# Patient Record
Sex: FEMALE | Race: WHITE | Employment: OTHER | URBAN - NONMETROPOLITAN AREA
[De-identification: names, ages, dates, MRNs, and addresses within clinical notes are randomized per-mention and may not be internally consistent; named-entity substitution may affect disease eponyms.]

---

## 2017-08-25 ENCOUNTER — APPOINTMENT (OUTPATIENT)
Dept: GENERAL RADIOLOGY | Age: 77
DRG: 180 | End: 2017-08-25
Attending: HOSPITALIST
Payer: MEDICARE

## 2017-08-25 ENCOUNTER — HOSPITAL ENCOUNTER (INPATIENT)
Age: 77
LOS: 15 days | Discharge: HOME HEALTH | DRG: 180 | End: 2017-09-09
Attending: HOSPITALIST | Admitting: HOSPITALIST
Payer: MEDICARE

## 2017-08-25 DIAGNOSIS — I48.0 PAROXYSMAL ATRIAL FIBRILLATION (HCC): Primary | ICD-10-CM

## 2017-08-25 LAB
ALBUMIN SERPL-MCNC: 3 G/DL (ref 3.5–5.2)
ALP BLD-CCNC: 101 U/L (ref 35–104)
ALT SERPL-CCNC: 8 U/L (ref 5–33)
ANION GAP SERPL CALCULATED.3IONS-SCNC: 9 MMOL/L (ref 7–19)
AST SERPL-CCNC: 12 U/L (ref 5–32)
BASE EXCESS ARTERIAL: 1.7 MMOL/L (ref -2–2)
BASOPHILS ABSOLUTE: 0.1 K/UL (ref 0–0.2)
BASOPHILS RELATIVE PERCENT: 0.7 % (ref 0–1)
BILIRUB SERPL-MCNC: 0.3 MG/DL (ref 0.2–1.2)
BUN BLDV-MCNC: 12 MG/DL (ref 8–23)
CALCIUM SERPL-MCNC: 9 MG/DL (ref 8.8–10.2)
CARBOXYHEMOGLOBIN ARTERIAL: 8.5 % (ref 0–5)
CHLORIDE BLD-SCNC: 102 MMOL/L (ref 98–111)
CO2: 28 MMOL/L (ref 22–29)
CREAT SERPL-MCNC: 0.8 MG/DL (ref 0.5–0.9)
EOSINOPHILS ABSOLUTE: 2.6 K/UL (ref 0–0.6)
EOSINOPHILS RELATIVE PERCENT: 14.3 % (ref 0–5)
GFR NON-AFRICAN AMERICAN: >60
GLUCOSE BLD-MCNC: 144 MG/DL (ref 70–99)
GLUCOSE BLD-MCNC: 93 MG/DL (ref 74–109)
HCO3 ARTERIAL: 29.4 MMOL/L (ref 22–26)
HCT VFR BLD CALC: 48.1 % (ref 37–47)
HEMOGLOBIN, ART, EXTENDED: 15.9 G/DL (ref 12–16)
HEMOGLOBIN: 15.6 G/DL (ref 12–16)
LYMPHOCYTES ABSOLUTE: 2 K/UL (ref 1.1–4.5)
LYMPHOCYTES RELATIVE PERCENT: 11.5 % (ref 20–40)
MAGNESIUM: 2.2 MG/DL (ref 1.6–2.4)
MCH RBC QN AUTO: 33.1 PG (ref 27–31)
MCHC RBC AUTO-ENTMCNC: 32.4 G/DL (ref 33–37)
MCV RBC AUTO: 101.9 FL (ref 81–99)
METHEMOGLOBIN ARTERIAL: 0.9 %
MONOCYTES ABSOLUTE: 1.2 K/UL (ref 0–0.9)
MONOCYTES RELATIVE PERCENT: 6.7 % (ref 0–10)
NEUTROPHILS ABSOLUTE: 11.8 K/UL (ref 1.5–7.5)
NEUTROPHILS RELATIVE PERCENT: 66.2 % (ref 50–65)
O2 CONTENT ARTERIAL: 16.7 ML/DL
O2 SAT, ARTERIAL: 75.1 %
O2 THERAPY: ABNORMAL
PCO2 ARTERIAL: 57 MMHG (ref 35–45)
PDW BLD-RTO: 16.9 % (ref 11.5–14.5)
PERFORMED ON: ABNORMAL
PH ARTERIAL: 7.32 (ref 7.35–7.45)
PLATELET # BLD: 277 K/UL (ref 130–400)
PMV BLD AUTO: 8.9 FL (ref 9.4–12.3)
PO2 ARTERIAL: 40 MMHG (ref 80–100)
POTASSIUM SERPL-SCNC: 4.2 MMOL/L (ref 3.5–5)
POTASSIUM, WHOLE BLOOD: 4.1
PRO-BNP: 1933 PG/ML (ref 0–1800)
RBC # BLD: 4.72 M/UL (ref 4.2–5.4)
SODIUM BLD-SCNC: 139 MMOL/L (ref 136–145)
TOTAL PROTEIN: 7.2 G/DL (ref 6.6–8.7)
WBC # BLD: 17.8 K/UL (ref 4.8–10.8)

## 2017-08-25 PROCEDURE — 82803 BLOOD GASES ANY COMBINATION: CPT

## 2017-08-25 PROCEDURE — G8998 SWALLOW D/C STATUS: HCPCS

## 2017-08-25 PROCEDURE — 2580000003 HC RX 258: Performed by: HOSPITALIST

## 2017-08-25 PROCEDURE — G8997 SWALLOW GOAL STATUS: HCPCS

## 2017-08-25 PROCEDURE — 84132 ASSAY OF SERUM POTASSIUM: CPT

## 2017-08-25 PROCEDURE — 80053 COMPREHEN METABOLIC PANEL: CPT

## 2017-08-25 PROCEDURE — 1210000000 HC MED SURG R&B

## 2017-08-25 PROCEDURE — 83880 ASSAY OF NATRIURETIC PEPTIDE: CPT

## 2017-08-25 PROCEDURE — 87040 BLOOD CULTURE FOR BACTERIA: CPT

## 2017-08-25 PROCEDURE — 2700000000 HC OXYGEN THERAPY PER DAY

## 2017-08-25 PROCEDURE — 83735 ASSAY OF MAGNESIUM: CPT

## 2017-08-25 PROCEDURE — 82948 REAGENT STRIP/BLOOD GLUCOSE: CPT

## 2017-08-25 PROCEDURE — 85025 COMPLETE CBC W/AUTO DIFF WBC: CPT

## 2017-08-25 PROCEDURE — 6370000000 HC RX 637 (ALT 250 FOR IP): Performed by: HOSPITALIST

## 2017-08-25 PROCEDURE — 6360000002 HC RX W HCPCS: Performed by: INTERNAL MEDICINE

## 2017-08-25 PROCEDURE — 71020 XR CHEST STANDARD TWO VW: CPT

## 2017-08-25 PROCEDURE — 94640 AIRWAY INHALATION TREATMENT: CPT

## 2017-08-25 PROCEDURE — 99222 1ST HOSP IP/OBS MODERATE 55: CPT | Performed by: HOSPITALIST

## 2017-08-25 PROCEDURE — 6360000002 HC RX W HCPCS: Performed by: HOSPITALIST

## 2017-08-25 PROCEDURE — 36415 COLL VENOUS BLD VENIPUNCTURE: CPT

## 2017-08-25 PROCEDURE — 92610 EVALUATE SWALLOWING FUNCTION: CPT

## 2017-08-25 PROCEDURE — G8996 SWALLOW CURRENT STATUS: HCPCS

## 2017-08-25 PROCEDURE — 36600 WITHDRAWAL OF ARTERIAL BLOOD: CPT

## 2017-08-25 RX ORDER — PRAVASTATIN SODIUM 20 MG
40 TABLET ORAL DAILY
Status: DISCONTINUED | OUTPATIENT
Start: 2017-08-25 | End: 2017-08-25

## 2017-08-25 RX ORDER — IPRATROPIUM BROMIDE AND ALBUTEROL SULFATE 2.5; .5 MG/3ML; MG/3ML
1 SOLUTION RESPIRATORY (INHALATION)
Status: DISCONTINUED | OUTPATIENT
Start: 2017-08-25 | End: 2017-08-27

## 2017-08-25 RX ORDER — TRAMADOL HYDROCHLORIDE 50 MG/1
50 TABLET ORAL EVERY 4 HOURS PRN
Status: DISCONTINUED | OUTPATIENT
Start: 2017-08-25 | End: 2017-09-09 | Stop reason: HOSPADM

## 2017-08-25 RX ORDER — FUROSEMIDE 20 MG/1
20 TABLET ORAL DAILY
Status: DISCONTINUED | OUTPATIENT
Start: 2017-08-25 | End: 2017-08-26

## 2017-08-25 RX ORDER — ACETAMINOPHEN 325 MG/1
650 TABLET ORAL EVERY 4 HOURS PRN
Status: DISCONTINUED | OUTPATIENT
Start: 2017-08-25 | End: 2017-08-25

## 2017-08-25 RX ORDER — LEVOFLOXACIN 5 MG/ML
500 INJECTION, SOLUTION INTRAVENOUS EVERY 24 HOURS
Status: DISCONTINUED | OUTPATIENT
Start: 2017-08-25 | End: 2017-08-27

## 2017-08-25 RX ORDER — VANCOMYCIN HYDROCHLORIDE 1 G/200ML
1000 INJECTION, SOLUTION INTRAVENOUS EVERY 12 HOURS
Status: DISCONTINUED | OUTPATIENT
Start: 2017-08-25 | End: 2017-08-29 | Stop reason: DRUGHIGH

## 2017-08-25 RX ORDER — TRAMADOL HYDROCHLORIDE 50 MG/1
50 TABLET ORAL 2 TIMES DAILY
Status: ON HOLD | COMMUNITY
End: 2017-11-27

## 2017-08-25 RX ORDER — GUAIFENESIN 600 MG/1
600 TABLET, EXTENDED RELEASE ORAL 2 TIMES DAILY
Status: DISCONTINUED | OUTPATIENT
Start: 2017-08-25 | End: 2017-09-09 | Stop reason: HOSPADM

## 2017-08-25 RX ORDER — ACETAMINOPHEN 325 MG/1
650 TABLET ORAL EVERY 4 HOURS PRN
Status: DISCONTINUED | OUTPATIENT
Start: 2017-08-25 | End: 2017-09-09 | Stop reason: HOSPADM

## 2017-08-25 RX ORDER — NIFEDIPINE 90 MG/1
90 TABLET, EXTENDED RELEASE ORAL DAILY
Status: DISCONTINUED | OUTPATIENT
Start: 2017-08-25 | End: 2017-09-09 | Stop reason: HOSPADM

## 2017-08-25 RX ORDER — METHYLPREDNISOLONE SODIUM SUCCINATE 40 MG/ML
40 INJECTION, POWDER, LYOPHILIZED, FOR SOLUTION INTRAMUSCULAR; INTRAVENOUS EVERY 6 HOURS
Status: DISCONTINUED | OUTPATIENT
Start: 2017-08-25 | End: 2017-08-26

## 2017-08-25 RX ORDER — SODIUM CHLORIDE 9 MG/ML
INJECTION, SOLUTION INTRAVENOUS CONTINUOUS
Status: DISCONTINUED | OUTPATIENT
Start: 2017-08-25 | End: 2017-08-26

## 2017-08-25 RX ORDER — NICOTINE 21 MG/24HR
1 PATCH, TRANSDERMAL 24 HOURS TRANSDERMAL DAILY
Status: DISCONTINUED | OUTPATIENT
Start: 2017-08-25 | End: 2017-09-09 | Stop reason: HOSPADM

## 2017-08-25 RX ORDER — DIAZEPAM 2 MG/1
2 TABLET ORAL EVERY 6 HOURS PRN
Status: DISCONTINUED | OUTPATIENT
Start: 2017-08-25 | End: 2017-08-25

## 2017-08-25 RX ADMIN — TRAMADOL HYDROCHLORIDE 50 MG: 50 TABLET, FILM COATED ORAL at 17:04

## 2017-08-25 RX ADMIN — ENOXAPARIN SODIUM 40 MG: 40 INJECTION SUBCUTANEOUS at 20:33

## 2017-08-25 RX ADMIN — FUROSEMIDE 20 MG: 20 TABLET ORAL at 20:33

## 2017-08-25 RX ADMIN — METHYLPREDNISOLONE SODIUM SUCCINATE 40 MG: 40 INJECTION, POWDER, FOR SOLUTION INTRAMUSCULAR; INTRAVENOUS at 20:32

## 2017-08-25 RX ADMIN — GUAIFENESIN 600 MG: 600 TABLET, EXTENDED RELEASE ORAL at 20:32

## 2017-08-25 RX ADMIN — VANCOMYCIN HYDROCHLORIDE 1000 MG: 1 INJECTION, SOLUTION INTRAVENOUS at 17:27

## 2017-08-25 RX ADMIN — IPRATROPIUM BROMIDE AND ALBUTEROL SULFATE 1 AMPULE: .5; 3 SOLUTION RESPIRATORY (INHALATION) at 14:49

## 2017-08-25 RX ADMIN — ACETAMINOPHEN 650 MG: 325 TABLET, FILM COATED ORAL at 16:12

## 2017-08-25 RX ADMIN — IPRATROPIUM BROMIDE AND ALBUTEROL SULFATE 1 AMPULE: .5; 3 SOLUTION RESPIRATORY (INHALATION) at 20:00

## 2017-08-25 RX ADMIN — NIFEDIPINE 90 MG: 90 TABLET, FILM COATED, EXTENDED RELEASE ORAL at 17:27

## 2017-08-25 RX ADMIN — SODIUM CHLORIDE: 9 INJECTION, SOLUTION INTRAVENOUS at 17:05

## 2017-08-25 RX ADMIN — LEVOFLOXACIN 500 MG: 5 INJECTION, SOLUTION INTRAVENOUS at 18:39

## 2017-08-25 ASSESSMENT — PAIN SCALES - GENERAL
PAINLEVEL_OUTOF10: 10
PAINLEVEL_OUTOF10: 10

## 2017-08-26 LAB
APTT: 28 SEC (ref 26–36.2)
HCT VFR BLD CALC: 48.8 % (ref 37–47)
HEMOGLOBIN: 15.9 G/DL (ref 12–16)
INR BLD: 0.98 (ref 0.88–1.18)
LACTIC ACID: 1.4 MG/DL (ref 0.5–1.9)
LV EF: 58 %
LVEF MODALITY: NORMAL
MCH RBC QN AUTO: 33.2 PG (ref 27–31)
MCHC RBC AUTO-ENTMCNC: 32.6 G/DL (ref 33–37)
MCV RBC AUTO: 101.9 FL (ref 81–99)
PDW BLD-RTO: 16.8 % (ref 11.5–14.5)
PLATELET # BLD: 298 K/UL (ref 130–400)
PMV BLD AUTO: 9.1 FL (ref 9.4–12.3)
PROTHROMBIN TIME: 12.9 SEC (ref 12–14.6)
RBC # BLD: 4.79 M/UL (ref 4.2–5.4)
TSH SERPL DL<=0.05 MIU/L-ACNC: 1.24 UIU/ML (ref 0.27–4.2)
WBC # BLD: 23 K/UL (ref 4.8–10.8)

## 2017-08-26 PROCEDURE — 93306 TTE W/DOPPLER COMPLETE: CPT

## 2017-08-26 PROCEDURE — 6370000000 HC RX 637 (ALT 250 FOR IP): Performed by: HOSPITALIST

## 2017-08-26 PROCEDURE — 99232 SBSQ HOSP IP/OBS MODERATE 35: CPT | Performed by: HOSPITALIST

## 2017-08-26 PROCEDURE — 94640 AIRWAY INHALATION TREATMENT: CPT

## 2017-08-26 PROCEDURE — 1210000000 HC MED SURG R&B

## 2017-08-26 PROCEDURE — 84443 ASSAY THYROID STIM HORMONE: CPT

## 2017-08-26 PROCEDURE — 2700000000 HC OXYGEN THERAPY PER DAY

## 2017-08-26 PROCEDURE — 85027 COMPLETE CBC AUTOMATED: CPT

## 2017-08-26 PROCEDURE — 6360000002 HC RX W HCPCS: Performed by: HOSPITALIST

## 2017-08-26 PROCEDURE — 2500000003 HC RX 250 WO HCPCS: Performed by: HOSPITALIST

## 2017-08-26 PROCEDURE — 85730 THROMBOPLASTIN TIME PARTIAL: CPT

## 2017-08-26 PROCEDURE — 6360000002 HC RX W HCPCS: Performed by: INTERNAL MEDICINE

## 2017-08-26 PROCEDURE — 83605 ASSAY OF LACTIC ACID: CPT

## 2017-08-26 PROCEDURE — 85610 PROTHROMBIN TIME: CPT

## 2017-08-26 PROCEDURE — 36415 COLL VENOUS BLD VENIPUNCTURE: CPT

## 2017-08-26 RX ORDER — METHYLPREDNISOLONE SODIUM SUCCINATE 40 MG/ML
40 INJECTION, POWDER, LYOPHILIZED, FOR SOLUTION INTRAMUSCULAR; INTRAVENOUS EVERY 8 HOURS
Status: DISCONTINUED | OUTPATIENT
Start: 2017-08-26 | End: 2017-08-26

## 2017-08-26 RX ORDER — METHYLPREDNISOLONE SODIUM SUCCINATE 40 MG/ML
40 INJECTION, POWDER, LYOPHILIZED, FOR SOLUTION INTRAMUSCULAR; INTRAVENOUS EVERY 12 HOURS
Status: DISCONTINUED | OUTPATIENT
Start: 2017-08-26 | End: 2017-08-31

## 2017-08-26 RX ORDER — DOCUSATE SODIUM 100 MG/1
100 CAPSULE, LIQUID FILLED ORAL DAILY
Status: DISCONTINUED | OUTPATIENT
Start: 2017-08-26 | End: 2017-09-09 | Stop reason: HOSPADM

## 2017-08-26 RX ORDER — BUMETANIDE 0.25 MG/ML
1 INJECTION, SOLUTION INTRAMUSCULAR; INTRAVENOUS 2 TIMES DAILY
Status: DISCONTINUED | OUTPATIENT
Start: 2017-08-26 | End: 2017-08-27

## 2017-08-26 RX ORDER — BUMETANIDE 0.25 MG/ML
1 INJECTION, SOLUTION INTRAMUSCULAR; INTRAVENOUS ONCE
Status: COMPLETED | OUTPATIENT
Start: 2017-08-26 | End: 2017-08-26

## 2017-08-26 RX ADMIN — METHYLPREDNISOLONE SODIUM SUCCINATE 40 MG: 40 INJECTION, POWDER, FOR SOLUTION INTRAMUSCULAR; INTRAVENOUS at 14:32

## 2017-08-26 RX ADMIN — TRAMADOL HYDROCHLORIDE 50 MG: 50 TABLET, FILM COATED ORAL at 00:09

## 2017-08-26 RX ADMIN — NIFEDIPINE 90 MG: 90 TABLET, FILM COATED, EXTENDED RELEASE ORAL at 09:48

## 2017-08-26 RX ADMIN — TRAMADOL HYDROCHLORIDE 50 MG: 50 TABLET, FILM COATED ORAL at 05:10

## 2017-08-26 RX ADMIN — IPRATROPIUM BROMIDE AND ALBUTEROL SULFATE 1 AMPULE: .5; 3 SOLUTION RESPIRATORY (INHALATION) at 07:23

## 2017-08-26 RX ADMIN — VANCOMYCIN HYDROCHLORIDE 1000 MG: 1 INJECTION, SOLUTION INTRAVENOUS at 05:10

## 2017-08-26 RX ADMIN — ENOXAPARIN SODIUM 40 MG: 40 INJECTION SUBCUTANEOUS at 20:41

## 2017-08-26 RX ADMIN — IPRATROPIUM BROMIDE AND ALBUTEROL SULFATE 1 AMPULE: .5; 3 SOLUTION RESPIRATORY (INHALATION) at 21:13

## 2017-08-26 RX ADMIN — IPRATROPIUM BROMIDE AND ALBUTEROL SULFATE 1 AMPULE: .5; 3 SOLUTION RESPIRATORY (INHALATION) at 15:12

## 2017-08-26 RX ADMIN — LEVOFLOXACIN 500 MG: 5 INJECTION, SOLUTION INTRAVENOUS at 16:03

## 2017-08-26 RX ADMIN — IPRATROPIUM BROMIDE AND ALBUTEROL SULFATE 1 AMPULE: .5; 3 SOLUTION RESPIRATORY (INHALATION) at 11:21

## 2017-08-26 RX ADMIN — TRAMADOL HYDROCHLORIDE 50 MG: 50 TABLET, FILM COATED ORAL at 20:41

## 2017-08-26 RX ADMIN — VANCOMYCIN HYDROCHLORIDE 1000 MG: 1 INJECTION, SOLUTION INTRAVENOUS at 17:13

## 2017-08-26 RX ADMIN — BUMETANIDE 1 MG: 0.25 INJECTION INTRAMUSCULAR; INTRAVENOUS at 17:47

## 2017-08-26 RX ADMIN — METHYLPREDNISOLONE SODIUM SUCCINATE 40 MG: 40 INJECTION, POWDER, FOR SOLUTION INTRAMUSCULAR; INTRAVENOUS at 02:59

## 2017-08-26 RX ADMIN — GUAIFENESIN 600 MG: 600 TABLET, EXTENDED RELEASE ORAL at 20:41

## 2017-08-26 RX ADMIN — GUAIFENESIN 600 MG: 600 TABLET, EXTENDED RELEASE ORAL at 09:48

## 2017-08-26 RX ADMIN — DOCUSATE SODIUM 100 MG: 100 CAPSULE, LIQUID FILLED ORAL at 14:32

## 2017-08-26 RX ADMIN — BUMETANIDE 1 MG: 0.25 INJECTION INTRAMUSCULAR; INTRAVENOUS at 09:50

## 2017-08-26 ASSESSMENT — PAIN SCALES - GENERAL
PAINLEVEL_OUTOF10: 9
PAINLEVEL_OUTOF10: 5
PAINLEVEL_OUTOF10: 10
PAINLEVEL_OUTOF10: 5

## 2017-08-27 LAB
ALBUMIN SERPL-MCNC: 3.1 G/DL (ref 3.5–5.2)
ALP BLD-CCNC: 83 U/L (ref 35–104)
ALT SERPL-CCNC: 9 U/L (ref 5–33)
ANION GAP SERPL CALCULATED.3IONS-SCNC: 13 MMOL/L (ref 7–19)
AST SERPL-CCNC: 10 U/L (ref 5–32)
BILIRUB SERPL-MCNC: <0.2 MG/DL (ref 0.2–1.2)
BUN BLDV-MCNC: 24 MG/DL (ref 8–23)
CALCIUM SERPL-MCNC: 9.2 MG/DL (ref 8.8–10.2)
CHLORIDE BLD-SCNC: 96 MMOL/L (ref 98–111)
CO2: 28 MMOL/L (ref 22–29)
CREAT SERPL-MCNC: 1.2 MG/DL (ref 0.5–0.9)
GFR NON-AFRICAN AMERICAN: 44
GLUCOSE BLD-MCNC: 199 MG/DL (ref 74–109)
HCT VFR BLD CALC: 45.8 % (ref 37–47)
HEMOGLOBIN: 15 G/DL (ref 12–16)
MCH RBC QN AUTO: 33 PG (ref 27–31)
MCHC RBC AUTO-ENTMCNC: 32.8 G/DL (ref 33–37)
MCV RBC AUTO: 100.7 FL (ref 81–99)
PDW BLD-RTO: 16.8 % (ref 11.5–14.5)
PLATELET # BLD: 282 K/UL (ref 130–400)
PMV BLD AUTO: 9.3 FL (ref 9.4–12.3)
POTASSIUM SERPL-SCNC: 4.3 MMOL/L (ref 3.5–5)
PRO-BNP: 3032 PG/ML (ref 0–1800)
RBC # BLD: 4.55 M/UL (ref 4.2–5.4)
SODIUM BLD-SCNC: 137 MMOL/L (ref 136–145)
TOTAL PROTEIN: 6.9 G/DL (ref 6.6–8.7)
VANCOMYCIN TROUGH: 15.5 UG/ML (ref 10–20)
WBC # BLD: 16 K/UL (ref 4.8–10.8)

## 2017-08-27 PROCEDURE — 2140000000 HC CCU INTERMEDIATE R&B

## 2017-08-27 PROCEDURE — 80202 ASSAY OF VANCOMYCIN: CPT

## 2017-08-27 PROCEDURE — 2500000003 HC RX 250 WO HCPCS: Performed by: HOSPITALIST

## 2017-08-27 PROCEDURE — 6360000002 HC RX W HCPCS: Performed by: INTERNAL MEDICINE

## 2017-08-27 PROCEDURE — 6360000002 HC RX W HCPCS: Performed by: HOSPITALIST

## 2017-08-27 PROCEDURE — 87205 SMEAR GRAM STAIN: CPT

## 2017-08-27 PROCEDURE — 93005 ELECTROCARDIOGRAM TRACING: CPT

## 2017-08-27 PROCEDURE — 36415 COLL VENOUS BLD VENIPUNCTURE: CPT

## 2017-08-27 PROCEDURE — 6370000000 HC RX 637 (ALT 250 FOR IP): Performed by: HOSPITALIST

## 2017-08-27 PROCEDURE — 2580000003 HC RX 258: Performed by: INTERNAL MEDICINE

## 2017-08-27 PROCEDURE — 99223 1ST HOSP IP/OBS HIGH 75: CPT | Performed by: INTERNAL MEDICINE

## 2017-08-27 PROCEDURE — 87070 CULTURE OTHR SPECIMN AEROBIC: CPT

## 2017-08-27 PROCEDURE — 99232 SBSQ HOSP IP/OBS MODERATE 35: CPT | Performed by: HOSPITALIST

## 2017-08-27 PROCEDURE — 94640 AIRWAY INHALATION TREATMENT: CPT

## 2017-08-27 PROCEDURE — 85027 COMPLETE CBC AUTOMATED: CPT

## 2017-08-27 PROCEDURE — 2700000000 HC OXYGEN THERAPY PER DAY

## 2017-08-27 PROCEDURE — 2580000003 HC RX 258: Performed by: HOSPITALIST

## 2017-08-27 PROCEDURE — 2500000003 HC RX 250 WO HCPCS: Performed by: INTERNAL MEDICINE

## 2017-08-27 PROCEDURE — 80053 COMPREHEN METABOLIC PANEL: CPT

## 2017-08-27 PROCEDURE — 83880 ASSAY OF NATRIURETIC PEPTIDE: CPT

## 2017-08-27 RX ORDER — PANTOPRAZOLE SODIUM 40 MG/1
40 TABLET, DELAYED RELEASE ORAL
Status: DISCONTINUED | OUTPATIENT
Start: 2017-08-27 | End: 2017-09-09 | Stop reason: HOSPADM

## 2017-08-27 RX ORDER — BUMETANIDE 1 MG/1
2 TABLET ORAL DAILY
Status: DISCONTINUED | OUTPATIENT
Start: 2017-08-27 | End: 2017-08-27

## 2017-08-27 RX ORDER — DILTIAZEM HYDROCHLORIDE 5 MG/ML
10 INJECTION INTRAVENOUS ONCE
Status: COMPLETED | OUTPATIENT
Start: 2017-08-27 | End: 2017-08-27

## 2017-08-27 RX ADMIN — DOCUSATE SODIUM 100 MG: 100 CAPSULE, LIQUID FILLED ORAL at 09:01

## 2017-08-27 RX ADMIN — DILTIAZEM HYDROCHLORIDE 10 MG: 5 INJECTION INTRAVENOUS at 05:00

## 2017-08-27 RX ADMIN — TRAMADOL HYDROCHLORIDE 50 MG: 50 TABLET, FILM COATED ORAL at 11:33

## 2017-08-27 RX ADMIN — AMIODARONE HYDROCHLORIDE 1 MG/MIN: 50 INJECTION, SOLUTION INTRAVENOUS at 13:40

## 2017-08-27 RX ADMIN — DILTIAZEM HYDROCHLORIDE 15 MG/HR: 5 INJECTION INTRAVENOUS at 05:16

## 2017-08-27 RX ADMIN — ENOXAPARIN SODIUM 80 MG: 80 INJECTION SUBCUTANEOUS at 09:01

## 2017-08-27 RX ADMIN — TRAMADOL HYDROCHLORIDE 50 MG: 50 TABLET, FILM COATED ORAL at 20:48

## 2017-08-27 RX ADMIN — AMIODARONE HYDROCHLORIDE 0.5 MG/MIN: 50 INJECTION, SOLUTION INTRAVENOUS at 20:38

## 2017-08-27 RX ADMIN — AMIODARONE HYDROCHLORIDE 150 MG: 50 INJECTION, SOLUTION INTRAVENOUS at 12:06

## 2017-08-27 RX ADMIN — ACETAMINOPHEN 650 MG: 325 TABLET, FILM COATED ORAL at 20:48

## 2017-08-27 RX ADMIN — TRAMADOL HYDROCHLORIDE 50 MG: 50 TABLET, FILM COATED ORAL at 05:21

## 2017-08-27 RX ADMIN — METHYLPREDNISOLONE SODIUM SUCCINATE 40 MG: 40 INJECTION, POWDER, FOR SOLUTION INTRAMUSCULAR; INTRAVENOUS at 02:59

## 2017-08-27 RX ADMIN — IPRATROPIUM BROMIDE AND ALBUTEROL SULFATE 1 AMPULE: .5; 3 SOLUTION RESPIRATORY (INHALATION) at 07:51

## 2017-08-27 RX ADMIN — METHYLPREDNISOLONE SODIUM SUCCINATE 40 MG: 40 INJECTION, POWDER, FOR SOLUTION INTRAMUSCULAR; INTRAVENOUS at 13:40

## 2017-08-27 RX ADMIN — VANCOMYCIN HYDROCHLORIDE 1000 MG: 1 INJECTION, SOLUTION INTRAVENOUS at 20:37

## 2017-08-27 RX ADMIN — GUAIFENESIN 600 MG: 600 TABLET, EXTENDED RELEASE ORAL at 20:47

## 2017-08-27 RX ADMIN — PANTOPRAZOLE SODIUM 40 MG: 40 TABLET, DELAYED RELEASE ORAL at 09:01

## 2017-08-27 RX ADMIN — GUAIFENESIN 600 MG: 600 TABLET, EXTENDED RELEASE ORAL at 09:01

## 2017-08-27 RX ADMIN — VANCOMYCIN HYDROCHLORIDE 1000 MG: 1 INJECTION, SOLUTION INTRAVENOUS at 06:43

## 2017-08-27 RX ADMIN — BUMETANIDE 2 MG: 1 TABLET ORAL at 09:01

## 2017-08-27 RX ADMIN — NIFEDIPINE 90 MG: 90 TABLET, FILM COATED, EXTENDED RELEASE ORAL at 09:01

## 2017-08-27 RX ADMIN — DOXYCYCLINE 100 MG: 100 INJECTION, POWDER, LYOPHILIZED, FOR SOLUTION INTRAVENOUS at 15:49

## 2017-08-27 ASSESSMENT — ENCOUNTER SYMPTOMS
SHORTNESS OF BREATH: 1
COUGH: 1
SPUTUM PRODUCTION: 1

## 2017-08-27 ASSESSMENT — PAIN SCALES - GENERAL
PAINLEVEL_OUTOF10: 6
PAINLEVEL_OUTOF10: 8
PAINLEVEL_OUTOF10: 6
PAINLEVEL_OUTOF10: 5

## 2017-08-28 LAB
BASE EXCESS ARTERIAL: 8.1 MMOL/L (ref -2–2)
CARBOXYHEMOGLOBIN ARTERIAL: 2.1 % (ref 0–5)
HCO3 ARTERIAL: 34.9 MMOL/L (ref 22–26)
HCT VFR BLD CALC: 44.3 % (ref 37–47)
HEMOGLOBIN, ART, EXTENDED: 15.8 G/DL (ref 12–16)
HEMOGLOBIN: 14.6 G/DL (ref 12–16)
MCH RBC QN AUTO: 33 PG (ref 27–31)
MCHC RBC AUTO-ENTMCNC: 33 G/DL (ref 33–37)
MCV RBC AUTO: 100 FL (ref 81–99)
METHEMOGLOBIN ARTERIAL: 1.1 %
O2 CONTENT ARTERIAL: 19.9 ML/DL
O2 SAT, ARTERIAL: 89.9 %
O2 THERAPY: ABNORMAL
PCO2 ARTERIAL: 55 MMHG (ref 35–45)
PDW BLD-RTO: 16.7 % (ref 11.5–14.5)
PH ARTERIAL: 7.41 (ref 7.35–7.45)
PLATELET # BLD: 291 K/UL (ref 130–400)
PMV BLD AUTO: 9.2 FL (ref 9.4–12.3)
PO2 ARTERIAL: 57 MMHG (ref 80–100)
POTASSIUM, WHOLE BLOOD: 5.1
PRO-BNP: 6231 PG/ML (ref 0–1800)
RBC # BLD: 4.43 M/UL (ref 4.2–5.4)
WBC # BLD: 15.9 K/UL (ref 4.8–10.8)

## 2017-08-28 PROCEDURE — 84132 ASSAY OF SERUM POTASSIUM: CPT

## 2017-08-28 PROCEDURE — G8989 SELF CARE D/C STATUS: HCPCS

## 2017-08-28 PROCEDURE — 2580000003 HC RX 258: Performed by: INTERNAL MEDICINE

## 2017-08-28 PROCEDURE — G8987 SELF CARE CURRENT STATUS: HCPCS

## 2017-08-28 PROCEDURE — 6360000002 HC RX W HCPCS: Performed by: INTERNAL MEDICINE

## 2017-08-28 PROCEDURE — 99232 SBSQ HOSP IP/OBS MODERATE 35: CPT | Performed by: HOSPITALIST

## 2017-08-28 PROCEDURE — 99231 SBSQ HOSP IP/OBS SF/LOW 25: CPT | Performed by: INTERNAL MEDICINE

## 2017-08-28 PROCEDURE — 36600 WITHDRAWAL OF ARTERIAL BLOOD: CPT

## 2017-08-28 PROCEDURE — 83880 ASSAY OF NATRIURETIC PEPTIDE: CPT

## 2017-08-28 PROCEDURE — 97162 PT EVAL MOD COMPLEX 30 MIN: CPT

## 2017-08-28 PROCEDURE — 36415 COLL VENOUS BLD VENIPUNCTURE: CPT

## 2017-08-28 PROCEDURE — 2500000003 HC RX 250 WO HCPCS: Performed by: HOSPITALIST

## 2017-08-28 PROCEDURE — 2140000000 HC CCU INTERMEDIATE R&B

## 2017-08-28 PROCEDURE — G8978 MOBILITY CURRENT STATUS: HCPCS

## 2017-08-28 PROCEDURE — 2700000000 HC OXYGEN THERAPY PER DAY

## 2017-08-28 PROCEDURE — G8979 MOBILITY GOAL STATUS: HCPCS

## 2017-08-28 PROCEDURE — 85027 COMPLETE CBC AUTOMATED: CPT

## 2017-08-28 PROCEDURE — 6360000002 HC RX W HCPCS: Performed by: HOSPITALIST

## 2017-08-28 PROCEDURE — 2500000003 HC RX 250 WO HCPCS: Performed by: INTERNAL MEDICINE

## 2017-08-28 PROCEDURE — G8988 SELF CARE GOAL STATUS: HCPCS

## 2017-08-28 PROCEDURE — 97165 OT EVAL LOW COMPLEX 30 MIN: CPT

## 2017-08-28 PROCEDURE — 93005 ELECTROCARDIOGRAM TRACING: CPT

## 2017-08-28 PROCEDURE — 2580000003 HC RX 258: Performed by: HOSPITALIST

## 2017-08-28 PROCEDURE — 6370000000 HC RX 637 (ALT 250 FOR IP): Performed by: HOSPITALIST

## 2017-08-28 PROCEDURE — 82803 BLOOD GASES ANY COMBINATION: CPT

## 2017-08-28 RX ORDER — DILTIAZEM HYDROCHLORIDE 5 MG/ML
10 INJECTION INTRAVENOUS ONCE
Status: COMPLETED | OUTPATIENT
Start: 2017-08-28 | End: 2017-08-28

## 2017-08-28 RX ADMIN — DOXYCYCLINE 100 MG: 100 INJECTION, POWDER, LYOPHILIZED, FOR SOLUTION INTRAVENOUS at 15:48

## 2017-08-28 RX ADMIN — TRAMADOL HYDROCHLORIDE 50 MG: 50 TABLET, FILM COATED ORAL at 02:02

## 2017-08-28 RX ADMIN — GUAIFENESIN 600 MG: 600 TABLET, EXTENDED RELEASE ORAL at 19:56

## 2017-08-28 RX ADMIN — METHYLNALTREXONE BROMIDE 6 MG: 12 INJECTION, SOLUTION SUBCUTANEOUS at 16:53

## 2017-08-28 RX ADMIN — DILTIAZEM HYDROCHLORIDE 10 MG/HR: 5 INJECTION INTRAVENOUS at 19:39

## 2017-08-28 RX ADMIN — NIFEDIPINE 90 MG: 90 TABLET, FILM COATED, EXTENDED RELEASE ORAL at 09:31

## 2017-08-28 RX ADMIN — PANTOPRAZOLE SODIUM 40 MG: 40 TABLET, DELAYED RELEASE ORAL at 06:36

## 2017-08-28 RX ADMIN — DOCUSATE SODIUM 100 MG: 100 CAPSULE, LIQUID FILLED ORAL at 09:31

## 2017-08-28 RX ADMIN — METHYLPREDNISOLONE SODIUM SUCCINATE 40 MG: 40 INJECTION, POWDER, FOR SOLUTION INTRAMUSCULAR; INTRAVENOUS at 02:02

## 2017-08-28 RX ADMIN — VANCOMYCIN HYDROCHLORIDE 1000 MG: 1 INJECTION, SOLUTION INTRAVENOUS at 06:12

## 2017-08-28 RX ADMIN — AMIODARONE HYDROCHLORIDE 0.5 MG/MIN: 50 INJECTION, SOLUTION INTRAVENOUS at 16:46

## 2017-08-28 RX ADMIN — TRAMADOL HYDROCHLORIDE 50 MG: 50 TABLET, FILM COATED ORAL at 23:58

## 2017-08-28 RX ADMIN — ENOXAPARIN SODIUM 80 MG: 80 INJECTION SUBCUTANEOUS at 15:48

## 2017-08-28 RX ADMIN — VANCOMYCIN HYDROCHLORIDE 1000 MG: 1 INJECTION, SOLUTION INTRAVENOUS at 18:08

## 2017-08-28 RX ADMIN — GUAIFENESIN 600 MG: 600 TABLET, EXTENDED RELEASE ORAL at 09:31

## 2017-08-28 RX ADMIN — TRAMADOL HYDROCHLORIDE 50 MG: 50 TABLET, FILM COATED ORAL at 19:56

## 2017-08-28 RX ADMIN — METHYLPREDNISOLONE SODIUM SUCCINATE 40 MG: 40 INJECTION, POWDER, FOR SOLUTION INTRAMUSCULAR; INTRAVENOUS at 15:48

## 2017-08-28 RX ADMIN — DOXYCYCLINE 100 MG: 100 INJECTION, POWDER, LYOPHILIZED, FOR SOLUTION INTRAVENOUS at 02:58

## 2017-08-28 RX ADMIN — DILTIAZEM HYDROCHLORIDE 10 MG: 5 INJECTION INTRAVENOUS at 19:31

## 2017-08-28 ASSESSMENT — PAIN SCALES - GENERAL
PAINLEVEL_OUTOF10: 7
PAINLEVEL_OUTOF10: 9
PAINLEVEL_OUTOF10: 7
PAINLEVEL_OUTOF10: 8
PAINLEVEL_OUTOF10: 0

## 2017-08-28 ASSESSMENT — PAIN DESCRIPTION - ORIENTATION: ORIENTATION: RIGHT

## 2017-08-28 ASSESSMENT — PAIN DESCRIPTION - LOCATION: LOCATION: RIB CAGE

## 2017-08-28 ASSESSMENT — PAIN DESCRIPTION - PAIN TYPE: TYPE: ACUTE PAIN

## 2017-08-29 LAB
ANION GAP SERPL CALCULATED.3IONS-SCNC: 10 MMOL/L (ref 7–19)
BUN BLDV-MCNC: 36 MG/DL (ref 8–23)
CALCIUM SERPL-MCNC: 9.4 MG/DL (ref 8.8–10.2)
CHLORIDE BLD-SCNC: 96 MMOL/L (ref 98–111)
CO2: 30 MMOL/L (ref 22–29)
CREAT SERPL-MCNC: 1.3 MG/DL (ref 0.5–0.9)
CULTURE, RESPIRATORY: NORMAL
EKG P AXIS: 86 DEGREES
EKG P AXIS: NORMAL DEGREES
EKG P AXIS: NORMAL DEGREES
EKG P-R INTERVAL: 120 MS
EKG P-R INTERVAL: 44 MS
EKG P-R INTERVAL: 73 MS
EKG Q-T INTERVAL: 294 MS
EKG Q-T INTERVAL: 349 MS
EKG Q-T INTERVAL: 374 MS
EKG QRS DURATION: 82 MS
EKG QRS DURATION: 88 MS
EKG QRS DURATION: 93 MS
EKG QTC CALCULATION (BAZETT): 369 MS
EKG QTC CALCULATION (BAZETT): 424 MS
EKG QTC CALCULATION (BAZETT): 465 MS
EKG T AXIS: 58 DEGREES
EKG T AXIS: 72 DEGREES
EKG T AXIS: 73 DEGREES
GFR NON-AFRICAN AMERICAN: 40
GLUCOSE BLD-MCNC: 183 MG/DL (ref 74–109)
GRAM STAIN RESULT: NORMAL
HCT VFR BLD CALC: 45.9 % (ref 37–47)
HEMOGLOBIN: 14.9 G/DL (ref 12–16)
MCH RBC QN AUTO: 32.4 PG (ref 27–31)
MCHC RBC AUTO-ENTMCNC: 32.5 G/DL (ref 33–37)
MCV RBC AUTO: 99.8 FL (ref 81–99)
PDW BLD-RTO: 16.5 % (ref 11.5–14.5)
PLATELET # BLD: 282 K/UL (ref 130–400)
PMV BLD AUTO: 9 FL (ref 9.4–12.3)
POTASSIUM SERPL-SCNC: 4.6 MMOL/L (ref 3.5–5)
POTASSIUM SERPL-SCNC: 5.5 MMOL/L (ref 3.5–5)
RBC # BLD: 4.6 M/UL (ref 4.2–5.4)
SODIUM BLD-SCNC: 136 MMOL/L (ref 136–145)
VANCOMYCIN TROUGH: 32.4 UG/ML (ref 10–20)
WBC # BLD: 14.8 K/UL (ref 4.8–10.8)

## 2017-08-29 PROCEDURE — 99231 SBSQ HOSP IP/OBS SF/LOW 25: CPT | Performed by: INTERNAL MEDICINE

## 2017-08-29 PROCEDURE — 6370000000 HC RX 637 (ALT 250 FOR IP): Performed by: INTERNAL MEDICINE

## 2017-08-29 PROCEDURE — 97116 GAIT TRAINING THERAPY: CPT

## 2017-08-29 PROCEDURE — 94640 AIRWAY INHALATION TREATMENT: CPT

## 2017-08-29 PROCEDURE — 6370000000 HC RX 637 (ALT 250 FOR IP)

## 2017-08-29 PROCEDURE — 2500000003 HC RX 250 WO HCPCS: Performed by: HOSPITALIST

## 2017-08-29 PROCEDURE — 80048 BASIC METABOLIC PNL TOTAL CA: CPT

## 2017-08-29 PROCEDURE — 80202 ASSAY OF VANCOMYCIN: CPT

## 2017-08-29 PROCEDURE — 6360000002 HC RX W HCPCS: Performed by: INTERNAL MEDICINE

## 2017-08-29 PROCEDURE — 93005 ELECTROCARDIOGRAM TRACING: CPT

## 2017-08-29 PROCEDURE — 6360000002 HC RX W HCPCS: Performed by: HOSPITALIST

## 2017-08-29 PROCEDURE — 6360000002 HC RX W HCPCS

## 2017-08-29 PROCEDURE — 2700000000 HC OXYGEN THERAPY PER DAY

## 2017-08-29 PROCEDURE — 99232 SBSQ HOSP IP/OBS MODERATE 35: CPT | Performed by: INTERNAL MEDICINE

## 2017-08-29 PROCEDURE — 2500000003 HC RX 250 WO HCPCS: Performed by: INTERNAL MEDICINE

## 2017-08-29 PROCEDURE — 2580000003 HC RX 258: Performed by: INTERNAL MEDICINE

## 2017-08-29 PROCEDURE — 84132 ASSAY OF SERUM POTASSIUM: CPT

## 2017-08-29 PROCEDURE — 6370000000 HC RX 637 (ALT 250 FOR IP): Performed by: HOSPITALIST

## 2017-08-29 PROCEDURE — 85027 COMPLETE CBC AUTOMATED: CPT

## 2017-08-29 PROCEDURE — 36415 COLL VENOUS BLD VENIPUNCTURE: CPT

## 2017-08-29 PROCEDURE — 97110 THERAPEUTIC EXERCISES: CPT

## 2017-08-29 PROCEDURE — 2140000000 HC CCU INTERMEDIATE R&B

## 2017-08-29 PROCEDURE — 2580000003 HC RX 258: Performed by: HOSPITALIST

## 2017-08-29 RX ORDER — IPRATROPIUM BROMIDE AND ALBUTEROL SULFATE 2.5; .5 MG/3ML; MG/3ML
1 SOLUTION RESPIRATORY (INHALATION)
Status: DISCONTINUED | OUTPATIENT
Start: 2017-08-29 | End: 2017-08-29

## 2017-08-29 RX ORDER — SODIUM CHLORIDE 0.9 % (FLUSH) 0.9 %
10 SYRINGE (ML) INJECTION EVERY 12 HOURS SCHEDULED
Status: DISCONTINUED | OUTPATIENT
Start: 2017-08-29 | End: 2017-09-06 | Stop reason: SDUPTHER

## 2017-08-29 RX ORDER — FUROSEMIDE 10 MG/ML
40 INJECTION INTRAMUSCULAR; INTRAVENOUS ONCE
Status: COMPLETED | OUTPATIENT
Start: 2017-08-29 | End: 2017-08-29

## 2017-08-29 RX ORDER — IPRATROPIUM BROMIDE AND ALBUTEROL SULFATE 2.5; .5 MG/3ML; MG/3ML
1 SOLUTION RESPIRATORY (INHALATION)
Status: DISCONTINUED | OUTPATIENT
Start: 2017-08-29 | End: 2017-09-09 | Stop reason: HOSPADM

## 2017-08-29 RX ORDER — SODIUM CHLORIDE 9 MG/ML
INJECTION, SOLUTION INTRAVENOUS CONTINUOUS
Status: DISCONTINUED | OUTPATIENT
Start: 2017-08-29 | End: 2017-09-07

## 2017-08-29 RX ORDER — SODIUM CHLORIDE 0.9 % (FLUSH) 0.9 %
10 SYRINGE (ML) INJECTION PRN
Status: DISCONTINUED | OUTPATIENT
Start: 2017-08-29 | End: 2017-09-06 | Stop reason: SDUPTHER

## 2017-08-29 RX ADMIN — GUAIFENESIN 600 MG: 600 TABLET, EXTENDED RELEASE ORAL at 09:23

## 2017-08-29 RX ADMIN — SODIUM CHLORIDE: 9 INJECTION, SOLUTION INTRAVENOUS at 08:13

## 2017-08-29 RX ADMIN — METHYLPREDNISOLONE SODIUM SUCCINATE 40 MG: 40 INJECTION, POWDER, FOR SOLUTION INTRAMUSCULAR; INTRAVENOUS at 09:23

## 2017-08-29 RX ADMIN — FUROSEMIDE 40 MG: 10 INJECTION, SOLUTION INTRAMUSCULAR; INTRAVENOUS at 10:06

## 2017-08-29 RX ADMIN — VANCOMYCIN HYDROCHLORIDE 1000 MG: 1 INJECTION, SOLUTION INTRAVENOUS at 06:18

## 2017-08-29 RX ADMIN — DOCUSATE SODIUM 100 MG: 100 CAPSULE, LIQUID FILLED ORAL at 09:23

## 2017-08-29 RX ADMIN — IPRATROPIUM BROMIDE AND ALBUTEROL SULFATE 1 AMPULE: .5; 3 SOLUTION RESPIRATORY (INHALATION) at 14:49

## 2017-08-29 RX ADMIN — IPRATROPIUM BROMIDE AND ALBUTEROL SULFATE 1 AMPULE: .5; 3 SOLUTION RESPIRATORY (INHALATION) at 10:00

## 2017-08-29 RX ADMIN — DOXYCYCLINE 100 MG: 100 INJECTION, POWDER, LYOPHILIZED, FOR SOLUTION INTRAVENOUS at 03:37

## 2017-08-29 RX ADMIN — TRAMADOL HYDROCHLORIDE 50 MG: 50 TABLET, FILM COATED ORAL at 10:05

## 2017-08-29 RX ADMIN — METOPROLOL TARTRATE 25 MG: 50 TABLET ORAL at 20:00

## 2017-08-29 RX ADMIN — IPRATROPIUM BROMIDE AND ALBUTEROL SULFATE 1 AMPULE: .5; 3 SOLUTION RESPIRATORY (INHALATION) at 18:47

## 2017-08-29 RX ADMIN — METHYLPREDNISOLONE SODIUM SUCCINATE 40 MG: 40 INJECTION, POWDER, FOR SOLUTION INTRAMUSCULAR; INTRAVENOUS at 20:14

## 2017-08-29 RX ADMIN — AMIODARONE HYDROCHLORIDE 0.5 MG/MIN: 50 INJECTION, SOLUTION INTRAVENOUS at 10:37

## 2017-08-29 RX ADMIN — ENOXAPARIN SODIUM 80 MG: 80 INJECTION SUBCUTANEOUS at 09:23

## 2017-08-29 RX ADMIN — TRAMADOL HYDROCHLORIDE 50 MG: 50 TABLET, FILM COATED ORAL at 20:04

## 2017-08-29 RX ADMIN — NIFEDIPINE 90 MG: 90 TABLET, FILM COATED, EXTENDED RELEASE ORAL at 09:23

## 2017-08-29 RX ADMIN — Medication 10 ML: at 09:24

## 2017-08-29 RX ADMIN — TRAMADOL HYDROCHLORIDE 50 MG: 50 TABLET, FILM COATED ORAL at 05:55

## 2017-08-29 RX ADMIN — DILTIAZEM HYDROCHLORIDE 10 MG/HR: 5 INJECTION INTRAVENOUS at 06:19

## 2017-08-29 RX ADMIN — PANTOPRAZOLE SODIUM 40 MG: 40 TABLET, DELAYED RELEASE ORAL at 05:52

## 2017-08-29 RX ADMIN — DOXYCYCLINE 100 MG: 100 INJECTION, POWDER, LYOPHILIZED, FOR SOLUTION INTRAVENOUS at 14:36

## 2017-08-29 RX ADMIN — ENOXAPARIN SODIUM 80 MG: 80 INJECTION SUBCUTANEOUS at 20:04

## 2017-08-29 RX ADMIN — GUAIFENESIN 600 MG: 600 TABLET, EXTENDED RELEASE ORAL at 20:03

## 2017-08-29 ASSESSMENT — PAIN DESCRIPTION - PAIN TYPE: TYPE: CHRONIC PAIN

## 2017-08-29 ASSESSMENT — PAIN SCALES - GENERAL
PAINLEVEL_OUTOF10: 7
PAINLEVEL_OUTOF10: 9
PAINLEVEL_OUTOF10: 6
PAINLEVEL_OUTOF10: 8
PAINLEVEL_OUTOF10: 7
PAINLEVEL_OUTOF10: 0
PAINLEVEL_OUTOF10: 9
PAINLEVEL_OUTOF10: 0
PAINLEVEL_OUTOF10: 0
PAINLEVEL_OUTOF10: 9
PAINLEVEL_OUTOF10: 8
PAINLEVEL_OUTOF10: 0
PAINLEVEL_OUTOF10: 8
PAINLEVEL_OUTOF10: 6

## 2017-08-29 ASSESSMENT — PAIN DESCRIPTION - LOCATION: LOCATION: BACK

## 2017-08-30 PROBLEM — I48.91 NEW ONSET A-FIB (HCC): Status: ACTIVE | Noted: 2017-08-30

## 2017-08-30 LAB
ANION GAP SERPL CALCULATED.3IONS-SCNC: 9 MMOL/L (ref 7–19)
BLOOD CULTURE, ROUTINE: NORMAL
BUN BLDV-MCNC: 36 MG/DL (ref 8–23)
CALCIUM SERPL-MCNC: 9.6 MG/DL (ref 8.8–10.2)
CHLORIDE BLD-SCNC: 96 MMOL/L (ref 98–111)
CO2: 33 MMOL/L (ref 22–29)
CREAT SERPL-MCNC: 1.3 MG/DL (ref 0.5–0.9)
CULTURE, BLOOD 2: NORMAL
EKG P AXIS: 39 DEGREES
EKG P AXIS: 45 DEGREES
EKG P-R INTERVAL: 148 MS
EKG P-R INTERVAL: 155 MS
EKG Q-T INTERVAL: 356 MS
EKG Q-T INTERVAL: 402 MS
EKG QRS DURATION: 97 MS
EKG QRS DURATION: 99 MS
EKG QTC CALCULATION (BAZETT): 419 MS
EKG QTC CALCULATION (BAZETT): 425 MS
EKG T AXIS: 49 DEGREES
EKG T AXIS: 59 DEGREES
GFR NON-AFRICAN AMERICAN: 40
GLUCOSE BLD-MCNC: 180 MG/DL (ref 74–109)
HCT VFR BLD CALC: 45.6 % (ref 37–47)
HEMOGLOBIN: 15.2 G/DL (ref 12–16)
MCH RBC QN AUTO: 32.5 PG (ref 27–31)
MCHC RBC AUTO-ENTMCNC: 33.3 G/DL (ref 33–37)
MCV RBC AUTO: 97.4 FL (ref 81–99)
PDW BLD-RTO: 15.9 % (ref 11.5–14.5)
PLATELET # BLD: 266 K/UL (ref 130–400)
PMV BLD AUTO: 8.9 FL (ref 9.4–12.3)
POTASSIUM SERPL-SCNC: 4.8 MMOL/L (ref 3.5–5)
RBC # BLD: 4.68 M/UL (ref 4.2–5.4)
SODIUM BLD-SCNC: 138 MMOL/L (ref 136–145)
VANCOMYCIN RANDOM: 24.5 UG/ML
VANCOMYCIN TROUGH: 19.9 UG/ML (ref 10–20)
WBC # BLD: 11.3 K/UL (ref 4.8–10.8)

## 2017-08-30 PROCEDURE — 80048 BASIC METABOLIC PNL TOTAL CA: CPT

## 2017-08-30 PROCEDURE — 6370000000 HC RX 637 (ALT 250 FOR IP): Performed by: HOSPITALIST

## 2017-08-30 PROCEDURE — 6360000002 HC RX W HCPCS: Performed by: INTERNAL MEDICINE

## 2017-08-30 PROCEDURE — 99224 PR SBSQ OBSERVATION CARE/DAY 15 MINUTES: CPT | Performed by: INTERNAL MEDICINE

## 2017-08-30 PROCEDURE — 2580000003 HC RX 258: Performed by: INTERNAL MEDICINE

## 2017-08-30 PROCEDURE — 2580000003 HC RX 258: Performed by: HOSPITALIST

## 2017-08-30 PROCEDURE — 6360000002 HC RX W HCPCS: Performed by: HOSPITALIST

## 2017-08-30 PROCEDURE — 2140000000 HC CCU INTERMEDIATE R&B

## 2017-08-30 PROCEDURE — 2700000000 HC OXYGEN THERAPY PER DAY

## 2017-08-30 PROCEDURE — 94640 AIRWAY INHALATION TREATMENT: CPT

## 2017-08-30 PROCEDURE — 97110 THERAPEUTIC EXERCISES: CPT

## 2017-08-30 PROCEDURE — 6370000000 HC RX 637 (ALT 250 FOR IP): Performed by: INTERNAL MEDICINE

## 2017-08-30 PROCEDURE — 2500000003 HC RX 250 WO HCPCS: Performed by: HOSPITALIST

## 2017-08-30 PROCEDURE — 93005 ELECTROCARDIOGRAM TRACING: CPT

## 2017-08-30 PROCEDURE — 6370000000 HC RX 637 (ALT 250 FOR IP): Performed by: NURSE PRACTITIONER

## 2017-08-30 PROCEDURE — 80202 ASSAY OF VANCOMYCIN: CPT

## 2017-08-30 PROCEDURE — 99231 SBSQ HOSP IP/OBS SF/LOW 25: CPT | Performed by: INTERNAL MEDICINE

## 2017-08-30 PROCEDURE — 85027 COMPLETE CBC AUTOMATED: CPT

## 2017-08-30 PROCEDURE — 36415 COLL VENOUS BLD VENIPUNCTURE: CPT

## 2017-08-30 PROCEDURE — 97116 GAIT TRAINING THERAPY: CPT

## 2017-08-30 RX ORDER — AMIODARONE HYDROCHLORIDE 200 MG/1
400 TABLET ORAL DAILY
Status: DISCONTINUED | OUTPATIENT
Start: 2017-08-30 | End: 2017-09-09 | Stop reason: HOSPADM

## 2017-08-30 RX ADMIN — DOXYCYCLINE 100 MG: 100 INJECTION, POWDER, LYOPHILIZED, FOR SOLUTION INTRAVENOUS at 15:38

## 2017-08-30 RX ADMIN — DOCUSATE SODIUM 100 MG: 100 CAPSULE, LIQUID FILLED ORAL at 09:03

## 2017-08-30 RX ADMIN — IPRATROPIUM BROMIDE AND ALBUTEROL SULFATE 1 AMPULE: .5; 3 SOLUTION RESPIRATORY (INHALATION) at 19:35

## 2017-08-30 RX ADMIN — METOPROLOL TARTRATE 25 MG: 50 TABLET ORAL at 20:40

## 2017-08-30 RX ADMIN — TRAMADOL HYDROCHLORIDE 50 MG: 50 TABLET, FILM COATED ORAL at 05:36

## 2017-08-30 RX ADMIN — IPRATROPIUM BROMIDE AND ALBUTEROL SULFATE 1 AMPULE: .5; 3 SOLUTION RESPIRATORY (INHALATION) at 10:17

## 2017-08-30 RX ADMIN — Medication 10 ML: at 20:41

## 2017-08-30 RX ADMIN — ENOXAPARIN SODIUM 80 MG: 80 INJECTION SUBCUTANEOUS at 17:10

## 2017-08-30 RX ADMIN — METHYLPREDNISOLONE SODIUM SUCCINATE 40 MG: 40 INJECTION, POWDER, FOR SOLUTION INTRAMUSCULAR; INTRAVENOUS at 02:55

## 2017-08-30 RX ADMIN — DOXYCYCLINE 100 MG: 100 INJECTION, POWDER, LYOPHILIZED, FOR SOLUTION INTRAVENOUS at 02:55

## 2017-08-30 RX ADMIN — TRAMADOL HYDROCHLORIDE 50 MG: 50 TABLET, FILM COATED ORAL at 22:07

## 2017-08-30 RX ADMIN — METHYLPREDNISOLONE SODIUM SUCCINATE 40 MG: 40 INJECTION, POWDER, FOR SOLUTION INTRAMUSCULAR; INTRAVENOUS at 15:38

## 2017-08-30 RX ADMIN — AMIODARONE HYDROCHLORIDE 0.5 MG/MIN: 50 INJECTION, SOLUTION INTRAVENOUS at 02:55

## 2017-08-30 RX ADMIN — AMIODARONE HYDROCHLORIDE 400 MG: 200 TABLET ORAL at 09:03

## 2017-08-30 RX ADMIN — METOPROLOL TARTRATE 25 MG: 50 TABLET ORAL at 09:03

## 2017-08-30 RX ADMIN — Medication 10 ML: at 09:02

## 2017-08-30 RX ADMIN — GUAIFENESIN 600 MG: 600 TABLET, EXTENDED RELEASE ORAL at 09:03

## 2017-08-30 RX ADMIN — ENOXAPARIN SODIUM 80 MG: 80 INJECTION SUBCUTANEOUS at 09:02

## 2017-08-30 RX ADMIN — PANTOPRAZOLE SODIUM 40 MG: 40 TABLET, DELAYED RELEASE ORAL at 05:36

## 2017-08-30 RX ADMIN — IPRATROPIUM BROMIDE AND ALBUTEROL SULFATE 1 AMPULE: .5; 3 SOLUTION RESPIRATORY (INHALATION) at 06:50

## 2017-08-30 RX ADMIN — IPRATROPIUM BROMIDE AND ALBUTEROL SULFATE 1 AMPULE: .5; 3 SOLUTION RESPIRATORY (INHALATION) at 14:24

## 2017-08-30 RX ADMIN — GUAIFENESIN 600 MG: 600 TABLET, EXTENDED RELEASE ORAL at 20:40

## 2017-08-30 RX ADMIN — NIFEDIPINE 90 MG: 90 TABLET, FILM COATED, EXTENDED RELEASE ORAL at 09:03

## 2017-08-30 ASSESSMENT — PAIN SCALES - GENERAL
PAINLEVEL_OUTOF10: 8
PAINLEVEL_OUTOF10: 0
PAINLEVEL_OUTOF10: 7
PAINLEVEL_OUTOF10: 2

## 2017-08-31 LAB
ANION GAP SERPL CALCULATED.3IONS-SCNC: 7 MMOL/L (ref 7–19)
BUN BLDV-MCNC: 39 MG/DL (ref 8–23)
CALCIUM SERPL-MCNC: 9.3 MG/DL (ref 8.8–10.2)
CHLORIDE BLD-SCNC: 99 MMOL/L (ref 98–111)
CO2: 35 MMOL/L (ref 22–29)
CREAT SERPL-MCNC: 1.1 MG/DL (ref 0.5–0.9)
GFR NON-AFRICAN AMERICAN: 48
GLUCOSE BLD-MCNC: 144 MG/DL (ref 74–109)
HCT VFR BLD CALC: 44.6 % (ref 37–47)
HEMOGLOBIN: 15 G/DL (ref 12–16)
MCH RBC QN AUTO: 33.6 PG (ref 27–31)
MCHC RBC AUTO-ENTMCNC: 33.6 G/DL (ref 33–37)
MCV RBC AUTO: 99.8 FL (ref 81–99)
PDW BLD-RTO: 16.2 % (ref 11.5–14.5)
PLATELET # BLD: 269 K/UL (ref 130–400)
PMV BLD AUTO: 9.3 FL (ref 9.4–12.3)
POTASSIUM SERPL-SCNC: 4.8 MMOL/L (ref 3.5–5)
RBC # BLD: 4.47 M/UL (ref 4.2–5.4)
SODIUM BLD-SCNC: 141 MMOL/L (ref 136–145)
VANCOMYCIN RANDOM: 14.9 UG/ML
WBC # BLD: 12.6 K/UL (ref 4.8–10.8)

## 2017-08-31 PROCEDURE — 2580000003 HC RX 258: Performed by: HOSPITALIST

## 2017-08-31 PROCEDURE — 2580000003 HC RX 258: Performed by: INTERNAL MEDICINE

## 2017-08-31 PROCEDURE — 6370000000 HC RX 637 (ALT 250 FOR IP): Performed by: NURSE PRACTITIONER

## 2017-08-31 PROCEDURE — 2140000000 HC CCU INTERMEDIATE R&B

## 2017-08-31 PROCEDURE — 85027 COMPLETE CBC AUTOMATED: CPT

## 2017-08-31 PROCEDURE — 6370000000 HC RX 637 (ALT 250 FOR IP): Performed by: INTERNAL MEDICINE

## 2017-08-31 PROCEDURE — 2700000000 HC OXYGEN THERAPY PER DAY

## 2017-08-31 PROCEDURE — 80202 ASSAY OF VANCOMYCIN: CPT

## 2017-08-31 PROCEDURE — 94640 AIRWAY INHALATION TREATMENT: CPT

## 2017-08-31 PROCEDURE — 99232 SBSQ HOSP IP/OBS MODERATE 35: CPT | Performed by: INTERNAL MEDICINE

## 2017-08-31 PROCEDURE — 36415 COLL VENOUS BLD VENIPUNCTURE: CPT

## 2017-08-31 PROCEDURE — 6360000002 HC RX W HCPCS: Performed by: HOSPITALIST

## 2017-08-31 PROCEDURE — 2500000003 HC RX 250 WO HCPCS: Performed by: HOSPITALIST

## 2017-08-31 PROCEDURE — 6370000000 HC RX 637 (ALT 250 FOR IP): Performed by: HOSPITALIST

## 2017-08-31 PROCEDURE — 80048 BASIC METABOLIC PNL TOTAL CA: CPT

## 2017-08-31 PROCEDURE — 6360000002 HC RX W HCPCS: Performed by: INTERNAL MEDICINE

## 2017-08-31 PROCEDURE — 99231 SBSQ HOSP IP/OBS SF/LOW 25: CPT | Performed by: INTERNAL MEDICINE

## 2017-08-31 RX ORDER — METHYLPREDNISOLONE SODIUM SUCCINATE 40 MG/ML
40 INJECTION, POWDER, LYOPHILIZED, FOR SOLUTION INTRAMUSCULAR; INTRAVENOUS EVERY 24 HOURS
Status: DISCONTINUED | OUTPATIENT
Start: 2017-09-01 | End: 2017-09-02

## 2017-08-31 RX ADMIN — Medication 10 ML: at 09:08

## 2017-08-31 RX ADMIN — TRAMADOL HYDROCHLORIDE 50 MG: 50 TABLET, FILM COATED ORAL at 20:58

## 2017-08-31 RX ADMIN — NIFEDIPINE 90 MG: 90 TABLET, FILM COATED, EXTENDED RELEASE ORAL at 09:07

## 2017-08-31 RX ADMIN — Medication 10 ML: at 13:46

## 2017-08-31 RX ADMIN — GUAIFENESIN 600 MG: 600 TABLET, EXTENDED RELEASE ORAL at 20:58

## 2017-08-31 RX ADMIN — VANCOMYCIN HYDROCHLORIDE 1250 MG: 1 INJECTION, POWDER, LYOPHILIZED, FOR SOLUTION INTRAVENOUS at 09:06

## 2017-08-31 RX ADMIN — GUAIFENESIN 600 MG: 600 TABLET, EXTENDED RELEASE ORAL at 09:07

## 2017-08-31 RX ADMIN — DOXYCYCLINE 100 MG: 100 INJECTION, POWDER, LYOPHILIZED, FOR SOLUTION INTRAVENOUS at 04:01

## 2017-08-31 RX ADMIN — IPRATROPIUM BROMIDE AND ALBUTEROL SULFATE 1 AMPULE: .5; 3 SOLUTION RESPIRATORY (INHALATION) at 19:40

## 2017-08-31 RX ADMIN — Medication 10 ML: at 20:59

## 2017-08-31 RX ADMIN — IPRATROPIUM BROMIDE AND ALBUTEROL SULFATE 1 AMPULE: .5; 3 SOLUTION RESPIRATORY (INHALATION) at 10:46

## 2017-08-31 RX ADMIN — IPRATROPIUM BROMIDE AND ALBUTEROL SULFATE 1 AMPULE: .5; 3 SOLUTION RESPIRATORY (INHALATION) at 06:52

## 2017-08-31 RX ADMIN — METOPROLOL TARTRATE 25 MG: 50 TABLET ORAL at 09:07

## 2017-08-31 RX ADMIN — MEROPENEM 500 MG: 1 INJECTION, POWDER, FOR SOLUTION INTRAVENOUS at 20:59

## 2017-08-31 RX ADMIN — AMIODARONE HYDROCHLORIDE 400 MG: 200 TABLET ORAL at 09:07

## 2017-08-31 RX ADMIN — METHYLPREDNISOLONE SODIUM SUCCINATE 40 MG: 40 INJECTION, POWDER, FOR SOLUTION INTRAMUSCULAR; INTRAVENOUS at 03:32

## 2017-08-31 RX ADMIN — METOPROLOL TARTRATE 25 MG: 50 TABLET ORAL at 20:59

## 2017-08-31 RX ADMIN — IPRATROPIUM BROMIDE AND ALBUTEROL SULFATE 1 AMPULE: .5; 3 SOLUTION RESPIRATORY (INHALATION) at 14:39

## 2017-08-31 RX ADMIN — PANTOPRAZOLE SODIUM 40 MG: 40 TABLET, DELAYED RELEASE ORAL at 05:59

## 2017-08-31 RX ADMIN — DOCUSATE SODIUM 100 MG: 100 CAPSULE, LIQUID FILLED ORAL at 09:08

## 2017-08-31 RX ADMIN — MEROPENEM 500 MG: 1 INJECTION, POWDER, FOR SOLUTION INTRAVENOUS at 13:45

## 2017-08-31 ASSESSMENT — PAIN SCALES - GENERAL
PAINLEVEL_OUTOF10: 9
PAINLEVEL_OUTOF10: 0
PAINLEVEL_OUTOF10: 6
PAINLEVEL_OUTOF10: 9

## 2017-09-01 ENCOUNTER — APPOINTMENT (OUTPATIENT)
Dept: CT IMAGING | Age: 77
DRG: 180 | End: 2017-09-01
Attending: HOSPITALIST
Payer: MEDICARE

## 2017-09-01 ENCOUNTER — APPOINTMENT (OUTPATIENT)
Dept: GENERAL RADIOLOGY | Age: 77
DRG: 180 | End: 2017-09-01
Attending: HOSPITALIST
Payer: MEDICARE

## 2017-09-01 LAB
ALBUMIN SERPL-MCNC: 2.7 G/DL (ref 3.5–5.2)
ALP BLD-CCNC: 64 U/L (ref 35–104)
ALT SERPL-CCNC: 21 U/L (ref 5–33)
ANION GAP SERPL CALCULATED.3IONS-SCNC: 7 MMOL/L (ref 7–19)
APTT: 24.5 SEC (ref 26–36.2)
AST SERPL-CCNC: 11 U/L (ref 5–32)
BILIRUB SERPL-MCNC: 0.4 MG/DL (ref 0.2–1.2)
BILIRUBIN DIRECT: 0.2 MG/DL (ref 0–0.3)
BILIRUBIN, INDIRECT: 0.2 MG/DL (ref 0.1–1)
BUN BLDV-MCNC: 45 MG/DL (ref 8–23)
CALCIUM SERPL-MCNC: 9.2 MG/DL (ref 8.8–10.2)
CHLORIDE BLD-SCNC: 99 MMOL/L (ref 98–111)
CO2: 35 MMOL/L (ref 22–29)
CREAT SERPL-MCNC: 1.5 MG/DL (ref 0.5–0.9)
FLUID TYPE: NORMAL
GFR NON-AFRICAN AMERICAN: 34
GLUCOSE BLD-MCNC: 92 MG/DL (ref 74–109)
GLUCOSE, FLUID: 125
HCT VFR BLD CALC: 44.1 % (ref 37–47)
HEMOGLOBIN: 14.8 G/DL (ref 12–16)
INR BLD: 0.96 (ref 0.88–1.18)
LD, FLUID: 75 U/L
MCH RBC QN AUTO: 33.2 PG (ref 27–31)
MCHC RBC AUTO-ENTMCNC: 33.6 G/DL (ref 33–37)
MCV RBC AUTO: 98.9 FL (ref 81–99)
PDW BLD-RTO: 16.2 % (ref 11.5–14.5)
PH, BODY FLUID: 7.72
PLATELET # BLD: 279 K/UL (ref 130–400)
PMV BLD AUTO: 9.1 FL (ref 9.4–12.3)
POTASSIUM SERPL-SCNC: 4.8 MMOL/L (ref 3.5–5)
PROTHROMBIN TIME: 12.7 SEC (ref 12–14.6)
RBC # BLD: 4.46 M/UL (ref 4.2–5.4)
SODIUM BLD-SCNC: 141 MMOL/L (ref 136–145)
TOTAL PROTEIN: 5.6 G/DL (ref 6.6–8.7)
WBC # BLD: 18.3 K/UL (ref 4.8–10.8)

## 2017-09-01 PROCEDURE — 85730 THROMBOPLASTIN TIME PARTIAL: CPT

## 2017-09-01 PROCEDURE — 99232 SBSQ HOSP IP/OBS MODERATE 35: CPT | Performed by: INTERNAL MEDICINE

## 2017-09-01 PROCEDURE — 88112 CYTOPATH CELL ENHANCE TECH: CPT

## 2017-09-01 PROCEDURE — 6370000000 HC RX 637 (ALT 250 FOR IP): Performed by: NURSE PRACTITIONER

## 2017-09-01 PROCEDURE — 2700000000 HC OXYGEN THERAPY PER DAY

## 2017-09-01 PROCEDURE — 76937 US GUIDE VASCULAR ACCESS: CPT

## 2017-09-01 PROCEDURE — 0W9B3ZX DRAINAGE OF LEFT PLEURAL CAVITY, PERCUTANEOUS APPROACH, DIAGNOSTIC: ICD-10-PCS | Performed by: RADIOLOGY

## 2017-09-01 PROCEDURE — 6370000000 HC RX 637 (ALT 250 FOR IP): Performed by: HOSPITALIST

## 2017-09-01 PROCEDURE — 87206 SMEAR FLUORESCENT/ACID STAI: CPT

## 2017-09-01 PROCEDURE — 85027 COMPLETE CBC AUTOMATED: CPT

## 2017-09-01 PROCEDURE — 2580000003 HC RX 258: Performed by: INTERNAL MEDICINE

## 2017-09-01 PROCEDURE — 87070 CULTURE OTHR SPECIMN AEROBIC: CPT

## 2017-09-01 PROCEDURE — 85610 PROTHROMBIN TIME: CPT

## 2017-09-01 PROCEDURE — 82945 GLUCOSE OTHER FLUID: CPT

## 2017-09-01 PROCEDURE — 6370000000 HC RX 637 (ALT 250 FOR IP): Performed by: INTERNAL MEDICINE

## 2017-09-01 PROCEDURE — 2140000000 HC CCU INTERMEDIATE R&B

## 2017-09-01 PROCEDURE — 71010 XR CHEST PORTABLE: CPT

## 2017-09-01 PROCEDURE — 99231 SBSQ HOSP IP/OBS SF/LOW 25: CPT | Performed by: INTERNAL MEDICINE

## 2017-09-01 PROCEDURE — B548ZZA ULTRASONOGRAPHY OF SUPERIOR VENA CAVA, GUIDANCE: ICD-10-PCS | Performed by: INTERNAL MEDICINE

## 2017-09-01 PROCEDURE — 87015 SPECIMEN INFECT AGNT CONCNTJ: CPT

## 2017-09-01 PROCEDURE — 80076 HEPATIC FUNCTION PANEL: CPT

## 2017-09-01 PROCEDURE — 10160 PNXR ASPIR ABSC HMTMA BULLA: CPT

## 2017-09-01 PROCEDURE — 36415 COLL VENOUS BLD VENIPUNCTURE: CPT

## 2017-09-01 PROCEDURE — 80048 BASIC METABOLIC PNL TOTAL CA: CPT

## 2017-09-01 PROCEDURE — 87116 MYCOBACTERIA CULTURE: CPT

## 2017-09-01 PROCEDURE — 83615 LACTATE (LD) (LDH) ENZYME: CPT

## 2017-09-01 PROCEDURE — 02HV33Z INSERTION OF INFUSION DEVICE INTO SUPERIOR VENA CAVA, PERCUTANEOUS APPROACH: ICD-10-PCS | Performed by: INTERNAL MEDICINE

## 2017-09-01 PROCEDURE — 2500000003 HC RX 250 WO HCPCS: Performed by: INTERNAL MEDICINE

## 2017-09-01 PROCEDURE — 83986 ASSAY PH BODY FLUID NOS: CPT

## 2017-09-01 PROCEDURE — 2580000003 HC RX 258: Performed by: NURSE PRACTITIONER

## 2017-09-01 PROCEDURE — 94640 AIRWAY INHALATION TREATMENT: CPT

## 2017-09-01 PROCEDURE — 36569 INSJ PICC 5 YR+ W/O IMAGING: CPT

## 2017-09-01 PROCEDURE — 6360000002 HC RX W HCPCS: Performed by: INTERNAL MEDICINE

## 2017-09-01 PROCEDURE — 88305 TISSUE EXAM BY PATHOLOGIST: CPT

## 2017-09-01 PROCEDURE — C1751 CATH, INF, PER/CENT/MIDLINE: HCPCS

## 2017-09-01 PROCEDURE — 87075 CULTR BACTERIA EXCEPT BLOOD: CPT

## 2017-09-01 PROCEDURE — 87205 SMEAR GRAM STAIN: CPT

## 2017-09-01 RX ORDER — SODIUM CHLORIDE 0.9 % (FLUSH) 0.9 %
10 SYRINGE (ML) INJECTION PRN
Status: DISCONTINUED | OUTPATIENT
Start: 2017-09-01 | End: 2017-09-06 | Stop reason: SDUPTHER

## 2017-09-01 RX ORDER — SODIUM CHLORIDE 0.9 % (FLUSH) 0.9 %
10 SYRINGE (ML) INJECTION PRN
Status: DISCONTINUED | OUTPATIENT
Start: 2017-09-01 | End: 2017-09-09 | Stop reason: HOSPADM

## 2017-09-01 RX ORDER — LIDOCAINE HYDROCHLORIDE 10 MG/ML
5 INJECTION, SOLUTION EPIDURAL; INFILTRATION; INTRACAUDAL; PERINEURAL ONCE
Status: COMPLETED | OUTPATIENT
Start: 2017-09-01 | End: 2017-09-01

## 2017-09-01 RX ORDER — SODIUM CHLORIDE 0.9 % (FLUSH) 0.9 %
10 SYRINGE (ML) INJECTION EVERY 12 HOURS SCHEDULED
Status: DISCONTINUED | OUTPATIENT
Start: 2017-09-01 | End: 2017-09-06 | Stop reason: SDUPTHER

## 2017-09-01 RX ORDER — HYDROCODONE BITARTRATE AND ACETAMINOPHEN 5; 325 MG/1; MG/1
1 TABLET ORAL EVERY 4 HOURS PRN
Status: DISCONTINUED | OUTPATIENT
Start: 2017-09-01 | End: 2017-09-09 | Stop reason: HOSPADM

## 2017-09-01 RX ORDER — SODIUM CHLORIDE 0.9 % (FLUSH) 0.9 %
10 SYRINGE (ML) INJECTION EVERY 12 HOURS SCHEDULED
Status: DISCONTINUED | OUTPATIENT
Start: 2017-09-01 | End: 2017-09-09 | Stop reason: HOSPADM

## 2017-09-01 RX ADMIN — IPRATROPIUM BROMIDE AND ALBUTEROL SULFATE 1 AMPULE: .5; 3 SOLUTION RESPIRATORY (INHALATION) at 14:48

## 2017-09-01 RX ADMIN — PANTOPRAZOLE SODIUM 40 MG: 40 TABLET, DELAYED RELEASE ORAL at 05:53

## 2017-09-01 RX ADMIN — METOPROLOL TARTRATE 25 MG: 50 TABLET ORAL at 09:03

## 2017-09-01 RX ADMIN — TRAMADOL HYDROCHLORIDE 50 MG: 50 TABLET, FILM COATED ORAL at 21:05

## 2017-09-01 RX ADMIN — METHYLPREDNISOLONE SODIUM SUCCINATE 40 MG: 40 INJECTION, POWDER, FOR SOLUTION INTRAMUSCULAR; INTRAVENOUS at 02:52

## 2017-09-01 RX ADMIN — LIDOCAINE HYDROCHLORIDE 5 ML: 10 INJECTION, SOLUTION EPIDURAL; INFILTRATION; INTRACAUDAL; PERINEURAL at 11:54

## 2017-09-01 RX ADMIN — METOPROLOL TARTRATE 25 MG: 50 TABLET ORAL at 21:04

## 2017-09-01 RX ADMIN — IPRATROPIUM BROMIDE AND ALBUTEROL SULFATE 1 AMPULE: .5; 3 SOLUTION RESPIRATORY (INHALATION) at 18:48

## 2017-09-01 RX ADMIN — TRAMADOL HYDROCHLORIDE 50 MG: 50 TABLET, FILM COATED ORAL at 04:11

## 2017-09-01 RX ADMIN — MEROPENEM 500 MG: 1 INJECTION, POWDER, FOR SOLUTION INTRAVENOUS at 21:04

## 2017-09-01 RX ADMIN — GUAIFENESIN 600 MG: 600 TABLET, EXTENDED RELEASE ORAL at 21:04

## 2017-09-01 RX ADMIN — Medication 10 ML: at 21:05

## 2017-09-01 RX ADMIN — MEROPENEM 500 MG: 1 INJECTION, POWDER, FOR SOLUTION INTRAVENOUS at 13:29

## 2017-09-01 RX ADMIN — GUAIFENESIN 600 MG: 600 TABLET, EXTENDED RELEASE ORAL at 09:02

## 2017-09-01 RX ADMIN — AMIODARONE HYDROCHLORIDE 400 MG: 200 TABLET ORAL at 09:03

## 2017-09-01 RX ADMIN — NIFEDIPINE 90 MG: 90 TABLET, FILM COATED, EXTENDED RELEASE ORAL at 09:02

## 2017-09-01 RX ADMIN — IPRATROPIUM BROMIDE AND ALBUTEROL SULFATE 1 AMPULE: .5; 3 SOLUTION RESPIRATORY (INHALATION) at 10:54

## 2017-09-01 RX ADMIN — IPRATROPIUM BROMIDE AND ALBUTEROL SULFATE 1 AMPULE: .5; 3 SOLUTION RESPIRATORY (INHALATION) at 06:57

## 2017-09-01 RX ADMIN — TRAMADOL HYDROCHLORIDE 50 MG: 50 TABLET, FILM COATED ORAL at 09:01

## 2017-09-01 ASSESSMENT — PAIN DESCRIPTION - LOCATION: LOCATION: BACK

## 2017-09-01 ASSESSMENT — PAIN SCALES - GENERAL
PAINLEVEL_OUTOF10: 7
PAINLEVEL_OUTOF10: 0
PAINLEVEL_OUTOF10: 8
PAINLEVEL_OUTOF10: 10
PAINLEVEL_OUTOF10: 8
PAINLEVEL_OUTOF10: 0
PAINLEVEL_OUTOF10: 7
PAINLEVEL_OUTOF10: 4
PAINLEVEL_OUTOF10: 7
PAINLEVEL_OUTOF10: 0

## 2017-09-01 ASSESSMENT — PAIN DESCRIPTION - PAIN TYPE: TYPE: CHRONIC PAIN

## 2017-09-01 ASSESSMENT — PAIN DESCRIPTION - DESCRIPTORS: DESCRIPTORS: ACHING

## 2017-09-02 LAB
ANION GAP SERPL CALCULATED.3IONS-SCNC: 7 MMOL/L (ref 7–19)
BUN BLDV-MCNC: 34 MG/DL (ref 8–23)
CALCIUM SERPL-MCNC: 9.2 MG/DL (ref 8.8–10.2)
CHLORIDE BLD-SCNC: 99 MMOL/L (ref 98–111)
CO2: 36 MMOL/L (ref 22–29)
CREAT SERPL-MCNC: 1.1 MG/DL (ref 0.5–0.9)
GFR NON-AFRICAN AMERICAN: 48
GLUCOSE BLD-MCNC: 96 MG/DL (ref 74–109)
HCT VFR BLD CALC: 44.9 % (ref 37–47)
HEMOGLOBIN: 14.7 G/DL (ref 12–16)
MCH RBC QN AUTO: 32.7 PG (ref 27–31)
MCHC RBC AUTO-ENTMCNC: 32.7 G/DL (ref 33–37)
MCV RBC AUTO: 100 FL (ref 81–99)
PDW BLD-RTO: 16.5 % (ref 11.5–14.5)
PLATELET # BLD: 281 K/UL (ref 130–400)
PMV BLD AUTO: 9.3 FL (ref 9.4–12.3)
POTASSIUM SERPL-SCNC: 4.6 MMOL/L (ref 3.5–5)
RBC # BLD: 4.49 M/UL (ref 4.2–5.4)
SODIUM BLD-SCNC: 142 MMOL/L (ref 136–145)
WBC # BLD: 26.4 K/UL (ref 4.8–10.8)

## 2017-09-02 PROCEDURE — 6360000002 HC RX W HCPCS: Performed by: INTERNAL MEDICINE

## 2017-09-02 PROCEDURE — 2580000003 HC RX 258: Performed by: HOSPITALIST

## 2017-09-02 PROCEDURE — 6370000000 HC RX 637 (ALT 250 FOR IP): Performed by: INTERNAL MEDICINE

## 2017-09-02 PROCEDURE — 2580000003 HC RX 258: Performed by: INTERNAL MEDICINE

## 2017-09-02 PROCEDURE — 97116 GAIT TRAINING THERAPY: CPT

## 2017-09-02 PROCEDURE — 2140000000 HC CCU INTERMEDIATE R&B

## 2017-09-02 PROCEDURE — 6370000000 HC RX 637 (ALT 250 FOR IP): Performed by: HOSPITALIST

## 2017-09-02 PROCEDURE — 2580000003 HC RX 258: Performed by: NURSE PRACTITIONER

## 2017-09-02 PROCEDURE — 2700000000 HC OXYGEN THERAPY PER DAY

## 2017-09-02 PROCEDURE — 80048 BASIC METABOLIC PNL TOTAL CA: CPT

## 2017-09-02 PROCEDURE — 99232 SBSQ HOSP IP/OBS MODERATE 35: CPT | Performed by: INTERNAL MEDICINE

## 2017-09-02 PROCEDURE — 94640 AIRWAY INHALATION TREATMENT: CPT

## 2017-09-02 PROCEDURE — 6370000000 HC RX 637 (ALT 250 FOR IP): Performed by: NURSE PRACTITIONER

## 2017-09-02 PROCEDURE — 97110 THERAPEUTIC EXERCISES: CPT

## 2017-09-02 PROCEDURE — 85027 COMPLETE CBC AUTOMATED: CPT

## 2017-09-02 PROCEDURE — 6360000002 HC RX W HCPCS: Performed by: HOSPITALIST

## 2017-09-02 PROCEDURE — 6360000002 HC RX W HCPCS: Performed by: NURSE PRACTITIONER

## 2017-09-02 RX ORDER — WARFARIN SODIUM 5 MG/1
10 TABLET ORAL
Status: COMPLETED | OUTPATIENT
Start: 2017-09-02 | End: 2017-09-02

## 2017-09-02 RX ORDER — PREDNISONE 10 MG/1
10 TABLET ORAL 2 TIMES DAILY WITH MEALS
Status: DISCONTINUED | OUTPATIENT
Start: 2017-09-02 | End: 2017-09-09 | Stop reason: HOSPADM

## 2017-09-02 RX ADMIN — IPRATROPIUM BROMIDE AND ALBUTEROL SULFATE 1 AMPULE: .5; 3 SOLUTION RESPIRATORY (INHALATION) at 07:19

## 2017-09-02 RX ADMIN — AMIODARONE HYDROCHLORIDE 400 MG: 200 TABLET ORAL at 09:02

## 2017-09-02 RX ADMIN — MEROPENEM 500 MG: 1 INJECTION, POWDER, FOR SOLUTION INTRAVENOUS at 05:04

## 2017-09-02 RX ADMIN — WARFARIN SODIUM 10 MG: 5 TABLET ORAL at 18:04

## 2017-09-02 RX ADMIN — VANCOMYCIN HYDROCHLORIDE 1250 MG: 1 INJECTION, POWDER, LYOPHILIZED, FOR SOLUTION INTRAVENOUS at 09:02

## 2017-09-02 RX ADMIN — METHYLPREDNISOLONE SODIUM SUCCINATE 40 MG: 40 INJECTION, POWDER, FOR SOLUTION INTRAMUSCULAR; INTRAVENOUS at 02:47

## 2017-09-02 RX ADMIN — IPRATROPIUM BROMIDE AND ALBUTEROL SULFATE 1 AMPULE: .5; 3 SOLUTION RESPIRATORY (INHALATION) at 19:46

## 2017-09-02 RX ADMIN — Medication 10 ML: at 22:08

## 2017-09-02 RX ADMIN — PANTOPRAZOLE SODIUM 40 MG: 40 TABLET, DELAYED RELEASE ORAL at 05:46

## 2017-09-02 RX ADMIN — DOCUSATE SODIUM 100 MG: 100 CAPSULE, LIQUID FILLED ORAL at 09:02

## 2017-09-02 RX ADMIN — ENOXAPARIN SODIUM 80 MG: 80 INJECTION SUBCUTANEOUS at 22:04

## 2017-09-02 RX ADMIN — GUAIFENESIN 600 MG: 600 TABLET, EXTENDED RELEASE ORAL at 22:04

## 2017-09-02 RX ADMIN — MEROPENEM 500 MG: 1 INJECTION, POWDER, FOR SOLUTION INTRAVENOUS at 12:14

## 2017-09-02 RX ADMIN — HYDROCODONE BITARTRATE AND ACETAMINOPHEN 1 TABLET: 5; 325 TABLET ORAL at 05:46

## 2017-09-02 RX ADMIN — Medication 10 ML: at 09:03

## 2017-09-02 RX ADMIN — NIFEDIPINE 90 MG: 90 TABLET, FILM COATED, EXTENDED RELEASE ORAL at 09:02

## 2017-09-02 RX ADMIN — METOPROLOL TARTRATE 25 MG: 50 TABLET ORAL at 09:02

## 2017-09-02 RX ADMIN — METOPROLOL TARTRATE 25 MG: 50 TABLET ORAL at 22:04

## 2017-09-02 RX ADMIN — GUAIFENESIN 600 MG: 600 TABLET, EXTENDED RELEASE ORAL at 09:02

## 2017-09-02 RX ADMIN — MEROPENEM 500 MG: 1 INJECTION, POWDER, FOR SOLUTION INTRAVENOUS at 22:04

## 2017-09-02 RX ADMIN — IPRATROPIUM BROMIDE AND ALBUTEROL SULFATE 1 AMPULE: .5; 3 SOLUTION RESPIRATORY (INHALATION) at 14:58

## 2017-09-02 RX ADMIN — ENOXAPARIN SODIUM 80 MG: 80 INJECTION SUBCUTANEOUS at 09:02

## 2017-09-02 RX ADMIN — IPRATROPIUM BROMIDE AND ALBUTEROL SULFATE 1 AMPULE: .5; 3 SOLUTION RESPIRATORY (INHALATION) at 11:35

## 2017-09-02 RX ADMIN — PREDNISONE 10 MG: 10 TABLET ORAL at 18:05

## 2017-09-02 RX ADMIN — PREDNISONE 10 MG: 10 TABLET ORAL at 12:11

## 2017-09-02 ASSESSMENT — PAIN SCALES - GENERAL
PAINLEVEL_OUTOF10: 0
PAINLEVEL_OUTOF10: 4
PAINLEVEL_OUTOF10: 8
PAINLEVEL_OUTOF10: 0

## 2017-09-03 LAB
ANION GAP SERPL CALCULATED.3IONS-SCNC: 7 MMOL/L (ref 7–19)
BUN BLDV-MCNC: 28 MG/DL (ref 8–23)
CALCIUM SERPL-MCNC: 9.2 MG/DL (ref 8.8–10.2)
CHLORIDE BLD-SCNC: 95 MMOL/L (ref 98–111)
CHOLESTEROL, TOTAL: 154 MG/DL (ref 160–199)
CO2: 36 MMOL/L (ref 22–29)
CREAT SERPL-MCNC: 1 MG/DL (ref 0.5–0.9)
GFR NON-AFRICAN AMERICAN: 54
GLUCOSE BLD-MCNC: 143 MG/DL (ref 74–109)
HCT VFR BLD CALC: 42.3 % (ref 37–47)
HDLC SERPL-MCNC: 43 MG/DL (ref 65–121)
HEMOGLOBIN: 14.1 G/DL (ref 12–16)
INR BLD: 1.09 (ref 0.88–1.18)
LDL CHOLESTEROL CALCULATED: 86 MG/DL
MCH RBC QN AUTO: 33.3 PG (ref 27–31)
MCHC RBC AUTO-ENTMCNC: 33.3 G/DL (ref 33–37)
MCV RBC AUTO: 99.8 FL (ref 81–99)
PDW BLD-RTO: 16.1 % (ref 11.5–14.5)
PLATELET # BLD: 254 K/UL (ref 130–400)
PMV BLD AUTO: 9.5 FL (ref 9.4–12.3)
POTASSIUM SERPL-SCNC: 5 MMOL/L (ref 3.5–5)
PROTHROMBIN TIME: 14 SEC (ref 12–14.6)
RBC # BLD: 4.24 M/UL (ref 4.2–5.4)
SODIUM BLD-SCNC: 138 MMOL/L (ref 136–145)
TRIGL SERPL-MCNC: 123 MG/DL (ref 150–199)
WBC # BLD: 21.3 K/UL (ref 4.8–10.8)

## 2017-09-03 PROCEDURE — 2140000000 HC CCU INTERMEDIATE R&B

## 2017-09-03 PROCEDURE — 6370000000 HC RX 637 (ALT 250 FOR IP): Performed by: NURSE PRACTITIONER

## 2017-09-03 PROCEDURE — 99232 SBSQ HOSP IP/OBS MODERATE 35: CPT | Performed by: INTERNAL MEDICINE

## 2017-09-03 PROCEDURE — 80061 LIPID PANEL: CPT

## 2017-09-03 PROCEDURE — 2580000003 HC RX 258: Performed by: NURSE PRACTITIONER

## 2017-09-03 PROCEDURE — 6360000002 HC RX W HCPCS: Performed by: NURSE PRACTITIONER

## 2017-09-03 PROCEDURE — 97116 GAIT TRAINING THERAPY: CPT

## 2017-09-03 PROCEDURE — 6370000000 HC RX 637 (ALT 250 FOR IP): Performed by: INTERNAL MEDICINE

## 2017-09-03 PROCEDURE — 6360000002 HC RX W HCPCS: Performed by: INTERNAL MEDICINE

## 2017-09-03 PROCEDURE — 99231 SBSQ HOSP IP/OBS SF/LOW 25: CPT | Performed by: INTERNAL MEDICINE

## 2017-09-03 PROCEDURE — 2700000000 HC OXYGEN THERAPY PER DAY

## 2017-09-03 PROCEDURE — 2580000003 HC RX 258: Performed by: INTERNAL MEDICINE

## 2017-09-03 PROCEDURE — 80048 BASIC METABOLIC PNL TOTAL CA: CPT

## 2017-09-03 PROCEDURE — 94640 AIRWAY INHALATION TREATMENT: CPT

## 2017-09-03 PROCEDURE — 6370000000 HC RX 637 (ALT 250 FOR IP): Performed by: HOSPITALIST

## 2017-09-03 PROCEDURE — 97110 THERAPEUTIC EXERCISES: CPT

## 2017-09-03 PROCEDURE — 85610 PROTHROMBIN TIME: CPT

## 2017-09-03 PROCEDURE — 85027 COMPLETE CBC AUTOMATED: CPT

## 2017-09-03 RX ORDER — L. ACIDOPHILUS/L.BULGARICUS 1MM CELL
4 TABLET ORAL 3 TIMES DAILY
Status: DISCONTINUED | OUTPATIENT
Start: 2017-09-03 | End: 2017-09-09 | Stop reason: HOSPADM

## 2017-09-03 RX ORDER — WARFARIN SODIUM 5 MG/1
10 TABLET ORAL
Status: DISCONTINUED | OUTPATIENT
Start: 2017-09-03 | End: 2017-09-03

## 2017-09-03 RX ORDER — WARFARIN SODIUM 7.5 MG/1
7.5 TABLET ORAL
Status: COMPLETED | OUTPATIENT
Start: 2017-09-03 | End: 2017-09-03

## 2017-09-03 RX ORDER — FLUCONAZOLE, SODIUM CHLORIDE 2 MG/ML
100 INJECTION INTRAVENOUS EVERY 24 HOURS
Status: DISCONTINUED | OUTPATIENT
Start: 2017-09-03 | End: 2017-09-09

## 2017-09-03 RX ADMIN — IPRATROPIUM BROMIDE AND ALBUTEROL SULFATE 1 AMPULE: .5; 3 SOLUTION RESPIRATORY (INHALATION) at 10:43

## 2017-09-03 RX ADMIN — PANTOPRAZOLE SODIUM 40 MG: 40 TABLET, DELAYED RELEASE ORAL at 06:32

## 2017-09-03 RX ADMIN — NYSTATIN 500000 UNITS: 100000 SUSPENSION ORAL at 14:00

## 2017-09-03 RX ADMIN — MEROPENEM 500 MG: 1 INJECTION, POWDER, FOR SOLUTION INTRAVENOUS at 21:20

## 2017-09-03 RX ADMIN — AMIODARONE HYDROCHLORIDE 400 MG: 200 TABLET ORAL at 09:22

## 2017-09-03 RX ADMIN — Medication 10 ML: at 21:15

## 2017-09-03 RX ADMIN — IPRATROPIUM BROMIDE AND ALBUTEROL SULFATE 1 AMPULE: .5; 3 SOLUTION RESPIRATORY (INHALATION) at 15:04

## 2017-09-03 RX ADMIN — Medication 10 ML: at 09:23

## 2017-09-03 RX ADMIN — HYDROCODONE BITARTRATE AND ACETAMINOPHEN 1 TABLET: 5; 325 TABLET ORAL at 01:34

## 2017-09-03 RX ADMIN — MEROPENEM 500 MG: 1 INJECTION, POWDER, FOR SOLUTION INTRAVENOUS at 13:21

## 2017-09-03 RX ADMIN — PREDNISONE 10 MG: 10 TABLET ORAL at 09:22

## 2017-09-03 RX ADMIN — METOPROLOL TARTRATE 25 MG: 50 TABLET ORAL at 21:13

## 2017-09-03 RX ADMIN — HYDROCODONE BITARTRATE AND ACETAMINOPHEN 1 TABLET: 5; 325 TABLET ORAL at 05:06

## 2017-09-03 RX ADMIN — HYDROCODONE BITARTRATE AND ACETAMINOPHEN 1 TABLET: 5; 325 TABLET ORAL at 18:17

## 2017-09-03 RX ADMIN — PREDNISONE 10 MG: 10 TABLET ORAL at 18:12

## 2017-09-03 RX ADMIN — NYSTATIN 500000 UNITS: 100000 SUSPENSION ORAL at 09:31

## 2017-09-03 RX ADMIN — MEROPENEM 500 MG: 1 INJECTION, POWDER, FOR SOLUTION INTRAVENOUS at 05:37

## 2017-09-03 RX ADMIN — IPRATROPIUM BROMIDE AND ALBUTEROL SULFATE 1 AMPULE: .5; 3 SOLUTION RESPIRATORY (INHALATION) at 07:04

## 2017-09-03 RX ADMIN — ENOXAPARIN SODIUM 80 MG: 80 INJECTION SUBCUTANEOUS at 21:13

## 2017-09-03 RX ADMIN — NYSTATIN 500000 UNITS: 100000 SUSPENSION ORAL at 18:13

## 2017-09-03 RX ADMIN — ENOXAPARIN SODIUM 80 MG: 80 INJECTION SUBCUTANEOUS at 09:21

## 2017-09-03 RX ADMIN — NIFEDIPINE 90 MG: 90 TABLET, FILM COATED, EXTENDED RELEASE ORAL at 09:22

## 2017-09-03 RX ADMIN — GUAIFENESIN 600 MG: 600 TABLET, EXTENDED RELEASE ORAL at 09:22

## 2017-09-03 RX ADMIN — IPRATROPIUM BROMIDE AND ALBUTEROL SULFATE 1 AMPULE: .5; 3 SOLUTION RESPIRATORY (INHALATION) at 20:13

## 2017-09-03 RX ADMIN — LACTOBACILLUS TAB 4 TABLET: TAB at 14:03

## 2017-09-03 RX ADMIN — METOPROLOL TARTRATE 25 MG: 50 TABLET ORAL at 09:22

## 2017-09-03 RX ADMIN — LACTOBACILLUS TAB 4 TABLET: TAB at 21:12

## 2017-09-03 RX ADMIN — WARFARIN SODIUM 7.5 MG: 7.5 TABLET ORAL at 18:12

## 2017-09-03 RX ADMIN — NYSTATIN 500000 UNITS: 100000 SUSPENSION ORAL at 21:20

## 2017-09-03 RX ADMIN — GUAIFENESIN 600 MG: 600 TABLET, EXTENDED RELEASE ORAL at 21:12

## 2017-09-03 RX ADMIN — DOCUSATE SODIUM 100 MG: 100 CAPSULE, LIQUID FILLED ORAL at 09:22

## 2017-09-03 RX ADMIN — FLUCONAZOLE 100 MG: 2 INJECTION INTRAVENOUS at 13:59

## 2017-09-03 RX ADMIN — Medication 5 ML: at 21:20

## 2017-09-03 ASSESSMENT — PAIN SCALES - GENERAL
PAINLEVEL_OUTOF10: 8
PAINLEVEL_OUTOF10: 9
PAINLEVEL_OUTOF10: 0
PAINLEVEL_OUTOF10: 10

## 2017-09-04 ENCOUNTER — APPOINTMENT (OUTPATIENT)
Dept: CT IMAGING | Age: 77
DRG: 180 | End: 2017-09-04
Attending: HOSPITALIST
Payer: MEDICARE

## 2017-09-04 LAB
AMYLASE: 24 U/L (ref 28–100)
ANION GAP SERPL CALCULATED.3IONS-SCNC: 13 MMOL/L (ref 7–19)
BUN BLDV-MCNC: 27 MG/DL (ref 8–23)
CALCIUM SERPL-MCNC: 9.1 MG/DL (ref 8.8–10.2)
CHLORIDE BLD-SCNC: 97 MMOL/L (ref 98–111)
CO2: 32 MMOL/L (ref 22–29)
CREAT SERPL-MCNC: 1 MG/DL (ref 0.5–0.9)
GFR NON-AFRICAN AMERICAN: 54
GLUCOSE BLD-MCNC: 119 MG/DL (ref 74–109)
GLUCOSE BLD-MCNC: 134 MG/DL (ref 70–99)
HCT VFR BLD CALC: 43.3 % (ref 37–47)
HEMOGLOBIN: 14.5 G/DL (ref 12–16)
INR BLD: 1.47 (ref 0.88–1.18)
LIPASE: 11 U/L (ref 13–60)
MCH RBC QN AUTO: 33.5 PG (ref 27–31)
MCHC RBC AUTO-ENTMCNC: 33.5 G/DL (ref 33–37)
MCV RBC AUTO: 100 FL (ref 81–99)
PDW BLD-RTO: 16.3 % (ref 11.5–14.5)
PERFORMED ON: ABNORMAL
PLATELET # BLD: 269 K/UL (ref 130–400)
PMV BLD AUTO: 10 FL (ref 9.4–12.3)
POTASSIUM SERPL-SCNC: 5 MMOL/L (ref 3.5–5)
PROTHROMBIN TIME: 17.8 SEC (ref 12–14.6)
RBC # BLD: 4.33 M/UL (ref 4.2–5.4)
SODIUM BLD-SCNC: 142 MMOL/L (ref 136–145)
VANCOMYCIN TROUGH: 10 UG/ML (ref 10–20)
WBC # BLD: 20.6 K/UL (ref 4.8–10.8)

## 2017-09-04 PROCEDURE — 6370000000 HC RX 637 (ALT 250 FOR IP): Performed by: INTERNAL MEDICINE

## 2017-09-04 PROCEDURE — 85027 COMPLETE CBC AUTOMATED: CPT

## 2017-09-04 PROCEDURE — 6360000002 HC RX W HCPCS: Performed by: HOSPITALIST

## 2017-09-04 PROCEDURE — 82150 ASSAY OF AMYLASE: CPT

## 2017-09-04 PROCEDURE — 6360000002 HC RX W HCPCS: Performed by: NURSE PRACTITIONER

## 2017-09-04 PROCEDURE — 6360000002 HC RX W HCPCS: Performed by: INTERNAL MEDICINE

## 2017-09-04 PROCEDURE — 2580000003 HC RX 258: Performed by: HOSPITALIST

## 2017-09-04 PROCEDURE — 6370000000 HC RX 637 (ALT 250 FOR IP): Performed by: HOSPITALIST

## 2017-09-04 PROCEDURE — 97116 GAIT TRAINING THERAPY: CPT

## 2017-09-04 PROCEDURE — 2580000003 HC RX 258: Performed by: NURSE PRACTITIONER

## 2017-09-04 PROCEDURE — 2580000003 HC RX 258: Performed by: INTERNAL MEDICINE

## 2017-09-04 PROCEDURE — 94640 AIRWAY INHALATION TREATMENT: CPT

## 2017-09-04 PROCEDURE — 74150 CT ABDOMEN W/O CONTRAST: CPT

## 2017-09-04 PROCEDURE — 6370000000 HC RX 637 (ALT 250 FOR IP): Performed by: NURSE PRACTITIONER

## 2017-09-04 PROCEDURE — 80202 ASSAY OF VANCOMYCIN: CPT

## 2017-09-04 PROCEDURE — 2700000000 HC OXYGEN THERAPY PER DAY

## 2017-09-04 PROCEDURE — 80048 BASIC METABOLIC PNL TOTAL CA: CPT

## 2017-09-04 PROCEDURE — 2140000000 HC CCU INTERMEDIATE R&B

## 2017-09-04 PROCEDURE — 85610 PROTHROMBIN TIME: CPT

## 2017-09-04 PROCEDURE — 99231 SBSQ HOSP IP/OBS SF/LOW 25: CPT | Performed by: INTERNAL MEDICINE

## 2017-09-04 PROCEDURE — 99232 SBSQ HOSP IP/OBS MODERATE 35: CPT | Performed by: INTERNAL MEDICINE

## 2017-09-04 PROCEDURE — 83690 ASSAY OF LIPASE: CPT

## 2017-09-04 PROCEDURE — 82948 REAGENT STRIP/BLOOD GLUCOSE: CPT

## 2017-09-04 RX ORDER — WARFARIN SODIUM 5 MG/1
5 TABLET ORAL
Status: COMPLETED | OUTPATIENT
Start: 2017-09-04 | End: 2017-09-04

## 2017-09-04 RX ORDER — ACYCLOVIR 50 MG/G
OINTMENT TOPICAL
Status: DISCONTINUED | OUTPATIENT
Start: 2017-09-04 | End: 2017-09-09 | Stop reason: HOSPADM

## 2017-09-04 RX ADMIN — IPRATROPIUM BROMIDE AND ALBUTEROL SULFATE 1 AMPULE: .5; 3 SOLUTION RESPIRATORY (INHALATION) at 15:27

## 2017-09-04 RX ADMIN — NYSTATIN 500000 UNITS: 100000 SUSPENSION ORAL at 17:37

## 2017-09-04 RX ADMIN — ACYCLOVIR: 50 OINTMENT TOPICAL at 13:28

## 2017-09-04 RX ADMIN — ENOXAPARIN SODIUM 80 MG: 80 INJECTION SUBCUTANEOUS at 10:20

## 2017-09-04 RX ADMIN — ACYCLOVIR: 50 OINTMENT TOPICAL at 21:42

## 2017-09-04 RX ADMIN — AMIODARONE HYDROCHLORIDE 400 MG: 200 TABLET ORAL at 10:19

## 2017-09-04 RX ADMIN — MICONAZOLE NITRATE 1 APPLICATOR: 20 CREAM VAGINAL at 21:47

## 2017-09-04 RX ADMIN — PREDNISONE 10 MG: 10 TABLET ORAL at 10:20

## 2017-09-04 RX ADMIN — PREDNISONE 10 MG: 10 TABLET ORAL at 17:36

## 2017-09-04 RX ADMIN — DOCUSATE SODIUM 100 MG: 100 CAPSULE, LIQUID FILLED ORAL at 10:19

## 2017-09-04 RX ADMIN — FLUCONAZOLE 100 MG: 2 INJECTION INTRAVENOUS at 13:25

## 2017-09-04 RX ADMIN — HYDROCODONE BITARTRATE AND ACETAMINOPHEN 1 TABLET: 5; 325 TABLET ORAL at 10:42

## 2017-09-04 RX ADMIN — GUAIFENESIN 600 MG: 600 TABLET, EXTENDED RELEASE ORAL at 21:42

## 2017-09-04 RX ADMIN — LACTOBACILLUS TAB 4 TABLET: TAB at 10:18

## 2017-09-04 RX ADMIN — Medication 10 ML: at 21:43

## 2017-09-04 RX ADMIN — HYDROCODONE BITARTRATE AND ACETAMINOPHEN 1 TABLET: 5; 325 TABLET ORAL at 04:38

## 2017-09-04 RX ADMIN — PANTOPRAZOLE SODIUM 40 MG: 40 TABLET, DELAYED RELEASE ORAL at 10:20

## 2017-09-04 RX ADMIN — MICONAZOLE NITRATE 1 APPLICATOR: 20 CREAM VAGINAL at 00:03

## 2017-09-04 RX ADMIN — LACTOBACILLUS TAB 4 TABLET: TAB at 13:31

## 2017-09-04 RX ADMIN — GUAIFENESIN 600 MG: 600 TABLET, EXTENDED RELEASE ORAL at 10:20

## 2017-09-04 RX ADMIN — LACTOBACILLUS TAB 4 TABLET: TAB at 21:42

## 2017-09-04 RX ADMIN — ENOXAPARIN SODIUM 80 MG: 80 INJECTION SUBCUTANEOUS at 21:41

## 2017-09-04 RX ADMIN — NYSTATIN 500000 UNITS: 100000 SUSPENSION ORAL at 21:42

## 2017-09-04 RX ADMIN — IPRATROPIUM BROMIDE AND ALBUTEROL SULFATE 1 AMPULE: .5; 3 SOLUTION RESPIRATORY (INHALATION) at 19:24

## 2017-09-04 RX ADMIN — METOPROLOL TARTRATE 25 MG: 50 TABLET ORAL at 21:41

## 2017-09-04 RX ADMIN — MEROPENEM 500 MG: 1 INJECTION, POWDER, FOR SOLUTION INTRAVENOUS at 21:00

## 2017-09-04 RX ADMIN — WARFARIN SODIUM 5 MG: 5 TABLET ORAL at 17:36

## 2017-09-04 RX ADMIN — MEROPENEM 500 MG: 1 INJECTION, POWDER, FOR SOLUTION INTRAVENOUS at 13:25

## 2017-09-04 RX ADMIN — METOPROLOL TARTRATE 25 MG: 50 TABLET ORAL at 10:18

## 2017-09-04 RX ADMIN — VANCOMYCIN HYDROCHLORIDE 1250 MG: 1 INJECTION, POWDER, LYOPHILIZED, FOR SOLUTION INTRAVENOUS at 10:31

## 2017-09-04 RX ADMIN — Medication 10 ML: at 10:25

## 2017-09-04 RX ADMIN — ACYCLOVIR: 50 OINTMENT TOPICAL at 17:38

## 2017-09-04 RX ADMIN — NYSTATIN 500000 UNITS: 100000 SUSPENSION ORAL at 13:32

## 2017-09-04 RX ADMIN — IPRATROPIUM BROMIDE AND ALBUTEROL SULFATE 1 AMPULE: .5; 3 SOLUTION RESPIRATORY (INHALATION) at 11:02

## 2017-09-04 RX ADMIN — TRAMADOL HYDROCHLORIDE 50 MG: 50 TABLET, FILM COATED ORAL at 13:30

## 2017-09-04 RX ADMIN — NIFEDIPINE 90 MG: 90 TABLET, FILM COATED, EXTENDED RELEASE ORAL at 10:20

## 2017-09-04 RX ADMIN — MEROPENEM 500 MG: 1 INJECTION, POWDER, FOR SOLUTION INTRAVENOUS at 04:38

## 2017-09-04 RX ADMIN — IPRATROPIUM BROMIDE AND ALBUTEROL SULFATE 1 AMPULE: .5; 3 SOLUTION RESPIRATORY (INHALATION) at 07:06

## 2017-09-04 RX ADMIN — NYSTATIN 500000 UNITS: 100000 SUSPENSION ORAL at 10:21

## 2017-09-04 ASSESSMENT — PAIN DESCRIPTION - LOCATION
LOCATION: ABDOMEN
LOCATION: BACK
LOCATION: ABDOMEN
LOCATION: ABDOMEN;BACK

## 2017-09-04 ASSESSMENT — PAIN SCALES - WONG BAKER: WONGBAKER_NUMERICALRESPONSE: 0

## 2017-09-04 ASSESSMENT — PAIN SCALES - GENERAL
PAINLEVEL_OUTOF10: 9
PAINLEVEL_OUTOF10: 8
PAINLEVEL_OUTOF10: 4
PAINLEVEL_OUTOF10: 8
PAINLEVEL_OUTOF10: 0
PAINLEVEL_OUTOF10: 6
PAINLEVEL_OUTOF10: 6
PAINLEVEL_OUTOF10: 7
PAINLEVEL_OUTOF10: 10

## 2017-09-04 ASSESSMENT — PAIN DESCRIPTION - PAIN TYPE: TYPE: ACUTE PAIN

## 2017-09-05 PROBLEM — J18.9 PNEUMONIA OF LEFT LUNG DUE TO INFECTIOUS ORGANISM: Status: ACTIVE | Noted: 2017-09-05

## 2017-09-05 PROBLEM — E43 SEVERE PROTEIN-CALORIE MALNUTRITION (HCC): Chronic | Status: ACTIVE | Noted: 2017-09-05

## 2017-09-05 LAB
ANAEROBIC CULTURE: NORMAL
ANION GAP SERPL CALCULATED.3IONS-SCNC: 9 MMOL/L (ref 7–19)
BODY FLUID CULTURE, STERILE: NORMAL
BUN BLDV-MCNC: 20 MG/DL (ref 8–23)
CALCIUM SERPL-MCNC: 8.9 MG/DL (ref 8.8–10.2)
CHLORIDE BLD-SCNC: 99 MMOL/L (ref 98–111)
CO2: 33 MMOL/L (ref 22–29)
CREAT SERPL-MCNC: 0.9 MG/DL (ref 0.5–0.9)
GFR NON-AFRICAN AMERICAN: >60
GLUCOSE BLD-MCNC: 85 MG/DL (ref 74–109)
GRAM STAIN RESULT: NORMAL
HCT VFR BLD CALC: 41.3 % (ref 37–47)
HEMOGLOBIN: 13.9 G/DL (ref 12–16)
INR BLD: 2.38 (ref 0.88–1.18)
MCH RBC QN AUTO: 33.7 PG (ref 27–31)
MCHC RBC AUTO-ENTMCNC: 33.7 G/DL (ref 33–37)
MCV RBC AUTO: 100 FL (ref 81–99)
PDW BLD-RTO: 16.1 % (ref 11.5–14.5)
PLATELET # BLD: 242 K/UL (ref 130–400)
PMV BLD AUTO: 10.4 FL (ref 9.4–12.3)
POTASSIUM SERPL-SCNC: 4.3 MMOL/L (ref 3.5–5)
PROTHROMBIN TIME: 26.2 SEC (ref 12–14.6)
RBC # BLD: 4.13 M/UL (ref 4.2–5.4)
SODIUM BLD-SCNC: 141 MMOL/L (ref 136–145)
WBC # BLD: 26.8 K/UL (ref 4.8–10.8)

## 2017-09-05 PROCEDURE — 99231 SBSQ HOSP IP/OBS SF/LOW 25: CPT | Performed by: INTERNAL MEDICINE

## 2017-09-05 PROCEDURE — 99233 SBSQ HOSP IP/OBS HIGH 50: CPT | Performed by: HOSPITALIST

## 2017-09-05 PROCEDURE — 6370000000 HC RX 637 (ALT 250 FOR IP): Performed by: HOSPITALIST

## 2017-09-05 PROCEDURE — 6360000002 HC RX W HCPCS: Performed by: INTERNAL MEDICINE

## 2017-09-05 PROCEDURE — 6370000000 HC RX 637 (ALT 250 FOR IP): Performed by: NURSE PRACTITIONER

## 2017-09-05 PROCEDURE — 2140000000 HC CCU INTERMEDIATE R&B

## 2017-09-05 PROCEDURE — 6360000002 HC RX W HCPCS: Performed by: NURSE PRACTITIONER

## 2017-09-05 PROCEDURE — 6370000000 HC RX 637 (ALT 250 FOR IP): Performed by: INTERNAL MEDICINE

## 2017-09-05 PROCEDURE — 80048 BASIC METABOLIC PNL TOTAL CA: CPT

## 2017-09-05 PROCEDURE — 94640 AIRWAY INHALATION TREATMENT: CPT

## 2017-09-05 PROCEDURE — 2580000003 HC RX 258: Performed by: INTERNAL MEDICINE

## 2017-09-05 PROCEDURE — 2700000000 HC OXYGEN THERAPY PER DAY

## 2017-09-05 PROCEDURE — 2580000003 HC RX 258: Performed by: NURSE PRACTITIONER

## 2017-09-05 PROCEDURE — 85027 COMPLETE CBC AUTOMATED: CPT

## 2017-09-05 PROCEDURE — 85610 PROTHROMBIN TIME: CPT

## 2017-09-05 RX ADMIN — LACTOBACILLUS TAB 4 TABLET: TAB at 08:11

## 2017-09-05 RX ADMIN — Medication 10 ML: at 08:14

## 2017-09-05 RX ADMIN — PREDNISONE 10 MG: 10 TABLET ORAL at 08:12

## 2017-09-05 RX ADMIN — IPRATROPIUM BROMIDE AND ALBUTEROL SULFATE 1 AMPULE: .5; 3 SOLUTION RESPIRATORY (INHALATION) at 10:04

## 2017-09-05 RX ADMIN — NYSTATIN 500000 UNITS: 100000 SUSPENSION ORAL at 08:13

## 2017-09-05 RX ADMIN — ACYCLOVIR: 50 OINTMENT TOPICAL at 17:04

## 2017-09-05 RX ADMIN — NYSTATIN 500000 UNITS: 100000 SUSPENSION ORAL at 13:15

## 2017-09-05 RX ADMIN — ACYCLOVIR: 50 OINTMENT TOPICAL at 20:02

## 2017-09-05 RX ADMIN — ACYCLOVIR: 50 OINTMENT TOPICAL at 01:30

## 2017-09-05 RX ADMIN — PREDNISONE 10 MG: 10 TABLET ORAL at 17:04

## 2017-09-05 RX ADMIN — NYSTATIN 500000 UNITS: 100000 SUSPENSION ORAL at 20:33

## 2017-09-05 RX ADMIN — IPRATROPIUM BROMIDE AND ALBUTEROL SULFATE 1 AMPULE: .5; 3 SOLUTION RESPIRATORY (INHALATION) at 06:16

## 2017-09-05 RX ADMIN — MEROPENEM 500 MG: 1 INJECTION, POWDER, FOR SOLUTION INTRAVENOUS at 04:58

## 2017-09-05 RX ADMIN — ACYCLOVIR: 50 OINTMENT TOPICAL at 05:55

## 2017-09-05 RX ADMIN — NYSTATIN 500000 UNITS: 100000 SUSPENSION ORAL at 17:03

## 2017-09-05 RX ADMIN — Medication 10 ML: at 08:12

## 2017-09-05 RX ADMIN — IPRATROPIUM BROMIDE AND ALBUTEROL SULFATE 1 AMPULE: .5; 3 SOLUTION RESPIRATORY (INHALATION) at 14:09

## 2017-09-05 RX ADMIN — HYDROCODONE BITARTRATE AND ACETAMINOPHEN 1 TABLET: 5; 325 TABLET ORAL at 17:04

## 2017-09-05 RX ADMIN — MEROPENEM 500 MG: 1 INJECTION, POWDER, FOR SOLUTION INTRAVENOUS at 14:42

## 2017-09-05 RX ADMIN — ACYCLOVIR: 50 OINTMENT TOPICAL at 13:15

## 2017-09-05 RX ADMIN — METOPROLOL TARTRATE 25 MG: 50 TABLET ORAL at 20:35

## 2017-09-05 RX ADMIN — GUAIFENESIN 600 MG: 600 TABLET, EXTENDED RELEASE ORAL at 20:30

## 2017-09-05 RX ADMIN — GUAIFENESIN 600 MG: 600 TABLET, EXTENDED RELEASE ORAL at 08:14

## 2017-09-05 RX ADMIN — HYDROCODONE BITARTRATE AND ACETAMINOPHEN 1 TABLET: 5; 325 TABLET ORAL at 00:01

## 2017-09-05 RX ADMIN — LACTOBACILLUS TAB 4 TABLET: TAB at 20:29

## 2017-09-05 RX ADMIN — METHYLNALTREXONE BROMIDE 12 MG: 12 INJECTION, SOLUTION SUBCUTANEOUS at 13:14

## 2017-09-05 RX ADMIN — ACYCLOVIR: 50 OINTMENT TOPICAL at 22:54

## 2017-09-05 RX ADMIN — Medication 10 ML: at 20:30

## 2017-09-05 RX ADMIN — MEROPENEM 500 MG: 1 INJECTION, POWDER, FOR SOLUTION INTRAVENOUS at 20:29

## 2017-09-05 RX ADMIN — ACYCLOVIR: 50 OINTMENT TOPICAL at 03:45

## 2017-09-05 RX ADMIN — AMIODARONE HYDROCHLORIDE 400 MG: 200 TABLET ORAL at 08:11

## 2017-09-05 RX ADMIN — HYDROCODONE BITARTRATE AND ACETAMINOPHEN 1 TABLET: 5; 325 TABLET ORAL at 23:01

## 2017-09-05 RX ADMIN — MICONAZOLE NITRATE 1 APPLICATOR: 20 CREAM VAGINAL at 20:35

## 2017-09-05 RX ADMIN — FLUCONAZOLE 100 MG: 2 INJECTION INTRAVENOUS at 13:14

## 2017-09-05 RX ADMIN — PANTOPRAZOLE SODIUM 40 MG: 40 TABLET, DELAYED RELEASE ORAL at 05:55

## 2017-09-05 RX ADMIN — LACTOBACILLUS TAB 4 TABLET: TAB at 13:19

## 2017-09-05 RX ADMIN — HYDROCODONE BITARTRATE AND ACETAMINOPHEN 1 TABLET: 5; 325 TABLET ORAL at 08:21

## 2017-09-05 RX ADMIN — ACYCLOVIR: 50 OINTMENT TOPICAL at 10:30

## 2017-09-05 RX ADMIN — Medication 10 ML: at 08:15

## 2017-09-05 RX ADMIN — NIFEDIPINE 90 MG: 90 TABLET, FILM COATED, EXTENDED RELEASE ORAL at 08:11

## 2017-09-05 RX ADMIN — IPRATROPIUM BROMIDE AND ALBUTEROL SULFATE 1 AMPULE: .5; 3 SOLUTION RESPIRATORY (INHALATION) at 19:47

## 2017-09-05 RX ADMIN — DOCUSATE SODIUM 100 MG: 100 CAPSULE, LIQUID FILLED ORAL at 08:11

## 2017-09-05 RX ADMIN — METOPROLOL TARTRATE 25 MG: 50 TABLET ORAL at 08:11

## 2017-09-05 ASSESSMENT — PAIN SCALES - GENERAL
PAINLEVEL_OUTOF10: 0
PAINLEVEL_OUTOF10: 9
PAINLEVEL_OUTOF10: 4
PAINLEVEL_OUTOF10: 0
PAINLEVEL_OUTOF10: 8
PAINLEVEL_OUTOF10: 2
PAINLEVEL_OUTOF10: 5
PAINLEVEL_OUTOF10: 6

## 2017-09-05 ASSESSMENT — PAIN DESCRIPTION - ONSET
ONSET: PROGRESSIVE
ONSET: ON-GOING

## 2017-09-05 ASSESSMENT — PAIN DESCRIPTION - PAIN TYPE
TYPE: ACUTE PAIN
TYPE: CHRONIC PAIN;OTHER (COMMENT)

## 2017-09-05 ASSESSMENT — PAIN DESCRIPTION - FREQUENCY
FREQUENCY: CONTINUOUS
FREQUENCY: CONTINUOUS

## 2017-09-05 ASSESSMENT — PAIN DESCRIPTION - DESCRIPTORS
DESCRIPTORS: ACHING;CRAMPING
DESCRIPTORS: ACHING;DISCOMFORT;CONSTANT

## 2017-09-05 ASSESSMENT — PAIN DESCRIPTION - LOCATION
LOCATION: ABDOMEN
LOCATION: GENERALIZED;ABDOMEN

## 2017-09-05 ASSESSMENT — PAIN DESCRIPTION - PROGRESSION
CLINICAL_PROGRESSION: GRADUALLY IMPROVING
CLINICAL_PROGRESSION: GRADUALLY IMPROVING

## 2017-09-06 LAB
ANION GAP SERPL CALCULATED.3IONS-SCNC: 10 MMOL/L (ref 7–19)
BUN BLDV-MCNC: 19 MG/DL (ref 8–23)
CALCIUM SERPL-MCNC: 8.8 MG/DL (ref 8.8–10.2)
CHLORIDE BLD-SCNC: 100 MMOL/L (ref 98–111)
CO2: 31 MMOL/L (ref 22–29)
CREAT SERPL-MCNC: 1 MG/DL (ref 0.5–0.9)
GFR NON-AFRICAN AMERICAN: 54
GLUCOSE BLD-MCNC: 144 MG/DL (ref 74–109)
HCT VFR BLD CALC: 41.5 % (ref 37–47)
HEMOGLOBIN: 13.8 G/DL (ref 12–16)
INR BLD: 2.47 (ref 0.88–1.18)
MCH RBC QN AUTO: 33.2 PG (ref 27–31)
MCHC RBC AUTO-ENTMCNC: 33.3 G/DL (ref 33–37)
MCV RBC AUTO: 99.8 FL (ref 81–99)
PDW BLD-RTO: 15.9 % (ref 11.5–14.5)
PLATELET # BLD: 235 K/UL (ref 130–400)
PMV BLD AUTO: 10.3 FL (ref 9.4–12.3)
POTASSIUM SERPL-SCNC: 4.7 MMOL/L (ref 3.5–5)
PROTHROMBIN TIME: 27 SEC (ref 12–14.6)
RBC # BLD: 4.16 M/UL (ref 4.2–5.4)
SODIUM BLD-SCNC: 141 MMOL/L (ref 136–145)
WBC # BLD: 25.8 K/UL (ref 4.8–10.8)

## 2017-09-06 PROCEDURE — 6370000000 HC RX 637 (ALT 250 FOR IP): Performed by: NURSE PRACTITIONER

## 2017-09-06 PROCEDURE — 2140000000 HC CCU INTERMEDIATE R&B

## 2017-09-06 PROCEDURE — 6360000002 HC RX W HCPCS: Performed by: INTERNAL MEDICINE

## 2017-09-06 PROCEDURE — 2580000003 HC RX 258: Performed by: INTERNAL MEDICINE

## 2017-09-06 PROCEDURE — 99233 SBSQ HOSP IP/OBS HIGH 50: CPT | Performed by: HOSPITALIST

## 2017-09-06 PROCEDURE — 6370000000 HC RX 637 (ALT 250 FOR IP): Performed by: INTERNAL MEDICINE

## 2017-09-06 PROCEDURE — 94640 AIRWAY INHALATION TREATMENT: CPT

## 2017-09-06 PROCEDURE — 85027 COMPLETE CBC AUTOMATED: CPT

## 2017-09-06 PROCEDURE — 2700000000 HC OXYGEN THERAPY PER DAY

## 2017-09-06 PROCEDURE — 6370000000 HC RX 637 (ALT 250 FOR IP): Performed by: HOSPITALIST

## 2017-09-06 PROCEDURE — 2580000003 HC RX 258: Performed by: NURSE PRACTITIONER

## 2017-09-06 PROCEDURE — 80048 BASIC METABOLIC PNL TOTAL CA: CPT

## 2017-09-06 PROCEDURE — 85610 PROTHROMBIN TIME: CPT

## 2017-09-06 PROCEDURE — 6360000002 HC RX W HCPCS: Performed by: NURSE PRACTITIONER

## 2017-09-06 RX ORDER — BISACODYL 10 MG
10 SUPPOSITORY, RECTAL RECTAL ONCE
Status: COMPLETED | OUTPATIENT
Start: 2017-09-06 | End: 2017-09-07

## 2017-09-06 RX ADMIN — Medication 10 ML: at 20:27

## 2017-09-06 RX ADMIN — DOCUSATE SODIUM 100 MG: 100 CAPSULE, LIQUID FILLED ORAL at 09:03

## 2017-09-06 RX ADMIN — NYSTATIN 500000 UNITS: 100000 SUSPENSION ORAL at 20:28

## 2017-09-06 RX ADMIN — MAGNESIUM HYDROXIDE 30 ML: 400 SUSPENSION ORAL at 15:35

## 2017-09-06 RX ADMIN — LACTOBACILLUS TAB 4 TABLET: TAB at 09:03

## 2017-09-06 RX ADMIN — NYSTATIN 500000 UNITS: 100000 SUSPENSION ORAL at 09:09

## 2017-09-06 RX ADMIN — LACTOBACILLUS TAB 4 TABLET: TAB at 13:53

## 2017-09-06 RX ADMIN — MEROPENEM 500 MG: 1 INJECTION, POWDER, FOR SOLUTION INTRAVENOUS at 04:35

## 2017-09-06 RX ADMIN — PANTOPRAZOLE SODIUM 40 MG: 40 TABLET, DELAYED RELEASE ORAL at 06:04

## 2017-09-06 RX ADMIN — ACYCLOVIR: 50 OINTMENT TOPICAL at 04:35

## 2017-09-06 RX ADMIN — MICONAZOLE NITRATE 1 APPLICATOR: 20 CREAM VAGINAL at 20:28

## 2017-09-06 RX ADMIN — NYSTATIN 500000 UNITS: 100000 SUSPENSION ORAL at 15:38

## 2017-09-06 RX ADMIN — LACTOBACILLUS TAB 4 TABLET: TAB at 21:14

## 2017-09-06 RX ADMIN — HYDROCODONE BITARTRATE AND ACETAMINOPHEN 1 TABLET: 5; 325 TABLET ORAL at 13:57

## 2017-09-06 RX ADMIN — PREDNISONE 10 MG: 10 TABLET ORAL at 09:02

## 2017-09-06 RX ADMIN — HYDROCODONE BITARTRATE AND ACETAMINOPHEN 1 TABLET: 5; 325 TABLET ORAL at 09:02

## 2017-09-06 RX ADMIN — ACYCLOVIR: 50 OINTMENT TOPICAL at 01:44

## 2017-09-06 RX ADMIN — AMIODARONE HYDROCHLORIDE 400 MG: 200 TABLET ORAL at 09:03

## 2017-09-06 RX ADMIN — MEROPENEM 500 MG: 1 INJECTION, POWDER, FOR SOLUTION INTRAVENOUS at 13:53

## 2017-09-06 RX ADMIN — ACYCLOVIR: 50 OINTMENT TOPICAL at 20:27

## 2017-09-06 RX ADMIN — NIFEDIPINE 90 MG: 90 TABLET, FILM COATED, EXTENDED RELEASE ORAL at 09:03

## 2017-09-06 RX ADMIN — Medication 10 ML: at 09:11

## 2017-09-06 RX ADMIN — IPRATROPIUM BROMIDE AND ALBUTEROL SULFATE 1 AMPULE: .5; 3 SOLUTION RESPIRATORY (INHALATION) at 14:08

## 2017-09-06 RX ADMIN — IPRATROPIUM BROMIDE AND ALBUTEROL SULFATE 1 AMPULE: .5; 3 SOLUTION RESPIRATORY (INHALATION) at 11:55

## 2017-09-06 RX ADMIN — GUAIFENESIN 600 MG: 600 TABLET, EXTENDED RELEASE ORAL at 09:03

## 2017-09-06 RX ADMIN — IPRATROPIUM BROMIDE AND ALBUTEROL SULFATE 1 AMPULE: .5; 3 SOLUTION RESPIRATORY (INHALATION) at 20:04

## 2017-09-06 RX ADMIN — METOPROLOL TARTRATE 25 MG: 50 TABLET ORAL at 09:03

## 2017-09-06 RX ADMIN — HYDROCODONE BITARTRATE AND ACETAMINOPHEN 1 TABLET: 5; 325 TABLET ORAL at 18:13

## 2017-09-06 RX ADMIN — FLUCONAZOLE 100 MG: 2 INJECTION INTRAVENOUS at 13:03

## 2017-09-06 RX ADMIN — GUAIFENESIN 600 MG: 600 TABLET, EXTENDED RELEASE ORAL at 20:27

## 2017-09-06 RX ADMIN — ACYCLOVIR: 50 OINTMENT TOPICAL at 15:38

## 2017-09-06 RX ADMIN — MEROPENEM 500 MG: 1 INJECTION, POWDER, FOR SOLUTION INTRAVENOUS at 20:27

## 2017-09-06 RX ADMIN — HYDROCODONE BITARTRATE AND ACETAMINOPHEN 1 TABLET: 5; 325 TABLET ORAL at 22:58

## 2017-09-06 RX ADMIN — METOPROLOL TARTRATE 25 MG: 50 TABLET ORAL at 20:27

## 2017-09-06 RX ADMIN — ACYCLOVIR: 50 OINTMENT TOPICAL at 09:11

## 2017-09-06 RX ADMIN — IPRATROPIUM BROMIDE AND ALBUTEROL SULFATE 1 AMPULE: .5; 3 SOLUTION RESPIRATORY (INHALATION) at 07:26

## 2017-09-06 RX ADMIN — PREDNISONE 10 MG: 10 TABLET ORAL at 15:35

## 2017-09-06 ASSESSMENT — PAIN DESCRIPTION - ORIENTATION: ORIENTATION: RIGHT

## 2017-09-06 ASSESSMENT — PAIN SCALES - GENERAL
PAINLEVEL_OUTOF10: 6
PAINLEVEL_OUTOF10: 5
PAINLEVEL_OUTOF10: 8
PAINLEVEL_OUTOF10: 3
PAINLEVEL_OUTOF10: 3

## 2017-09-06 ASSESSMENT — PAIN DESCRIPTION - LOCATION: LOCATION: RIB CAGE

## 2017-09-06 ASSESSMENT — PAIN DESCRIPTION - PAIN TYPE: TYPE: ACUTE PAIN

## 2017-09-07 ENCOUNTER — APPOINTMENT (OUTPATIENT)
Dept: GENERAL RADIOLOGY | Age: 77
DRG: 180 | End: 2017-09-07
Attending: HOSPITALIST
Payer: MEDICARE

## 2017-09-07 LAB
ABO/RH: NORMAL
ANION GAP SERPL CALCULATED.3IONS-SCNC: 9 MMOL/L (ref 7–19)
ANTIBODY SCREEN: NORMAL
BLOOD BANK DISPENSE STATUS: NORMAL
BLOOD BANK DISPENSE STATUS: NORMAL
BLOOD BANK PRODUCT CODE: NORMAL
BLOOD BANK PRODUCT CODE: NORMAL
BPU ID: NORMAL
BPU ID: NORMAL
BUN BLDV-MCNC: 20 MG/DL (ref 8–23)
CALCIUM SERPL-MCNC: 8.8 MG/DL (ref 8.8–10.2)
CHLORIDE BLD-SCNC: 99 MMOL/L (ref 98–111)
CO2: 31 MMOL/L (ref 22–29)
CREAT SERPL-MCNC: 1 MG/DL (ref 0.5–0.9)
DESCRIPTION BLOOD BANK: NORMAL
DESCRIPTION BLOOD BANK: NORMAL
GFR NON-AFRICAN AMERICAN: 54
GLUCOSE BLD-MCNC: 97 MG/DL (ref 74–109)
HCT VFR BLD CALC: 40.1 % (ref 37–47)
HEMOGLOBIN: 13.4 G/DL (ref 12–16)
INR BLD: 1.32 (ref 0.88–1.18)
INR BLD: 1.65 (ref 0.88–1.18)
INR BLD: 2.33 (ref 0.88–1.18)
MCH RBC QN AUTO: 33.3 PG (ref 27–31)
MCHC RBC AUTO-ENTMCNC: 33.4 G/DL (ref 33–37)
MCV RBC AUTO: 99.8 FL (ref 81–99)
PDW BLD-RTO: 15.9 % (ref 11.5–14.5)
PLATELET # BLD: 235 K/UL (ref 130–400)
PMV BLD AUTO: 10.1 FL (ref 9.4–12.3)
POTASSIUM SERPL-SCNC: 4.7 MMOL/L (ref 3.5–5)
PROTHROMBIN TIME: 16.4 SEC (ref 12–14.6)
PROTHROMBIN TIME: 19.6 SEC (ref 12–14.6)
PROTHROMBIN TIME: 25.8 SEC (ref 12–14.6)
RBC # BLD: 4.02 M/UL (ref 4.2–5.4)
SODIUM BLD-SCNC: 139 MMOL/L (ref 136–145)
WBC # BLD: 28.7 K/UL (ref 4.8–10.8)

## 2017-09-07 PROCEDURE — 6370000000 HC RX 637 (ALT 250 FOR IP): Performed by: INTERNAL MEDICINE

## 2017-09-07 PROCEDURE — 97110 THERAPEUTIC EXERCISES: CPT

## 2017-09-07 PROCEDURE — 85610 PROTHROMBIN TIME: CPT

## 2017-09-07 PROCEDURE — 80048 BASIC METABOLIC PNL TOTAL CA: CPT

## 2017-09-07 PROCEDURE — 36415 COLL VENOUS BLD VENIPUNCTURE: CPT

## 2017-09-07 PROCEDURE — 71010 XR CHEST PORTABLE: CPT

## 2017-09-07 PROCEDURE — 94761 N-INVAS EAR/PLS OXIMETRY MLT: CPT

## 2017-09-07 PROCEDURE — 97116 GAIT TRAINING THERAPY: CPT

## 2017-09-07 PROCEDURE — 86901 BLOOD TYPING SEROLOGIC RH(D): CPT

## 2017-09-07 PROCEDURE — 6360000002 HC RX W HCPCS: Performed by: NURSE PRACTITIONER

## 2017-09-07 PROCEDURE — 2140000000 HC CCU INTERMEDIATE R&B

## 2017-09-07 PROCEDURE — 2580000003 HC RX 258: Performed by: INTERNAL MEDICINE

## 2017-09-07 PROCEDURE — 99233 SBSQ HOSP IP/OBS HIGH 50: CPT | Performed by: HOSPITALIST

## 2017-09-07 PROCEDURE — 86850 RBC ANTIBODY SCREEN: CPT

## 2017-09-07 PROCEDURE — 6370000000 HC RX 637 (ALT 250 FOR IP): Performed by: NURSE PRACTITIONER

## 2017-09-07 PROCEDURE — 86900 BLOOD TYPING SEROLOGIC ABO: CPT

## 2017-09-07 PROCEDURE — 2580000003 HC RX 258: Performed by: NURSE PRACTITIONER

## 2017-09-07 PROCEDURE — 6370000000 HC RX 637 (ALT 250 FOR IP): Performed by: HOSPITALIST

## 2017-09-07 PROCEDURE — P9059 PLASMA, FRZ BETWEEN 8-24HOUR: HCPCS

## 2017-09-07 PROCEDURE — 85027 COMPLETE CBC AUTOMATED: CPT

## 2017-09-07 PROCEDURE — 2700000000 HC OXYGEN THERAPY PER DAY

## 2017-09-07 PROCEDURE — 36430 TRANSFUSION BLD/BLD COMPNT: CPT

## 2017-09-07 PROCEDURE — 94640 AIRWAY INHALATION TREATMENT: CPT

## 2017-09-07 PROCEDURE — 6360000002 HC RX W HCPCS: Performed by: INTERNAL MEDICINE

## 2017-09-07 RX ORDER — 0.9 % SODIUM CHLORIDE 0.9 %
250 INTRAVENOUS SOLUTION INTRAVENOUS ONCE
Status: COMPLETED | OUTPATIENT
Start: 2017-09-07 | End: 2017-09-08

## 2017-09-07 RX ORDER — 0.9 % SODIUM CHLORIDE 0.9 %
250 INTRAVENOUS SOLUTION INTRAVENOUS ONCE
Status: COMPLETED | OUTPATIENT
Start: 2017-09-07 | End: 2017-09-07

## 2017-09-07 RX ORDER — PHYTONADIONE 5 MG/1
5 TABLET ORAL ONCE
Status: COMPLETED | OUTPATIENT
Start: 2017-09-07 | End: 2017-09-07

## 2017-09-07 RX ADMIN — FLUCONAZOLE 100 MG: 2 INJECTION INTRAVENOUS at 13:22

## 2017-09-07 RX ADMIN — ACYCLOVIR: 50 OINTMENT TOPICAL at 07:39

## 2017-09-07 RX ADMIN — MEROPENEM 500 MG: 1 INJECTION, POWDER, FOR SOLUTION INTRAVENOUS at 04:12

## 2017-09-07 RX ADMIN — MEROPENEM 500 MG: 1 INJECTION, POWDER, FOR SOLUTION INTRAVENOUS at 11:56

## 2017-09-07 RX ADMIN — MICONAZOLE NITRATE 1 APPLICATOR: 20 CREAM VAGINAL at 21:56

## 2017-09-07 RX ADMIN — NYSTATIN 500000 UNITS: 100000 SUSPENSION ORAL at 17:29

## 2017-09-07 RX ADMIN — NYSTATIN 500000 UNITS: 100000 SUSPENSION ORAL at 08:58

## 2017-09-07 RX ADMIN — IPRATROPIUM BROMIDE AND ALBUTEROL SULFATE 1 AMPULE: .5; 3 SOLUTION RESPIRATORY (INHALATION) at 18:55

## 2017-09-07 RX ADMIN — AMIODARONE HYDROCHLORIDE 400 MG: 200 TABLET ORAL at 08:58

## 2017-09-07 RX ADMIN — METOPROLOL TARTRATE 12.5 MG: 25 TABLET ORAL at 09:25

## 2017-09-07 RX ADMIN — PREDNISONE 10 MG: 10 TABLET ORAL at 17:29

## 2017-09-07 RX ADMIN — HYDROCODONE BITARTRATE AND ACETAMINOPHEN 1 TABLET: 5; 325 TABLET ORAL at 22:01

## 2017-09-07 RX ADMIN — LACTOBACILLUS TAB 4 TABLET: TAB at 21:54

## 2017-09-07 RX ADMIN — DOCUSATE SODIUM 100 MG: 100 CAPSULE, LIQUID FILLED ORAL at 07:39

## 2017-09-07 RX ADMIN — IPRATROPIUM BROMIDE AND ALBUTEROL SULFATE 1 AMPULE: .5; 3 SOLUTION RESPIRATORY (INHALATION) at 07:08

## 2017-09-07 RX ADMIN — HYDROCODONE BITARTRATE AND ACETAMINOPHEN 1 TABLET: 5; 325 TABLET ORAL at 16:42

## 2017-09-07 RX ADMIN — ACYCLOVIR: 50 OINTMENT TOPICAL at 04:12

## 2017-09-07 RX ADMIN — NYSTATIN 500000 UNITS: 100000 SUSPENSION ORAL at 21:56

## 2017-09-07 RX ADMIN — IPRATROPIUM BROMIDE AND ALBUTEROL SULFATE 1 AMPULE: .5; 3 SOLUTION RESPIRATORY (INHALATION) at 15:09

## 2017-09-07 RX ADMIN — PHYTONADIONE 5 MG: 5 TABLET ORAL at 12:30

## 2017-09-07 RX ADMIN — PREDNISONE 10 MG: 10 TABLET ORAL at 07:39

## 2017-09-07 RX ADMIN — ACYCLOVIR: 50 OINTMENT TOPICAL at 16:43

## 2017-09-07 RX ADMIN — GUAIFENESIN 600 MG: 600 TABLET, EXTENDED RELEASE ORAL at 08:57

## 2017-09-07 RX ADMIN — MEROPENEM 500 MG: 1 INJECTION, POWDER, FOR SOLUTION INTRAVENOUS at 21:53

## 2017-09-07 RX ADMIN — SODIUM CHLORIDE 250 ML: 9 INJECTION, SOLUTION INTRAVENOUS at 19:00

## 2017-09-07 RX ADMIN — ACYCLOVIR: 50 OINTMENT TOPICAL at 12:05

## 2017-09-07 RX ADMIN — IPRATROPIUM BROMIDE AND ALBUTEROL SULFATE 1 AMPULE: .5; 3 SOLUTION RESPIRATORY (INHALATION) at 11:11

## 2017-09-07 RX ADMIN — ACYCLOVIR: 50 OINTMENT TOPICAL at 21:56

## 2017-09-07 RX ADMIN — NIFEDIPINE 90 MG: 90 TABLET, FILM COATED, EXTENDED RELEASE ORAL at 08:57

## 2017-09-07 RX ADMIN — SODIUM CHLORIDE 20 ML: 9 INJECTION, SOLUTION INTRAVENOUS at 13:23

## 2017-09-07 RX ADMIN — ACYCLOVIR: 50 OINTMENT TOPICAL at 01:38

## 2017-09-07 RX ADMIN — HYDROCODONE BITARTRATE AND ACETAMINOPHEN 1 TABLET: 5; 325 TABLET ORAL at 07:39

## 2017-09-07 RX ADMIN — Medication 10 ML: at 08:57

## 2017-09-07 RX ADMIN — HYDROCODONE BITARTRATE AND ACETAMINOPHEN 1 TABLET: 5; 325 TABLET ORAL at 03:09

## 2017-09-07 RX ADMIN — GUAIFENESIN 600 MG: 600 TABLET, EXTENDED RELEASE ORAL at 21:54

## 2017-09-07 RX ADMIN — NYSTATIN 500000 UNITS: 100000 SUSPENSION ORAL at 12:07

## 2017-09-07 RX ADMIN — Medication 10 ML: at 22:00

## 2017-09-07 RX ADMIN — METOPROLOL TARTRATE 12.5 MG: 25 TABLET ORAL at 21:54

## 2017-09-07 RX ADMIN — HYDROCODONE BITARTRATE AND ACETAMINOPHEN 1 TABLET: 5; 325 TABLET ORAL at 12:38

## 2017-09-07 RX ADMIN — PANTOPRAZOLE SODIUM 40 MG: 40 TABLET, DELAYED RELEASE ORAL at 06:08

## 2017-09-07 RX ADMIN — BISACODYL 10 MG: 10 SUPPOSITORY RECTAL at 05:07

## 2017-09-07 ASSESSMENT — PAIN SCALES - GENERAL
PAINLEVEL_OUTOF10: 9
PAINLEVEL_OUTOF10: 7
PAINLEVEL_OUTOF10: 9
PAINLEVEL_OUTOF10: 8
PAINLEVEL_OUTOF10: 10
PAINLEVEL_OUTOF10: 10
PAINLEVEL_OUTOF10: 8
PAINLEVEL_OUTOF10: 5
PAINLEVEL_OUTOF10: 6
PAINLEVEL_OUTOF10: 2
PAINLEVEL_OUTOF10: 7

## 2017-09-08 ENCOUNTER — APPOINTMENT (OUTPATIENT)
Dept: CT IMAGING | Age: 77
DRG: 180 | End: 2017-09-08
Attending: HOSPITALIST
Payer: MEDICARE

## 2017-09-08 ENCOUNTER — APPOINTMENT (OUTPATIENT)
Dept: GENERAL RADIOLOGY | Age: 77
DRG: 180 | End: 2017-09-08
Attending: HOSPITALIST
Payer: MEDICARE

## 2017-09-08 LAB
ANION GAP SERPL CALCULATED.3IONS-SCNC: 11 MMOL/L (ref 7–19)
BLOOD BANK DISPENSE STATUS: NORMAL
BLOOD BANK PRODUCT CODE: NORMAL
BPU ID: NORMAL
BUN BLDV-MCNC: 21 MG/DL (ref 8–23)
CALCIUM SERPL-MCNC: 8.8 MG/DL (ref 8.8–10.2)
CHLORIDE BLD-SCNC: 100 MMOL/L (ref 98–111)
CO2: 30 MMOL/L (ref 22–29)
CREAT SERPL-MCNC: 1.1 MG/DL (ref 0.5–0.9)
DESCRIPTION BLOOD BANK: NORMAL
GFR NON-AFRICAN AMERICAN: 48
GLUCOSE BLD-MCNC: 121 MG/DL (ref 74–109)
HCT VFR BLD CALC: 38.5 % (ref 37–47)
HEMOGLOBIN: 12.6 G/DL (ref 12–16)
INR BLD: 1.16 (ref 0.88–1.18)
INR BLD: 1.3 (ref 0.88–1.18)
MCH RBC QN AUTO: 33 PG (ref 27–31)
MCHC RBC AUTO-ENTMCNC: 32.7 G/DL (ref 33–37)
MCV RBC AUTO: 100.8 FL (ref 81–99)
PDW BLD-RTO: 16.1 % (ref 11.5–14.5)
PLATELET # BLD: 244 K/UL (ref 130–400)
PMV BLD AUTO: 10.3 FL (ref 9.4–12.3)
POTASSIUM SERPL-SCNC: 5.1 MMOL/L (ref 3.5–5)
PROTHROMBIN TIME: 14.8 SEC (ref 12–14.6)
PROTHROMBIN TIME: 16.2 SEC (ref 12–14.6)
RBC # BLD: 3.82 M/UL (ref 4.2–5.4)
SODIUM BLD-SCNC: 141 MMOL/L (ref 136–145)
WBC # BLD: 28.4 K/UL (ref 4.8–10.8)

## 2017-09-08 PROCEDURE — 99233 SBSQ HOSP IP/OBS HIGH 50: CPT | Performed by: HOSPITALIST

## 2017-09-08 PROCEDURE — 6360000002 HC RX W HCPCS: Performed by: RADIOLOGY

## 2017-09-08 PROCEDURE — 36430 TRANSFUSION BLD/BLD COMPNT: CPT

## 2017-09-08 PROCEDURE — 6370000000 HC RX 637 (ALT 250 FOR IP): Performed by: NURSE PRACTITIONER

## 2017-09-08 PROCEDURE — 6360000002 HC RX W HCPCS: Performed by: INTERNAL MEDICINE

## 2017-09-08 PROCEDURE — 88333 PATH CONSLTJ SURG CYTO XM 1: CPT

## 2017-09-08 PROCEDURE — 2580000003 HC RX 258: Performed by: INTERNAL MEDICINE

## 2017-09-08 PROCEDURE — 6370000000 HC RX 637 (ALT 250 FOR IP): Performed by: INTERNAL MEDICINE

## 2017-09-08 PROCEDURE — 2140000000 HC CCU INTERMEDIATE R&B

## 2017-09-08 PROCEDURE — 85027 COMPLETE CBC AUTOMATED: CPT

## 2017-09-08 PROCEDURE — 2580000003 HC RX 258: Performed by: NURSE PRACTITIONER

## 2017-09-08 PROCEDURE — 88305 TISSUE EXAM BY PATHOLOGIST: CPT

## 2017-09-08 PROCEDURE — 2700000000 HC OXYGEN THERAPY PER DAY

## 2017-09-08 PROCEDURE — P9059 PLASMA, FRZ BETWEEN 8-24HOUR: HCPCS

## 2017-09-08 PROCEDURE — 6370000000 HC RX 637 (ALT 250 FOR IP): Performed by: HOSPITALIST

## 2017-09-08 PROCEDURE — 0BBG3ZX EXCISION OF LEFT UPPER LUNG LOBE, PERCUTANEOUS APPROACH, DIAGNOSTIC: ICD-10-PCS | Performed by: RADIOLOGY

## 2017-09-08 PROCEDURE — 88329 PATH CONSLTJ DRG SURG: CPT

## 2017-09-08 PROCEDURE — 80048 BASIC METABOLIC PNL TOTAL CA: CPT

## 2017-09-08 PROCEDURE — 88334 PATH CONSLTJ SURG CYTO XM EA: CPT

## 2017-09-08 PROCEDURE — 71010 XR CHEST PORTABLE: CPT

## 2017-09-08 PROCEDURE — 2720000000

## 2017-09-08 PROCEDURE — 6360000002 HC RX W HCPCS

## 2017-09-08 PROCEDURE — 6360000002 HC RX W HCPCS: Performed by: NURSE PRACTITIONER

## 2017-09-08 PROCEDURE — 85610 PROTHROMBIN TIME: CPT

## 2017-09-08 PROCEDURE — 94640 AIRWAY INHALATION TREATMENT: CPT

## 2017-09-08 RX ORDER — MIDAZOLAM HYDROCHLORIDE 1 MG/ML
INJECTION INTRAMUSCULAR; INTRAVENOUS
Status: COMPLETED | OUTPATIENT
Start: 2017-09-08 | End: 2017-09-08

## 2017-09-08 RX ORDER — 0.9 % SODIUM CHLORIDE 0.9 %
250 INTRAVENOUS SOLUTION INTRAVENOUS ONCE
Status: COMPLETED | OUTPATIENT
Start: 2017-09-08 | End: 2017-09-08

## 2017-09-08 RX ORDER — FENTANYL CITRATE 50 UG/ML
INJECTION, SOLUTION INTRAMUSCULAR; INTRAVENOUS
Status: DISCONTINUED
Start: 2017-09-08 | End: 2017-09-08 | Stop reason: WASHOUT

## 2017-09-08 RX ORDER — MIDAZOLAM HYDROCHLORIDE 1 MG/ML
INJECTION INTRAMUSCULAR; INTRAVENOUS
Status: COMPLETED
Start: 2017-09-08 | End: 2017-09-08

## 2017-09-08 RX ADMIN — AMIODARONE HYDROCHLORIDE 400 MG: 200 TABLET ORAL at 09:30

## 2017-09-08 RX ADMIN — NIFEDIPINE 90 MG: 90 TABLET, FILM COATED, EXTENDED RELEASE ORAL at 09:30

## 2017-09-08 RX ADMIN — MEROPENEM 500 MG: 1 INJECTION, POWDER, FOR SOLUTION INTRAVENOUS at 03:18

## 2017-09-08 RX ADMIN — GUAIFENESIN 600 MG: 600 TABLET, EXTENDED RELEASE ORAL at 20:35

## 2017-09-08 RX ADMIN — METOPROLOL TARTRATE 12.5 MG: 25 TABLET ORAL at 09:31

## 2017-09-08 RX ADMIN — METOPROLOL TARTRATE 12.5 MG: 25 TABLET ORAL at 20:37

## 2017-09-08 RX ADMIN — MEROPENEM 500 MG: 1 INJECTION, POWDER, FOR SOLUTION INTRAVENOUS at 13:38

## 2017-09-08 RX ADMIN — HYDROCODONE BITARTRATE AND ACETAMINOPHEN 1 TABLET: 5; 325 TABLET ORAL at 03:17

## 2017-09-08 RX ADMIN — Medication 10 ML: at 20:36

## 2017-09-08 RX ADMIN — IPRATROPIUM BROMIDE AND ALBUTEROL SULFATE 1 AMPULE: .5; 3 SOLUTION RESPIRATORY (INHALATION) at 19:11

## 2017-09-08 RX ADMIN — LACTOBACILLUS TAB 4 TABLET: TAB at 14:50

## 2017-09-08 RX ADMIN — IPRATROPIUM BROMIDE AND ALBUTEROL SULFATE 1 AMPULE: .5; 3 SOLUTION RESPIRATORY (INHALATION) at 07:05

## 2017-09-08 RX ADMIN — MIDAZOLAM HYDROCHLORIDE 1 MG: 1 INJECTION, SOLUTION INTRAMUSCULAR; INTRAVENOUS at 11:58

## 2017-09-08 RX ADMIN — SODIUM CHLORIDE 250 ML: 9 INJECTION, SOLUTION INTRAVENOUS at 06:12

## 2017-09-08 RX ADMIN — ACYCLOVIR: 50 OINTMENT TOPICAL at 00:22

## 2017-09-08 RX ADMIN — NYSTATIN 500000 UNITS: 100000 SUSPENSION ORAL at 16:30

## 2017-09-08 RX ADMIN — FLUCONAZOLE 100 MG: 2 INJECTION INTRAVENOUS at 14:50

## 2017-09-08 RX ADMIN — ACYCLOVIR: 50 OINTMENT TOPICAL at 06:14

## 2017-09-08 RX ADMIN — IPRATROPIUM BROMIDE AND ALBUTEROL SULFATE 1 AMPULE: .5; 3 SOLUTION RESPIRATORY (INHALATION) at 14:44

## 2017-09-08 RX ADMIN — GUAIFENESIN 600 MG: 600 TABLET, EXTENDED RELEASE ORAL at 09:30

## 2017-09-08 RX ADMIN — ACYCLOVIR: 50 OINTMENT TOPICAL at 16:30

## 2017-09-08 RX ADMIN — PREDNISONE 10 MG: 10 TABLET ORAL at 09:30

## 2017-09-08 RX ADMIN — NYSTATIN 500000 UNITS: 100000 SUSPENSION ORAL at 13:13

## 2017-09-08 RX ADMIN — NYSTATIN 500000 UNITS: 100000 SUSPENSION ORAL at 09:38

## 2017-09-08 RX ADMIN — HYDROCODONE BITARTRATE AND ACETAMINOPHEN 1 TABLET: 5; 325 TABLET ORAL at 13:15

## 2017-09-08 RX ADMIN — NYSTATIN 500000 UNITS: 100000 SUSPENSION ORAL at 20:35

## 2017-09-08 RX ADMIN — PREDNISONE 10 MG: 10 TABLET ORAL at 16:30

## 2017-09-08 RX ADMIN — LACTOBACILLUS TAB 4 TABLET: TAB at 09:29

## 2017-09-08 RX ADMIN — ACYCLOVIR: 50 OINTMENT TOPICAL at 13:12

## 2017-09-08 RX ADMIN — LACTOBACILLUS TAB 1 TABLET: TAB at 20:35

## 2017-09-08 RX ADMIN — ACYCLOVIR: 50 OINTMENT TOPICAL at 03:20

## 2017-09-08 RX ADMIN — PANTOPRAZOLE SODIUM 40 MG: 40 TABLET, DELAYED RELEASE ORAL at 06:14

## 2017-09-08 RX ADMIN — ACYCLOVIR: 50 OINTMENT TOPICAL at 09:39

## 2017-09-08 RX ADMIN — MICONAZOLE NITRATE 1 APPLICATOR: 20 CREAM VAGINAL at 20:36

## 2017-09-08 RX ADMIN — MIDAZOLAM HYDROCHLORIDE: 1 INJECTION, SOLUTION INTRAMUSCULAR; INTRAVENOUS at 11:58

## 2017-09-08 RX ADMIN — HYDROCODONE BITARTRATE AND ACETAMINOPHEN 1 TABLET: 5; 325 TABLET ORAL at 20:48

## 2017-09-08 RX ADMIN — MEROPENEM 500 MG: 1 INJECTION, POWDER, FOR SOLUTION INTRAVENOUS at 20:35

## 2017-09-08 RX ADMIN — ACYCLOVIR: 50 OINTMENT TOPICAL at 19:00

## 2017-09-08 RX ADMIN — IPRATROPIUM BROMIDE AND ALBUTEROL SULFATE 1 AMPULE: .5; 3 SOLUTION RESPIRATORY (INHALATION) at 11:01

## 2017-09-08 ASSESSMENT — PAIN SCALES - GENERAL
PAINLEVEL_OUTOF10: 9
PAINLEVEL_OUTOF10: 10
PAINLEVEL_OUTOF10: 0
PAINLEVEL_OUTOF10: 9
PAINLEVEL_OUTOF10: 6
PAINLEVEL_OUTOF10: 7
PAINLEVEL_OUTOF10: 0

## 2017-09-09 VITALS
OXYGEN SATURATION: 92 % | HEART RATE: 59 BPM | HEIGHT: 69 IN | WEIGHT: 172.6 LBS | DIASTOLIC BLOOD PRESSURE: 72 MMHG | TEMPERATURE: 97 F | BODY MASS INDEX: 25.56 KG/M2 | SYSTOLIC BLOOD PRESSURE: 128 MMHG | RESPIRATION RATE: 16 BRPM

## 2017-09-09 LAB
ANION GAP SERPL CALCULATED.3IONS-SCNC: 8 MMOL/L (ref 7–19)
BUN BLDV-MCNC: 20 MG/DL (ref 8–23)
CALCIUM SERPL-MCNC: 9 MG/DL (ref 8.8–10.2)
CHLORIDE BLD-SCNC: 101 MMOL/L (ref 98–111)
CO2: 31 MMOL/L (ref 22–29)
CREAT SERPL-MCNC: 1 MG/DL (ref 0.5–0.9)
GFR NON-AFRICAN AMERICAN: 54
GLUCOSE BLD-MCNC: 97 MG/DL (ref 74–109)
HCT VFR BLD CALC: 38.5 % (ref 37–47)
HEMOGLOBIN: 12.5 G/DL (ref 12–16)
INR BLD: 1.11 (ref 0.88–1.18)
MCH RBC QN AUTO: 33 PG (ref 27–31)
MCHC RBC AUTO-ENTMCNC: 32.5 G/DL (ref 33–37)
MCV RBC AUTO: 101.6 FL (ref 81–99)
PDW BLD-RTO: 16 % (ref 11.5–14.5)
PLATELET # BLD: 248 K/UL (ref 130–400)
PMV BLD AUTO: 10.3 FL (ref 9.4–12.3)
POTASSIUM SERPL-SCNC: 4.6 MMOL/L (ref 3.5–5)
PROTHROMBIN TIME: 14.2 SEC (ref 12–14.6)
RBC # BLD: 3.79 M/UL (ref 4.2–5.4)
SODIUM BLD-SCNC: 140 MMOL/L (ref 136–145)
WBC # BLD: 17.8 K/UL (ref 4.8–10.8)

## 2017-09-09 PROCEDURE — 2700000000 HC OXYGEN THERAPY PER DAY

## 2017-09-09 PROCEDURE — 94640 AIRWAY INHALATION TREATMENT: CPT

## 2017-09-09 PROCEDURE — 6360000002 HC RX W HCPCS: Performed by: INTERNAL MEDICINE

## 2017-09-09 PROCEDURE — 6360000002 HC RX W HCPCS: Performed by: PHYSICIAN ASSISTANT

## 2017-09-09 PROCEDURE — 6370000000 HC RX 637 (ALT 250 FOR IP): Performed by: NURSE PRACTITIONER

## 2017-09-09 PROCEDURE — 6370000000 HC RX 637 (ALT 250 FOR IP): Performed by: INTERNAL MEDICINE

## 2017-09-09 PROCEDURE — 2580000003 HC RX 258: Performed by: INTERNAL MEDICINE

## 2017-09-09 PROCEDURE — 99239 HOSP IP/OBS DSCHRG MGMT >30: CPT | Performed by: HOSPITALIST

## 2017-09-09 PROCEDURE — 80048 BASIC METABOLIC PNL TOTAL CA: CPT

## 2017-09-09 PROCEDURE — 85610 PROTHROMBIN TIME: CPT

## 2017-09-09 PROCEDURE — 6370000000 HC RX 637 (ALT 250 FOR IP): Performed by: HOSPITALIST

## 2017-09-09 PROCEDURE — 85027 COMPLETE CBC AUTOMATED: CPT

## 2017-09-09 RX ORDER — PANTOPRAZOLE SODIUM 40 MG/1
40 TABLET, DELAYED RELEASE ORAL
Qty: 30 TABLET | Refills: 0 | Status: SHIPPED | OUTPATIENT
Start: 2017-09-09

## 2017-09-09 RX ORDER — NICOTINE 21 MG/24HR
1 PATCH, TRANSDERMAL 24 HOURS TRANSDERMAL DAILY
Qty: 30 PATCH | Refills: 0 | Status: ON HOLD | OUTPATIENT
Start: 2017-09-09 | End: 2017-10-11 | Stop reason: ALTCHOICE

## 2017-09-09 RX ORDER — WARFARIN SODIUM 5 MG/1
TABLET ORAL
Qty: 30 TABLET | Refills: 0 | Status: ON HOLD | OUTPATIENT
Start: 2017-09-09 | End: 2017-10-13

## 2017-09-09 RX ORDER — HYDROCODONE BITARTRATE AND ACETAMINOPHEN 5; 325 MG/1; MG/1
1 TABLET ORAL EVERY 6 HOURS PRN
Qty: 20 TABLET | Refills: 0 | Status: ON HOLD | OUTPATIENT
Start: 2017-09-09 | End: 2017-10-13

## 2017-09-09 RX ORDER — WARFARIN SODIUM 5 MG/1
5 TABLET ORAL DAILY
Status: DISCONTINUED | OUTPATIENT
Start: 2017-09-09 | End: 2017-09-09 | Stop reason: HOSPADM

## 2017-09-09 RX ORDER — AMIODARONE HYDROCHLORIDE 200 MG/1
200 TABLET ORAL DAILY
Qty: 30 TABLET | Refills: 0 | Status: ON HOLD | OUTPATIENT
Start: 2017-09-09 | End: 2017-10-13

## 2017-09-09 RX ADMIN — MEROPENEM 500 MG: 1 INJECTION, POWDER, FOR SOLUTION INTRAVENOUS at 06:29

## 2017-09-09 RX ADMIN — TRAMADOL HYDROCHLORIDE 50 MG: 50 TABLET, FILM COATED ORAL at 00:12

## 2017-09-09 RX ADMIN — ACYCLOVIR: 50 OINTMENT TOPICAL at 10:13

## 2017-09-09 RX ADMIN — GUAIFENESIN 600 MG: 600 TABLET, EXTENDED RELEASE ORAL at 08:51

## 2017-09-09 RX ADMIN — PREDNISONE 10 MG: 10 TABLET ORAL at 07:30

## 2017-09-09 RX ADMIN — METOPROLOL TARTRATE 12.5 MG: 25 TABLET ORAL at 08:51

## 2017-09-09 RX ADMIN — HYDROCODONE BITARTRATE AND ACETAMINOPHEN 1 TABLET: 5; 325 TABLET ORAL at 15:11

## 2017-09-09 RX ADMIN — NYSTATIN 500000 UNITS: 100000 SUSPENSION ORAL at 08:50

## 2017-09-09 RX ADMIN — IPRATROPIUM BROMIDE AND ALBUTEROL SULFATE 1 AMPULE: .5; 3 SOLUTION RESPIRATORY (INHALATION) at 06:40

## 2017-09-09 RX ADMIN — LACTOBACILLUS TAB 4 TABLET: TAB at 08:51

## 2017-09-09 RX ADMIN — ENOXAPARIN SODIUM 80 MG: 80 INJECTION SUBCUTANEOUS at 14:48

## 2017-09-09 RX ADMIN — NIFEDIPINE 90 MG: 90 TABLET, FILM COATED, EXTENDED RELEASE ORAL at 08:51

## 2017-09-09 RX ADMIN — HYDROCODONE BITARTRATE AND ACETAMINOPHEN 1 TABLET: 5; 325 TABLET ORAL at 08:57

## 2017-09-09 RX ADMIN — AMIODARONE HYDROCHLORIDE 400 MG: 200 TABLET ORAL at 08:51

## 2017-09-09 RX ADMIN — Medication 10 ML: at 08:51

## 2017-09-09 RX ADMIN — PANTOPRAZOLE SODIUM 40 MG: 40 TABLET, DELAYED RELEASE ORAL at 07:30

## 2017-09-09 RX ADMIN — DOCUSATE SODIUM 100 MG: 100 CAPSULE, LIQUID FILLED ORAL at 08:51

## 2017-09-09 RX ADMIN — IPRATROPIUM BROMIDE AND ALBUTEROL SULFATE 1 AMPULE: .5; 3 SOLUTION RESPIRATORY (INHALATION) at 10:29

## 2017-09-09 ASSESSMENT — PAIN SCALES - GENERAL
PAINLEVEL_OUTOF10: 10
PAINLEVEL_OUTOF10: 9
PAINLEVEL_OUTOF10: 9

## 2017-09-28 ENCOUNTER — HOSPITAL ENCOUNTER (INPATIENT)
Age: 77
LOS: 15 days | Discharge: NURSING, REHAB OR LONG-TERM CARE | DRG: 813 | End: 2017-10-13
Attending: EMERGENCY MEDICINE | Admitting: HOSPITALIST
Payer: MEDICARE

## 2017-09-28 DIAGNOSIS — Z98.890 S/P THORACENTESIS: ICD-10-CM

## 2017-09-28 DIAGNOSIS — T45.511A WARFARIN TOXICITY, ACCIDENTAL OR UNINTENTIONAL, INITIAL ENCOUNTER: ICD-10-CM

## 2017-09-28 DIAGNOSIS — K92.1 GASTROINTESTINAL HEMORRHAGE WITH MELENA: Primary | ICD-10-CM

## 2017-09-28 DIAGNOSIS — D62 ANEMIA DUE TO BLOOD LOSS, ACUTE: ICD-10-CM

## 2017-09-28 PROBLEM — D68.59 HYPERCOAGULABLE STATE (HCC): Status: ACTIVE | Noted: 2017-09-28

## 2017-09-28 PROBLEM — I48.0 PAROXYSMAL ATRIAL FIBRILLATION (HCC): Status: ACTIVE | Noted: 2017-09-28

## 2017-09-28 LAB
ABO/RH: NORMAL
ALBUMIN SERPL-MCNC: 2.6 G/DL (ref 3.5–5.2)
ALP BLD-CCNC: 55 U/L (ref 35–104)
ALT SERPL-CCNC: 15 U/L (ref 5–33)
ANION GAP SERPL CALCULATED.3IONS-SCNC: 12 MMOL/L (ref 7–19)
ANTIBODY SCREEN: NORMAL
AST SERPL-CCNC: 15 U/L (ref 5–32)
BASOPHILS ABSOLUTE: 0.1 K/UL (ref 0–0.2)
BASOPHILS ABSOLUTE: 0.1 K/UL (ref 0–0.2)
BASOPHILS RELATIVE PERCENT: 0.2 % (ref 0–1)
BASOPHILS RELATIVE PERCENT: 0.3 % (ref 0–1)
BILIRUB SERPL-MCNC: <0.2 MG/DL (ref 0.2–1.2)
BLOOD BANK DISPENSE STATUS: NORMAL
BLOOD BANK DISPENSE STATUS: NORMAL
BLOOD BANK PRODUCT CODE: NORMAL
BLOOD BANK PRODUCT CODE: NORMAL
BPU ID: NORMAL
BPU ID: NORMAL
BUN BLDV-MCNC: 72 MG/DL (ref 8–23)
CALCIUM SERPL-MCNC: 8.3 MG/DL (ref 8.8–10.2)
CHLORIDE BLD-SCNC: 107 MMOL/L (ref 98–111)
CO2: 23 MMOL/L (ref 22–29)
CREAT SERPL-MCNC: 1.5 MG/DL (ref 0.5–0.9)
DESCRIPTION BLOOD BANK: NORMAL
DESCRIPTION BLOOD BANK: NORMAL
EOSINOPHILS ABSOLUTE: 0.2 K/UL (ref 0–0.6)
EOSINOPHILS ABSOLUTE: 0.2 K/UL (ref 0–0.6)
EOSINOPHILS RELATIVE PERCENT: 0.7 % (ref 0–5)
EOSINOPHILS RELATIVE PERCENT: 0.8 % (ref 0–5)
FERRITIN: 196.2 NG/ML (ref 13–150)
GFR NON-AFRICAN AMERICAN: 34
GLUCOSE BLD-MCNC: 136 MG/DL (ref 74–109)
HCT VFR BLD CALC: 21.3 % (ref 37–47)
HCT VFR BLD CALC: 23.7 % (ref 37–47)
HEMOGLOBIN: 6.6 G/DL (ref 12–16)
HEMOGLOBIN: 7.4 G/DL (ref 12–16)
INR BLD: >18.8 (ref 0.88–1.18)
INR BLD: >18.8 (ref 0.88–1.18)
IRON SATURATION: 28 % (ref 14–50)
IRON: 56 UG/DL (ref 37–145)
LACTIC ACID: 1.8 MMOL/L (ref 0.5–1.9)
LYMPHOCYTES ABSOLUTE: 1.9 K/UL (ref 1.1–4.5)
LYMPHOCYTES ABSOLUTE: 2 K/UL (ref 1.1–4.5)
LYMPHOCYTES RELATIVE PERCENT: 7.8 % (ref 20–40)
LYMPHOCYTES RELATIVE PERCENT: 8.7 % (ref 20–40)
MCH RBC QN AUTO: 31.9 PG (ref 27–31)
MCH RBC QN AUTO: 32.5 PG (ref 27–31)
MCHC RBC AUTO-ENTMCNC: 31 G/DL (ref 33–37)
MCHC RBC AUTO-ENTMCNC: 31.2 G/DL (ref 33–37)
MCV RBC AUTO: 102.2 FL (ref 81–99)
MCV RBC AUTO: 104.9 FL (ref 81–99)
MONOCYTES ABSOLUTE: 1.5 K/UL (ref 0–0.9)
MONOCYTES ABSOLUTE: 1.6 K/UL (ref 0–0.9)
MONOCYTES RELATIVE PERCENT: 6.4 % (ref 0–10)
MONOCYTES RELATIVE PERCENT: 6.4 % (ref 0–10)
NEUTROPHILS ABSOLUTE: 18.9 K/UL (ref 1.5–7.5)
NEUTROPHILS ABSOLUTE: 20 K/UL (ref 1.5–7.5)
NEUTROPHILS RELATIVE PERCENT: 80.8 % (ref 50–65)
NEUTROPHILS RELATIVE PERCENT: 81.6 % (ref 50–65)
PDW BLD-RTO: 16.7 % (ref 11.5–14.5)
PDW BLD-RTO: 16.7 % (ref 11.5–14.5)
PLATELET # BLD: 311 K/UL (ref 130–400)
PLATELET # BLD: 334 K/UL (ref 130–400)
PMV BLD AUTO: 9.1 FL (ref 9.4–12.3)
PMV BLD AUTO: 9.2 FL (ref 9.4–12.3)
POTASSIUM SERPL-SCNC: 5.1 MMOL/L (ref 3.5–5)
PROTHROMBIN TIME: >120 SEC (ref 12–14.6)
PROTHROMBIN TIME: >120 SEC (ref 12–14.6)
RBC # BLD: 2.03 M/UL (ref 4.2–5.4)
RBC # BLD: 2.32 M/UL (ref 4.2–5.4)
SODIUM BLD-SCNC: 142 MMOL/L (ref 136–145)
TOTAL IRON BINDING CAPACITY: 203 UG/DL (ref 250–400)
TOTAL PROTEIN: 5.5 G/DL (ref 6.6–8.7)
WBC # BLD: 23.4 K/UL (ref 4.8–10.8)
WBC # BLD: 24.5 K/UL (ref 4.8–10.8)

## 2017-09-28 PROCEDURE — 99284 EMERGENCY DEPT VISIT MOD MDM: CPT | Performed by: EMERGENCY MEDICINE

## 2017-09-28 PROCEDURE — 85025 COMPLETE CBC W/AUTO DIFF WBC: CPT

## 2017-09-28 PROCEDURE — 86901 BLOOD TYPING SEROLOGIC RH(D): CPT

## 2017-09-28 PROCEDURE — 86900 BLOOD TYPING SEROLOGIC ABO: CPT

## 2017-09-28 PROCEDURE — 85610 PROTHROMBIN TIME: CPT

## 2017-09-28 PROCEDURE — 6370000000 HC RX 637 (ALT 250 FOR IP): Performed by: INTERNAL MEDICINE

## 2017-09-28 PROCEDURE — 83540 ASSAY OF IRON: CPT

## 2017-09-28 PROCEDURE — C9113 INJ PANTOPRAZOLE SODIUM, VIA: HCPCS | Performed by: EMERGENCY MEDICINE

## 2017-09-28 PROCEDURE — 36415 COLL VENOUS BLD VENIPUNCTURE: CPT

## 2017-09-28 PROCEDURE — 83550 IRON BINDING TEST: CPT

## 2017-09-28 PROCEDURE — 86850 RBC ANTIBODY SCREEN: CPT

## 2017-09-28 PROCEDURE — 99223 1ST HOSP IP/OBS HIGH 75: CPT | Performed by: INTERNAL MEDICINE

## 2017-09-28 PROCEDURE — 2580000003 HC RX 258: Performed by: EMERGENCY MEDICINE

## 2017-09-28 PROCEDURE — 6360000002 HC RX W HCPCS: Performed by: INTERNAL MEDICINE

## 2017-09-28 PROCEDURE — 6360000002 HC RX W HCPCS: Performed by: EMERGENCY MEDICINE

## 2017-09-28 PROCEDURE — 93005 ELECTROCARDIOGRAM TRACING: CPT

## 2017-09-28 PROCEDURE — 2700000000 HC OXYGEN THERAPY PER DAY

## 2017-09-28 PROCEDURE — 99285 EMERGENCY DEPT VISIT HI MDM: CPT

## 2017-09-28 PROCEDURE — 94664 DEMO&/EVAL PT USE INHALER: CPT

## 2017-09-28 PROCEDURE — 83605 ASSAY OF LACTIC ACID: CPT

## 2017-09-28 PROCEDURE — 80053 COMPREHEN METABOLIC PANEL: CPT

## 2017-09-28 PROCEDURE — 82728 ASSAY OF FERRITIN: CPT

## 2017-09-28 PROCEDURE — P9059 PLASMA, FRZ BETWEEN 8-24HOUR: HCPCS

## 2017-09-28 PROCEDURE — 2000000000 HC ICU R&B

## 2017-09-28 PROCEDURE — 36430 TRANSFUSION BLD/BLD COMPNT: CPT

## 2017-09-28 PROCEDURE — 2580000003 HC RX 258: Performed by: INTERNAL MEDICINE

## 2017-09-28 PROCEDURE — 99222 1ST HOSP IP/OBS MODERATE 55: CPT | Performed by: INTERNAL MEDICINE

## 2017-09-28 RX ORDER — SODIUM CHLORIDE 0.9 % (FLUSH) 0.9 %
10 SYRINGE (ML) INJECTION PRN
Status: DISCONTINUED | OUTPATIENT
Start: 2017-09-28 | End: 2017-09-29

## 2017-09-28 RX ORDER — SODIUM CHLORIDE 0.9 % (FLUSH) 0.9 %
10 SYRINGE (ML) INJECTION EVERY 12 HOURS SCHEDULED
Status: DISCONTINUED | OUTPATIENT
Start: 2017-09-28 | End: 2017-09-29

## 2017-09-28 RX ORDER — 0.9 % SODIUM CHLORIDE 0.9 %
1000 INTRAVENOUS SOLUTION INTRAVENOUS ONCE
Status: COMPLETED | OUTPATIENT
Start: 2017-09-28 | End: 2017-09-28

## 2017-09-28 RX ORDER — PANTOPRAZOLE SODIUM 40 MG/10ML
80 INJECTION, POWDER, LYOPHILIZED, FOR SOLUTION INTRAVENOUS ONCE
Status: COMPLETED | OUTPATIENT
Start: 2017-09-28 | End: 2017-09-28

## 2017-09-28 RX ORDER — SODIUM CHLORIDE 9 MG/ML
INJECTION, SOLUTION INTRAVENOUS CONTINUOUS
Status: DISCONTINUED | OUTPATIENT
Start: 2017-09-28 | End: 2017-09-30

## 2017-09-28 RX ORDER — TRAMADOL HYDROCHLORIDE 50 MG/1
50 TABLET ORAL 2 TIMES DAILY
Status: DISCONTINUED | OUTPATIENT
Start: 2017-09-28 | End: 2017-10-13 | Stop reason: HOSPADM

## 2017-09-28 RX ORDER — ONDANSETRON 2 MG/ML
4 INJECTION INTRAMUSCULAR; INTRAVENOUS EVERY 6 HOURS PRN
Status: DISCONTINUED | OUTPATIENT
Start: 2017-09-28 | End: 2017-10-13 | Stop reason: HOSPADM

## 2017-09-28 RX ORDER — PRAVASTATIN SODIUM 20 MG
40 TABLET ORAL DAILY
Status: DISCONTINUED | OUTPATIENT
Start: 2017-09-28 | End: 2017-10-13 | Stop reason: HOSPADM

## 2017-09-28 RX ORDER — ACETAMINOPHEN 325 MG/1
650 TABLET ORAL EVERY 4 HOURS PRN
Status: DISCONTINUED | OUTPATIENT
Start: 2017-09-28 | End: 2017-10-13 | Stop reason: HOSPADM

## 2017-09-28 RX ORDER — PHYTONADIONE 10 MG/ML
10 INJECTION, EMULSION INTRAMUSCULAR; INTRAVENOUS; SUBCUTANEOUS ONCE
Status: COMPLETED | OUTPATIENT
Start: 2017-09-28 | End: 2017-09-28

## 2017-09-28 RX ADMIN — PHYTONADIONE 10 MG: 10 INJECTION, EMULSION INTRAMUSCULAR; INTRAVENOUS; SUBCUTANEOUS at 17:12

## 2017-09-28 RX ADMIN — SODIUM CHLORIDE 1000 ML: 9 INJECTION, SOLUTION INTRAVENOUS at 15:28

## 2017-09-28 RX ADMIN — TRAMADOL HYDROCHLORIDE 50 MG: 50 TABLET, FILM COATED ORAL at 21:22

## 2017-09-28 RX ADMIN — PRAVASTATIN SODIUM 40 MG: 20 TABLET ORAL at 21:22

## 2017-09-28 RX ADMIN — OCTREOTIDE ACETATE 25 MCG/HR: 500 INJECTION, SOLUTION INTRAVENOUS; SUBCUTANEOUS at 20:24

## 2017-09-28 RX ADMIN — PANTOPRAZOLE SODIUM 80 MG: 40 INJECTION, POWDER, FOR SOLUTION INTRAVENOUS at 17:11

## 2017-09-28 RX ADMIN — Medication 10 ML: at 21:23

## 2017-09-28 RX ADMIN — SODIUM CHLORIDE 8 MG/HR: 9 INJECTION, SOLUTION INTRAVENOUS at 21:38

## 2017-09-28 ASSESSMENT — ENCOUNTER SYMPTOMS
NAUSEA: 0
ABDOMINAL PAIN: 0
SHORTNESS OF BREATH: 1
BACK PAIN: 0
DIARRHEA: 0
CONSTIPATION: 0
VOMITING: 0

## 2017-09-28 ASSESSMENT — PAIN SCALES - GENERAL: PAINLEVEL_OUTOF10: 2

## 2017-09-28 NOTE — Clinical Note
Patient Class: Inpatient [101]   REQUIRED: Diagnosis: GI bleed [[id number]]   Estimated Length of Stay: Estimated stay of more than 2 midnights   Future Attending Provider: Melissa Moore [[id number]]   Admitting Provider: Melissa Moore [[id number]]

## 2017-09-28 NOTE — ED PROVIDER NOTES
23.7 (*)     .2 (*)     MCH 31.9 (*)     MCHC 31.2 (*)     RDW 16.7 (*)     MPV 9.2 (*)     Neutrophils % 81.6 (*)     Lymphocytes % 7.8 (*)     Neutrophils # 20.0 (*)     Monocytes # 1.60 (*)     All other components within normal limits   COMPREHENSIVE METABOLIC PANEL - Abnormal; Notable for the following:     Potassium 5.1 (*)     Glucose 136 (*)     BUN 72 (*)     CREATININE 1.5 (*)     GFR Non-African American 34 (*)     Calcium 8.3 (*)     Total Protein 5.5 (*)     Alb 2.6 (*)     All other components within normal limits   PROTIME-INR - Abnormal; Notable for the following:     Protime >120.0 (*)     INR >18.80 (*)     All other components within normal limits    Narrative:     CALL  Martini  [facility] tel. ,  Hematology results called to and read back by maia case rn,  09/28/2017 15:24, by Bertin Jacinto - Abnormal; Notable for the following:     Protime >120.0 (*)     INR >18.80 (*)     All other components within normal limits    Narrative:     CALL  [facility]  [facility] tel. ,  Hematology results called to and read back by maia corrigan rn,  09/28/2017 16:50, by [facility]   LACTIC ACID, PLASMA   URINALYSIS   TYPE AND SCREEN   PREPARE FRESH FROZEN PLASMA       All other labs were within normal range or not returned as of this dictation. EMERGENCY DEPARTMENT COURSE and DIFFERENTIAL DIAGNOSIS/MDM:   Vitals:    Vitals:    09/28/17 1448 09/28/17 1500 09/28/17 1600 09/28/17 1700   BP:  (!) 98/45 (!) 110/52 (!) 108/56   Pulse:   64 64   Resp: 20 20 20 20   Temp:    98.5 °F (36.9 °C)   SpO2: 93% 94% 93% 92%   Weight:       Height:               MDM  Number of Diagnoses or Management Options  Diagnosis management comments: Discussed with Dr. Kevin Weaver - shaan. CRITICAL CARE TIME   Total Critical Care time was 0 minutes, excluding separately reportable procedures.   There was a high probability of clinically significant/life threatening deterioration in the patient's condition which required my urgent intervention. CONSULTS:  None    PROCEDURES:  Unless otherwise noted below, none     Procedures    FINAL IMPRESSION      1. Gastrointestinal hemorrhage with melena    2. Warfarin toxicity, accidental or unintentional, initial encounter    3.  Anemia due to blood loss, acute          DISPOSITION/PLAN   DISPOSITION     PATIENT REFERRED TO:  Claudie [address] [id number]  [phone]            DISCHARGE MEDICATIONS:  New Prescriptions    No medications on file          (Please note that portions of this note were completed with a voice recognition program.  Efforts were made to edit the dictations but occasionally words are mis-transcribed.)    Dinorah Trujillo MD (electronically signed)  Attending Emergency Physician       Dinorah Trujillo MD  09/28/17 [id number]

## 2017-09-28 NOTE — H&P
Hospital Medicine History & Physical      PCP: Alie Blue    Date of Admission: 9/28/2017    Date of Service: Pt seen/examined on 9/28/17 and Admitted to Inpatient with expected LOS greater than two midnights due to medical therapy. Chief Complaint:  GI bleed with melena turning to bright red blood, dizziness      History Of Present Illness:    69 y.o. female who presented to ER with melena and dizziness. She has been on coumadin for paroxysmal a fib. She also recently underwent lung mass biopsy. She states that her stools turned black 2 days ago and has continued. Today when she cleared her bowels she had bright red blood and was dizzy so she came to ER. She states she is only taking coumadin and cannot remember her dose right now. Otherwise no other complaints today. No chest pain or palpitations. SOB at her baseline. No N/V. No fevers or chills. Appetite ok. She is very thirsty. Very pleasant and joking with me and staff. Past Medical History:          Diagnosis Date    Anxiety     HTN (hypertension)     Hyperlipidemia     Malignant neoplasm of breast (female), unspecified site     Occlusion and stenosis of carotid artery     Osteoporosis     Palliative care encounter     Tobacco use disorder        Past Surgical History:          Procedure Laterality Date    BREAST SURGERY      CATARACT REMOVAL      CHOLECYSTECTOMY      COLONOSCOPY      LYMPH NODE BIOPSY      MASTECTOMY, PARTIAL         Medications Prior to Admission:      Prior to Admission medications    Medication Sig Start Date End Date Taking?  Authorizing Provider   amiodarone (CORDARONE) 200 MG tablet Take 1 tablet by mouth daily 9/9/17  Yes Mellisa Ramsey PA-C   enoxaparin (LOVENOX) 80 MG/0.8ML injection Inject 0.8 mLs into the skin 2 times daily For 5 days or until INR 2.0-3.0 9/9/17  Yes Mellisa Ramsey PA-C   warfarin (COUMADIN) 5 MG tablet Keep INR 2.0-3.0 9/9/17  Yes Mellisa Ramsey PA-C   metoprolol tartrate (LOPRESSOR) 25 MG tablet Take 0.5 tablets by mouth 2 times daily 9/9/17  Yes Tootie Henderson PA-C   nicotine (NICODERM CQ) 14 MG/24HR Place 1 patch onto the skin daily 9/9/17  Yes Tootie Henderson PA-C   pantoprazole (PROTONIX) 40 MG tablet Take 1 tablet by mouth every morning (before breakfast) 9/9/17  Yes Tootie Henderson PA-C   HYDROcodone-acetaminophen (NORCO) 5-325 MG per tablet Take 1 tablet by mouth every 6 hours as needed for Pain . 9/9/17  Yes Dar Green MD   traMADol (ULTRAM) 50 MG tablet Take 50 mg by mouth 2 times daily   Yes Historical Provider, MD   diazepam (VALIUM) 5 MG tablet Take 5 mg by mouth every 6 hours as needed. Yes Historical Provider, MD   NIFEdipine (ADALAT CC) 90 MG CR tablet Take 90 mg by mouth daily. Yes Historical Provider, MD   pravastatin (PRAVACHOL) 40 MG tablet Take 40 mg by mouth daily. Yes Historical Provider, MD       Allergies:  Aspirin; Morphine; and Penicillins    Social History:      The patient currently lives at home. TOBACCO:   reports that she has quit smoking. She does not have any smokeless tobacco history on file. ETOH:   reports that she does not drink alcohol. Family History:      Reviewed in detail and negative for DM, CAD. Positive as follows:        Problem Relation Age of Onset    Lung Cancer Other     Stomach Cancer Other     Brain Cancer Other     Other Father      cardiac event       REVIEW OF SYSTEMS:   Pertinent items are noted in HPI. 14 point reveiw of systems otherwise negative. PHYSICAL EXAM:    /67  Pulse 70  Temp 97.8 °F (36.6 °C) (Temporal)   Resp 24  Ht 5' 9\" (1.753 m)  Wt 160 lb (72.6 kg)  SpO2 93%  BMI 23.63 kg/m2    General appearance: No apparent distress, appears stated age and cooperative. HEENT: Normal cephalic, atraumatic without obvious deformity. Pupils equal, round, and reactive to light. Extra ocular muscles intact. Conjunctivae/corneas clear.   Neck: Supple, with full range of motion. No jugular venous distention. Trachea midline. No thyroid tenderness. No masses palpated. Respiratory:  Normal respiratory effort. Clear to auscultation, bilaterally without Rales/Wheezes/Rhonchi. Cardiovascular: Regular rate and rhythm with normal X2/Q2.  8-9/1 systolic ejection murmur. No rubs or gallops. Abdomen: Soft, non-tender, non-distended with normal bowel sounds. No hepatosplenomegaly. Musculoskeletal: No clubbing, cyanosis or edema bilaterally. Full range of motion without deformity. Skin: Skin color pale, texture, turgor normal.  No rashes or lesions. Neurologic:  Neurovascularly intact without any focal sensory/motor deficits. Cranial nerves: II-XII intact, grossly non-focal.  Generalized weakness. Psychiatric: Alert and oriented, thought content appropriate, normal insight    CXR:  None  EKG:  I have reviewed the EKG with the following interpretation: Sinus with no ST segment elevations or depressions. No T wave abnormalities. Labs:     Recent Labs      09/28/17   1440   WBC  24.5*   HGB  7.4*   HCT  23.7*   PLT  334     Recent Labs      09/28/17   1440   NA  142   K  5.1*   CL  107   CO2  23   BUN  72*   CREATININE  1.5*   CALCIUM  8.3*     Recent Labs      09/28/17   1440   AST  15   ALT  15   BILITOT  <0.2   ALKPHOS  55     Recent Labs      09/28/17   1440  09/28/17   1552   INR  >18.80*  >18.80*     No results for input(s): CKTOTAL, TROPONINI in the last 72 hours.     Urinalysis:    No results found for: Edson Carlota, BACTERIA, RBCUA, BLOODU, SPECGRAV, GLUCOSEU      ASSESSMENT:    Active Hospital Problems    Diagnosis Date Noted    Diastolic heart failure, stage C ([address] [I50.30]      Priority: High    Paroxysmal atrial fibrillation ([address] [I48.0] 09/28/2017    Hypercoagulable state ([address] [D68.59] 09/28/2017    Gastrointestinal hemorrhage with melena [K92.1] 09/28/2017    Acute blood loss anemia [D62] 09/28/2017    Lung mass [R91.8]     Essential hypertension [I10] PLAN:  Protonix drip. Octreotide drip. Consult GI. Serial CBCs. Transfuse as needed to keep Hgb above 7. Vitamin K and FFP has been administered in ER. Strict I/Os. Normal saline at 50 cc/hr. Iron panel. Will restart home medications as clinically indicated. Hold BP meds for now. I believe hydration should treat her mildly elevated potassium sufficiently. BMP in am.  See all admit orders. Further recommendations per clinical course.         DVT Prophylaxis: None, GI bleed, place SCDs  Diet: Clear liquid diet  Code Status: Full     PT/OT Eval Status: Pending    Dispo - TBD       Bubba Baptiste DO

## 2017-09-28 NOTE — ED NOTES
Bed: 17  Expected date: 9/28/17  Expected time: 1:37 PM  Means of arrival: [facility]  Comments:  EMS     Fred Quinones RN  09/28/17 [id number]

## 2017-09-29 ENCOUNTER — ANESTHESIA EVENT (OUTPATIENT)
Dept: ENDOSCOPY | Age: 77
DRG: 813 | End: 2017-09-29
Payer: MEDICARE

## 2017-09-29 ENCOUNTER — ANESTHESIA (OUTPATIENT)
Dept: ENDOSCOPY | Age: 77
DRG: 813 | End: 2017-09-29
Payer: MEDICARE

## 2017-09-29 ENCOUNTER — APPOINTMENT (OUTPATIENT)
Dept: GENERAL RADIOLOGY | Age: 77
DRG: 813 | End: 2017-09-29
Payer: MEDICARE

## 2017-09-29 VITALS
OXYGEN SATURATION: 93 % | RESPIRATION RATE: 16 BRPM | SYSTOLIC BLOOD PRESSURE: 103 MMHG | DIASTOLIC BLOOD PRESSURE: 48 MMHG

## 2017-09-29 LAB
ANION GAP SERPL CALCULATED.3IONS-SCNC: 4 MMOL/L (ref 7–19)
BASOPHILS ABSOLUTE: 0.1 K/UL (ref 0–0.2)
BASOPHILS RELATIVE PERCENT: 0.5 % (ref 0–1)
BLOOD BANK DISPENSE STATUS: NORMAL
BLOOD BANK PRODUCT CODE: NORMAL
BPU ID: NORMAL
BUN BLDV-MCNC: 61 MG/DL (ref 8–23)
CALCIUM SERPL-MCNC: 7.9 MG/DL (ref 8.8–10.2)
CHLORIDE BLD-SCNC: 109 MMOL/L (ref 98–111)
CO2: 28 MMOL/L (ref 22–29)
CREAT SERPL-MCNC: 1.3 MG/DL (ref 0.5–0.9)
DESCRIPTION BLOOD BANK: NORMAL
EOSINOPHILS ABSOLUTE: 2.1 K/UL (ref 0–0.6)
EOSINOPHILS RELATIVE PERCENT: 9.2 % (ref 0–5)
GFR NON-AFRICAN AMERICAN: 40
GLUCOSE BLD-MCNC: 165 MG/DL (ref 74–109)
HCT VFR BLD CALC: 17.4 % (ref 37–47)
HCT VFR BLD CALC: 23.6 % (ref 37–47)
HEMOGLOBIN: 5.5 G/DL (ref 12–16)
HEMOGLOBIN: 7.6 G/DL (ref 12–16)
INR BLD: 1.94 (ref 0.88–1.18)
INR BLD: 3.55 (ref 0.88–1.18)
LV EF: 55 %
LVEF MODALITY: NORMAL
LYMPHOCYTES ABSOLUTE: 1.7 K/UL (ref 1.1–4.5)
LYMPHOCYTES RELATIVE PERCENT: 7.5 % (ref 20–40)
MCH RBC QN AUTO: 30.8 PG (ref 27–31)
MCH RBC QN AUTO: 33.1 PG (ref 27–31)
MCHC RBC AUTO-ENTMCNC: 31.6 G/DL (ref 33–37)
MCHC RBC AUTO-ENTMCNC: 32.2 G/DL (ref 33–37)
MCV RBC AUTO: 104.8 FL (ref 81–99)
MCV RBC AUTO: 95.5 FL (ref 81–99)
MONOCYTES ABSOLUTE: 1.8 K/UL (ref 0–0.9)
MONOCYTES RELATIVE PERCENT: 7.8 % (ref 0–10)
NEUTROPHILS ABSOLUTE: 16.6 K/UL (ref 1.5–7.5)
NEUTROPHILS RELATIVE PERCENT: 71.6 % (ref 50–65)
PDW BLD-RTO: 17.2 % (ref 11.5–14.5)
PDW BLD-RTO: 19.6 % (ref 11.5–14.5)
PLATELET # BLD: 201 K/UL (ref 130–400)
PLATELET # BLD: 249 K/UL (ref 130–400)
PMV BLD AUTO: 8.9 FL (ref 9.4–12.3)
PMV BLD AUTO: 9.1 FL (ref 9.4–12.3)
POTASSIUM SERPL-SCNC: 4.6 MMOL/L (ref 3.5–5)
PROTHROMBIN TIME: 22.3 SEC (ref 12–14.6)
PROTHROMBIN TIME: 36.1 SEC (ref 12–14.6)
RBC # BLD: 1.66 M/UL (ref 4.2–5.4)
RBC # BLD: 2.47 M/UL (ref 4.2–5.4)
SODIUM BLD-SCNC: 141 MMOL/L (ref 136–145)
WBC # BLD: 23.2 K/UL (ref 4.8–10.8)
WBC # BLD: 24 K/UL (ref 4.8–10.8)

## 2017-09-29 PROCEDURE — 3700000001 HC ADD 15 MINUTES (ANESTHESIA): Performed by: INTERNAL MEDICINE

## 2017-09-29 PROCEDURE — 99233 SBSQ HOSP IP/OBS HIGH 50: CPT | Performed by: INTERNAL MEDICINE

## 2017-09-29 PROCEDURE — 93306 TTE W/DOPPLER COMPLETE: CPT

## 2017-09-29 PROCEDURE — 43235 EGD DIAGNOSTIC BRUSH WASH: CPT | Performed by: INTERNAL MEDICINE

## 2017-09-29 PROCEDURE — 36415 COLL VENOUS BLD VENIPUNCTURE: CPT

## 2017-09-29 PROCEDURE — P9059 PLASMA, FRZ BETWEEN 8-24HOUR: HCPCS

## 2017-09-29 PROCEDURE — 71010 XR CHEST PORTABLE: CPT

## 2017-09-29 PROCEDURE — C1751 CATH, INF, PER/CENT/MIDLINE: HCPCS

## 2017-09-29 PROCEDURE — 90670 PCV13 VACCINE IM: CPT | Performed by: HOSPITALIST

## 2017-09-29 PROCEDURE — C9113 INJ PANTOPRAZOLE SODIUM, VIA: HCPCS | Performed by: EMERGENCY MEDICINE

## 2017-09-29 PROCEDURE — 6360000002 HC RX W HCPCS: Performed by: EMERGENCY MEDICINE

## 2017-09-29 PROCEDURE — 80048 BASIC METABOLIC PNL TOTAL CA: CPT

## 2017-09-29 PROCEDURE — 6360000002 HC RX W HCPCS: Performed by: INTERNAL MEDICINE

## 2017-09-29 PROCEDURE — P9040 RBC LEUKOREDUCED IRRADIATED: HCPCS

## 2017-09-29 PROCEDURE — 85027 COMPLETE CBC AUTOMATED: CPT

## 2017-09-29 PROCEDURE — 36430 TRANSFUSION BLD/BLD COMPNT: CPT

## 2017-09-29 PROCEDURE — 6360000002 HC RX W HCPCS: Performed by: HOSPITALIST

## 2017-09-29 PROCEDURE — 36569 INSJ PICC 5 YR+ W/O IMAGING: CPT

## 2017-09-29 PROCEDURE — 87086 URINE CULTURE/COLONY COUNT: CPT

## 2017-09-29 PROCEDURE — 85610 PROTHROMBIN TIME: CPT

## 2017-09-29 PROCEDURE — 3700000000 HC ANESTHESIA ATTENDED CARE: Performed by: INTERNAL MEDICINE

## 2017-09-29 PROCEDURE — 2580000003 HC RX 258: Performed by: EMERGENCY MEDICINE

## 2017-09-29 PROCEDURE — 3E0234Z INTRODUCTION OF SERUM, TOXOID AND VACCINE INTO MUSCLE, PERCUTANEOUS APPROACH: ICD-10-PCS | Performed by: INTERNAL MEDICINE

## 2017-09-29 PROCEDURE — 76937 US GUIDE VASCULAR ACCESS: CPT

## 2017-09-29 PROCEDURE — 2580000003 HC RX 258: Performed by: INTERNAL MEDICINE

## 2017-09-29 PROCEDURE — 85025 COMPLETE CBC W/AUTO DIFF WBC: CPT

## 2017-09-29 PROCEDURE — 6370000000 HC RX 637 (ALT 250 FOR IP): Performed by: INTERNAL MEDICINE

## 2017-09-29 PROCEDURE — 6360000002 HC RX W HCPCS: Performed by: NURSE ANESTHETIST, CERTIFIED REGISTERED

## 2017-09-29 PROCEDURE — 0DJ08ZZ INSPECTION OF UPPER INTESTINAL TRACT, VIA NATURAL OR ARTIFICIAL OPENING ENDOSCOPIC: ICD-10-PCS | Performed by: INTERNAL MEDICINE

## 2017-09-29 PROCEDURE — 2500000003 HC RX 250 WO HCPCS: Performed by: INTERNAL MEDICINE

## 2017-09-29 PROCEDURE — 2000000000 HC ICU R&B

## 2017-09-29 PROCEDURE — 2500000003 HC RX 250 WO HCPCS: Performed by: NURSE ANESTHETIST, CERTIFIED REGISTERED

## 2017-09-29 PROCEDURE — 2700000000 HC OXYGEN THERAPY PER DAY

## 2017-09-29 PROCEDURE — 3609012800 HC EGD DIAGNOSTIC ONLY: Performed by: INTERNAL MEDICINE

## 2017-09-29 PROCEDURE — P9016 RBC LEUKOCYTES REDUCED: HCPCS

## 2017-09-29 PROCEDURE — G0009 ADMIN PNEUMOCOCCAL VACCINE: HCPCS | Performed by: HOSPITALIST

## 2017-09-29 RX ORDER — LIDOCAINE HYDROCHLORIDE 10 MG/ML
INJECTION, SOLUTION EPIDURAL; INFILTRATION; INTRACAUDAL; PERINEURAL PRN
Status: DISCONTINUED | OUTPATIENT
Start: 2017-09-29 | End: 2017-09-29 | Stop reason: SDUPTHER

## 2017-09-29 RX ORDER — 0.9 % SODIUM CHLORIDE 0.9 %
10 VIAL (ML) INJECTION EVERY 12 HOURS SCHEDULED
Status: DISCONTINUED | OUTPATIENT
Start: 2017-09-29 | End: 2017-10-06 | Stop reason: SDUPTHER

## 2017-09-29 RX ORDER — VANCOMYCIN HYDROCHLORIDE 1 G/200ML
1000 INJECTION, SOLUTION INTRAVENOUS EVERY 24 HOURS
Status: DISCONTINUED | OUTPATIENT
Start: 2017-09-29 | End: 2017-10-05

## 2017-09-29 RX ORDER — LIDOCAINE HYDROCHLORIDE 10 MG/ML
5 INJECTION, SOLUTION EPIDURAL; INFILTRATION; INTRACAUDAL; PERINEURAL ONCE
Status: COMPLETED | OUTPATIENT
Start: 2017-09-29 | End: 2017-09-29

## 2017-09-29 RX ORDER — 0.9 % SODIUM CHLORIDE 0.9 %
10 VIAL (ML) INJECTION PRN
Status: DISCONTINUED | OUTPATIENT
Start: 2017-09-29 | End: 2017-10-06 | Stop reason: SDUPTHER

## 2017-09-29 RX ORDER — SODIUM CHLORIDE 0.9 % (FLUSH) 0.9 %
10 SYRINGE (ML) INJECTION EVERY 12 HOURS SCHEDULED
Status: DISCONTINUED | OUTPATIENT
Start: 2017-09-29 | End: 2017-10-13 | Stop reason: HOSPADM

## 2017-09-29 RX ORDER — PROPOFOL 10 MG/ML
INJECTION, EMULSION INTRAVENOUS PRN
Status: DISCONTINUED | OUTPATIENT
Start: 2017-09-29 | End: 2017-09-29 | Stop reason: SDUPTHER

## 2017-09-29 RX ORDER — POLYETHYLENE GLYCOL 3350 17 G/17G
238 POWDER, FOR SOLUTION ORAL ONCE
Status: COMPLETED | OUTPATIENT
Start: 2017-09-29 | End: 2017-09-29

## 2017-09-29 RX ORDER — FUROSEMIDE 10 MG/ML
20 INJECTION INTRAMUSCULAR; INTRAVENOUS SEE ADMIN INSTRUCTIONS
Status: COMPLETED | OUTPATIENT
Start: 2017-09-29 | End: 2017-09-29

## 2017-09-29 RX ORDER — 0.9 % SODIUM CHLORIDE 0.9 %
250 INTRAVENOUS SOLUTION INTRAVENOUS ONCE
Status: DISCONTINUED | OUTPATIENT
Start: 2017-09-29 | End: 2017-10-13 | Stop reason: HOSPADM

## 2017-09-29 RX ORDER — SODIUM CHLORIDE 0.9 % (FLUSH) 0.9 %
10 SYRINGE (ML) INJECTION PRN
Status: DISCONTINUED | OUTPATIENT
Start: 2017-09-29 | End: 2017-10-13 | Stop reason: HOSPADM

## 2017-09-29 RX ADMIN — LIDOCAINE HYDROCHLORIDE 5 ML: 10 INJECTION, SOLUTION EPIDURAL; INFILTRATION; INTRACAUDAL; PERINEURAL at 10:42

## 2017-09-29 RX ADMIN — MAGESIUM CITRATE 296 ML: 1.75 LIQUID ORAL at 17:48

## 2017-09-29 RX ADMIN — VANCOMYCIN HYDROCHLORIDE 1000 MG: 1 INJECTION, SOLUTION INTRAVENOUS at 20:59

## 2017-09-29 RX ADMIN — Medication 10 ML: at 21:00

## 2017-09-29 RX ADMIN — PROPOFOL 80 MG: 10 INJECTION, EMULSION INTRAVENOUS at 12:29

## 2017-09-29 RX ADMIN — ONDANSETRON 4 MG: 2 INJECTION INTRAMUSCULAR; INTRAVENOUS at 20:59

## 2017-09-29 RX ADMIN — LIDOCAINE HYDROCHLORIDE 5 ML: 10 INJECTION, SOLUTION EPIDURAL; INFILTRATION; INTRACAUDAL; PERINEURAL at 12:29

## 2017-09-29 RX ADMIN — FUROSEMIDE 20 MG: 10 INJECTION, SOLUTION INTRAMUSCULAR; INTRAVENOUS at 12:56

## 2017-09-29 RX ADMIN — POLYETHYLENE GLYCOL 3350 238 G: 17 POWDER, FOR SOLUTION ORAL at 19:43

## 2017-09-29 RX ADMIN — SODIUM CHLORIDE 8 MG/HR: 9 INJECTION, SOLUTION INTRAVENOUS at 05:15

## 2017-09-29 RX ADMIN — BISACODYL 10 MG: 5 TABLET, COATED ORAL at 17:48

## 2017-09-29 RX ADMIN — MEROPENEM 1 G: 1 INJECTION, POWDER, FOR SOLUTION INTRAVENOUS at 20:35

## 2017-09-29 RX ADMIN — OCTREOTIDE ACETATE 25 MCG/HR: 500 INJECTION, SOLUTION INTRAVENOUS; SUBCUTANEOUS at 10:34

## 2017-09-29 RX ADMIN — PNEUMOCOCCAL 13-VALENT CONJUGATE VACCINE 0.5 ML: 2.2; 2.2; 2.2; 2.2; 2.2; 4.4; 2.2; 2.2; 2.2; 2.2; 2.2; 2.2; 2.2 INJECTION, SUSPENSION INTRAMUSCULAR at 11:44

## 2017-09-29 ASSESSMENT — PAIN SCALES - GENERAL
PAINLEVEL_OUTOF10: 0

## 2017-09-29 NOTE — CONSULTS
Denies swollen lymph glands or hemophilia. ALLERGIC/IMMUNOLOGIC:  Denies anaphylaxis or loss of immune function. PHYSICAL EXAMINATION:  HEENT:  PERRL. Extraocular muscles intact. Sclerae not icteric. NECK:  Without masses. Trachea midline and moveable. No crepitus. HEART:  RRR. No heaves or thrills. LUNGS:  Normal excursion. No auditory wheezes or accessory muscle  use. ABDOMEN:  No hepatomegaly. No significant hernias. Soft. Nontender  to palpation. No masses, rebound or rigidity. LYMPHATICS:  No enlarged nodes in the neck, groin or umbilicus. SKIN:  No systemic rashes or large areas of ecchymosis. No spider  angioma. MUSCULOSKELETAL:  Nails show no clubbing or cyanosis. No cogwheel  rigidity. NEURO:  No new focal deficits. No new loss of sensation. PSYCHIATRIC:  No excessive depression. Judgment and insight appear  appropriate. ASSESSMENT:  1. Acute blood loss anemia. 2.  Melena. 3.  Coagulopathy secondary to Coumadin. 4.  Atrial fibrillation. PLAN:  1. She is currently on Protonix as well as octreotide intravenously,  I will continue these. She is receiving FFP and vitamin K to reverse  her coagulopathy. 2.  I will keep her n.p.o. and if her coagulopathy is essentially  reversed, we will plan on EGD tomorrow. 3.  Keep hemoglobin over 7 by transfusion. Thank you for allowing me to participate in her care.     Sincerely,        Shanae Collier DO    D: 09/28/2017 23:41:49       T: 09/28/2017 23:43:17     [email or web address]  Job#: [id number]     Doc#: [id number]

## 2017-09-29 NOTE — CARE COORDINATION
Patient is current with [facility].   Please contact them at time of discharge at [phone] Electronically signed by Jordan Jaramillo RN on 9/29/2017 at 8:56 AM

## 2017-09-29 NOTE — PROGRESS NOTES
Nutrition Assessment    Type and Reason for Visit: Initial, Positive Nutrition Screen    Nutrition Recommendations: continue current POC    Malnutrition Assessment:  · Malnutrition Status: At risk for malnutrition  · Context: Acute illness or injury    Nutrition Diagnosis:   · Problem: Inadequate oral intake  · Etiology: related to Alteration in GI function     Signs and symptoms:  as evidenced by Intake 0-25%, NPO status due to medical condition    Nutrition Assessment:  · Subjective Assessment: Positive nutrition screen for decreased po intake and decreased weight. At present time pt to have EGD. · Nutrition-Focused Physical Findings:    · Wound Type:  (bruising)  · Current Nutrition Therapies:  · Oral Diet Orders: Clear Liquid, 2gm Sodium, NPO   · Oral Diet intake: 1-25%  · Oral Nutrition Supplement (ONS) Orders: None    · Anthropometric Measures:  · Ht: 5' 9\" (175.3 cm)   · Current Body Wt: 159 lb 4 oz (72.2 kg)  · Admission Body Wt: 160 lb (72.6 kg) (stated)  · Ideal Body Wt: 145 lb (65.8 kg),  · BMI Classification: BMI 18.5 - 24.9 Normal Weight    Estimated Intake vs Estimated Needs: Intake Less Than Needs    Nutrition Risk Level: High    Nutrition Interventions:   Continue NPO  Continued Inpatient Monitoring    Nutrition Evaluation:   · Evaluation: Goals set   · Goals: po intake 50% or greater    · Monitoring: NPO Status, Diet Progression, Meal Intake, Diet Tolerance, Skin Integrity, Wound Healing, Weight, Pertinent Labs    See Adult Nutrition Doc Flowsheet for more detail.      Electronically signed by Alban Borges MS, RD, LD on 9/29/17 at 12:44 PM    Contact Number: [phone]

## 2017-09-29 NOTE — PROGRESS NOTES
Patient sitting up in bed with pain. She was able to eat some clear liquids with supper. Last Hgb above 7 and blood pressure stable. Colonoscopy consent signed and in paper chart.

## 2017-09-29 NOTE — PROGRESS NOTES
09/28/2017    Acute blood loss anemia [D62] 09/28/2017    Warfarin-induced coagulopathy (HCC) [T45.511A, D68.9]     Melena [K92.1]     Lung mass [R91.8]     Essential hypertension [I10]            Plan:  Place PICC. Transfuse 3 units PRBCs with a hemoglobin of 5.5. Hgb was 6.6 last night and that lab was never called to the physician.  @ units FFP as well with INR still above 3 and active bleeding. GI following. Continue protonix and octreotide drips. CBC every 6 hours. BMP in am.  Given leukocytosis, check UA ansd CXR now please. Continue gentle hydration given AMOS. EGD when ok with GI. Further recommendations per clinical course. Maintain in ICU for now. See all orders.           Bubba Baptiste ,DO

## 2017-09-30 ENCOUNTER — APPOINTMENT (OUTPATIENT)
Dept: CT IMAGING | Age: 77
DRG: 813 | End: 2017-09-30
Payer: MEDICARE

## 2017-09-30 PROBLEM — J18.9 HCAP (HEALTHCARE-ASSOCIATED PNEUMONIA): Status: ACTIVE | Noted: 2017-09-30

## 2017-09-30 LAB
ANION GAP SERPL CALCULATED.3IONS-SCNC: 7 MMOL/L (ref 7–19)
BASOPHILS ABSOLUTE: 0.1 K/UL (ref 0–0.2)
BASOPHILS RELATIVE PERCENT: 0.3 % (ref 0–1)
BASOPHILS RELATIVE PERCENT: 0.4 % (ref 0–1)
BUN BLDV-MCNC: 32 MG/DL (ref 8–23)
CALCIUM SERPL-MCNC: 8 MG/DL (ref 8.8–10.2)
CHLORIDE BLD-SCNC: 107 MMOL/L (ref 98–111)
CO2: 32 MMOL/L (ref 22–29)
CREAT SERPL-MCNC: 1.1 MG/DL (ref 0.5–0.9)
EOSINOPHILS ABSOLUTE: 1.9 K/UL (ref 0–0.6)
EOSINOPHILS ABSOLUTE: 2.1 K/UL (ref 0–0.6)
EOSINOPHILS ABSOLUTE: 2.3 K/UL (ref 0–0.6)
EOSINOPHILS ABSOLUTE: 2.5 K/UL (ref 0–0.6)
EOSINOPHILS RELATIVE PERCENT: 7 % (ref 0–5)
EOSINOPHILS RELATIVE PERCENT: 7.4 % (ref 0–5)
EOSINOPHILS RELATIVE PERCENT: 7.4 % (ref 0–5)
EOSINOPHILS RELATIVE PERCENT: 8.2 % (ref 0–5)
GFR NON-AFRICAN AMERICAN: 48
GLUCOSE BLD-MCNC: 138 MG/DL (ref 74–109)
HCT VFR BLD CALC: 26 % (ref 37–47)
HCT VFR BLD CALC: 26.1 % (ref 37–47)
HCT VFR BLD CALC: 26.6 % (ref 37–47)
HCT VFR BLD CALC: 26.6 % (ref 37–47)
HEMOGLOBIN: 8.5 G/DL (ref 12–16)
HEMOGLOBIN: 8.6 G/DL (ref 12–16)
HEMOGLOBIN: 8.6 G/DL (ref 12–16)
HEMOGLOBIN: 8.7 G/DL (ref 12–16)
LYMPHOCYTES ABSOLUTE: 1.1 K/UL (ref 1.1–4.5)
LYMPHOCYTES ABSOLUTE: 1.3 K/UL (ref 1.1–4.5)
LYMPHOCYTES ABSOLUTE: 1.4 K/UL (ref 1.1–4.5)
LYMPHOCYTES ABSOLUTE: 1.6 K/UL (ref 1.1–4.5)
LYMPHOCYTES RELATIVE PERCENT: 3.9 % (ref 20–40)
LYMPHOCYTES RELATIVE PERCENT: 3.9 % (ref 20–40)
LYMPHOCYTES RELATIVE PERCENT: 4.9 % (ref 20–40)
LYMPHOCYTES RELATIVE PERCENT: 6.1 % (ref 20–40)
MCH RBC QN AUTO: 31.3 PG (ref 27–31)
MCH RBC QN AUTO: 31.3 PG (ref 27–31)
MCH RBC QN AUTO: 31.5 PG (ref 27–31)
MCH RBC QN AUTO: 31.6 PG (ref 27–31)
MCHC RBC AUTO-ENTMCNC: 32.3 G/DL (ref 33–37)
MCHC RBC AUTO-ENTMCNC: 32.3 G/DL (ref 33–37)
MCHC RBC AUTO-ENTMCNC: 32.6 G/DL (ref 33–37)
MCHC RBC AUTO-ENTMCNC: 33.5 G/DL (ref 33–37)
MCV RBC AUTO: 94.5 FL (ref 81–99)
MCV RBC AUTO: 96 FL (ref 81–99)
MCV RBC AUTO: 96.7 FL (ref 81–99)
MCV RBC AUTO: 97.4 FL (ref 81–99)
MONOCYTES ABSOLUTE: 0.8 K/UL (ref 0–0.9)
MONOCYTES ABSOLUTE: 1.4 K/UL (ref 0–0.9)
MONOCYTES ABSOLUTE: 1.6 K/UL (ref 0–0.9)
MONOCYTES ABSOLUTE: 2 K/UL (ref 0–0.9)
MONOCYTES RELATIVE PERCENT: 2.9 % (ref 0–10)
MONOCYTES RELATIVE PERCENT: 5.2 % (ref 0–10)
MONOCYTES RELATIVE PERCENT: 5.5 % (ref 0–10)
MONOCYTES RELATIVE PERCENT: 5.9 % (ref 0–10)
NEUTROPHILS ABSOLUTE: 21.2 K/UL (ref 1.5–7.5)
NEUTROPHILS ABSOLUTE: 22.3 K/UL (ref 1.5–7.5)
NEUTROPHILS ABSOLUTE: 22.8 K/UL (ref 1.5–7.5)
NEUTROPHILS ABSOLUTE: 26.7 K/UL (ref 1.5–7.5)
NEUTROPHILS RELATIVE PERCENT: 78.4 % (ref 50–65)
NEUTROPHILS RELATIVE PERCENT: 78.8 % (ref 50–65)
NEUTROPHILS RELATIVE PERCENT: 79.1 % (ref 50–65)
NEUTROPHILS RELATIVE PERCENT: 81.1 % (ref 50–65)
PDW BLD-RTO: 20.2 % (ref 11.5–14.5)
PDW BLD-RTO: 20.7 % (ref 11.5–14.5)
PDW BLD-RTO: 21 % (ref 11.5–14.5)
PDW BLD-RTO: 21.1 % (ref 11.5–14.5)
PLATELET # BLD: 235 K/UL (ref 130–400)
PLATELET # BLD: 236 K/UL (ref 130–400)
PLATELET # BLD: 238 K/UL (ref 130–400)
PLATELET # BLD: 246 K/UL (ref 130–400)
PMV BLD AUTO: 8.9 FL (ref 9.4–12.3)
PMV BLD AUTO: 9 FL (ref 9.4–12.3)
POTASSIUM SERPL-SCNC: 3.9 MMOL/L (ref 3.5–5)
RBC # BLD: 2.72 M/UL (ref 4.2–5.4)
RBC # BLD: 2.73 M/UL (ref 4.2–5.4)
RBC # BLD: 2.75 M/UL (ref 4.2–5.4)
RBC # BLD: 2.75 M/UL (ref 4.2–5.4)
SODIUM BLD-SCNC: 146 MMOL/L (ref 136–145)
WBC # BLD: 27 K/UL (ref 4.8–10.8)
WBC # BLD: 27.5 K/UL (ref 4.8–10.8)
WBC # BLD: 29 K/UL (ref 4.8–10.8)
WBC # BLD: 33.7 K/UL (ref 4.8–10.8)

## 2017-09-30 PROCEDURE — 99233 SBSQ HOSP IP/OBS HIGH 50: CPT | Performed by: INTERNAL MEDICINE

## 2017-09-30 PROCEDURE — 6370000000 HC RX 637 (ALT 250 FOR IP): Performed by: INTERNAL MEDICINE

## 2017-09-30 PROCEDURE — 71250 CT THORAX DX C-: CPT

## 2017-09-30 PROCEDURE — 99232 SBSQ HOSP IP/OBS MODERATE 35: CPT | Performed by: INTERNAL MEDICINE

## 2017-09-30 PROCEDURE — 85025 COMPLETE CBC W/AUTO DIFF WBC: CPT

## 2017-09-30 PROCEDURE — 87040 BLOOD CULTURE FOR BACTERIA: CPT

## 2017-09-30 PROCEDURE — 2580000003 HC RX 258: Performed by: INTERNAL MEDICINE

## 2017-09-30 PROCEDURE — 6360000002 HC RX W HCPCS: Performed by: INTERNAL MEDICINE

## 2017-09-30 PROCEDURE — 2700000000 HC OXYGEN THERAPY PER DAY

## 2017-09-30 PROCEDURE — 2000000000 HC ICU R&B

## 2017-09-30 PROCEDURE — 80048 BASIC METABOLIC PNL TOTAL CA: CPT

## 2017-09-30 RX ORDER — DEXTROSE MONOHYDRATE 50 MG/ML
INJECTION, SOLUTION INTRAVENOUS CONTINUOUS
Status: DISCONTINUED | OUTPATIENT
Start: 2017-09-30 | End: 2017-10-01

## 2017-09-30 RX ADMIN — VANCOMYCIN HYDROCHLORIDE 1000 MG: 1 INJECTION, SOLUTION INTRAVENOUS at 20:47

## 2017-09-30 RX ADMIN — MEROPENEM 1 G: 1 INJECTION, POWDER, FOR SOLUTION INTRAVENOUS at 08:11

## 2017-09-30 RX ADMIN — TRAMADOL HYDROCHLORIDE 50 MG: 50 TABLET, FILM COATED ORAL at 20:46

## 2017-09-30 RX ADMIN — MEROPENEM 1 G: 1 INJECTION, POWDER, FOR SOLUTION INTRAVENOUS at 20:00

## 2017-09-30 RX ADMIN — Medication 10 ML: at 19:57

## 2017-09-30 RX ADMIN — Medication 10 ML: at 09:00

## 2017-09-30 RX ADMIN — Medication 10 ML: at 20:47

## 2017-09-30 RX ADMIN — TRAMADOL HYDROCHLORIDE 50 MG: 50 TABLET, FILM COATED ORAL at 11:52

## 2017-09-30 RX ADMIN — OCTREOTIDE ACETATE 25 MCG/HR: 500 INJECTION, SOLUTION INTRAVENOUS; SUBCUTANEOUS at 21:37

## 2017-09-30 RX ADMIN — PRAVASTATIN SODIUM 40 MG: 20 TABLET ORAL at 11:52

## 2017-09-30 RX ADMIN — DEXTROSE MONOHYDRATE 25 ML/HR: 50 INJECTION, SOLUTION INTRAVENOUS at 12:04

## 2017-09-30 ASSESSMENT — PAIN SCALES - GENERAL
PAINLEVEL_OUTOF10: 4
PAINLEVEL_OUTOF10: 0
PAINLEVEL_OUTOF10: 4
PAINLEVEL_OUTOF10: 0
PAINLEVEL_OUTOF10: 7

## 2017-09-30 NOTE — PROGRESS NOTES
Pharmacy Note  Vancomycin Consult    Mary Belle is a 69 y.o. female started on Vancomycin for pneumonia; consult received from Dr. Timothy Busby to manage therapy. Patient Active Problem List   Diagnosis    Mixed hyperlipidemia    Essential hypertension    Lung mass    Atrial fibrillation with RVR (HCC)    Diastolic heart failure, stage C (HCC)    Severe protein-calorie malnutrition (HCC)    Pneumonia of left lung due to infectious organism    Paroxysmal atrial fibrillation (HCC)    Hypercoagulable state ([address]    Gastrointestinal hemorrhage with melena    Acute blood loss anemia    Warfarin-induced coagulopathy (HCC)    Melena    Gastroesophageal reflux disease with esophagitis       Allergies:  Aspirin; Morphine; and Penicillins     Temp max: 99.1    Recent Labs      09/28/17   1440  09/29/17   0129   BUN  72*  61*       Recent Labs      09/28/17   1440  09/29/17   0129   CREATININE  1.5*  1.3*       Recent Labs      09/29/17   0801  09/29/17   1620   WBC  24.0*  23.2*         Intake/Output Summary (Last 24 hours) at 09/29/17 1932  Last data filed at 09/29/17 1800   Gross per 24 hour   Intake 4332.83 ml   Output 4000 ml   Net 332.83 ml       Culture Date Source Results   09/29/17 Urine Sent                 Ht Readings from Last 1 Encounters:   09/28/17 5' 9\" (1.753 m)        Wt Readings from Last 1 Encounters:   09/29/17 159 lb 4.8 oz (72.3 kg)         Body mass index is 23.52 kg/(m^2). Estimated Creatinine Clearance: 38 mL/min (based on Cr of 1.3). Assessment/Plan:  Will initiate vancomycin 1000 mg IV every 24 hours. Timing of trough level will be determined based on culture results, renal function, and clinical response. Thank you for the consult. Will continue to follow.     Saurabh Woods, PHARM D, 9/29/2017, 7:33 PM

## 2017-09-30 NOTE — PROGRESS NOTES
Critical Care Progress Note      Events of Last 24 hours: No acute events noted overnight. Chief Complaint:  GI bleed, anemia follow up    Subjective:   Feeling more weak and tired this morning from have to go bathroom from bowel prep. No chest pain or palpitations. No N/V.  SOB about the same today. Appetite is poor. Wants the NG out. ROS:  Pertinent items are noted in HPI. 14 point reveiw of systems otherwise negative. Invasive Lines: peripheral IVs only     MV:  D0    No results for input(s): PHART, QAG9VLF, PO2ART in the last 72 hours. MV Settings:     / / /FiO2 : 50 %  ABGs:   Lab Results   Component Value Date    PHART 7.410 08/28/2017    PO2ART 57.0 08/28/2017    XNV9TNO 55.0 08/28/2017       IV:   octreotide (SANDOSTATIN) infusion 25 mcg/hr (09/29/17 1034)    sodium chloride 50 mL/hr at 09/28/17 1814       Vitals:  BP (!) 142/44  Pulse 67  Temp 98.7 °F (37.1 °C) (Temporal)   Resp 17  Ht 5' 9\" (1.753 m)  Wt 159 lb 4.8 oz (72.3 kg)  SpO2 93%  BMI 23.52 kg/m2     Intake/Output Summary (Last 24 hours) at 09/30/17 0829  Last data filed at 09/30/17 0600   Gross per 24 hour   Intake          4993.33 ml   Output             3610 ml   Net          1383.33 ml       EXAM:  General appearance: No apparent distress, appears stated age and cooperative. HEENT: Normal cephalic, atraumatic without obvious deformity. Pupils equal, round, and reactive to light. Extra ocular muscles intact. Conjunctivae/corneas clear. Neck: Supple, with full range of motion. No jugular venous distention. Trachea midline. No thyroid tenderness. No masses palpated. Respiratory:  Normal respiratory effort. Clear to auscultation, bilaterally without wheezes or rhonchi. Surprisingly no crackles despite CXR appearance. Cardiovascular: Regular rate and rhythm with normal H3/V9.  7-2/2 systolic ejection murmur. No rubs or gallops. Abdomen: Soft, non-tender, non-distended with normal bowel sounds.   No

## 2017-09-30 NOTE — PROGRESS NOTES
GI  - PROGRESS NOTE    Admit Date: 9/28/2017             Chief Complaint:    Patient being seen for:  melena     DIET CLEAR LIQUID; Low Sodium (2 GM)                         Bowel movement: no black or bloody stools    Pain:None  Nausea:None    Intake/Output Summary (Last 24 hours) at 09/30/17 1741  Last data filed at 09/30/17 1600   Gross per 24 hour   Intake          2718.33 ml   Output             1890 ml   Net           828.33 ml               Lab /Radiology:   Scheduled Meds: Reviewed          -----------------------------------------------------------------          Recent Labs      09/30/17   0005  09/30/17   0555  09/30/17   1350   WBC  27.5*  33.7*  29.0*   RBC  2.75*  2.72*  2.75*   HGB  8.7*  8.5*  8.6*   HCT  26.0*  26.1*  26.6*   PLT  246  238  235     Recent Labs      09/28/17   1440  09/29/17   0129  09/30/17   0555   NA  142  141  146*   K  5.1*  4.6  3.9   ANIONGAP  12  4*  7   CL  107  109  107   CO2  23  28  32*   BUN  72*  61*  32*   CREATININE  1.5*  1.3*  1.1*   GLUCOSE  136*  165*  138*   CALCIUM  8.3*  7.9*  8.0*     Recent Labs      09/28/17   1440   AST  15   ALT  15   BILITOT  <0.2   ALKPHOS  55     No results for input(s): AMYLASE, LIPASE in the last 72 hours. Troponin T: No results for input(s): TROPONINI in the last 72 hours. Pro-BNP: No results for input(s): BNP in the last 72 hours. INR:   Recent Labs      09/28/17   1552  09/29/17   0129  09/29/17   1620   INR  >18.80*  3.55*  1.94*             Physical Exam:   Vitals: BP (!) 141/44  Pulse 62  Temp 97.6 °F (36.4 °C) (Temporal)   Resp 24  Ht 5' 9\" (1.753 m)  Wt 159 lb 4.8 oz (72.3 kg)  SpO2 90%  BMI 23.52 kg/m2    HEENT: Pupils equal, round, reactive to light       Neck supple. Trachea midline. No crepitus  Lungs: breath sounds bilaterally.   Normal excursion  Heart[de-identified]    IRRR without heave or thrill  Abdomen: soft, non-tender; bowel sounds normal; no masses,  no organomegaly. No encarcerated hernia detected. No organomegaly detected  Extremities: no cyanosis or clubbing  Lymphatic: No significant lymph node enlargement papable in the neck , groin or umbilicus  Neurologic: alert. oriented        Impression:   1. Acute blood loss anemia. 2.  Melena. 3.  Coagulopathy secondary to Coumadin. 4.  Atrial fibrillation. Plan:   Clear liquids  Colon on Monday  Hold coumadin        Jackelyn Pickard D.O.   Gastroenterology

## 2017-10-01 LAB
ANION GAP SERPL CALCULATED.3IONS-SCNC: 2 MMOL/L (ref 7–19)
BASOPHILS ABSOLUTE: 0.1 K/UL (ref 0–0.2)
BASOPHILS RELATIVE PERCENT: 0.3 % (ref 0–1)
BASOPHILS RELATIVE PERCENT: 0.4 % (ref 0–1)
BASOPHILS RELATIVE PERCENT: 0.4 % (ref 0–1)
BUN BLDV-MCNC: 20 MG/DL (ref 8–23)
CALCIUM SERPL-MCNC: 8.3 MG/DL (ref 8.8–10.2)
CHLORIDE BLD-SCNC: 104 MMOL/L (ref 98–111)
CO2: 35 MMOL/L (ref 22–29)
CREAT SERPL-MCNC: 1 MG/DL (ref 0.5–0.9)
EOSINOPHILS ABSOLUTE: 1.7 K/UL (ref 0–0.6)
EOSINOPHILS ABSOLUTE: 1.7 K/UL (ref 0–0.6)
EOSINOPHILS ABSOLUTE: 1.8 K/UL (ref 0–0.6)
EOSINOPHILS RELATIVE PERCENT: 7.4 % (ref 0–5)
EOSINOPHILS RELATIVE PERCENT: 7.8 % (ref 0–5)
EOSINOPHILS RELATIVE PERCENT: 7.8 % (ref 0–5)
GFR NON-AFRICAN AMERICAN: 54
GLUCOSE BLD-MCNC: 124 MG/DL (ref 74–109)
HCT VFR BLD CALC: 25.3 % (ref 37–47)
HCT VFR BLD CALC: 26.1 % (ref 37–47)
HCT VFR BLD CALC: 27.3 % (ref 37–47)
HEMOGLOBIN: 8.1 G/DL (ref 12–16)
HEMOGLOBIN: 8.2 G/DL (ref 12–16)
HEMOGLOBIN: 8.4 G/DL (ref 12–16)
LYMPHOCYTES ABSOLUTE: 1.3 K/UL (ref 1.1–4.5)
LYMPHOCYTES ABSOLUTE: 1.5 K/UL (ref 1.1–4.5)
LYMPHOCYTES ABSOLUTE: 1.7 K/UL (ref 1.1–4.5)
LYMPHOCYTES RELATIVE PERCENT: 5.4 % (ref 20–40)
LYMPHOCYTES RELATIVE PERCENT: 6.3 % (ref 20–40)
LYMPHOCYTES RELATIVE PERCENT: 7.9 % (ref 20–40)
MCH RBC QN AUTO: 30.9 PG (ref 27–31)
MCH RBC QN AUTO: 31.4 PG (ref 27–31)
MCH RBC QN AUTO: 31.9 PG (ref 27–31)
MCHC RBC AUTO-ENTMCNC: 30.8 G/DL (ref 33–37)
MCHC RBC AUTO-ENTMCNC: 31.4 G/DL (ref 33–37)
MCHC RBC AUTO-ENTMCNC: 32 G/DL (ref 33–37)
MCV RBC AUTO: 100 FL (ref 81–99)
MCV RBC AUTO: 100.4 FL (ref 81–99)
MCV RBC AUTO: 99.6 FL (ref 81–99)
MONOCYTES ABSOLUTE: 1.1 K/UL (ref 0–0.9)
MONOCYTES ABSOLUTE: 1.3 K/UL (ref 0–0.9)
MONOCYTES ABSOLUTE: 1.3 K/UL (ref 0–0.9)
MONOCYTES RELATIVE PERCENT: 5.3 % (ref 0–10)
MONOCYTES RELATIVE PERCENT: 5.5 % (ref 0–10)
MONOCYTES RELATIVE PERCENT: 5.5 % (ref 0–10)
NEUTROPHILS ABSOLUTE: 16.4 K/UL (ref 1.5–7.5)
NEUTROPHILS ABSOLUTE: 18.3 K/UL (ref 1.5–7.5)
NEUTROPHILS ABSOLUTE: 18.3 K/UL (ref 1.5–7.5)
NEUTROPHILS RELATIVE PERCENT: 77.2 % (ref 50–65)
NEUTROPHILS RELATIVE PERCENT: 78.5 % (ref 50–65)
NEUTROPHILS RELATIVE PERCENT: 79.4 % (ref 50–65)
PDW BLD-RTO: 20.5 % (ref 11.5–14.5)
PDW BLD-RTO: 20.8 % (ref 11.5–14.5)
PDW BLD-RTO: 21.2 % (ref 11.5–14.5)
PLATELET # BLD: 228 K/UL (ref 130–400)
PLATELET # BLD: 232 K/UL (ref 130–400)
PLATELET # BLD: 237 K/UL (ref 130–400)
PMV BLD AUTO: 8.9 FL (ref 9.4–12.3)
PMV BLD AUTO: 8.9 FL (ref 9.4–12.3)
PMV BLD AUTO: 9.1 FL (ref 9.4–12.3)
POTASSIUM SERPL-SCNC: 3.8 MMOL/L (ref 3.5–5)
RBC # BLD: 2.54 M/UL (ref 4.2–5.4)
RBC # BLD: 2.61 M/UL (ref 4.2–5.4)
RBC # BLD: 2.72 M/UL (ref 4.2–5.4)
SODIUM BLD-SCNC: 141 MMOL/L (ref 136–145)
URINE CULTURE, ROUTINE: NORMAL
WBC # BLD: 21.2 K/UL (ref 4.8–10.8)
WBC # BLD: 23.1 K/UL (ref 4.8–10.8)
WBC # BLD: 23.4 K/UL (ref 4.8–10.8)

## 2017-10-01 PROCEDURE — C9113 INJ PANTOPRAZOLE SODIUM, VIA: HCPCS | Performed by: INTERNAL MEDICINE

## 2017-10-01 PROCEDURE — 2000000000 HC ICU R&B

## 2017-10-01 PROCEDURE — 6370000000 HC RX 637 (ALT 250 FOR IP): Performed by: INTERNAL MEDICINE

## 2017-10-01 PROCEDURE — 85025 COMPLETE CBC W/AUTO DIFF WBC: CPT

## 2017-10-01 PROCEDURE — 99232 SBSQ HOSP IP/OBS MODERATE 35: CPT | Performed by: INTERNAL MEDICINE

## 2017-10-01 PROCEDURE — 80048 BASIC METABOLIC PNL TOTAL CA: CPT

## 2017-10-01 PROCEDURE — 2580000003 HC RX 258: Performed by: INTERNAL MEDICINE

## 2017-10-01 PROCEDURE — 99233 SBSQ HOSP IP/OBS HIGH 50: CPT | Performed by: INTERNAL MEDICINE

## 2017-10-01 PROCEDURE — 6360000002 HC RX W HCPCS: Performed by: INTERNAL MEDICINE

## 2017-10-01 PROCEDURE — 2700000000 HC OXYGEN THERAPY PER DAY

## 2017-10-01 RX ORDER — PANTOPRAZOLE SODIUM 40 MG/10ML
40 INJECTION, POWDER, LYOPHILIZED, FOR SOLUTION INTRAVENOUS 2 TIMES DAILY
Status: DISCONTINUED | OUTPATIENT
Start: 2017-10-01 | End: 2017-10-02

## 2017-10-01 RX ADMIN — MEROPENEM 1 G: 1 INJECTION, POWDER, FOR SOLUTION INTRAVENOUS at 20:10

## 2017-10-01 RX ADMIN — PANTOPRAZOLE SODIUM 40 MG: 40 INJECTION, POWDER, FOR SOLUTION INTRAVENOUS at 10:53

## 2017-10-01 RX ADMIN — Medication 10 ML: at 09:11

## 2017-10-01 RX ADMIN — Medication 10 ML: at 20:17

## 2017-10-01 RX ADMIN — Medication 10 ML: at 09:10

## 2017-10-01 RX ADMIN — Medication 10 ML: at 21:12

## 2017-10-01 RX ADMIN — PRAVASTATIN SODIUM 40 MG: 20 TABLET ORAL at 09:10

## 2017-10-01 RX ADMIN — TRAMADOL HYDROCHLORIDE 50 MG: 50 TABLET, FILM COATED ORAL at 09:10

## 2017-10-01 RX ADMIN — TRAMADOL HYDROCHLORIDE 50 MG: 50 TABLET, FILM COATED ORAL at 21:12

## 2017-10-01 RX ADMIN — MEROPENEM 1 G: 1 INJECTION, POWDER, FOR SOLUTION INTRAVENOUS at 07:48

## 2017-10-01 RX ADMIN — PANTOPRAZOLE SODIUM 40 MG: 40 INJECTION, POWDER, FOR SOLUTION INTRAVENOUS at 21:12

## 2017-10-01 RX ADMIN — VANCOMYCIN HYDROCHLORIDE 1000 MG: 1 INJECTION, SOLUTION INTRAVENOUS at 21:11

## 2017-10-01 ASSESSMENT — PAIN DESCRIPTION - ONSET: ONSET: GRADUAL

## 2017-10-01 ASSESSMENT — PAIN DESCRIPTION - FREQUENCY: FREQUENCY: INTERMITTENT

## 2017-10-01 ASSESSMENT — PAIN SCALES - GENERAL
PAINLEVEL_OUTOF10: 5
PAINLEVEL_OUTOF10: 1
PAINLEVEL_OUTOF10: 0
PAINLEVEL_OUTOF10: 2
PAINLEVEL_OUTOF10: 0

## 2017-10-01 ASSESSMENT — PAIN DESCRIPTION - LOCATION: LOCATION: RIB CAGE

## 2017-10-01 ASSESSMENT — PAIN DESCRIPTION - DESCRIPTORS: DESCRIPTORS: ACHING

## 2017-10-01 ASSESSMENT — PAIN DESCRIPTION - PROGRESSION: CLINICAL_PROGRESSION: NOT CHANGED

## 2017-10-01 ASSESSMENT — PAIN DESCRIPTION - PAIN TYPE: TYPE: ACUTE PAIN

## 2017-10-01 ASSESSMENT — PAIN DESCRIPTION - ORIENTATION: ORIENTATION: RIGHT

## 2017-10-01 NOTE — PROGRESS NOTES
[facility]    Cultures:  None    Films:  XR Chest Portable [[id number]] Resulted: 09/29/17 1704      Order Status: Completed Updated: 09/29/17 1606     Narrative:       XR CHEST PORTABLE   9/29/2017 4:03 PM  History: Leukocytosis. Portable chest x-ray compared with 09/08/2017. Increased dense consolidation of left lower lobe compatible with  pneumonia. Left PICC line now in good position. Underlying chronic lung changes. No pneumothorax.     Impression:       1. Extensive left lower lobe pneumonia is increased as compared with 3  weeks ago. CT Chest:  Impression:        1. Superimposed pneumonia surrounding a biopsy-proven lung carcinoma  in the left lingula. 2. Consolidation in the right lower lobe may also represent pneumonia  or atelectasis. 3. Moderate-sized left pleural effusion. 4. Metastatic lymphadenopathy in the mediastinum. 5. Lucent lesion in the L3 vertebral body is partially visualized and  is concerning for metastatic disease. Assessment:  Active Hospital Problems    Diagnosis Date Noted    Diastolic heart failure, stage C (HCC) [I50.30]      Priority: High    HCAP (healthcare-associated pneumonia) [J18.9] 09/30/2017    Gastroesophageal reflux disease with esophagitis [K21.0]     Paroxysmal atrial fibrillation [address] [address] [I48.0] 09/28/2017    Hypercoagulable state ([facility] [address] [D68.59] 09/28/2017    Gastrointestinal hemorrhage with melena [K92.1] 09/28/2017    Acute blood loss anemia [D62] 09/28/2017    Warfarin-induced coagulopathy (HCC) [T45.511A, D68.9]     Melena [K92.1]     Lung mass [R91.8]     Essential hypertension [I10]            Plan:  EGD report reviewed, no signs of bleeding. Colonoscopy tomorrow. Continue protonix, DC octreotide. CBC every 12 hours. Transfuse as needed to keep Hgb above 7. BMP in am.  Leukocytosis improved, CT reviewed. Continue merrem and vancomycin for now, unable to add levaquin or cipro given her a fib. DC D5W.   Further recommendations per

## 2017-10-02 ENCOUNTER — ANESTHESIA (OUTPATIENT)
Dept: ENDOSCOPY | Age: 77
DRG: 813 | End: 2017-10-02
Payer: MEDICARE

## 2017-10-02 ENCOUNTER — ANESTHESIA EVENT (OUTPATIENT)
Dept: ENDOSCOPY | Age: 77
DRG: 813 | End: 2017-10-02
Payer: MEDICARE

## 2017-10-02 VITALS
TEMPERATURE: 98.6 F | RESPIRATION RATE: 17 BRPM | DIASTOLIC BLOOD PRESSURE: 33 MMHG | SYSTOLIC BLOOD PRESSURE: 106 MMHG | OXYGEN SATURATION: 95 %

## 2017-10-02 LAB
ANION GAP SERPL CALCULATED.3IONS-SCNC: 1 MMOL/L (ref 7–19)
BASOPHILS ABSOLUTE: 0.1 K/UL (ref 0–0.2)
BASOPHILS RELATIVE PERCENT: 0.4 % (ref 0–1)
BUN BLDV-MCNC: 13 MG/DL (ref 8–23)
CALCIUM SERPL-MCNC: 8.1 MG/DL (ref 8.8–10.2)
CHLORIDE BLD-SCNC: 104 MMOL/L (ref 98–111)
CO2: 36 MMOL/L (ref 22–29)
CREAT SERPL-MCNC: 0.9 MG/DL (ref 0.5–0.9)
EKG P AXIS: 77 DEGREES
EKG P-R INTERVAL: 154 MS
EKG Q-T INTERVAL: 444 MS
EKG QRS DURATION: 94 MS
EKG QTC CALCULATION (BAZETT): 429 MS
EKG T AXIS: 69 DEGREES
EOSINOPHILS ABSOLUTE: 1.7 K/UL (ref 0–0.6)
EOSINOPHILS RELATIVE PERCENT: 7.3 % (ref 0–5)
GFR NON-AFRICAN AMERICAN: >60
GLUCOSE BLD-MCNC: 113 MG/DL (ref 74–109)
HCT VFR BLD CALC: 27.4 % (ref 37–47)
HEMOGLOBIN: 8.5 G/DL (ref 12–16)
INR BLD: 1.2 (ref 0.88–1.18)
LYMPHOCYTES ABSOLUTE: 1.5 K/UL (ref 1.1–4.5)
LYMPHOCYTES RELATIVE PERCENT: 6.1 % (ref 20–40)
MCH RBC QN AUTO: 31.1 PG (ref 27–31)
MCHC RBC AUTO-ENTMCNC: 31 G/DL (ref 33–37)
MCV RBC AUTO: 100.4 FL (ref 81–99)
MONOCYTES ABSOLUTE: 1.2 K/UL (ref 0–0.9)
MONOCYTES RELATIVE PERCENT: 5.1 % (ref 0–10)
NEUTROPHILS ABSOLUTE: 19 K/UL (ref 1.5–7.5)
NEUTROPHILS RELATIVE PERCENT: 80.1 % (ref 50–65)
PDW BLD-RTO: 19.8 % (ref 11.5–14.5)
PLATELET # BLD: 222 K/UL (ref 130–400)
PMV BLD AUTO: 8.7 FL (ref 9.4–12.3)
POTASSIUM SERPL-SCNC: 3.9 MMOL/L (ref 3.5–5)
PROTHROMBIN TIME: 15.2 SEC (ref 12–14.6)
RBC # BLD: 2.73 M/UL (ref 4.2–5.4)
SODIUM BLD-SCNC: 141 MMOL/L (ref 136–145)
WBC # BLD: 23.7 K/UL (ref 4.8–10.8)

## 2017-10-02 PROCEDURE — 88305 TISSUE EXAM BY PATHOLOGIST: CPT

## 2017-10-02 PROCEDURE — 3609010400 HC COLONOSCOPY POLYPECTOMY HOT BIOPSY: Performed by: INTERNAL MEDICINE

## 2017-10-02 PROCEDURE — 6370000000 HC RX 637 (ALT 250 FOR IP): Performed by: INTERNAL MEDICINE

## 2017-10-02 PROCEDURE — 2580000003 HC RX 258: Performed by: NURSE ANESTHETIST, CERTIFIED REGISTERED

## 2017-10-02 PROCEDURE — 85025 COMPLETE CBC W/AUTO DIFF WBC: CPT

## 2017-10-02 PROCEDURE — 1210000000 HC MED SURG R&B

## 2017-10-02 PROCEDURE — 99233 SBSQ HOSP IP/OBS HIGH 50: CPT | Performed by: INTERNAL MEDICINE

## 2017-10-02 PROCEDURE — 2580000003 HC RX 258: Performed by: INTERNAL MEDICINE

## 2017-10-02 PROCEDURE — 6360000002 HC RX W HCPCS: Performed by: NURSE ANESTHETIST, CERTIFIED REGISTERED

## 2017-10-02 PROCEDURE — 3700000001 HC ADD 15 MINUTES (ANESTHESIA): Performed by: INTERNAL MEDICINE

## 2017-10-02 PROCEDURE — 85610 PROTHROMBIN TIME: CPT

## 2017-10-02 PROCEDURE — 0DBM8ZX EXCISION OF DESCENDING COLON, VIA NATURAL OR ARTIFICIAL OPENING ENDOSCOPIC, DIAGNOSTIC: ICD-10-PCS | Performed by: INTERNAL MEDICINE

## 2017-10-02 PROCEDURE — 6360000002 HC RX W HCPCS: Performed by: INTERNAL MEDICINE

## 2017-10-02 PROCEDURE — 3700000000 HC ANESTHESIA ATTENDED CARE: Performed by: INTERNAL MEDICINE

## 2017-10-02 PROCEDURE — 2700000000 HC OXYGEN THERAPY PER DAY

## 2017-10-02 PROCEDURE — C9113 INJ PANTOPRAZOLE SODIUM, VIA: HCPCS | Performed by: INTERNAL MEDICINE

## 2017-10-02 PROCEDURE — 36592 COLLECT BLOOD FROM PICC: CPT

## 2017-10-02 PROCEDURE — 45384 COLONOSCOPY W/LESION REMOVAL: CPT | Performed by: INTERNAL MEDICINE

## 2017-10-02 PROCEDURE — 80048 BASIC METABOLIC PNL TOTAL CA: CPT

## 2017-10-02 PROCEDURE — 2500000003 HC RX 250 WO HCPCS: Performed by: INTERNAL MEDICINE

## 2017-10-02 RX ORDER — AMIODARONE HYDROCHLORIDE 200 MG/1
200 TABLET ORAL DAILY
Status: DISCONTINUED | OUTPATIENT
Start: 2017-10-02 | End: 2017-10-02

## 2017-10-02 RX ORDER — PROPOFOL 10 MG/ML
INJECTION, EMULSION INTRAVENOUS PRN
Status: DISCONTINUED | OUTPATIENT
Start: 2017-10-02 | End: 2017-10-02 | Stop reason: SDUPTHER

## 2017-10-02 RX ORDER — SODIUM CHLORIDE, SODIUM LACTATE, POTASSIUM CHLORIDE, CALCIUM CHLORIDE 600; 310; 30; 20 MG/100ML; MG/100ML; MG/100ML; MG/100ML
INJECTION, SOLUTION INTRAVENOUS CONTINUOUS PRN
Status: DISCONTINUED | OUTPATIENT
Start: 2017-10-02 | End: 2017-10-02 | Stop reason: SDUPTHER

## 2017-10-02 RX ORDER — LISINOPRIL 10 MG/1
10 TABLET ORAL DAILY
Status: DISCONTINUED | OUTPATIENT
Start: 2017-10-02 | End: 2017-10-02

## 2017-10-02 RX ORDER — NIFEDIPINE 30 MG/1
90 TABLET, EXTENDED RELEASE ORAL DAILY
Status: DISCONTINUED | OUTPATIENT
Start: 2017-10-02 | End: 2017-10-03 | Stop reason: SDUPTHER

## 2017-10-02 RX ORDER — BUMETANIDE 0.25 MG/ML
0.5 INJECTION, SOLUTION INTRAMUSCULAR; INTRAVENOUS ONCE
Status: COMPLETED | OUTPATIENT
Start: 2017-10-02 | End: 2017-10-02

## 2017-10-02 RX ADMIN — MEROPENEM 1 G: 1 INJECTION, POWDER, FOR SOLUTION INTRAVENOUS at 09:34

## 2017-10-02 RX ADMIN — PRAVASTATIN SODIUM 40 MG: 20 TABLET ORAL at 09:51

## 2017-10-02 RX ADMIN — SODIUM CHLORIDE, SODIUM LACTATE, POTASSIUM CHLORIDE, AND CALCIUM CHLORIDE: 600; 310; 30; 20 INJECTION, SOLUTION INTRAVENOUS at 10:43

## 2017-10-02 RX ADMIN — BUMETANIDE 0.5 MG: 0.25 INJECTION INTRAMUSCULAR; INTRAVENOUS at 09:34

## 2017-10-02 RX ADMIN — LISINOPRIL 10 MG: 10 TABLET ORAL at 04:47

## 2017-10-02 RX ADMIN — ONDANSETRON 4 MG: 2 INJECTION INTRAMUSCULAR; INTRAVENOUS at 13:31

## 2017-10-02 RX ADMIN — Medication 10 ML: at 09:36

## 2017-10-02 RX ADMIN — Medication 10 ML: at 20:08

## 2017-10-02 RX ADMIN — TRAMADOL HYDROCHLORIDE 50 MG: 50 TABLET, FILM COATED ORAL at 09:45

## 2017-10-02 RX ADMIN — VANCOMYCIN HYDROCHLORIDE 1000 MG: 1 INJECTION, SOLUTION INTRAVENOUS at 22:10

## 2017-10-02 RX ADMIN — NIFEDIPINE 90 MG: 30 TABLET, FILM COATED, EXTENDED RELEASE ORAL at 09:45

## 2017-10-02 RX ADMIN — Medication 10 ML: at 04:51

## 2017-10-02 RX ADMIN — TRAMADOL HYDROCHLORIDE 50 MG: 50 TABLET, FILM COATED ORAL at 20:08

## 2017-10-02 RX ADMIN — Medication 10 ML: at 20:09

## 2017-10-02 RX ADMIN — PROPOFOL 250 MG: 10 INJECTION, EMULSION INTRAVENOUS at 10:48

## 2017-10-02 RX ADMIN — MEROPENEM 1 G: 1 INJECTION, POWDER, FOR SOLUTION INTRAVENOUS at 20:08

## 2017-10-02 RX ADMIN — PANTOPRAZOLE SODIUM 40 MG: 40 INJECTION, POWDER, FOR SOLUTION INTRAVENOUS at 09:34

## 2017-10-02 RX ADMIN — Medication 10 ML: at 04:54

## 2017-10-02 ASSESSMENT — PAIN SCALES - GENERAL
PAINLEVEL_OUTOF10: 0
PAINLEVEL_OUTOF10: 0
PAINLEVEL_OUTOF10: 10
PAINLEVEL_OUTOF10: 7

## 2017-10-02 NOTE — PROGRESS NOTES
bowel sounds normal; no masses,  no organomegaly. No encarcerated hernia detected. No organomegaly detected  Extremities: no cyanosis or clubbing  Lymphatic: No significant lymph node enlargement papable in the neck , groin or umbilicus  Neurologic: alert. oriented        Impression:   1. Acute blood loss anemia. 2.  Melena- currently stable  3. Coagulopathy secondary to Coumadin. 4.  Atrial fibrillation. Plan:   Colon tomorrow  Hold coumadin        Flakito Pickard D.O.   Gastroenterology

## 2017-10-02 NOTE — PLAN OF CARE
Problem: Nutrition  Goal: Optimal nutrition therapy  Outcome: Ongoing  Nutrition Problem: Inadequate oral intake  Intervention: Food and/or Nutrient Delivery: Start oral diet  Nutritional Goals: po intake 50% or greater

## 2017-10-02 NOTE — ANESTHESIA PRE PROCEDURE
Department of Anesthesiology  Preprocedure Note       Name:  Everardo Found   Age:  69 y.o.  :  1940                                          MRN:  [id number]         Date:  10/2/2017      Surgeon: Alley Rutherford):  Reggie Amador DO    Procedure: Procedure(s):  COLONOSCOPY DIAGNOSTIC OR SCREENING    Medications prior to admission:   Prior to Admission medications    Medication Sig Start Date End Date Taking? Authorizing Provider   amiodarone (CORDARONE) 200 MG tablet Take 1 tablet by mouth daily 17  Yes Todd Valdez PA-C   enoxaparin (LOVENOX) 80 MG/0.8ML injection Inject 0.8 mLs into the skin 2 times daily For 5 days or until INR 2.0-3.0 17  Yes Todd Valdez PA-C   warfarin (COUMADIN) 5 MG tablet Keep INR 2.0-3.0 17  Yes Todd Valdez PA-C   metoprolol tartrate (LOPRESSOR) 25 MG tablet Take 0.5 tablets by mouth 2 times daily 17  Yes Todd Valdez PA-C   nicotine (NICODERM CQ) 14 MG/24HR Place 1 patch onto the skin daily 17  Yes Todd Valdez PA-C   pantoprazole (PROTONIX) 40 MG tablet Take 1 tablet by mouth every morning (before breakfast) 17  Yes Todd Valdez PA-C   HYDROcodone-acetaminophen (NORCO) 5-325 MG per tablet Take 1 tablet by mouth every 6 hours as needed for Pain . 17  Yes Tesha Coley MD   traMADol (ULTRAM) 50 MG tablet Take 50 mg by mouth 2 times daily   Yes Historical Provider, MD   diazepam (VALIUM) 5 MG tablet Take 5 mg by mouth every 6 hours as needed. Yes Historical Provider, MD   NIFEdipine (ADALAT CC) 90 MG CR tablet Take 90 mg by mouth daily. Yes Historical Provider, MD   pravastatin (PRAVACHOL) 40 MG tablet Take 40 mg by mouth daily.    Yes Historical Provider, MD       Current medications:    Current Facility-Administered Medications   Medication Dose Route Frequency Provider Last Rate Last Dose    NIFEdipine (PROCARDIA XL) extended release tablet 90 mg  90 mg Oral Daily Bubba Baptiste DO   90 mg at 10/02/17 0945    pantoprazole of left lung due to infectious organism J18.9    Paroxysmal atrial fibrillation (HCC) I48.0    Hypercoagulable state ([facility] [address] D68.59    Gastrointestinal hemorrhage with melena K92.1    Acute blood loss anemia D62    Warfarin-induced coagulopathy (HCC) T45.511A, D68.9    Melena K92.1    Gastroesophageal reflux disease with esophagitis K21.0    HCAP (healthcare-associated pneumonia) J18.9       Past Medical History:        Diagnosis Date    A-fib ([facility]     Anticoagulated on Coumadin     Anxiety     HTN (hypertension)     Hyperlipidemia     Malignant neoplasm of breast (female), unspecified site     Occlusion and stenosis of carotid artery     Osteoporosis     Palliative care encounter     Respiratory distress     Tobacco use disorder        Past Surgical History:        Procedure Laterality Date    BREAST SURGERY      CATARACT REMOVAL      CHOLECYSTECTOMY      COLONOSCOPY      LYMPH NODE BIOPSY      MASTECTOMY, PARTIAL      UPPER GASTROINTESTINAL ENDOSCOPY N/A 9/29/2017    EGD DIAGNOSTIC ONLY performed by Reggie Amador DO at [address] [facility]       Social History:    Social History   Substance Use Topics    Smoking status: Former Smoker    Smokeless tobacco: Not on file    Alcohol use No                                Counseling given: Not Answered      Vital Signs (Current):   Vitals:    10/02/17 0801 10/02/17 0902 10/02/17 0945 10/02/17 1000   BP: (!) 158/62 (!) 168/73 (!) 169/53 (!) 174/54   Pulse: 53 56  52   Resp: 16 18  14   Temp: 97.5 °F (36.4 °C)      TempSrc: Temporal      SpO2: 96% 94%  94%   Weight:       Height:                                                  BP Readings from Last 3 Encounters:   10/02/17 (!) 174/54   09/29/17 (!) 103/48   09/09/17 128/72       NPO Status:                                                                                 BMI:   Wt Readings from Last 3 Encounters:   09/29/17 159 lb 4.8 oz (72.3 kg)   09/09/17 172 lb 9.6 oz (78.3 kg)   03/26/13 225 lb

## 2017-10-02 NOTE — PROGRESS NOTES
Appointment made c Dr. Severo Marine for October 12 at [facility].    Electronically signed by Rian Ramirez RN on 10/2/2017 at 8:02 AM

## 2017-10-02 NOTE — PROGRESS NOTES
Critical Care Progress Note      Events of Last 24 hours: No acute events noted overnight. Chief Complaint:  GI bleed, anemia follow up    Subjective:   Feeling a little better this morning. Does still have some mild abdominal pain. No chest pain or palpitations. No N/V.  SOB worse today. Appetite is poor. No fevers or chills. ROS:  Pertinent items are noted in HPI. 14 point reveiw of systems otherwise negative. Invasive Lines: peripheral IVs only     MV:  D0    No results for input(s): PHART, COW7TGV, PO2ART in the last 72 hours. MV Settings:     / / /FiO2 : 50 %  ABGs:   Lab Results   Component Value Date    PHART 7.410 08/28/2017    PO2ART 57.0 08/28/2017    WVZ5TZN 55.0 08/28/2017       IV:       Vitals:  BP (!) 173/51  Pulse 53  Temp 97.7 °F (36.5 °C) (Temporal)   Resp 16  Ht 5' 9\" (1.753 m)  Wt 159 lb 4.8 oz (72.3 kg)  SpO2 93%  BMI 23.52 kg/m2     Intake/Output Summary (Last 24 hours) at 10/02/17 0745  Last data filed at 10/02/17 0600   Gross per 24 hour   Intake           690.83 ml   Output              585 ml   Net           105.83 ml       EXAM:  General appearance: No apparent distress, appears stated age and cooperative. HEENT: Normal cephalic, atraumatic without obvious deformity. Pupils equal, round, and reactive to light. Extra ocular muscles intact. Conjunctivae/corneas clear. Neck: Supple, with full range of motion. No jugular venous distention. Trachea midline. No thyroid tenderness. No masses palpated. Respiratory:  Normal respiratory effort. Clear to auscultation, bilaterally without wheezes or rhonchi. Few crackles noted bilateral bases. Cardiovascular: Regular rate and rhythm with normal H3/O7.  0-2/5 systolic ejection murmur. No rubs or gallops. Abdomen: Soft, non-tender, non-distended with normal bowel sounds. No hepatosplenomegaly. Musculoskeletal: No clubbing, cyanosis or edema bilaterally. Full range of motion without deformity.   Skin: Skin [[id number]] Resulted: 09/29/17 1704      Order Status: Completed Updated: 09/29/17 1606     Narrative:       XR CHEST PORTABLE   9/29/2017 4:03 PM  History: Leukocytosis. Portable chest x-ray compared with 09/08/2017. Increased dense consolidation of left lower lobe compatible with  pneumonia. Left PICC line now in good position. Underlying chronic lung changes. No pneumothorax.     Impression:       1. Extensive left lower lobe pneumonia is increased as compared with 3  weeks ago. CT Chest:  Impression:        1. Superimposed pneumonia surrounding a biopsy-proven lung carcinoma  in the left lingula. 2. Consolidation in the right lower lobe may also represent pneumonia  or atelectasis. 3. Moderate-sized left pleural effusion. 4. Metastatic lymphadenopathy in the mediastinum. 5. Lucent lesion in the L3 vertebral body is partially visualized and  is concerning for metastatic disease. Assessment:  Active Hospital Problems    Diagnosis Date Noted    Diastolic heart failure, stage C (HCC) [I50.30]      Priority: High    HCAP (healthcare-associated pneumonia) [J18.9] 09/30/2017    Gastroesophageal reflux disease with esophagitis [K21.0]     Paroxysmal atrial fibrillation ([facility] [address] [I48.0] 09/28/2017    Hypercoagulable state ([facility] [address] [D68.59] 09/28/2017    Gastrointestinal hemorrhage with melena [K92.1] 09/28/2017    Acute blood loss anemia [D62] 09/28/2017    Warfarin-induced coagulopathy (HCC) [T45.511A, D68.9]     Melena [K92.1]     Lung mass [R91.8]     Essential hypertension [I10]            Plan:  Given her elevated BP and SOB, bumex 0.5 mg IV times one. Restart nifedipine. No signs of bleeding. Colonoscopy today. Continue protonix. CBC every 12 hours. Transfuse as needed to keep Hgb above 7. BMP in am.  Leukocytosis improved, CT reviewed. Continue merrem and vancomycin for now, unable to add levaquin or cipro given her a fib. Further recommendations per clinical course.   Maintain in

## 2017-10-03 ENCOUNTER — APPOINTMENT (OUTPATIENT)
Dept: GENERAL RADIOLOGY | Age: 77
DRG: 813 | End: 2017-10-03
Payer: MEDICARE

## 2017-10-03 LAB
ANION GAP SERPL CALCULATED.3IONS-SCNC: 1 MMOL/L (ref 7–19)
BASOPHILS ABSOLUTE: 0.1 K/UL (ref 0–0.2)
BASOPHILS ABSOLUTE: 0.1 K/UL (ref 0–0.2)
BASOPHILS RELATIVE PERCENT: 0.3 % (ref 0–1)
BASOPHILS RELATIVE PERCENT: 0.4 % (ref 0–1)
BUN BLDV-MCNC: 13 MG/DL (ref 8–23)
CALCIUM SERPL-MCNC: 8.1 MG/DL (ref 8.8–10.2)
CHLORIDE BLD-SCNC: 104 MMOL/L (ref 98–111)
CO2: 37 MMOL/L (ref 22–29)
CREAT SERPL-MCNC: 0.8 MG/DL (ref 0.5–0.9)
EKG P AXIS: NORMAL DEGREES
EKG P-R INTERVAL: NORMAL MS
EKG Q-T INTERVAL: 320 MS
EKG QRS DURATION: 110 MS
EKG QTC CALCULATION (BAZETT): 437 MS
EKG T AXIS: -16 DEGREES
EOSINOPHILS ABSOLUTE: 1.3 K/UL (ref 0–0.6)
EOSINOPHILS ABSOLUTE: 1.4 K/UL (ref 0–0.6)
EOSINOPHILS RELATIVE PERCENT: 4.7 % (ref 0–5)
EOSINOPHILS RELATIVE PERCENT: 5.8 % (ref 0–5)
GFR NON-AFRICAN AMERICAN: >60
GLUCOSE BLD-MCNC: 116 MG/DL (ref 74–109)
HCT VFR BLD CALC: 28.1 % (ref 37–47)
HCT VFR BLD CALC: 28.6 % (ref 37–47)
HEMOGLOBIN: 8.8 G/DL (ref 12–16)
HEMOGLOBIN: 9.4 G/DL (ref 12–16)
LYMPHOCYTES ABSOLUTE: 1.8 K/UL (ref 1.1–4.5)
LYMPHOCYTES ABSOLUTE: 2 K/UL (ref 1.1–4.5)
LYMPHOCYTES RELATIVE PERCENT: 7.4 % (ref 20–40)
LYMPHOCYTES RELATIVE PERCENT: 7.5 % (ref 20–40)
MCH RBC QN AUTO: 31.4 PG (ref 27–31)
MCH RBC QN AUTO: 33.2 PG (ref 27–31)
MCHC RBC AUTO-ENTMCNC: 31.3 G/DL (ref 33–37)
MCHC RBC AUTO-ENTMCNC: 32.9 G/DL (ref 33–37)
MCV RBC AUTO: 100.4 FL (ref 81–99)
MCV RBC AUTO: 101.1 FL (ref 81–99)
MONOCYTES ABSOLUTE: 1.2 K/UL (ref 0–0.9)
MONOCYTES ABSOLUTE: 1.5 K/UL (ref 0–0.9)
MONOCYTES RELATIVE PERCENT: 4.9 % (ref 0–10)
MONOCYTES RELATIVE PERCENT: 5.5 % (ref 0–10)
NEUTROPHILS ABSOLUTE: 19.2 K/UL (ref 1.5–7.5)
NEUTROPHILS ABSOLUTE: 21.9 K/UL (ref 1.5–7.5)
NEUTROPHILS RELATIVE PERCENT: 80.7 % (ref 50–65)
NEUTROPHILS RELATIVE PERCENT: 81 % (ref 50–65)
PDW BLD-RTO: 19.4 % (ref 11.5–14.5)
PDW BLD-RTO: 19.5 % (ref 11.5–14.5)
PLATELET # BLD: 239 K/UL (ref 130–400)
PLATELET # BLD: 261 K/UL (ref 130–400)
PMV BLD AUTO: 9 FL (ref 9.4–12.3)
PMV BLD AUTO: 9.4 FL (ref 9.4–12.3)
POTASSIUM SERPL-SCNC: 3.6 MMOL/L (ref 3.5–5)
RBC # BLD: 2.8 M/UL (ref 4.2–5.4)
RBC # BLD: 2.83 M/UL (ref 4.2–5.4)
SODIUM BLD-SCNC: 142 MMOL/L (ref 136–145)
VANCOMYCIN TROUGH: 13.1 UG/ML (ref 10–20)
WBC # BLD: 23.8 K/UL (ref 4.8–10.8)
WBC # BLD: 27.1 K/UL (ref 4.8–10.8)

## 2017-10-03 PROCEDURE — 6360000002 HC RX W HCPCS: Performed by: INTERNAL MEDICINE

## 2017-10-03 PROCEDURE — 2580000003 HC RX 258: Performed by: INTERNAL MEDICINE

## 2017-10-03 PROCEDURE — 2700000000 HC OXYGEN THERAPY PER DAY

## 2017-10-03 PROCEDURE — 2500000003 HC RX 250 WO HCPCS: Performed by: INTERNAL MEDICINE

## 2017-10-03 PROCEDURE — 2500000003 HC RX 250 WO HCPCS

## 2017-10-03 PROCEDURE — 1210000000 HC MED SURG R&B

## 2017-10-03 PROCEDURE — 80048 BASIC METABOLIC PNL TOTAL CA: CPT

## 2017-10-03 PROCEDURE — 6370000000 HC RX 637 (ALT 250 FOR IP): Performed by: INTERNAL MEDICINE

## 2017-10-03 PROCEDURE — 99233 SBSQ HOSP IP/OBS HIGH 50: CPT | Performed by: INTERNAL MEDICINE

## 2017-10-03 PROCEDURE — 80202 ASSAY OF VANCOMYCIN: CPT

## 2017-10-03 PROCEDURE — 85025 COMPLETE CBC W/AUTO DIFF WBC: CPT

## 2017-10-03 PROCEDURE — 71020 XR CHEST STANDARD TWO VW: CPT

## 2017-10-03 PROCEDURE — 36592 COLLECT BLOOD FROM PICC: CPT

## 2017-10-03 RX ORDER — AMIODARONE HYDROCHLORIDE 200 MG/1
200 TABLET ORAL DAILY
Status: DISCONTINUED | OUTPATIENT
Start: 2017-10-03 | End: 2017-10-13 | Stop reason: HOSPADM

## 2017-10-03 RX ORDER — BUMETANIDE 0.25 MG/ML
1 INJECTION, SOLUTION INTRAMUSCULAR; INTRAVENOUS ONCE
Status: COMPLETED | OUTPATIENT
Start: 2017-10-03 | End: 2017-10-03

## 2017-10-03 RX ORDER — NIFEDIPINE 90 MG/1
90 TABLET, EXTENDED RELEASE ORAL DAILY
Status: DISCONTINUED | OUTPATIENT
Start: 2017-10-03 | End: 2017-10-04

## 2017-10-03 RX ORDER — METOPROLOL TARTRATE 5 MG/5ML
5 INJECTION INTRAVENOUS EVERY 6 HOURS
Status: DISCONTINUED | OUTPATIENT
Start: 2017-10-03 | End: 2017-10-04

## 2017-10-03 RX ORDER — METOPROLOL TARTRATE 5 MG/5ML
INJECTION INTRAVENOUS
Status: COMPLETED
Start: 2017-10-03 | End: 2017-10-03

## 2017-10-03 RX ADMIN — NIFEDIPINE 90 MG: 90 TABLET, FILM COATED, EXTENDED RELEASE ORAL at 12:26

## 2017-10-03 RX ADMIN — TRAMADOL HYDROCHLORIDE 50 MG: 50 TABLET, FILM COATED ORAL at 21:24

## 2017-10-03 RX ADMIN — PRAVASTATIN SODIUM 40 MG: 20 TABLET ORAL at 08:57

## 2017-10-03 RX ADMIN — MEROPENEM 1 G: 1 INJECTION, POWDER, FOR SOLUTION INTRAVENOUS at 16:49

## 2017-10-03 RX ADMIN — Medication 10 ML: at 09:00

## 2017-10-03 RX ADMIN — Medication 10 ML: at 09:01

## 2017-10-03 RX ADMIN — Medication 10 ML: at 21:25

## 2017-10-03 RX ADMIN — METOPROLOL TARTRATE 5 MG: 5 INJECTION INTRAVENOUS at 22:00

## 2017-10-03 RX ADMIN — TRAMADOL HYDROCHLORIDE 50 MG: 50 TABLET, FILM COATED ORAL at 08:56

## 2017-10-03 RX ADMIN — AMIODARONE HYDROCHLORIDE 200 MG: 200 TABLET ORAL at 15:30

## 2017-10-03 RX ADMIN — METOPROLOL TARTRATE 5 MG: 5 INJECTION INTRAVENOUS at 15:11

## 2017-10-03 RX ADMIN — MEROPENEM 1 G: 1 INJECTION, POWDER, FOR SOLUTION INTRAVENOUS at 08:58

## 2017-10-03 RX ADMIN — BUMETANIDE 1 MG: 0.25 INJECTION INTRAMUSCULAR; INTRAVENOUS at 13:50

## 2017-10-03 RX ADMIN — VANCOMYCIN HYDROCHLORIDE 1000 MG: 1 INJECTION, SOLUTION INTRAVENOUS at 23:30

## 2017-10-03 ASSESSMENT — PAIN SCALES - GENERAL
PAINLEVEL_OUTOF10: 7
PAINLEVEL_OUTOF10: 5
PAINLEVEL_OUTOF10: 2
PAINLEVEL_OUTOF10: 2

## 2017-10-03 ASSESSMENT — PAIN DESCRIPTION - LOCATION: LOCATION: BACK

## 2017-10-03 NOTE — PLAN OF CARE
Problem: Falls - Risk of  Goal: Absence of falls  Outcome: Ongoing    Problem: Bleeding:  Goal: Will show no signs and symptoms of excessive bleeding  Will show no signs and symptoms of excessive bleeding   Outcome: Ongoing    Problem: Infection - Risk of, Central Venous Catheter-Associated Bloodstream Infection  Goal: Absence of central venous catheter-associated bloodstream infection  Outcome: Ongoing    Problem: Pain:  Goal: Pain level will decrease  Pain level will decrease   Outcome: Ongoing  Goal: Control of acute pain  Control of acute pain   Outcome: Ongoing  Goal: Control of chronic pain  Control of chronic pain   Outcome: Ongoing    Problem: Risk for Impaired Skin Integrity  Goal: Tissue integrity - skin and mucous membranes  Structural intactness and normal physiological function of skin and  mucous membranes.    Outcome: Ongoing    Problem: Nutrition  Goal: Optimal nutrition therapy  Outcome: Ongoing

## 2017-10-03 NOTE — CARE COORDINATION
SS spoke with pt regarding plans following discharge. She is expecting to return to her home in [place] where she lives with her son and her daughter living close by. She has home health with [facility] and team is aware to contact upon discharge. She is using O2 while here and has it set up at home with [employer]. She has no DME yet reports a recent fall. She refused a PT evaluation for mobility needs. She states she has no difficulties attaining or taking her medications. Continue to follow as needed.  Electronically signed by Venus Ruiz on 10/3/2017 at 12:32 PM

## 2017-10-03 NOTE — PROGRESS NOTES
Telemetry room called and notified this rn that pt HR in 160s. Pt states feels slightly sob but \"otherwise I feel fine. \"  /76, O2 89% on 6L NC.  EKG completed, afib with hr 140s.   Will notify MD.

## 2017-10-03 NOTE — PROGRESS NOTES
Per Dr Shirleen Blizzard O2 turned to 4L NC. O2 sat was 85% on 4L. O2 increased to 5L and pt sat 87%. O2 now at 6L and O2 sat is 89%. Monitoring.

## 2017-10-04 LAB
ANION GAP SERPL CALCULATED.3IONS-SCNC: 9 MMOL/L (ref 7–19)
BASOPHILS ABSOLUTE: 0.1 K/UL (ref 0–0.2)
BASOPHILS ABSOLUTE: 0.1 K/UL (ref 0–0.2)
BASOPHILS RELATIVE PERCENT: 0.4 % (ref 0–1)
BASOPHILS RELATIVE PERCENT: 0.4 % (ref 0–1)
BUN BLDV-MCNC: 12 MG/DL (ref 8–23)
CALCIUM SERPL-MCNC: 8 MG/DL (ref 8.8–10.2)
CHLORIDE BLD-SCNC: 101 MMOL/L (ref 98–111)
CO2: 31 MMOL/L (ref 22–29)
CREAT SERPL-MCNC: 0.8 MG/DL (ref 0.5–0.9)
EOSINOPHILS ABSOLUTE: 1.4 K/UL (ref 0–0.6)
EOSINOPHILS ABSOLUTE: 1.6 K/UL (ref 0–0.6)
EOSINOPHILS RELATIVE PERCENT: 6 % (ref 0–5)
EOSINOPHILS RELATIVE PERCENT: 6 % (ref 0–5)
GFR NON-AFRICAN AMERICAN: >60
GLUCOSE BLD-MCNC: 95 MG/DL (ref 74–109)
HCT VFR BLD CALC: 28.8 % (ref 37–47)
HCT VFR BLD CALC: 29.5 % (ref 37–47)
HEMOGLOBIN: 9.1 G/DL (ref 12–16)
HEMOGLOBIN: 9.5 G/DL (ref 12–16)
LYMPHOCYTES ABSOLUTE: 2 K/UL (ref 1.1–4.5)
LYMPHOCYTES ABSOLUTE: 2.1 K/UL (ref 1.1–4.5)
LYMPHOCYTES RELATIVE PERCENT: 7.4 % (ref 20–40)
LYMPHOCYTES RELATIVE PERCENT: 8.8 % (ref 20–40)
MCH RBC QN AUTO: 31.6 PG (ref 27–31)
MCH RBC QN AUTO: 31.9 PG (ref 27–31)
MCHC RBC AUTO-ENTMCNC: 31.6 G/DL (ref 33–37)
MCHC RBC AUTO-ENTMCNC: 32.2 G/DL (ref 33–37)
MCV RBC AUTO: 100 FL (ref 81–99)
MCV RBC AUTO: 99 FL (ref 81–99)
MONOCYTES ABSOLUTE: 1.5 K/UL (ref 0–0.9)
MONOCYTES ABSOLUTE: 1.5 K/UL (ref 0–0.9)
MONOCYTES RELATIVE PERCENT: 5.5 % (ref 0–10)
MONOCYTES RELATIVE PERCENT: 6.1 % (ref 0–10)
NEUTROPHILS ABSOLUTE: 18.8 K/UL (ref 1.5–7.5)
NEUTROPHILS ABSOLUTE: 21.7 K/UL (ref 1.5–7.5)
NEUTROPHILS RELATIVE PERCENT: 78 % (ref 50–65)
NEUTROPHILS RELATIVE PERCENT: 79.7 % (ref 50–65)
PDW BLD-RTO: 18.9 % (ref 11.5–14.5)
PDW BLD-RTO: 19.1 % (ref 11.5–14.5)
PLATELET # BLD: 257 K/UL (ref 130–400)
PLATELET # BLD: 263 K/UL (ref 130–400)
PMV BLD AUTO: 9.2 FL (ref 9.4–12.3)
PMV BLD AUTO: 9.2 FL (ref 9.4–12.3)
POTASSIUM SERPL-SCNC: 3.4 MMOL/L (ref 3.5–5)
RBC # BLD: 2.88 M/UL (ref 4.2–5.4)
RBC # BLD: 2.98 M/UL (ref 4.2–5.4)
SODIUM BLD-SCNC: 141 MMOL/L (ref 136–145)
WBC # BLD: 24.1 K/UL (ref 4.8–10.8)
WBC # BLD: 27.2 K/UL (ref 4.8–10.8)

## 2017-10-04 PROCEDURE — 99233 SBSQ HOSP IP/OBS HIGH 50: CPT | Performed by: INTERNAL MEDICINE

## 2017-10-04 PROCEDURE — 6370000000 HC RX 637 (ALT 250 FOR IP): Performed by: INTERNAL MEDICINE

## 2017-10-04 PROCEDURE — 6360000002 HC RX W HCPCS: Performed by: INTERNAL MEDICINE

## 2017-10-04 PROCEDURE — 80048 BASIC METABOLIC PNL TOTAL CA: CPT

## 2017-10-04 PROCEDURE — 2580000003 HC RX 258: Performed by: INTERNAL MEDICINE

## 2017-10-04 PROCEDURE — 89051 BODY FLUID CELL COUNT: CPT

## 2017-10-04 PROCEDURE — 2700000000 HC OXYGEN THERAPY PER DAY

## 2017-10-04 PROCEDURE — 99223 1ST HOSP IP/OBS HIGH 75: CPT | Performed by: INTERNAL MEDICINE

## 2017-10-04 PROCEDURE — 1210000000 HC MED SURG R&B

## 2017-10-04 PROCEDURE — 85025 COMPLETE CBC W/AUTO DIFF WBC: CPT

## 2017-10-04 PROCEDURE — 36592 COLLECT BLOOD FROM PICC: CPT

## 2017-10-04 PROCEDURE — 2500000003 HC RX 250 WO HCPCS: Performed by: INTERNAL MEDICINE

## 2017-10-04 RX ORDER — METOPROLOL TARTRATE 5 MG/5ML
2.5 INJECTION INTRAVENOUS EVERY 6 HOURS
Status: DISCONTINUED | OUTPATIENT
Start: 2017-10-05 | End: 2017-10-04

## 2017-10-04 RX ORDER — 0.9 % SODIUM CHLORIDE 0.9 %
500 INTRAVENOUS SOLUTION INTRAVENOUS ONCE
Status: COMPLETED | OUTPATIENT
Start: 2017-10-04 | End: 2017-10-04

## 2017-10-04 RX ORDER — NIFEDIPINE 60 MG/1
60 TABLET, FILM COATED, EXTENDED RELEASE ORAL DAILY
Status: DISCONTINUED | OUTPATIENT
Start: 2017-10-05 | End: 2017-10-05

## 2017-10-04 RX ORDER — SODIUM CHLORIDE 9 MG/ML
INJECTION, SOLUTION INTRAVENOUS CONTINUOUS
Status: DISCONTINUED | OUTPATIENT
Start: 2017-10-04 | End: 2017-10-07

## 2017-10-04 RX ADMIN — METOPROLOL TARTRATE 12.5 MG: 25 TABLET ORAL at 20:43

## 2017-10-04 RX ADMIN — PRAVASTATIN SODIUM 40 MG: 20 TABLET ORAL at 08:33

## 2017-10-04 RX ADMIN — SODIUM CHLORIDE 500 ML: 9 INJECTION, SOLUTION INTRAVENOUS at 15:45

## 2017-10-04 RX ADMIN — MEROPENEM 1 G: 1 INJECTION, POWDER, FOR SOLUTION INTRAVENOUS at 18:01

## 2017-10-04 RX ADMIN — Medication 10 ML: at 08:34

## 2017-10-04 RX ADMIN — MEROPENEM 1 G: 1 INJECTION, POWDER, FOR SOLUTION INTRAVENOUS at 00:55

## 2017-10-04 RX ADMIN — ONDANSETRON 4 MG: 2 INJECTION INTRAMUSCULAR; INTRAVENOUS at 14:50

## 2017-10-04 RX ADMIN — MEROPENEM 1 G: 1 INJECTION, POWDER, FOR SOLUTION INTRAVENOUS at 08:33

## 2017-10-04 RX ADMIN — METOPROLOL TARTRATE 5 MG: 5 INJECTION INTRAVENOUS at 18:01

## 2017-10-04 RX ADMIN — TRAMADOL HYDROCHLORIDE 50 MG: 50 TABLET, FILM COATED ORAL at 20:43

## 2017-10-04 RX ADMIN — SODIUM CHLORIDE: 9 INJECTION, SOLUTION INTRAVENOUS at 20:43

## 2017-10-04 RX ADMIN — METOPROLOL TARTRATE 5 MG: 5 INJECTION INTRAVENOUS at 12:03

## 2017-10-04 RX ADMIN — AMIODARONE HYDROCHLORIDE 200 MG: 200 TABLET ORAL at 12:03

## 2017-10-04 RX ADMIN — VANCOMYCIN HYDROCHLORIDE 1000 MG: 1 INJECTION, SOLUTION INTRAVENOUS at 21:54

## 2017-10-04 RX ADMIN — SODIUM CHLORIDE: 9 INJECTION, SOLUTION INTRAVENOUS at 17:01

## 2017-10-04 ASSESSMENT — PAIN SCALES - GENERAL
PAINLEVEL_OUTOF10: 1
PAINLEVEL_OUTOF10: 5

## 2017-10-04 NOTE — PLAN OF CARE
Problem: Nutrition  Goal: Optimal nutrition therapy  Nutrition Problem: Inadequate oral intake  Intervention: Food and/or Nutrient Delivery: Continue current diet, Modify current ONS  Nutritional Goals: po intake 50% or greater  Outcome: Ongoing

## 2017-10-04 NOTE — PROGRESS NOTES
Nutrition Assessment    Type and Reason for Visit: Reassess    Nutrition Recommendations: Ensure TID    Malnutrition Assessment:  · Malnutrition Status: At risk for malnutrition  · Context: Acute illness or injury    Nutrition Diagnosis:   · Problem: Inadequate oral intake  · Etiology: related to Alteration in GI function     Signs and symptoms:  as evidenced by Intake 0-25%    Nutrition Assessment:  · Subjective Assessment: Appetite has been poor. Pt doesn't care for [employer], states she drinks Ensure at home. · Nutrition-Focused Physical Findings:    · Wound Type:  (bruising)  · Current Nutrition Therapies:  · Oral Diet Orders: Dental Soft, 2gm Sodium   · Oral Diet intake: 1-25%  · Oral Nutrition Supplement (ONS) Orders: Frozen Oral Supplement  · Anthropometric Measures:  · Ht: 5' 9\" (175.3 cm)   · Current Body Wt: 159 lb 4 oz (72.2 kg)  · Admission Body Wt: 160 lb (72.6 kg) (stated)  · Ideal Body Wt: 145 lb (65.8 kg), % Ideal Body    · BMI Classification: BMI 18.5 - 24.9 Normal Weight    Estimated Intake vs Estimated Needs: Intake Less Than Needs    Nutrition Risk Level: High    Nutrition Interventions:   Continue current diet, Modify current ONS  Continued Inpatient Monitoring    Nutrition Evaluation:   · Evaluation: Progressing toward goals   · Goals: po intake 50% or greater    · Monitoring: Meal Intake, Supplement Intake, Weight, Pertinent Labs    See Adult Nutrition Doc Flowsheet for more detail.      Electronically signed by Joe Butcher MS, RD, LD on 10/4/17 at 10:58 AM    Contact Number: [phone]

## 2017-10-04 NOTE — PROGRESS NOTES
Dr. Land Aas at bedside, pt showing no s/s of acute distress at this time. Orders received for IV fluids. Will continue to monitor.  Electronically signed by Katherine Philippe RN on 10/4/2017 at 12:28 PM

## 2017-10-04 NOTE — PROGRESS NOTES
Palliative Care  made a follow up visit to offer support to Patient. Patient was lying in her bed and reports that things are not going too well. Patient reports that Doctors are not able to get her Blood Pressure up consistently. Patient shares that she was brought to the hospital after having an episode where she could not breath. Patient reports that a mass was discovered on her left lung. Patient reports that more tests are being run. Palliative Care will continue to follow up.       Electronically signed by Roland Hou on 10/4/2017 at 11:01 AM

## 2017-10-04 NOTE — PROGRESS NOTES
Pt's metoprolol IV and amiodarone held this am for BP 92/52. Tele called to report pt's HR up in 150-160s. Pt's rhythm afib per tele. Discussed with Dr. Ian Travis who said to go ahead and give dose of metoprolol IV and amiodarone now. Pt showing no s/s of acute distress. Will continue to monitor.

## 2017-10-04 NOTE — PROGRESS NOTES
Critical Care Progress Note      Events of Last 24 hours: No acute events noted overnight. Chief Complaint:  afib f/u    Subjective:   Continues in afib, and has been relatively hypotensive. Amiodarone and metoprolol held this morning due to low BP, then HR increased to 130's again. No chest pain, no light headedness while lying down. No n/v. Appetite is good. Hgb stable after recent GI bleeding. Continues on 6lpm oxygen, home baseline 3lpm.  Has pleural effusion on left. Invasive Lines: peripheral IVs only     MV:  D0      MV Settings:     / / /FiO2 : 50 %  ABGs:   Lab Results   Component Value Date    PHART 7.410 08/28/2017    PO2ART 57.0 08/28/2017    LZU4YPL 55.0 08/28/2017       IV:   sodium chloride 75 mL/hr at 10/04/17 1701       Vitals:  BP (!) 90/53  Pulse 103  Temp 97.3 °F (36.3 °C) (Temporal)   Resp 18  Ht 5' 9\" (1.753 m)  Wt 159 lb 12.8 oz (72.5 kg)  SpO2 90%  BMI 23.6 kg/m2     Intake/Output Summary (Last 24 hours) at 10/04/17 1836  Last data filed at 10/04/17 1215   Gross per 24 hour   Intake             2706 ml   Output                0 ml   Net             2706 ml       EXAM:  General appearance: No apparent distress, appears stated age and cooperative.   CV: tachy, irregularly irregular, no murmur  Pulm: diminished left base, no rales or rhonchi, no wheeze  GI: soft, NT, ND  Ext: no edema'  Neuro:  No focal weakness, answers and follow commands easily      Medications:  Scheduled Meds:   [START ON 10/5/2017] metoprolol  2.5 mg Intravenous Q6H    NIFEdipine  90 mg Oral Daily    meropenem  1 g Intravenous Q8H    amiodarone  200 mg Oral Daily    0.9 % sodium chloride  250 mL Intravenous Once    sodium chloride flush  10 mL Intravenous 2 times per day    sodium chloride (PF)  10 mL Intravenous 2 times per day    vancomycin  1,000 mg Intravenous Q24H    vancomycin (VANCOCIN) intermittent dosing (placeholder)   Other RX Placeholder    traMADol  50 mg Oral BID    pravastatin  40 mg Oral Daily       PRN Meds:  sodium chloride flush, sodium chloride (PF), acetaminophen, magnesium hydroxide, ondansetron    Results:  CBC:   Recent Labs      10/03/17   1400  10/03/17   2130  10/04/17   0850   WBC  23.8*  27.1*  27.2*   HGB  8.8*  9.4*  9.5*   HCT  28.1*  28.6*  29.5*   MCV  100.4*  101.1*  99.0   PLT  239  261  263     BMP:   Recent Labs      10/02/17   0500  10/03/17   1400  10/04/17   0850   NA  141  142  141   K  3.9  3.6  3.4*   CL  104  104  101   CO2  36*  37*  31*   BUN  13  13  12   CREATININE  0.9  0.8  0.8     PT/INR:   Recent Labs      10/02/17   0500   PROTIME  15.2*   INR  1.20*       Cultures:  None    Films:  XR Chest Portable [[id number]] Resulted: 09/29/17 1704      Order Status: Completed Updated: 09/29/17 1606     Narrative:       XR CHEST PORTABLE   9/29/2017 4:03 PM  History: Leukocytosis. Portable chest x-ray compared with 09/08/2017. Increased dense consolidation of left lower lobe compatible with  pneumonia. Left PICC line now in good position. Underlying chronic lung changes. No pneumothorax.     Impression:       1. Extensive left lower lobe pneumonia is increased as compared with 3  weeks ago. CT Chest:  Impression:        1. Superimposed pneumonia surrounding a biopsy-proven lung carcinoma  in the left lingula. 2. Consolidation in the right lower lobe may also represent pneumonia  or atelectasis. 3. Moderate-sized left pleural effusion. 4. Metastatic lymphadenopathy in the mediastinum. 5. Lucent lesion in the L3 vertebral body is partially visualized and  is concerning for metastatic disease.        Assessment:  Active Hospital Problems    Diagnosis Date Noted    Diastolic heart failure, stage C (HCC) [I50.30]      Priority: High    Polyp of descending colon [D12.4]     Diverticulosis of large intestine without hemorrhage [K57.30]     HCAP (healthcare-associated pneumonia) [J18.9] 09/30/2017    Gastroesophageal reflux disease with esophagitis [K21.0]     Paroxysmal atrial fibrillation ([facility] [address] [I48.0] 09/28/2017    Hypercoagulable state ([facility] [address] [D68.59] 09/28/2017    Gastrointestinal hemorrhage with melena [K92.1] 09/28/2017    Acute blood loss anemia [D62] 09/28/2017    Warfarin-induced coagulopathy (HCC) [T45.511A, D68.9]     Melena [K92.1]     Lung mass [R91.8]     Essential hypertension [I10]            Plan:  Afib:  Continue IV metoprolol; oral amiodarone for afib. Off anticoag due to GI bleeding, consider resuming trial.  Request cardio consult due to continued hypotension on this treatment plan. Give IVF bolus 500ml and start maintenance fluid. Pleural effusion:  Will get US guided thoracentesis tomorrow, send fluid for culture. Has had PNA. May help improve afib and hypoxia. Acute on chronic hypoxic resp failure: Wean as able. Control afib and remove pleural fluid.   Cont PNA tx          Hansel Corcoran MD

## 2017-10-04 NOTE — PROGRESS NOTES
Neurovascularly intact without any focal sensory/motor deficits. Cranial nerves: II-XII intact, grossly non-focal.  Generalized weakness. Psychiatric: Alert and oriented, thought content appropriate, normal insight      Medications:  Scheduled Meds:   NIFEdipine  90 mg Oral Daily    meropenem  1 g Intravenous Q8H    metoprolol  5 mg Intravenous Q6H    amiodarone  200 mg Oral Daily    0.9 % sodium chloride  250 mL Intravenous Once    sodium chloride flush  10 mL Intravenous 2 times per day    sodium chloride (PF)  10 mL Intravenous 2 times per day    vancomycin  1,000 mg Intravenous Q24H    vancomycin (VANCOCIN) intermittent dosing (placeholder)   Other RX Placeholder    traMADol  50 mg Oral BID    pravastatin  40 mg Oral Daily       PRN Meds:  sodium chloride flush, sodium chloride (PF), acetaminophen, magnesium hydroxide, ondansetron    Results:  CBC:   Recent Labs      10/01/17   1940  10/02/17   0500  10/03/17   1400   WBC  21.2*  23.7*  23.8*   HGB  8.4*  8.5*  8.8*   HCT  27.3*  27.4*  28.1*   MCV  100.4*  100.4*  100.4*   PLT  237  222  239     BMP:   Recent Labs      10/01/17   0010  10/02/17   0500  10/03/17   1400   NA  141  141  142   K  3.8  3.9  3.6   CL  104  104  104   CO2  35*  36*  37*   BUN  20  13  13   CREATININE  1.0*  0.9  0.8     LIVER PROFILE:   No results for input(s): AST, ALT, LIPASE, BILIDIR, BILITOT, ALKPHOS in the last 72 hours. Invalid input(s): AMYLASE,  ALB  PT/INR:   Recent Labs      10/02/17   0500   PROTIME  15.2*   INR  1.20*       Cultures:  None    Films:  XR Chest Portable [[id number]] Resulted: 09/29/17 1704      Order Status: Completed Updated: 09/29/17 1606     Narrative:       XR CHEST PORTABLE   9/29/2017 4:03 PM  History: Leukocytosis. Portable chest x-ray compared with 09/08/2017. Increased dense consolidation of left lower lobe compatible with  pneumonia. Left PICC line now in good position. Underlying chronic lung changes.   No pneumothorax.

## 2017-10-05 ENCOUNTER — APPOINTMENT (OUTPATIENT)
Dept: GENERAL RADIOLOGY | Age: 77
DRG: 813 | End: 2017-10-05
Payer: MEDICARE

## 2017-10-05 ENCOUNTER — APPOINTMENT (OUTPATIENT)
Dept: ULTRASOUND IMAGING | Age: 77
DRG: 813 | End: 2017-10-05
Payer: MEDICARE

## 2017-10-05 LAB
APPEARANCE FLUID: NORMAL
BLOOD CULTURE, ROUTINE: NORMAL
CELL COUNT FLUID TYPE: NORMAL
CLOT EVALUATION: NORMAL
COLOR FLUID: YELLOW
CULTURE, BLOOD 2: NORMAL
FLUID PATH CONSULT: NO
FLUID TYPE: NORMAL
INR BLD: 1.12 (ref 0.88–1.18)
LD, FLUID: 148 U/L
LYMPHOCYTES, BODY FLUID: 44 %
MACROPHAGE FLUID: 5 %
MONOCYTE, FLUID: 9 %
NEUTROPHIL, FLUID: 42 %
NUCLEATED CELLS FLUID: 228 /CUMM
NUMBER OF CELLS COUNTED FLUID: 100
PROTEIN FLUID: 2.4 G/DL
PROTHROMBIN TIME: 14.3 SEC (ref 12–14.6)
RBC FLUID: 910 /CUMM

## 2017-10-05 PROCEDURE — 84157 ASSAY OF PROTEIN OTHER: CPT

## 2017-10-05 PROCEDURE — 2700000000 HC OXYGEN THERAPY PER DAY

## 2017-10-05 PROCEDURE — 6360000002 HC RX W HCPCS: Performed by: INTERNAL MEDICINE

## 2017-10-05 PROCEDURE — 87070 CULTURE OTHR SPECIMN AEROBIC: CPT

## 2017-10-05 PROCEDURE — 6370000000 HC RX 637 (ALT 250 FOR IP): Performed by: INTERNAL MEDICINE

## 2017-10-05 PROCEDURE — 83615 LACTATE (LD) (LDH) ENZYME: CPT

## 2017-10-05 PROCEDURE — 85610 PROTHROMBIN TIME: CPT

## 2017-10-05 PROCEDURE — 1210000000 HC MED SURG R&B

## 2017-10-05 PROCEDURE — 87205 SMEAR GRAM STAIN: CPT

## 2017-10-05 PROCEDURE — 0W9B3ZZ DRAINAGE OF LEFT PLEURAL CAVITY, PERCUTANEOUS APPROACH: ICD-10-PCS | Performed by: RADIOLOGY

## 2017-10-05 PROCEDURE — 2580000003 HC RX 258: Performed by: INTERNAL MEDICINE

## 2017-10-05 PROCEDURE — 71035 XR CHEST 1 VW: CPT

## 2017-10-05 PROCEDURE — 32555 ASPIRATE PLEURA W/ IMAGING: CPT

## 2017-10-05 PROCEDURE — 99233 SBSQ HOSP IP/OBS HIGH 50: CPT | Performed by: INTERNAL MEDICINE

## 2017-10-05 PROCEDURE — 87015 SPECIMEN INFECT AGNT CONCNTJ: CPT

## 2017-10-05 PROCEDURE — 87075 CULTR BACTERIA EXCEPT BLOOD: CPT

## 2017-10-05 RX ORDER — POTASSIUM CHLORIDE 20 MEQ/1
20 TABLET, EXTENDED RELEASE ORAL 2 TIMES DAILY
Status: COMPLETED | OUTPATIENT
Start: 2017-10-05 | End: 2017-10-06

## 2017-10-05 RX ADMIN — POTASSIUM CHLORIDE 20 MEQ: 20 TABLET, EXTENDED RELEASE ORAL at 12:07

## 2017-10-05 RX ADMIN — PRAVASTATIN SODIUM 40 MG: 20 TABLET ORAL at 08:51

## 2017-10-05 RX ADMIN — TRAMADOL HYDROCHLORIDE 50 MG: 50 TABLET, FILM COATED ORAL at 08:51

## 2017-10-05 RX ADMIN — METOPROLOL TARTRATE 12.5 MG: 25 TABLET ORAL at 20:52

## 2017-10-05 RX ADMIN — MEROPENEM 1 G: 1 INJECTION, POWDER, FOR SOLUTION INTRAVENOUS at 00:50

## 2017-10-05 RX ADMIN — MEROPENEM 1 G: 1 INJECTION, POWDER, FOR SOLUTION INTRAVENOUS at 08:51

## 2017-10-05 RX ADMIN — Medication 10 ML: at 08:52

## 2017-10-05 RX ADMIN — MEROPENEM 1 G: 1 INJECTION, POWDER, FOR SOLUTION INTRAVENOUS at 17:20

## 2017-10-05 RX ADMIN — TRAMADOL HYDROCHLORIDE 50 MG: 50 TABLET, FILM COATED ORAL at 20:52

## 2017-10-05 RX ADMIN — POTASSIUM CHLORIDE 20 MEQ: 20 TABLET, EXTENDED RELEASE ORAL at 20:53

## 2017-10-05 RX ADMIN — AMIODARONE HYDROCHLORIDE 200 MG: 200 TABLET ORAL at 08:51

## 2017-10-05 RX ADMIN — NIFEDIPINE 60 MG: 60 TABLET, FILM COATED, EXTENDED RELEASE ORAL at 12:06

## 2017-10-05 RX ADMIN — METOPROLOL TARTRATE 12.5 MG: 25 TABLET ORAL at 08:51

## 2017-10-05 ASSESSMENT — PAIN SCALES - GENERAL
PAINLEVEL_OUTOF10: 2
PAINLEVEL_OUTOF10: 5
PAINLEVEL_OUTOF10: 4

## 2017-10-05 NOTE — CONSULTS
[facility] Randy Dwyer      Cardiology Consultation      Date of Admission:  9/28/2017  1:44 PM    Date of Initially Being Seen / Consultation:  10/5/17    Cardiologist:  Dr. Antonio Jiang    Cardiology Attending: Dr. Riri Juárez Attending: Hospital services     PCP:  Alie Blue    Reason for Consultation or Admission / Chief Complaint:  Atrial fibrillation and hypotension    SUBJECTIVE AND HISTORY OF PRESENT ILLNESS:    Source of the history:  Patient, family, previous inpatient and outpatient records in [facility]. Alex Pandya is a 69 y.o. female who presents to [facility] emergency room with symptoms / signs / problem or diagnosis of melena and dizziness on 9/28/2017. Patient had been on coumadin for paroxysmal atrial fibrillation diagnosed In August 2017. Upon evaluation in the ER, patient was found to have an INR >18.80 and a hemoglobin of 7.4. Anticoagulation was discontinued. Amiodarone and metoprolol were continued. On 10/3/2017 patient went back into atrial fibrillation and she remains in that rhythm. Patient denies chest pain, pressure, and tightness. Patient denies palpitations, dizziness, near syncope, and syncope. Patient states they just took fluid off her lung this morning and her breathing has much improved.         CARDIAC RISK PROFILE:    Risk Factor Yes / No / Unknown       Gender Female   Cigarette Use Yes: Former   Family History of Heart Disease Yes: Father   Diabetes Mellitus No   Hypercholesteremia Yes: Pravastatin   Hypertension Yes: Metoprolol, Nifedipine          Cardiac Specific Problems:    Specialty Problems        Cardiology Problems    Diastolic heart failure, stage C (HCC)        Essential hypertension        Atrial fibrillation with RVR (HCC)        Paroxysmal atrial fibrillation ([address]                PRIOR CARDIAC PROBLEM LIST  (IF APPLICABLE):    7/17/7591 TTE:    Mitral valve leaflets are mildly thickened with preserved leaflet 99  Temp 97.4 °F (36.3 °C) (Temporal)   Resp 18  Ht 5' 9\" (1.753 m)  Wt 159 lb 12.8 oz (72.5 kg)  SpO2 (!) 89%  BMI 23.6 kg/m2    GENERAL - well developed and well nourished, in no amount of generalized distress  HEENT   PERRLA, Hearing appears normal, conjunctiva and lids are normal, ears and nose appear normal  NECK - no thyromegaly, no JVD, trachea is in the midline  CARDIOVASCULAR  PMI is in the left mid line clavicular position, Normal S1 and S2 with a grade 2/6 systolic murmur. No S3 or S4    PULMONARY   No respiratory distress. No wheezes and rales. ABDOMEN   soft, non tender, no rebound, no hepatomegaly or splenomegaly  MUSCULOSKELETAL   Sitting, digitals and nails are without clubbing or cyanosis  EXTREMITIES - No edema  NEUROLOGIC - cranial nerves, II-XII, are normal  SKIN - turgor is normal, no rash  PSYCHIATRIC - normal mood and affect, alert and orientated x 3, judgement and insight appear appropriate      LABORATORY EVALUATION & TESTING:    I have personally reviewed and interpreted the results of the following diagnostic testing      EKG and or Telemetry:  which was personally reviewed me:  Atrial fibrillation rhythm, 127 bpm,  with nonspecific T wave abnormality        CBC:   Recent Labs      10/03/17   2130  10/04/17   0850  10/04/17   2145   WBC  27.1*  27.2*  24.1*   HGB  9.4*  9.5*  9.1*   HCT  28.6*  29.5*  28.8*   MCV  101.1*  99.0  100.0*   PLT  261  263  257     BMP:   Recent Labs      10/03/17   1400  10/04/17   0850   NA  142  141   K  3.6  3.4*   CL  104  101   CO2  37*  31*   BUN  13  12   CREATININE  0.8  0.8     Cardiac Enzymes: No results for input(s): CKTOTAL, CKMB, CKMBINDEX, TROPONINI in the last 72 hours. PT/INR:   Recent Labs      10/05/17   0630   PROTIME  14.3   INR  1.12     APTT: No results for input(s): APTT in the last 72 hours.   Liver Profile:  Lab Results   Component Value Date    AST 15 09/28/2017    ALT 15 09/28/2017    BILIDIR 0.2 09/01/2017    BILITOT <0.2 09/28/2017    ALKPHOS 55 09/28/2017     Lab Results   Component Value Date    CHOL 154 09/03/2017    HDL 43 09/03/2017    TRIG 123 09/03/2017     TSH:  Lab Results   Component Value Date    TSH 1.240 08/26/2017     UA: No results found for: NITRITE, COLORU, PHUR, LABCAST, WBCUA, RBCUA, MUCUS, TRICHOMONAS, YEAST, BACTERIA, CLARITYU, SPECGRAV, LEUKOCYTESUR, UROBILINOGEN, BILIRUBINUR, BLOODU, GLUCOSEU, AMORPHOUS          ALL THE CARDIOLOGY PROBLEMS ARE LISTED ABOVE; HOWEVER, THE FOLLOWING SPECIFIC CARDIAC PROBLEMS WERE ADDRESSED AND TREATED DURING THE HOSPITAL VISIT TODAY:                                                                                                                                                                                                                                              MEDICAL DECISION MAKING             Cardiac Specific Problem / Diagnosis  Discussion and Data Reviewed Diagnostic Procedures Ordered Management Options Selected           1. Atrial fibrillation  Initial encounter   Review and summation of old records:    Continue metoprolol and amiodarone. Anticoagulation on hold after recent GI bleed. No Continue current medications:     Yes           2. Hypotension Initial presentation during this evaluation   Nifedipine, metoprolol  SBP min 81 max 99  DBP min 46 max 57  No Continue current medications:    No- hold Nifedipine, continue maintenance fluid           3.  Dyspnea Initial presentation during this evaluation Improved after thoracentesis performed today    9/29/2017 TTE:    Mitral valve leaflets are mildly thickened with preserved leaflet mobility.   Mild-moderate mitral regurgitation noted.   Normal left ventricular size with preserved LV function and an estimated ejection fraction of approximately 55%.   No regional wall motion abnormalities identified.   Normal left ventricular wall thickness.   Normal diastolic function.   No evidence of left ventricular mass

## 2017-10-05 NOTE — CONSULTS
(Note to coding:  Patient was seen tonight, however, due to logistics, the full history and physical examination / consult note will be written tomorrow.)      I have personally seen and preformed historical and physical examinations. These examinations were performed by:  Darren Valentine MD on 10/4/17 in / on 4th floor    The patient has the following known cardiologic specific problems:    Specialty Problems        Cardiology Problems    Diastolic heart failure, stage C ([address]        Essential hypertension        Atrial fibrillation with RVR (HCC)        Paroxysmal atrial fibrillation (HCC)                Today's Subjective complaint / reason for consultation is:  Atrial fibrillation      Objective:   General:     Pleasant and interactive. Respiratory:       CTA without rhonchi or wheezes. Effort regular and unlabored. Cardiovascular:   RRR without murmurs, rubs or gallops. Normal S1/S2. Extremities:         No clubbing or cyanosis or edema. GI:                   Abdomen soft, NT, ND.  +BS. No guarding. Neuro:                 No focal motor deficits. Psych:                 Appropriate behavior      Plan:          1. Further recommendations per clinical course  2. See orders      The history and physical examination / consult note will be preformed tomorrow.       Electronically signed by Darren Valentine MD on 10/4/17

## 2017-10-05 NOTE — PROGRESS NOTES
Appreciate cardiology input. Cont oral metoprolol; oral amiodarone for afib. Off anticoag due to GI bleeding, consider resuming trial.    Pleural effusion:  Follow up fluid for culture and analysis. May be infectious or malignant    Acute on chronic hypoxic resp failure: Wean as able. Control afib and remove pleural fluid.     Pneumonia:  Will d/c vanc, cont meropenem    Hopeful d/c soon          Judson Lundborg, MD

## 2017-10-06 ENCOUNTER — APPOINTMENT (OUTPATIENT)
Dept: GENERAL RADIOLOGY | Age: 77
DRG: 813 | End: 2017-10-06
Payer: MEDICARE

## 2017-10-06 LAB
ANION GAP SERPL CALCULATED.3IONS-SCNC: 9 MMOL/L (ref 7–19)
BASOPHILS ABSOLUTE: 0.2 K/UL (ref 0–0.2)
BASOPHILS RELATIVE PERCENT: 0.7 % (ref 0–1)
BUN BLDV-MCNC: 17 MG/DL (ref 8–23)
CALCIUM SERPL-MCNC: 7.8 MG/DL (ref 8.8–10.2)
CHLORIDE BLD-SCNC: 106 MMOL/L (ref 98–111)
CO2: 28 MMOL/L (ref 22–29)
CREAT SERPL-MCNC: 0.9 MG/DL (ref 0.5–0.9)
EOSINOPHILS ABSOLUTE: 2 K/UL (ref 0–0.6)
EOSINOPHILS RELATIVE PERCENT: 9.1 % (ref 0–5)
GFR NON-AFRICAN AMERICAN: >60
GLUCOSE BLD-MCNC: 89 MG/DL (ref 74–109)
HCT VFR BLD CALC: 28.1 % (ref 37–47)
HEMOGLOBIN: 8.8 G/DL (ref 12–16)
LYMPHOCYTES ABSOLUTE: 2.2 K/UL (ref 1.1–4.5)
LYMPHOCYTES RELATIVE PERCENT: 10.1 % (ref 20–40)
MCH RBC QN AUTO: 31.5 PG (ref 27–31)
MCHC RBC AUTO-ENTMCNC: 31.3 G/DL (ref 33–37)
MCV RBC AUTO: 100.7 FL (ref 81–99)
MONOCYTES ABSOLUTE: 1.8 K/UL (ref 0–0.9)
MONOCYTES RELATIVE PERCENT: 8.3 % (ref 0–10)
NEUTROPHILS ABSOLUTE: 15.6 K/UL (ref 1.5–7.5)
NEUTROPHILS RELATIVE PERCENT: 70.9 % (ref 50–65)
PDW BLD-RTO: 19 % (ref 11.5–14.5)
PLATELET # BLD: 284 K/UL (ref 130–400)
PMV BLD AUTO: 9.4 FL (ref 9.4–12.3)
POTASSIUM SERPL-SCNC: 4.6 MMOL/L (ref 3.5–5)
RBC # BLD: 2.79 M/UL (ref 4.2–5.4)
SODIUM BLD-SCNC: 143 MMOL/L (ref 136–145)
WBC # BLD: 21.9 K/UL (ref 4.8–10.8)

## 2017-10-06 PROCEDURE — 2580000003 HC RX 258: Performed by: INTERNAL MEDICINE

## 2017-10-06 PROCEDURE — 1210000000 HC MED SURG R&B

## 2017-10-06 PROCEDURE — 2700000000 HC OXYGEN THERAPY PER DAY

## 2017-10-06 PROCEDURE — 99231 SBSQ HOSP IP/OBS SF/LOW 25: CPT | Performed by: INTERNAL MEDICINE

## 2017-10-06 PROCEDURE — 71010 XR CHEST PORTABLE: CPT

## 2017-10-06 PROCEDURE — 6370000000 HC RX 637 (ALT 250 FOR IP): Performed by: INTERNAL MEDICINE

## 2017-10-06 PROCEDURE — 36415 COLL VENOUS BLD VENIPUNCTURE: CPT

## 2017-10-06 PROCEDURE — 85025 COMPLETE CBC W/AUTO DIFF WBC: CPT

## 2017-10-06 PROCEDURE — 6360000002 HC RX W HCPCS: Performed by: INTERNAL MEDICINE

## 2017-10-06 PROCEDURE — 99233 SBSQ HOSP IP/OBS HIGH 50: CPT | Performed by: INTERNAL MEDICINE

## 2017-10-06 PROCEDURE — 80048 BASIC METABOLIC PNL TOTAL CA: CPT

## 2017-10-06 RX ORDER — 0.9 % SODIUM CHLORIDE 0.9 %
500 INTRAVENOUS SOLUTION INTRAVENOUS ONCE
Status: COMPLETED | OUTPATIENT
Start: 2017-10-06 | End: 2017-10-06

## 2017-10-06 RX ADMIN — POTASSIUM CHLORIDE 20 MEQ: 20 TABLET, EXTENDED RELEASE ORAL at 09:17

## 2017-10-06 RX ADMIN — AMIODARONE HYDROCHLORIDE 200 MG: 200 TABLET ORAL at 09:17

## 2017-10-06 RX ADMIN — Medication 10 ML: at 10:23

## 2017-10-06 RX ADMIN — ENOXAPARIN SODIUM 70 MG: 80 INJECTION SUBCUTANEOUS at 21:33

## 2017-10-06 RX ADMIN — ENOXAPARIN SODIUM 70 MG: 80 INJECTION SUBCUTANEOUS at 14:18

## 2017-10-06 RX ADMIN — METOPROLOL TARTRATE 12.5 MG: 25 TABLET ORAL at 09:18

## 2017-10-06 RX ADMIN — PRAVASTATIN SODIUM 40 MG: 20 TABLET ORAL at 09:18

## 2017-10-06 RX ADMIN — SODIUM CHLORIDE 500 ML: 9 INJECTION, SOLUTION INTRAVENOUS at 09:50

## 2017-10-06 RX ADMIN — Medication 10 ML: at 22:23

## 2017-10-06 RX ADMIN — Medication 10 ML: at 10:24

## 2017-10-06 RX ADMIN — SODIUM CHLORIDE: 9 INJECTION, SOLUTION INTRAVENOUS at 06:36

## 2017-10-06 RX ADMIN — TRAMADOL HYDROCHLORIDE 50 MG: 50 TABLET, FILM COATED ORAL at 09:17

## 2017-10-06 RX ADMIN — MEROPENEM 1 G: 1 INJECTION, POWDER, FOR SOLUTION INTRAVENOUS at 00:17

## 2017-10-06 RX ADMIN — TRAMADOL HYDROCHLORIDE 50 MG: 50 TABLET, FILM COATED ORAL at 21:25

## 2017-10-06 RX ADMIN — MEROPENEM 1 G: 1 INJECTION, POWDER, FOR SOLUTION INTRAVENOUS at 17:38

## 2017-10-06 RX ADMIN — MEROPENEM 1 G: 1 INJECTION, POWDER, FOR SOLUTION INTRAVENOUS at 09:16

## 2017-10-06 ASSESSMENT — PAIN SCALES - GENERAL
PAINLEVEL_OUTOF10: 9
PAINLEVEL_OUTOF10: 10

## 2017-10-06 NOTE — PROGRESS NOTES
Critical Care Progress Note      Events of Last 24 hours: No acute events noted overnight. Chief Complaint:  afib f/u    Subjective:   Called to bedside this AM for chest pain. Had substernal pain, mild shortness of breath. Pain worse with inspiration. No nausea. HR improved control but continues relative hypotension. Pain resolved shortly later, and CXR was unchanged, EKG afib with no acute changes, and enzymes normal.          Invasive Lines: peripheral IVs only     MV:  D0      MV Settings:     / / /FiO2 : 50 %  ABGs:   Lab Results   Component Value Date    PHART 7.410 08/28/2017    PO2ART 57.0 08/28/2017    HBA5NSP 55.0 08/28/2017       IV:   sodium chloride 75 mL/hr at 10/06/17 0636       Vitals:  BP (!) 90/50  Pulse 106  Temp 97.8 °F (36.6 °C)  Resp 18  Ht 5' 9\" (1.753 m)  Wt 159 lb 12.8 oz (72.5 kg)  SpO2 90%  BMI 23.6 kg/m2     Intake/Output Summary (Last 24 hours) at 10/06/17 1846  Last data filed at 10/06/17 0830   Gross per 24 hour   Intake             2659 ml   Output                0 ml   Net             2659 ml       EXAM:  General appearance:mild distress, appears stated age and cooperative.   CV: tachy, irregularly irregular, no murmur  Pulm: L base crackles, no wheezing, R lung clear  GI: soft, NT, ND  Ext: no edema'  Neuro:  No focal weakness, answers and follow commands easily      Medications:  Scheduled Meds:   enoxaparin  1 mg/kg Subcutaneous BID    metoprolol tartrate  12.5 mg Oral BID    meropenem  1 g Intravenous Q8H    amiodarone  200 mg Oral Daily    0.9 % sodium chloride  250 mL Intravenous Once    sodium chloride flush  10 mL Intravenous 2 times per day    traMADol  50 mg Oral BID    pravastatin  40 mg Oral Daily       PRN Meds:  sodium chloride flush, acetaminophen, magnesium hydroxide, ondansetron    Results:  CBC:   Recent Labs      10/04/17   0850  10/04/17   2145  10/06/17   0737   WBC  27.2*  24.1*  21.9*   HGB  9.5*  9.1*  8.8*   HCT  29.5*  28.8*  28.1*

## 2017-10-06 NOTE — PROGRESS NOTES
RN called to room pt reports chest pain with deep breathe bp 84/60 02 95% on 6 lpm hr 102 resp 22. Pt awake alert and oriented. Call placed to Dr Rubens Daugherty new orders received. EKG completed. RN in with pt.

## 2017-10-06 NOTE — PLAN OF CARE
Problem: Nutrition  Goal: Optimal nutrition therapy  Nutrition Problem: Inadequate oral intake  Intervention: Food and/or Nutrient Delivery: Continue current diet, Continue current ONS  Nutritional Goals: po intake 50% or greater  Outcome: Ongoing

## 2017-10-06 NOTE — PROGRESS NOTES
Palliative Care  made a follow up visit to offer support to Patient. Patient was lying in her bed with Family present. Patient reports that she feels much better today than she has in a few days. Patient is hoping to get out of hospital soon. Palliative Care will continue to follow up.       Electronically signed by Malick Farris on 10/6/2017 at 4:49 PM

## 2017-10-06 NOTE — PROGRESS NOTES
Nutrition Assessment    Type and Reason for Visit: Reassess    Nutrition Recommendations: continue POC    Malnutrition Assessment:  · Malnutrition Status: At risk for malnutrition  · Context: Acute illness or injury    Nutrition Diagnosis:   · Problem: Inadequate oral intake  · Etiology: related to Alteration in GI function     Signs and symptoms:  as evidenced by Intake 0-25%    Nutrition Assessment:  · Subjective Assessment: states appetite is better, drinks supplement  · Nutrition-Focused Physical Findings:    · Wound Type:  (bruising)  · Current Nutrition Therapies:  · Oral Diet Orders: Dental Soft, 2gm Sodium   · Oral Diet intake: 26-50%  · Oral Nutrition Supplement (ONS) Orders: Standard High Calorie Oral Supplement  · Anthropometric Measures:  · Ht: 5' 9\" (175.3 cm)   · Current Body Wt: 159 lb 4 oz (72.2 kg)  · Admission Body Wt: 160 lb (72.6 kg) (stated)  · Usual Body Wt:    · % Weight Change:  ,     · Ideal Body Wt: 145 lb (65.8 kg), % Ideal Body    · BMI Classification: BMI 18.5 - 24.9 Normal Weight    Estimated Intake vs Estimated Needs: Intake Improving    Nutrition Risk Level: Moderate    Nutrition Interventions:   Continue current diet, Continue current ONS  Continued Inpatient Monitoring    Nutrition Evaluation:   · Evaluation: Progressing toward goals   · Goals: po intake 50% or greater    · Monitoring: Meal Intake, Supplement Intake, Weight, Pertinent Labs    See Adult Nutrition Doc Flowsheet for more detail.      Electronically signed by Blanche Saez MS, RD, LD on 10/6/17 at 2:23 PM    Contact Number: [phone]

## 2017-10-07 LAB
ANION GAP SERPL CALCULATED.3IONS-SCNC: 9 MMOL/L (ref 7–19)
ANISOCYTOSIS: ABNORMAL
ATYPICAL LYMPHOCYTE RELATIVE PERCENT: 1 % (ref 0–8)
BANDED NEUTROPHILS RELATIVE PERCENT: 18 % (ref 0–5)
BASOPHILS ABSOLUTE: 0.3 K/UL (ref 0–0.2)
BASOPHILS MANUAL: 1 %
BASOPHILS RELATIVE PERCENT: 1 % (ref 0–1)
BUN BLDV-MCNC: 15 MG/DL (ref 8–23)
CALCIUM SERPL-MCNC: 7.9 MG/DL (ref 8.8–10.2)
CHLORIDE BLD-SCNC: 108 MMOL/L (ref 98–111)
CO2: 26 MMOL/L (ref 22–29)
CREAT SERPL-MCNC: 0.9 MG/DL (ref 0.5–0.9)
EOSINOPHILS ABSOLUTE: 1.94 K/UL (ref 0–0.6)
EOSINOPHILS RELATIVE PERCENT: 6 % (ref 0–5)
GFR NON-AFRICAN AMERICAN: >60
GLUCOSE BLD-MCNC: 90 MG/DL (ref 74–109)
HCT VFR BLD CALC: 30.8 % (ref 37–47)
HEMOGLOBIN: 9.4 G/DL (ref 12–16)
LYMPHOCYTES ABSOLUTE: 4.9 K/UL (ref 1.1–4.5)
LYMPHOCYTES RELATIVE PERCENT: 14 % (ref 20–40)
MACROCYTES: ABNORMAL
MCH RBC QN AUTO: 31.5 PG (ref 27–31)
MCHC RBC AUTO-ENTMCNC: 30.5 G/DL (ref 33–37)
MCV RBC AUTO: 103.4 FL (ref 81–99)
MONOCYTES ABSOLUTE: 2.9 K/UL (ref 0–0.9)
MONOCYTES RELATIVE PERCENT: 9 % (ref 0–10)
NEUTROPHILS ABSOLUTE: 22.4 K/UL (ref 1.5–7.5)
NEUTROPHILS MANUAL: 51 %
NEUTROPHILS RELATIVE PERCENT: 51 % (ref 50–65)
PDW BLD-RTO: 19.1 % (ref 11.5–14.5)
PLATELET # BLD: 377 K/UL (ref 130–400)
PLATELET SLIDE REVIEW: ADEQUATE
PMV BLD AUTO: 9.5 FL (ref 9.4–12.3)
POLYCHROMASIA: ABNORMAL
POTASSIUM SERPL-SCNC: 4.8 MMOL/L (ref 3.5–5)
RBC # BLD: 2.98 M/UL (ref 4.2–5.4)
SODIUM BLD-SCNC: 143 MMOL/L (ref 136–145)
WBC # BLD: 32.4 K/UL (ref 4.8–10.8)

## 2017-10-07 PROCEDURE — 2580000003 HC RX 258: Performed by: INTERNAL MEDICINE

## 2017-10-07 PROCEDURE — 85025 COMPLETE CBC W/AUTO DIFF WBC: CPT

## 2017-10-07 PROCEDURE — 6370000000 HC RX 637 (ALT 250 FOR IP): Performed by: INTERNAL MEDICINE

## 2017-10-07 PROCEDURE — 2700000000 HC OXYGEN THERAPY PER DAY

## 2017-10-07 PROCEDURE — 36415 COLL VENOUS BLD VENIPUNCTURE: CPT

## 2017-10-07 PROCEDURE — 1210000000 HC MED SURG R&B

## 2017-10-07 PROCEDURE — 80048 BASIC METABOLIC PNL TOTAL CA: CPT

## 2017-10-07 PROCEDURE — 99231 SBSQ HOSP IP/OBS SF/LOW 25: CPT | Performed by: INTERNAL MEDICINE

## 2017-10-07 PROCEDURE — 6360000002 HC RX W HCPCS: Performed by: INTERNAL MEDICINE

## 2017-10-07 RX ADMIN — METOPROLOL TARTRATE 12.5 MG: 25 TABLET ORAL at 08:08

## 2017-10-07 RX ADMIN — MEROPENEM 1 G: 1 INJECTION, POWDER, FOR SOLUTION INTRAVENOUS at 17:33

## 2017-10-07 RX ADMIN — ENOXAPARIN SODIUM 70 MG: 80 INJECTION SUBCUTANEOUS at 08:08

## 2017-10-07 RX ADMIN — PRAVASTATIN SODIUM 40 MG: 20 TABLET ORAL at 08:08

## 2017-10-07 RX ADMIN — TRAMADOL HYDROCHLORIDE 50 MG: 50 TABLET, FILM COATED ORAL at 21:18

## 2017-10-07 RX ADMIN — ENOXAPARIN SODIUM 70 MG: 80 INJECTION SUBCUTANEOUS at 21:17

## 2017-10-07 RX ADMIN — MEROPENEM 1 G: 1 INJECTION, POWDER, FOR SOLUTION INTRAVENOUS at 08:08

## 2017-10-07 RX ADMIN — AMIODARONE HYDROCHLORIDE 200 MG: 200 TABLET ORAL at 08:07

## 2017-10-07 RX ADMIN — MEROPENEM 1 G: 1 INJECTION, POWDER, FOR SOLUTION INTRAVENOUS at 00:01

## 2017-10-07 RX ADMIN — Medication 10 ML: at 08:08

## 2017-10-07 RX ADMIN — SODIUM CHLORIDE: 9 INJECTION, SOLUTION INTRAVENOUS at 10:45

## 2017-10-07 RX ADMIN — METOPROLOL TARTRATE 12.5 MG: 25 TABLET ORAL at 21:18

## 2017-10-07 ASSESSMENT — PAIN SCALES - GENERAL
PAINLEVEL_OUTOF10: 9

## 2017-10-07 NOTE — PROGRESS NOTES
[facility]  Daily Progress Note      Chief Complaint: f/u A. fib    Interval Hx: No chest pain except with inspiration, some shortness of air, palpitations    ROS:  The rest of the systems are negative except Interval Hx    Current Inpatient Medications:   enoxaparin  1 mg/kg Subcutaneous BID    metoprolol tartrate  12.5 mg Oral BID    meropenem  1 g Intravenous Q8H    amiodarone  200 mg Oral Daily    0.9 % sodium chloride  250 mL Intravenous Once    sodium chloride flush  10 mL Intravenous 2 times per day    traMADol  50 mg Oral BID    pravastatin  40 mg Oral Daily       IV Infusions (if any):       Physical Exam:   /68   Pulse 109   Temp 97.8 °F (36.6 °C) (Temporal)   Resp 20   Ht 5' 9\" (1.753 m)   Wt 159 lb 12.8 oz (72.5 kg)   SpO2 94%   BMI 23.60 kg/m²       Intake/Output Summary (Last 24 hours) at 10/07/17 1456  Last data filed at 10/07/17 1315   Gross per 24 hour   Intake              900 ml   Output                0 ml   Net              900 ml       Gen - NAD  Neck - Supple  ENMT - MMM, OP Clear  Cardio - No JVD     Irregularly irregular, no gallop, rub, murmur                No edema, 2+ radials  Resp - Normal effort, bilateral fine crackles  GI - soft, non-tender, no HSM  Psych - A+O x 3, normal affect     Diagnostics:      Recent Labs      10/04/17   2145  10/06/17   0737  10/07/17   0820   WBC  24.1*  21.9*  32.4*   HGB  9.1*  8.8*  9.4*   PLT  257  284  377      Recent Labs      10/06/17   0738  10/07/17   0820   NA  143  143   K  4.6  4.8   CL  106  108   CO2  28  26   BUN  17  15   CREATININE  0.9  0.9   LABGLOM  >60  >60   CALCIUM  7.8*  7.9*      No results for input(s): TROPONINI, PROBNP in the last 72 hours.      Tele - Rate controlledUncontrolled/ A. fib    Assessment:     69 y.o. female with A. fib, GI bleed, supratherapeutic INR, non-small cell lung cancer, hypotension    Plan:     Atrial fibrillation - Her rates were better controlled last night, today is

## 2017-10-07 NOTE — PROGRESS NOTES
blocker. I do not think warfarin should be restarted. She tells me that she was seen by her primary care provider and that an INR was drawn and her Lovenox was stopped. Not truly for sure why in the world her INR was greater than 18.8. Regardless, warfarin may not be the best option when the setting of this non-small cell lung cancer. She does need to have anticoagulation with the potential of a hypercoagulable state in the setting of atrial fibrillation. She may be a candidate NOAC pending insurance approval.  This may be ideal with her with the potential of bleeding dyscrasias with future treatment of her cancer. We're choosing a rate controlling strategy and hopefully she will cardiovert herself. DC cardioversion is not possible with her O2 need. We'll continue amiodarone as this will be the best antiarrhythmic in the setting of future radiation and chemotherapy treatment for her lung cancer. I discussed at length with the family the risks and benefits of using amiodarone as well as the alternatives. They are in agreement to continue with amiodarone treatment. Hypotension - continue to monitor, blood pressure has not been lower than the mid 80s. Non-small cell lung cancer - family was inquiring if the oncologist can be consulted to get the staging and treatment plan together.     Disposition - continue to monitor    Manan Guerrero MD  [id number] [facility]    10/6/2017 9:45 PM

## 2017-10-07 NOTE — PLAN OF CARE
Problem: Falls - Risk of  Goal: Absence of falls  Outcome: Ongoing    Problem: Bleeding:  Goal: Will show no signs and symptoms of excessive bleeding  Will show no signs and symptoms of excessive bleeding   Outcome: Ongoing    Problem: Infection - Risk of, Central Venous Catheter-Associated Bloodstream Infection  Goal: Absence of central venous catheter-associated bloodstream infection  Outcome: Ongoing    Problem: Pain:  Goal: Pain level will decrease  Pain level will decrease   Outcome: Ongoing  Goal: Control of acute pain  Control of acute pain   Outcome: Ongoing  Goal: Control of chronic pain  Control of chronic pain   Outcome: Ongoing    Problem: Risk for Impaired Skin Integrity  Goal: Tissue integrity - skin and mucous membranes  Structural intactness and normal physiological function of skin and  mucous membranes.    Outcome: Ongoing    Problem: Nutrition  Goal: Optimal nutrition therapy  Nutrition Problem: Inadequate oral intake  Intervention: Food and/or Nutrient Delivery: Continue current diet, Continue current ONS  Nutritional Goals: po intake 50% or greater   Outcome: Ongoing

## 2017-10-07 NOTE — PROGRESS NOTES
Critical Care Progress Note      Events of Last 24 hours: No acute events noted overnight. Chief Complaint:  Short of breath    Subjective:   77F with recent lung cancer diagnosis, atrial fibrillation uncontrolled, left pleural effusion, pneumonia, and other medical problems. Original admission for GI bleeding, had negative EGD and Colonoscopy, bleeding stopped and has restarted lovenox 1mg/kg. Has been on IV abx, had left thoracentesis, and managed on rate control strategy for afib. Has had relative hypotension, continued rapid afib, and continued shortness of breath. Leukocytosis unimproved despite abx, but no fevers and maybe non-infectious leukocytosis. Outpatient heme/onc consult scheduled for next Thursday. 10/7:  Still somewhat short of breath in the morning. HR around 120 afib. No chest pain. No nausea and appetite is good. Invasive Lines: peripheral IVs only     MV:  D0      MV Settings:     / / /FiO2 : 50 %  ABGs:   Lab Results   Component Value Date    PHART 7.410 08/28/2017    PO2ART 57.0 08/28/2017    ZUD2FEO 55.0 08/28/2017       IV:       Vitals:  /75   Pulse 119   Temp 97.9 °F (36.6 °C) (Temporal)   Resp 20   Ht 5' 9\" (1.753 m)   Wt 159 lb 12.8 oz (72.5 kg)   SpO2 95%   BMI 23.60 kg/m²      Intake/Output Summary (Last 24 hours) at 10/07/17 1835  Last data filed at 10/07/17 1530   Gross per 24 hour   Intake           1245.6 ml   Output                0 ml   Net           1245.6 ml       EXAM:  General appearance:no distress, appears stated age and cooperative.   CV: tachy, irregularly irregular, no murmur  Pulm: L base crackles, diminished left base, no wheezing, R lung clear  GI: soft, NT, ND  Ext: no edema'  Neuro:  No focal weakness, answers and follow commands easily      Medications:  Scheduled Meds:   enoxaparin  1 mg/kg Subcutaneous BID    metoprolol tartrate  12.5 mg Oral BID    meropenem  1 g Intravenous Q8H    amiodarone  200 mg Oral Daily    0.9 % sodium chloride  250 mL Intravenous Once    sodium chloride flush  10 mL Intravenous 2 times per day    traMADol  50 mg Oral BID    pravastatin  40 mg Oral Daily       PRN Meds:  sodium chloride flush, acetaminophen, magnesium hydroxide, ondansetron    Results:  CBC:   Recent Labs      10/04/17   2145  10/06/17   0737  10/07/17   0820   WBC  24.1*  21.9*  32.4*   HGB  9.1*  8.8*  9.4*   HCT  28.8*  28.1*  30.8*   MCV  100.0*  100.7*  103.4*   PLT  257  284  377     BMP:   Recent Labs      10/06/17   0738  10/07/17   0820   NA  143  143   K  4.6  4.8   CL  106  108   CO2  28  26   BUN  17  15   CREATININE  0.9  0.9     PT/INR:   Recent Labs      10/05/17   0630   PROTIME  14.3   INR  1.12       Cultures:  None    Films:  XR Chest Portable [[id number]] Resulted: 09/29/17 1704      Order Status: Completed Updated: 09/29/17 1606     Narrative:       XR CHEST PORTABLE   9/29/2017 4:03 PM  History: Leukocytosis. Portable chest x-ray compared with 09/08/2017. Increased dense consolidation of left lower lobe compatible with  pneumonia. Left PICC line now in good position. Underlying chronic lung changes. No pneumothorax.     Impression:       1. Extensive left lower lobe pneumonia is increased as compared with 3  weeks ago. CT Chest:  Impression:        1. Superimposed pneumonia surrounding a biopsy-proven lung carcinoma  in the left lingula. 2. Consolidation in the right lower lobe may also represent pneumonia  or atelectasis. 3. Moderate-sized left pleural effusion. 4. Metastatic lymphadenopathy in the mediastinum. 5. Lucent lesion in the L3 vertebral body is partially visualized and  is concerning for metastatic disease.        Assessment:  Active Hospital Problems    Diagnosis Date Noted    Non-small cell lung cancer ([facility] [address] [C34.90]      Priority: High    Warfarin toxicity [T45.511A]      Priority: High    Diastolic heart failure, stage C (HCC) [I50.30]      Priority: High    Polyp of descending colon [D12.4]

## 2017-10-08 LAB
ANION GAP SERPL CALCULATED.3IONS-SCNC: 8 MMOL/L (ref 7–19)
ANISOCYTOSIS: ABNORMAL
BANDED NEUTROPHILS RELATIVE PERCENT: 20 % (ref 0–5)
BASOPHILS ABSOLUTE: 0.2 K/UL (ref 0–0.2)
BASOPHILS MANUAL: 1 %
BASOPHILS RELATIVE PERCENT: 1 % (ref 0–1)
BUN BLDV-MCNC: 13 MG/DL (ref 8–23)
CALCIUM SERPL-MCNC: 7.8 MG/DL (ref 8.8–10.2)
CHLORIDE BLD-SCNC: 108 MMOL/L (ref 98–111)
CO2: 27 MMOL/L (ref 22–29)
CREAT SERPL-MCNC: 0.9 MG/DL (ref 0.5–0.9)
EOSINOPHILS ABSOLUTE: 1.39 K/UL (ref 0–0.6)
EOSINOPHILS RELATIVE PERCENT: 6 % (ref 0–5)
GFR NON-AFRICAN AMERICAN: >60
GLUCOSE BLD-MCNC: 96 MG/DL (ref 74–109)
HCT VFR BLD CALC: 28.6 % (ref 37–47)
HEMOGLOBIN: 8.7 G/DL (ref 12–16)
LYMPHOCYTES ABSOLUTE: 1.2 K/UL (ref 1.1–4.5)
LYMPHOCYTES RELATIVE PERCENT: 5 % (ref 20–40)
MACROCYTES: ABNORMAL
MCH RBC QN AUTO: 30.7 PG (ref 27–31)
MCHC RBC AUTO-ENTMCNC: 30.4 G/DL (ref 33–37)
MCV RBC AUTO: 101.1 FL (ref 81–99)
MONOCYTES ABSOLUTE: 1.8 K/UL (ref 0–0.9)
MONOCYTES RELATIVE PERCENT: 8 % (ref 0–10)
NEUTROPHILS ABSOLUTE: 18.5 K/UL (ref 1.5–7.5)
NEUTROPHILS MANUAL: 60 %
NEUTROPHILS RELATIVE PERCENT: 60 % (ref 50–65)
PDW BLD-RTO: 18.7 % (ref 11.5–14.5)
PLATELET # BLD: 335 K/UL (ref 130–400)
PLATELET SLIDE REVIEW: ADEQUATE
PMV BLD AUTO: 9.3 FL (ref 9.4–12.3)
POLYCHROMASIA: ABNORMAL
POTASSIUM SERPL-SCNC: 4.6 MMOL/L (ref 3.5–5)
RBC # BLD: 2.83 M/UL (ref 4.2–5.4)
SODIUM BLD-SCNC: 143 MMOL/L (ref 136–145)
WBC # BLD: 23.1 K/UL (ref 4.8–10.8)

## 2017-10-08 PROCEDURE — 99231 SBSQ HOSP IP/OBS SF/LOW 25: CPT | Performed by: INTERNAL MEDICINE

## 2017-10-08 PROCEDURE — 80048 BASIC METABOLIC PNL TOTAL CA: CPT

## 2017-10-08 PROCEDURE — 6360000002 HC RX W HCPCS: Performed by: INTERNAL MEDICINE

## 2017-10-08 PROCEDURE — 2700000000 HC OXYGEN THERAPY PER DAY

## 2017-10-08 PROCEDURE — 94640 AIRWAY INHALATION TREATMENT: CPT

## 2017-10-08 PROCEDURE — 6370000000 HC RX 637 (ALT 250 FOR IP): Performed by: INTERNAL MEDICINE

## 2017-10-08 PROCEDURE — 1210000000 HC MED SURG R&B

## 2017-10-08 PROCEDURE — 99233 SBSQ HOSP IP/OBS HIGH 50: CPT | Performed by: HOSPITALIST

## 2017-10-08 PROCEDURE — 6370000000 HC RX 637 (ALT 250 FOR IP): Performed by: HOSPITALIST

## 2017-10-08 PROCEDURE — 2580000003 HC RX 258: Performed by: INTERNAL MEDICINE

## 2017-10-08 PROCEDURE — 85025 COMPLETE CBC W/AUTO DIFF WBC: CPT

## 2017-10-08 RX ORDER — IPRATROPIUM BROMIDE AND ALBUTEROL SULFATE 2.5; .5 MG/3ML; MG/3ML
1 SOLUTION RESPIRATORY (INHALATION) EVERY 4 HOURS
Status: DISCONTINUED | OUTPATIENT
Start: 2017-10-08 | End: 2017-10-09

## 2017-10-08 RX ADMIN — IPRATROPIUM BROMIDE AND ALBUTEROL SULFATE: .5; 3 SOLUTION RESPIRATORY (INHALATION) at 18:58

## 2017-10-08 RX ADMIN — METOPROLOL TARTRATE 25 MG: 25 TABLET ORAL at 20:29

## 2017-10-08 RX ADMIN — TRAMADOL HYDROCHLORIDE 50 MG: 50 TABLET, FILM COATED ORAL at 09:14

## 2017-10-08 RX ADMIN — ENOXAPARIN SODIUM 70 MG: 80 INJECTION SUBCUTANEOUS at 09:14

## 2017-10-08 RX ADMIN — Medication 10 ML: at 20:30

## 2017-10-08 RX ADMIN — METOPROLOL TARTRATE 12.5 MG: 25 TABLET ORAL at 09:14

## 2017-10-08 RX ADMIN — ENOXAPARIN SODIUM 70 MG: 80 INJECTION SUBCUTANEOUS at 20:28

## 2017-10-08 RX ADMIN — IPRATROPIUM BROMIDE AND ALBUTEROL SULFATE 1 AMPULE: .5; 3 SOLUTION RESPIRATORY (INHALATION) at 23:15

## 2017-10-08 RX ADMIN — AMIODARONE HYDROCHLORIDE 200 MG: 200 TABLET ORAL at 09:13

## 2017-10-08 RX ADMIN — MEROPENEM 1 G: 1 INJECTION, POWDER, FOR SOLUTION INTRAVENOUS at 17:39

## 2017-10-08 RX ADMIN — TRAMADOL HYDROCHLORIDE 50 MG: 50 TABLET, FILM COATED ORAL at 20:30

## 2017-10-08 RX ADMIN — PRAVASTATIN SODIUM 40 MG: 20 TABLET ORAL at 09:13

## 2017-10-08 RX ADMIN — MEROPENEM 1 G: 1 INJECTION, POWDER, FOR SOLUTION INTRAVENOUS at 09:14

## 2017-10-08 RX ADMIN — IPRATROPIUM BROMIDE AND ALBUTEROL SULFATE 1 AMPULE: .5; 3 SOLUTION RESPIRATORY (INHALATION) at 10:29

## 2017-10-08 RX ADMIN — Medication 10 ML: at 09:14

## 2017-10-08 RX ADMIN — IPRATROPIUM BROMIDE AND ALBUTEROL SULFATE 1 AMPULE: .5; 3 SOLUTION RESPIRATORY (INHALATION) at 15:12

## 2017-10-08 RX ADMIN — MEROPENEM 1 G: 1 INJECTION, POWDER, FOR SOLUTION INTRAVENOUS at 01:23

## 2017-10-08 ASSESSMENT — ENCOUNTER SYMPTOMS
HEMOPTYSIS: 0
VOMITING: 0
COUGH: 1
SHORTNESS OF BREATH: 1
NAUSEA: 0
ORTHOPNEA: 0
SPUTUM PRODUCTION: 0
BLURRED VISION: 0
HEARTBURN: 0
SINUS PAIN: 0
BLOOD IN STOOL: 0
SORE THROAT: 0
ABDOMINAL PAIN: 0
DOUBLE VISION: 0

## 2017-10-08 ASSESSMENT — PAIN SCALES - GENERAL
PAINLEVEL_OUTOF10: 0
PAINLEVEL_OUTOF10: 9
PAINLEVEL_OUTOF10: 9

## 2017-10-08 NOTE — PROGRESS NOTES
5935  Last data filed at 10/08/17 2733   Gross per 24 hour   Intake           1885.6 ml   Output                0 ml   Net           1885.6 ml     DIET DENTAL SOFT; Low Sodium (2 GM)  Last BM 10/7 x3  Physical Exam      Labs:     Recent Labs      10/06/17   0737  10/07/17   0820  10/08/17   0620   WBC  21.9*  32.4*  23.1*   RBC  2.79*  2.98*  2.83*   HGB  8.8*  9.4*  8.7*   HCT  28.1*  30.8*  28.6*   MCV  100.7*  103.4*  101.1*   MCH  31.5*  31.5*  30.7   MCHC  31.3*  30.5*  30.4*   PLT  284  377  335     Recent Labs      10/06/17   0738  10/07/17   0820  10/08/17   0620   NA  143  143  143   K  4.6  4.8  4.6   ANIONGAP  9  9  8   CL  106  108  108   CO2  28  26  27   BUN  17  15  13   CREATININE  0.9  0.9  0.9   GLUCOSE  89  90  96   CALCIUM  7.8*  7.9*  7.8*     No results for input(s): MG, PHOS in the last 72 hours. No results for input(s): AST, ALT, ALB, BILITOT, ALKPHOS, ALB in the last 72 hours. @LABRCNT (ABG:3)@  HgBA1c:  No components found for: HGBA1C  FLP:  Lab Results   Component Value Date    TRIG 123 09/03/2017    HDL 43 09/03/2017    LDLCALC 86 09/03/2017     TSH:    Lab Results   Component Value Date    TSH 1.240 08/26/2017     Troponin T: No results for input(s): TROPONINI in the last 72 hours. INR: No results for input(s): INR in the last 72 hours. RAD:   Impression   Impression:   Residual left pleural effusion with questionable slight increase in   volume following yesterday's thoracentesis. No pneumothorax.    Signed by Dr Omer Leslie on 10/6/2017 9:51 AM           EKG: / telemetry:  afib 123  Echo:   Summary   Mitral valve leaflets are mildly thickened with preserved leaflet   mobility.   Mild-moderate mitral regurgitation noted.   Normal left ventricular size with preserved LV function and an estimated   ejection fraction of approximately 55%.   No regional wall motion abnormalities identified.   Normal left ventricular wall thickness.   Normal diastolic function.   No evidence of left ventricular mass or thrombus noted. Micro:   Component Results     Component Collected Lab   Body Fluid Culture, Sterile 10/05/2017 10:15 AM [facility]   No growth to date    Gram Stain Result 10/05/2017 10:15 AM [facility]   Few WBC's (Polymorphonuclear)   No organisms seen     Anaerobic Culture 10/05/2017 10:15 AM [facility]   No anaerobes to date,will hold 4 days          Impression / Plan:         Principal Problem:    Gastrointestinal hemorrhage with melena--resolved, no source identified  Active Problems:    Essential hypertension--now borderline hypotension    Non-small cell carcinoma--heme on visit next week    Diastolic heart failure, stage C--    Paroxysmal atrial fibrillation-- with RVR--will leave medications to cardiology    Hypercoagulable state--lovenox,NOAC if insurance will approve    Acute blood loss anemia--stable    Warfarin-induced coagulopathy--reversed / resolved    Gastroesophageal reflux disease with esophagitis    HCAP--on merrem, vanco stopped    Polyp of descending colon    Diverticulosis of large intestine without hemorrhage     PT/OT consults. I suspect she has atelectasis. IS instruction. OOB to chair with all meals.     Advance Directive: Full Code    DVT prophylaxis: lovenox  GI: none  Antibiotics: merrem  Discharge planning: TBD      Toño Bowman DO  Internal Medicine Hospitalist

## 2017-10-08 NOTE — PROGRESS NOTES
fibrillation - Rates in the 110s, will increase metoprolol 25 twice a day    Shortness of air - I'm not certain if this is related to A. fib or worsening pleural effusion, monitor    Hypotension -blood pressure improved, monitor with increase of metoprolol    Disposition - continue to monitor    Fortunato Narayan MD  [facility] [place]    10/8/2017 6:59 PM

## 2017-10-08 NOTE — PROGRESS NOTES
12 hr chart check complete. Pt resting in bed, no s/s of acute distress noted. Educated pt on use of incentive spirometer. Pt verbalized understanding. Fall precautions in place. Will continue to monitor.  Electronically signed by Flakito Hays RN on 10/8/2017 at 4:10 PM

## 2017-10-08 NOTE — PLAN OF CARE
Problem: Falls - Risk of  Intervention: Fall risk assessment  Pt is to call for assistance. Intervention: Postfall assessment  None. Intervention: Fall precautions  Pt is to call for assistance. Intervention: Toileting assistance  Pt is to call for assistance. Goal: Absence of falls  Outcome: Ongoing      Problem: Bleeding:  Intervention: Assess signs and symptoms of excessive bleeding  None noted; will continue to monitor. Intervention: Assess signs and symptoms of impaired coagulation  None noted; will continue to monitor. Intervention: Bleeding precautions  Ongoing; continue to monitor. Intervention: Manage a safe environment  Safe environment noted. Goal: Will show no signs and symptoms of excessive bleeding  Will show no signs and symptoms of excessive bleeding   Outcome: Ongoing      Problem: Infection - Risk of, Central Venous Catheter-Associated Bloodstream Infection  Goal: Absence of central venous catheter-associated bloodstream infection  Outcome: Ongoing    Intervention: [facility]  picc intact    Intervention: Monitor for signs and symptoms of infection  None noted. Intervention: Manage dressing change  Dressing intact. Problem: Pain:  Intervention: Opioid analgesia side-effects  Ongoing. Intervention: Assess barriers to pain control  Ongoing. Intervention: Promote participation in pain management plan  Ongoing. Goal: Pain level will decrease  Pain level will decrease   Outcome: Ongoing    Goal: Control of acute pain  Control of acute pain   Outcome: Ongoing    Goal: Control of chronic pain  Control of chronic pain   Outcome: Ongoing      Problem: Risk for Impaired Skin Integrity  Goal: Tissue integrity - skin and mucous membranes  Structural intactness and normal physiological function of skin and  mucous membranes. Outcome: Ongoing    Intervention: SKIN ASSESSMENT  See assessment.   Intervention: PRESSURE ULCER PREVENTION  Pt is able to turn

## 2017-10-09 ENCOUNTER — APPOINTMENT (OUTPATIENT)
Dept: GENERAL RADIOLOGY | Age: 77
DRG: 813 | End: 2017-10-09
Payer: MEDICARE

## 2017-10-09 LAB
ANAEROBIC CULTURE: NORMAL
ANION GAP SERPL CALCULATED.3IONS-SCNC: 10 MMOL/L (ref 7–19)
BASOPHILS ABSOLUTE: 0.2 K/UL (ref 0–0.2)
BASOPHILS RELATIVE PERCENT: 0.7 % (ref 0–1)
BODY FLUID CULTURE, STERILE: NORMAL
BUN BLDV-MCNC: 15 MG/DL (ref 8–23)
CALCIUM SERPL-MCNC: 8.5 MG/DL (ref 8.8–10.2)
CHLORIDE BLD-SCNC: 105 MMOL/L (ref 98–111)
CO2: 26 MMOL/L (ref 22–29)
CREAT SERPL-MCNC: 0.9 MG/DL (ref 0.5–0.9)
EOSINOPHILS ABSOLUTE: 1.7 K/UL (ref 0–0.6)
EOSINOPHILS RELATIVE PERCENT: 7.2 % (ref 0–5)
GFR NON-AFRICAN AMERICAN: >60
GLUCOSE BLD-MCNC: 84 MG/DL (ref 74–109)
GRAM STAIN RESULT: NORMAL
HCT VFR BLD CALC: 28.8 % (ref 37–47)
HEMOGLOBIN: 8.8 G/DL (ref 12–16)
LYMPHOCYTES ABSOLUTE: 2.6 K/UL (ref 1.1–4.5)
LYMPHOCYTES RELATIVE PERCENT: 11.2 % (ref 20–40)
MCH RBC QN AUTO: 31.3 PG (ref 27–31)
MCHC RBC AUTO-ENTMCNC: 30.6 G/DL (ref 33–37)
MCV RBC AUTO: 102.5 FL (ref 81–99)
MONOCYTES ABSOLUTE: 2.2 K/UL (ref 0–0.9)
MONOCYTES RELATIVE PERCENT: 9.4 % (ref 0–10)
NEUTROPHILS ABSOLUTE: 16.2 K/UL (ref 1.5–7.5)
NEUTROPHILS RELATIVE PERCENT: 70.6 % (ref 50–65)
PDW BLD-RTO: 18.6 % (ref 11.5–14.5)
PLATELET # BLD: 386 K/UL (ref 130–400)
PMV BLD AUTO: 10 FL (ref 9.4–12.3)
POTASSIUM SERPL-SCNC: 4.3 MMOL/L (ref 3.5–5)
RBC # BLD: 2.81 M/UL (ref 4.2–5.4)
SODIUM BLD-SCNC: 141 MMOL/L (ref 136–145)
WBC # BLD: 23 K/UL (ref 4.8–10.8)

## 2017-10-09 PROCEDURE — 6370000000 HC RX 637 (ALT 250 FOR IP): Performed by: INTERNAL MEDICINE

## 2017-10-09 PROCEDURE — 36592 COLLECT BLOOD FROM PICC: CPT

## 2017-10-09 PROCEDURE — 2140000000 HC CCU INTERMEDIATE R&B

## 2017-10-09 PROCEDURE — 80048 BASIC METABOLIC PNL TOTAL CA: CPT

## 2017-10-09 PROCEDURE — 6360000002 HC RX W HCPCS: Performed by: INTERNAL MEDICINE

## 2017-10-09 PROCEDURE — 99231 SBSQ HOSP IP/OBS SF/LOW 25: CPT | Performed by: INTERNAL MEDICINE

## 2017-10-09 PROCEDURE — 94640 AIRWAY INHALATION TREATMENT: CPT

## 2017-10-09 PROCEDURE — 2580000003 HC RX 258: Performed by: INTERNAL MEDICINE

## 2017-10-09 PROCEDURE — 71020 XR CHEST STANDARD TWO VW: CPT

## 2017-10-09 PROCEDURE — 97161 PT EVAL LOW COMPLEX 20 MIN: CPT

## 2017-10-09 PROCEDURE — 36591 DRAW BLOOD OFF VENOUS DEVICE: CPT

## 2017-10-09 PROCEDURE — 2700000000 HC OXYGEN THERAPY PER DAY

## 2017-10-09 PROCEDURE — G8979 MOBILITY GOAL STATUS: HCPCS

## 2017-10-09 PROCEDURE — G8978 MOBILITY CURRENT STATUS: HCPCS

## 2017-10-09 PROCEDURE — 6370000000 HC RX 637 (ALT 250 FOR IP): Performed by: HOSPITALIST

## 2017-10-09 PROCEDURE — 85025 COMPLETE CBC W/AUTO DIFF WBC: CPT

## 2017-10-09 RX ORDER — DIGOXIN 0.25 MG/ML
250 INJECTION INTRAMUSCULAR; INTRAVENOUS ONCE
Status: COMPLETED | OUTPATIENT
Start: 2017-10-09 | End: 2017-10-09

## 2017-10-09 RX ORDER — IPRATROPIUM BROMIDE AND ALBUTEROL SULFATE 2.5; .5 MG/3ML; MG/3ML
1 SOLUTION RESPIRATORY (INHALATION) EVERY 4 HOURS
Status: DISCONTINUED | OUTPATIENT
Start: 2017-10-09 | End: 2017-10-13 | Stop reason: HOSPADM

## 2017-10-09 RX ADMIN — TRAMADOL HYDROCHLORIDE 50 MG: 50 TABLET, FILM COATED ORAL at 08:20

## 2017-10-09 RX ADMIN — IPRATROPIUM BROMIDE AND ALBUTEROL SULFATE 1 AMPULE: .5; 3 SOLUTION RESPIRATORY (INHALATION) at 23:51

## 2017-10-09 RX ADMIN — METOPROLOL TARTRATE 25 MG: 25 TABLET ORAL at 20:54

## 2017-10-09 RX ADMIN — AMIODARONE HYDROCHLORIDE 200 MG: 200 TABLET ORAL at 08:20

## 2017-10-09 RX ADMIN — Medication 10 ML: at 08:21

## 2017-10-09 RX ADMIN — MEROPENEM 1 G: 1 INJECTION, POWDER, FOR SOLUTION INTRAVENOUS at 08:21

## 2017-10-09 RX ADMIN — METOPROLOL TARTRATE 25 MG: 25 TABLET ORAL at 08:20

## 2017-10-09 RX ADMIN — ENOXAPARIN SODIUM 70 MG: 80 INJECTION SUBCUTANEOUS at 08:20

## 2017-10-09 RX ADMIN — IPRATROPIUM BROMIDE AND ALBUTEROL SULFATE 1 AMPULE: .5; 3 SOLUTION RESPIRATORY (INHALATION) at 02:42

## 2017-10-09 RX ADMIN — PRAVASTATIN SODIUM 40 MG: 20 TABLET ORAL at 08:20

## 2017-10-09 RX ADMIN — MEROPENEM 1 G: 1 INJECTION, POWDER, FOR SOLUTION INTRAVENOUS at 00:29

## 2017-10-09 RX ADMIN — ENOXAPARIN SODIUM 70 MG: 80 INJECTION SUBCUTANEOUS at 20:53

## 2017-10-09 RX ADMIN — DIGOXIN 250 MCG: 250 INJECTION, SOLUTION INTRAMUSCULAR; INTRAVENOUS; PARENTERAL at 20:53

## 2017-10-09 RX ADMIN — IPRATROPIUM BROMIDE AND ALBUTEROL SULFATE 1 AMPULE: .5; 3 SOLUTION RESPIRATORY (INHALATION) at 10:30

## 2017-10-09 RX ADMIN — Medication 10 ML: at 20:54

## 2017-10-09 RX ADMIN — MEROPENEM 1 G: 1 INJECTION, POWDER, FOR SOLUTION INTRAVENOUS at 16:39

## 2017-10-09 RX ADMIN — IPRATROPIUM BROMIDE AND ALBUTEROL SULFATE 1 AMPULE: .5; 3 SOLUTION RESPIRATORY (INHALATION) at 14:09

## 2017-10-09 RX ADMIN — IPRATROPIUM BROMIDE AND ALBUTEROL SULFATE 1 AMPULE: .5; 3 SOLUTION RESPIRATORY (INHALATION) at 18:44

## 2017-10-09 RX ADMIN — TRAMADOL HYDROCHLORIDE 50 MG: 50 TABLET, FILM COATED ORAL at 20:54

## 2017-10-09 RX ADMIN — IPRATROPIUM BROMIDE AND ALBUTEROL SULFATE 1 AMPULE: .5; 3 SOLUTION RESPIRATORY (INHALATION) at 06:15

## 2017-10-09 ASSESSMENT — ENCOUNTER SYMPTOMS
SINUS PAIN: 0
VOMITING: 0
DOUBLE VISION: 0
SHORTNESS OF BREATH: 1
HEMOPTYSIS: 0
BLURRED VISION: 0
NAUSEA: 0
COUGH: 1
BACK PAIN: 0
ORTHOPNEA: 0
SPUTUM PRODUCTION: 0
PHOTOPHOBIA: 0
WHEEZING: 0
HEARTBURN: 0
ORTHOPNEA: 1
SORE THROAT: 0
ABDOMINAL PAIN: 0
BLOOD IN STOOL: 0

## 2017-10-09 ASSESSMENT — PAIN SCALES - GENERAL
PAINLEVEL_OUTOF10: 7
PAINLEVEL_OUTOF10: 3
PAINLEVEL_OUTOF10: 9

## 2017-10-09 NOTE — PROGRESS NOTES
Hospitalist Progress Note  10/9/2017 12:13 PM  Subjective:   Admit Date: 9/28/2017  PCP: Krys Strauss  [id number]      Chief Complaint: Im short winded    Subjective / History of present illness:  Still is very short of breath. Is getting winded with minimal activity. Lovenox was resumed yesterday, no bleeding. Highest  in the past 24 hours. No bleeding, 3 stools yesterday. Cumulative hospital history:   72F with recent lung cancer diagnosis, atrial fibrillation uncontrolled, left pleural effusion, pneumonia, and other medical problems. Original admission for GI bleeding, had negative EGD and Colonoscopy, bleeding stopped and has restarted lovenox 1mg/kg. Has been on IV abx, had left thoracentesis, and managed on rate control strategy for afib. Has had relative hypotension, continued rapid afib, and continued shortness of breath. Leukocytosis unimproved despite abx, but no fevers and maybe non-infectious leukocytosis. Outpatient heme/onc consult scheduled for next Thursday. ROS:  Review of Systems   Constitutional: Positive for malaise/fatigue. Negative for chills, fever and weight loss. HENT: Positive for congestion. Negative for hearing loss, nosebleeds, sinus pain and sore throat. Eyes: Negative for blurred vision and double vision. Respiratory: Positive for cough and shortness of breath. Negative for hemoptysis and sputum production. Cardiovascular: Negative for chest pain, palpitations, orthopnea and leg swelling. Gastrointestinal: Negative for abdominal pain, blood in stool, heartburn, nausea and vomiting. Genitourinary: Negative for dysuria, frequency and urgency. Musculoskeletal: Positive for joint pain and myalgias. Skin: Negative for itching and rash. Neurological: Positive for weakness. Negative for dizziness, focal weakness, seizures and headaches. Endo/Heme/Allergies: Bruises/bleeds easily. Psychiatric/Behavioral: Negative for depression.  The patient is nervous/anxious. The patient does not have insomnia. 14 point review of systems is negative except as specifically addressed above.     Medications:   Current Facility-Administered Medications   Medication Dose Route Frequency Provider Last Rate Last Dose    ipratropium-albuterol (DUONEB) nebulizer solution 1 ampule  1 ampule Inhalation Q4H Ramu Cruz MD   1 ampule at 10/09/17 1030    metoprolol tartrate (LOPRESSOR) tablet 25 mg  25 mg Oral BID Faheem Wiley MD   25 mg at 10/09/17 0820    enoxaparin (LOVENOX) injection 70 mg  1 mg/kg Subcutaneous BID Edrick Bernheim, MD   70 mg at 10/09/17 0820    meropenem (MERREM) 1 g in sodium chloride 0.9 % 100 mL IVPB (mini-bag)  1 g Intravenous Q8H Bubba D Emile, DO   Stopped at 10/09/17 0851    amiodarone (CORDARONE) tablet 200 mg  200 mg Oral Daily Edrick Bernheim, MD   200 mg at 10/09/17 0820    0.9 % sodium chloride infusion 250 mL  250 mL Intravenous Once Bubba D Emile, DO        sodium chloride flush 0.9 % injection 10 mL  10 mL Intravenous 2 times per day Bubba D Emile, DO   10 mL at 10/09/17 0821    sodium chloride flush 0.9 % injection 10 mL  10 mL Intravenous PRN Bubba D Emile, DO   10 mL at 10/02/17 0454    traMADol (ULTRAM) tablet 50 mg  50 mg Oral BID Bubba D Emile, DO   50 mg at 10/09/17 0820    pravastatin (PRAVACHOL) tablet 40 mg  40 mg Oral Daily Bubba D Emile, DO   40 mg at 10/09/17 0820    acetaminophen (TYLENOL) tablet 650 mg  650 mg Oral Q4H PRN Bubba D Emile, DO        magnesium hydroxide (MILK OF MAGNESIA) 400 MG/5ML suspension 30 mL  30 mL Oral Daily PRN Bubba D Emile, DO        ondansetron (ZOFRAN) injection 4 mg  4 mg Intravenous Q6H PRN Bubba D Emile, DO   4 mg at 10/04/17 1450        Objective:   Vitals: /66   Pulse 137   Temp 97.6 °F (36.4 °C)   Resp 20   Ht 5' 9\" (1.753 m)   Wt 166 lb 9 oz (75.6 kg)   SpO2 91%   BMI 24.60 kg/m²    24HR INTAKE/OUTPUT:      Intake/Output Summary (Last 24 hours) at 10/09/17 1213  Last data filed at 10/09/17 0854   Gross per 24 hour   Intake              580 ml   Output                0 ml   Net              580 ml     DIET DENTAL SOFT; Low Sodium (2 GM)  Last BM 10/7 x3  Physical Exam      Labs:     Recent Labs      10/07/17   0820  10/08/17   0620  10/09/17   0435   WBC  32.4*  23.1*  23.0*   RBC  2.98*  2.83*  2.81*   HGB  9.4*  8.7*  8.8*   HCT  30.8*  28.6*  28.8*   MCV  103.4*  101.1*  102.5*   MCH  31.5*  30.7  31.3*   MCHC  30.5*  30.4*  30.6*   PLT  377  335  386     Recent Labs      10/07/17   0820  10/08/17   0620  10/09/17   0435   NA  143  143  141   K  4.8  4.6  4.3   ANIONGAP  9  8  10   CL  108  108  105   CO2  26  27  26   BUN  15  13  15   CREATININE  0.9  0.9  0.9   GLUCOSE  90  96  84   CALCIUM  7.9*  7.8*  8.5*     No results for input(s): MG, PHOS in the last 72 hours. No results for input(s): AST, ALT, ALB, BILITOT, ALKPHOS, ALB in the last 72 hours. @LABRCNT (ABG:3)@  HgBA1c:  No components found for: HGBA1C  FLP:    Lab Results   Component Value Date    TRIG 123 09/03/2017    HDL 43 09/03/2017    LDLCALC 86 09/03/2017     TSH:    Lab Results   Component Value Date    TSH 1.240 08/26/2017     Troponin T: No results for input(s): TROPONINI in the last 72 hours. INR: No results for input(s): INR in the last 72 hours. RAD:   Impression   Impression:   Residual left pleural effusion with questionable slight increase in   volume following yesterday's thoracentesis. No pneumothorax.    Signed by Dr Carlos Jerome on 10/6/2017 9:51 AM           EKG: / telemetry:  afib 123  Echo:   Summary   Mitral valve leaflets are mildly thickened with preserved leaflet   mobility.   Mild-moderate mitral regurgitation noted.   Normal left ventricular size with preserved LV function and an estimated   ejection fraction of approximately 55%.   No regional wall motion abnormalities identified.   Normal left ventricular wall thickness.   Normal diastolic function.   No evidence of left ventricular mass

## 2017-10-09 NOTE — PROGRESS NOTES
Palliative Care  made a follow up visit to offer support to Patient. Patient reports that she is feeling much better than when she first arrived, but is still uncertain about what Doctors are going to do with her at this point. Patient reports that she is trying to keep her spirits up, but it is difficult when there is nothing that you can do but lay in bed. Patient reminds this  that she was very active prior to becoming sick and coming to the hospital.  Palliative care will continue to follow up.       Electronically signed by Debi Miranda on 10/9/2017 at 1:39 PM

## 2017-10-09 NOTE — PROGRESS NOTES
Bedside report rec'd from off going nurse. Introduced self to pt, safety check completed. Pt resting in bed, denies any needs at this time. Assumed care. 1300- Spoke with Dr. Eamon Serra about patients increased HR. Would like HR to stay under 120 and order digoxin 250mcg IV push x 1 per telephone order and will be here to see patient later today. No s/s of distress noted with pt at this time. 1340-Not able to give digoxin on floor. Need to transfer to PCU for higher level of care. Page placed to Dr. Eamon Serra. 1400-Repeat page into Dr. Eamon Serra. Monitoring HR continuously, has stayed consistantly under 120 at this time. Will continue to monitor. 1650-Repeat page into Dr. Eamon Serra. -115 Afib. No n/s of distress noted. Will contiue to monitor. 1800-DrTeddy Cords at bedside. Transfer patient to 7th floor for IV digoxin push. Wants hr to stay 120 and below. Will continue to monitor.

## 2017-10-09 NOTE — PROGRESS NOTES
Follow up visit:  Pt is awake and resting in bed. She is tearful today. Asked pt what she was tearful about. Pt tells  Me that she feels like she doesn't know what is going on with her care. This nurse asked pt to tell me what she did not understand. Per pt, she states, I want to know what is going on. Someone came in told me they were going to have to drain the fluid from my lungs again. She also says that she feels respiratory tx have helped her more than anything. She also states \"I want to know if what they are doing is really going to help\". I did speak with pt nurse, Naun Jeffries and charge nurse, Wilma Engle. They both tell me there are not any new orders as of yet today other than a repeat x ray from yesterday. And also, pt is being non compliant with her diet. She is on special diet. Family has brought in outside food and pt is eating this along with added salt. Pt nurse has spoke with her about this as well. PC nurse did sit down with pt and explain to her the importance of following the diet ordered for her as well as leaving her O2 on. Explained to her that high salt foods cause people to retain fluids. I also explained to her that if new orders are put on for her that her nurse would let her know and explain to her what they are (which is what her nurse has done previously). Nurse also tells me she can explain something to pt but later pt doesn't remember what she has told her. PC will follow for support and POC.     Electronically signed by Mandy Harden RN on 10/9/2017 at 2:16 PM

## 2017-10-09 NOTE — CONSULTS
 MASTECTOMY, PARTIAL      KS COLSC FLX W/REMOVAL LESION BY HOT BX FORCEPS N/A 10/2/2017    Dr Luiza Samuels prep, diverticulosis-Tubular AP (-) dysplasia--1 yr recall    UPPER GASTROINTESTINAL ENDOSCOPY N/A 9/29/2017    Dr Navin Castillo     Allergies  Allergies   Allergen Reactions    Aspirin     Morphine     Penicillins      Medications    ipratropium-albuterol 1 ampule Inhalation Q4H   digoxin 250 mcg Intravenous Once   metoprolol tartrate 25 mg Oral BID   enoxaparin 1 mg/kg Subcutaneous BID   meropenem 1 g Intravenous Q8H   amiodarone 200 mg Oral Daily   0.9 % sodium chloride 250 mL Intravenous Once   sodium chloride flush 10 mL Intravenous 2 times per day   traMADol 50 mg Oral BID   pravastatin 40 mg Oral Daily        Social History   reports that she has quit smoking. She does not have any smokeless tobacco history on file. She reports that she does not drink alcohol or use drugs. Family History  family history includes Brain Cancer in an other family member; Jareth Nadeem in an other family member; Other in her father; Vassie Cast in an other family member. Review of Systems:  Review of Systems   Constitutional: Positive for malaise/fatigue. Negative for chills and fever. HENT: Negative for congestion, ear discharge, hearing loss, nosebleeds and sore throat. Eyes: Negative for double vision and photophobia. Respiratory: Positive for cough and shortness of breath. Negative for sputum production and wheezing. Cardiovascular: Positive for chest pain and orthopnea. Negative for leg swelling. Gastrointestinal: Negative for heartburn, nausea and vomiting. Genitourinary: Negative for dysuria, frequency and urgency. Musculoskeletal: Negative for back pain, myalgias and neck pain. Skin: Negative for itching and rash. Neurological: Positive for weakness. Negative for dizziness, sensory change, focal weakness and loss of consciousness.    Endo/Heme/Allergies: Negative for environmental allergies and polydipsia. Does not bruise/bleed easily. Psychiatric/Behavioral: Negative for depression and hallucinations. The patient is not nervous/anxious. All other systems reviewed and are negative. Physical Exam:   height is 5' 9\" (1.753 m) and weight is 166 lb 9 oz (75.6 kg). Her temperature is 97.6 °F (36.4 °C). Her blood pressure is 108/66 and her pulse is 137. Her respiration is 20 and oxygen saturation is 91%. Intake/Output Summary (Last 24 hours) at 10/09/17 1342  Last data filed at 10/09/17 0854   Gross per 24 hour   Intake              340 ml   Output                0 ml   Net              340 ml     Physical Exam   Constitutional: She is oriented to person, place, and time. She appears well-developed and well-nourished. No distress. Nasal cannula in place. HENT:   Head: Normocephalic and atraumatic. Right Ear: External ear normal.   Left Ear: External ear normal.   Nose: Nose normal.   Mouth/Throat: Oropharynx is clear and moist.   Eyes: Conjunctivae and EOM are normal. Pupils are equal, round, and reactive to light. Right eye exhibits no discharge. Left eye exhibits no discharge. Neck: Normal range of motion. Neck supple. No JVD present. Cardiovascular: An irregularly irregular rhythm present. Tachycardia present. No murmur heard. Pulmonary/Chest: Accessory muscle usage present. Tachypnea noted. She has decreased breath sounds in the right lower field, the left middle field and the left lower field. Abdominal: Soft. Bowel sounds are normal.   Musculoskeletal: Normal range of motion. She exhibits no edema or deformity. Neurological: She is alert and oriented to person, place, and time. No cranial nerve deficit. Coordination normal.   Skin: Skin is warm and dry. She is not diaphoretic. No erythema. No pallor. Psychiatric: She has a normal mood and affect. Her behavior is normal. Thought content normal.   Nursing note and vitals reviewed.     Recent Labs      10/07/17   0820 noted  Independent review of ekg: afib  Patient Active Problem List   Diagnosis    Mixed hyperlipidemia    Essential hypertension    Lung mass    [facility] RVR [facility] [address]    Diastolic heart failure, stage C (HCC)    Severe protein-calorie malnutrition (HCC)    Pneumonia of left lung due to infectious organism    Paroxysmal atrial fibrillation (HCC)    Hypercoagulable state ([facility] [address]    Gastrointestinal hemorrhage with melena    Acute blood loss anemia    Warfarin-induced coagulopathy (HCC)    Melena    Gastroesophageal reflux disease with esophagitis    HCAP (healthcare-associated pneumonia)    Polyp of descending colon    Diverticulosis of large intestine without hemorrhage    Non-small cell lung cancer (HCC)    Warfarin toxicity     Pulmonary Assessment:  New problem (to me), with additional workup planned: Recurrent pleural effusion    New problem (to me), no additional workup planned: atrial fibrillation requiring anticoagulation, will defer to cardiology    Other problems either stable, failing to improve or worsenin. GI Bleed  2. Lung mass, recent biopsy confirming NSCLC  3. Hypoxemia  4. Anemia  5. Leukocytosis  6. Moderate mitral regurgitation    Recommend/plan:     · Reviewed recent imaging and biopsy/thoracentesis results with Dr. Lynsey Banerjee  · This is a recurrent issue that has not been amendable to thoracentesis  · Recommend consultation for CT surgery  · Recommend consultation for oncology as this could be related to her cancer and she has not seen them yet  · Reasonable to treat with abx in the interim due to leukocytosis  · Supplemental oxygen and bronchodilators as needed  · Signing off. Not much else to offer from a pulmonary standpoint other than oxygen. Please call back if  needed further. Electronically signed by [employer] on 10/09/17 at 1:42 PM  Physician's substantive portion:  Subjective:  The patient is resting this evening with O2 in place via nasal cannula. Objective: She has decreased breath sounds on the left on chest exam.  Assessment/recommendation: Based on what appears to be fairly rapid reaccumulation of her effusion post thoracentesis, and this is her 2nd thoracentesis in the last several weeks, would certainly recommend thoracic surgery consultation for consideration of a Pleurx catheter placement. Attempts could be made to manage with diuretic therapy as alternative but if this was not successful would again recommend Pleurx catheter placement per thoracic surgery. I think should be a poor candidate for chest tube placement with pleurodesis. Pulmonary will sign off. I have seen and examined patient personally, performing a face-to-face diagnostic evaluation with plan of care reviewed and developed with APRN and nursing staff. I have addended and/or modified the above history of present illness, physical examination, and assessment and plan to reflect my findings and impressions. Essential elements of the care plan were discussed with APRN above. Agree with findings and assessment/plan as documented above.     Electronically signed by Garrick Gtz MD on 10/9/17 at 7:46 PM

## 2017-10-10 ENCOUNTER — APPOINTMENT (OUTPATIENT)
Dept: CT IMAGING | Age: 77
DRG: 813 | End: 2017-10-10
Payer: MEDICARE

## 2017-10-10 LAB
ANION GAP SERPL CALCULATED.3IONS-SCNC: 5 MMOL/L (ref 7–19)
BASOPHILS ABSOLUTE: 0.1 K/UL (ref 0–0.2)
BASOPHILS RELATIVE PERCENT: 0.6 % (ref 0–1)
BUN BLDV-MCNC: 16 MG/DL (ref 8–23)
CALCIUM SERPL-MCNC: 8.4 MG/DL (ref 8.8–10.2)
CHLORIDE BLD-SCNC: 104 MMOL/L (ref 98–111)
CO2: 31 MMOL/L (ref 22–29)
CREAT SERPL-MCNC: 0.8 MG/DL (ref 0.5–0.9)
DIGOXIN LEVEL: 0.4 NG/ML (ref 0.6–1.2)
EOSINOPHILS ABSOLUTE: 1.8 K/UL (ref 0–0.6)
EOSINOPHILS RELATIVE PERCENT: 8 % (ref 0–5)
FERRITIN: 158.9 NG/ML (ref 13–150)
FOLATE: 7.3 NG/ML (ref 4.8–37.3)
GFR NON-AFRICAN AMERICAN: >60
GLUCOSE BLD-MCNC: 95 MG/DL (ref 74–109)
HCT VFR BLD CALC: 28.5 % (ref 37–47)
HEMOGLOBIN: 8.6 G/DL (ref 12–16)
IRON: 18 UG/DL (ref 37–145)
LACTIC ACID: 1.9 MMOL/L (ref 0.5–1.9)
LYMPHOCYTES ABSOLUTE: 2.7 K/UL (ref 1.1–4.5)
LYMPHOCYTES RELATIVE PERCENT: 11.7 % (ref 20–40)
MAGNESIUM: 2.1 MG/DL (ref 1.6–2.4)
MCH RBC QN AUTO: 30.9 PG (ref 27–31)
MCHC RBC AUTO-ENTMCNC: 30.2 G/DL (ref 33–37)
MCV RBC AUTO: 102.5 FL (ref 81–99)
MONOCYTES ABSOLUTE: 2.3 K/UL (ref 0–0.9)
MONOCYTES RELATIVE PERCENT: 10.2 % (ref 0–10)
NEUTROPHILS ABSOLUTE: 15.5 K/UL (ref 1.5–7.5)
NEUTROPHILS RELATIVE PERCENT: 68.1 % (ref 50–65)
PDW BLD-RTO: 18.6 % (ref 11.5–14.5)
PLATELET # BLD: 381 K/UL (ref 130–400)
PMV BLD AUTO: 10 FL (ref 9.4–12.3)
POTASSIUM SERPL-SCNC: 4.4 MMOL/L (ref 3.5–5)
PRO-BNP: 2932 PG/ML (ref 0–1800)
RBC # BLD: 2.78 M/UL (ref 4.2–5.4)
SODIUM BLD-SCNC: 140 MMOL/L (ref 136–145)
T4 FREE: 1.3 NG/DL (ref 0.9–1.7)
TSH SERPL DL<=0.05 MIU/L-ACNC: 4.93 UIU/ML (ref 0.27–4.2)
VITAMIN B-12: 362 PG/ML (ref 211–946)
WBC # BLD: 22.7 K/UL (ref 4.8–10.8)

## 2017-10-10 PROCEDURE — 87205 SMEAR GRAM STAIN: CPT

## 2017-10-10 PROCEDURE — 2580000003 HC RX 258: Performed by: INTERNAL MEDICINE

## 2017-10-10 PROCEDURE — 97116 GAIT TRAINING THERAPY: CPT

## 2017-10-10 PROCEDURE — 2700000000 HC OXYGEN THERAPY PER DAY

## 2017-10-10 PROCEDURE — 6370000000 HC RX 637 (ALT 250 FOR IP): Performed by: INTERNAL MEDICINE

## 2017-10-10 PROCEDURE — 6360000002 HC RX W HCPCS: Performed by: INTERNAL MEDICINE

## 2017-10-10 PROCEDURE — 94640 AIRWAY INHALATION TREATMENT: CPT

## 2017-10-10 PROCEDURE — 83605 ASSAY OF LACTIC ACID: CPT

## 2017-10-10 PROCEDURE — 82728 ASSAY OF FERRITIN: CPT

## 2017-10-10 PROCEDURE — 2140000000 HC CCU INTERMEDIATE R&B

## 2017-10-10 PROCEDURE — 6360000004 HC RX CONTRAST MEDICATION: Performed by: THORACIC SURGERY (CARDIOTHORACIC VASCULAR SURGERY)

## 2017-10-10 PROCEDURE — 84443 ASSAY THYROID STIM HORMONE: CPT

## 2017-10-10 PROCEDURE — 83735 ASSAY OF MAGNESIUM: CPT

## 2017-10-10 PROCEDURE — 36415 COLL VENOUS BLD VENIPUNCTURE: CPT

## 2017-10-10 PROCEDURE — 83540 ASSAY OF IRON: CPT

## 2017-10-10 PROCEDURE — 6370000000 HC RX 637 (ALT 250 FOR IP): Performed by: HOSPITALIST

## 2017-10-10 PROCEDURE — 87185 SC STD ENZYME DETCJ PER NZM: CPT

## 2017-10-10 PROCEDURE — 85025 COMPLETE CBC W/AUTO DIFF WBC: CPT

## 2017-10-10 PROCEDURE — 83880 ASSAY OF NATRIURETIC PEPTIDE: CPT

## 2017-10-10 PROCEDURE — 82746 ASSAY OF FOLIC ACID SERUM: CPT

## 2017-10-10 PROCEDURE — 99232 SBSQ HOSP IP/OBS MODERATE 35: CPT | Performed by: HOSPITALIST

## 2017-10-10 PROCEDURE — 84439 ASSAY OF FREE THYROXINE: CPT

## 2017-10-10 PROCEDURE — 82607 VITAMIN B-12: CPT

## 2017-10-10 PROCEDURE — 99231 SBSQ HOSP IP/OBS SF/LOW 25: CPT | Performed by: INTERNAL MEDICINE

## 2017-10-10 PROCEDURE — 80048 BASIC METABOLIC PNL TOTAL CA: CPT

## 2017-10-10 PROCEDURE — 87077 CULTURE AEROBIC IDENTIFY: CPT

## 2017-10-10 PROCEDURE — 99223 1ST HOSP IP/OBS HIGH 75: CPT | Performed by: THORACIC SURGERY (CARDIOTHORACIC VASCULAR SURGERY)

## 2017-10-10 PROCEDURE — 94664 DEMO&/EVAL PT USE INHALER: CPT

## 2017-10-10 PROCEDURE — 6360000002 HC RX W HCPCS: Performed by: HOSPITALIST

## 2017-10-10 PROCEDURE — 87070 CULTURE OTHR SPECIMN AEROBIC: CPT

## 2017-10-10 PROCEDURE — 97530 THERAPEUTIC ACTIVITIES: CPT

## 2017-10-10 PROCEDURE — 71260 CT THORAX DX C+: CPT

## 2017-10-10 PROCEDURE — 80162 ASSAY OF DIGOXIN TOTAL: CPT

## 2017-10-10 RX ORDER — METHYLPREDNISOLONE SODIUM SUCCINATE 40 MG/ML
40 INJECTION, POWDER, LYOPHILIZED, FOR SOLUTION INTRAMUSCULAR; INTRAVENOUS EVERY 6 HOURS
Status: DISCONTINUED | OUTPATIENT
Start: 2017-10-10 | End: 2017-10-11

## 2017-10-10 RX ORDER — GUAIFENESIN 600 MG/1
600 TABLET, EXTENDED RELEASE ORAL 2 TIMES DAILY
Status: DISCONTINUED | OUTPATIENT
Start: 2017-10-10 | End: 2017-10-13 | Stop reason: HOSPADM

## 2017-10-10 RX ORDER — FUROSEMIDE 10 MG/ML
40 INJECTION INTRAMUSCULAR; INTRAVENOUS DAILY
Status: DISCONTINUED | OUTPATIENT
Start: 2017-10-10 | End: 2017-10-12

## 2017-10-10 RX ADMIN — MEROPENEM 1 G: 1 INJECTION, POWDER, FOR SOLUTION INTRAVENOUS at 01:21

## 2017-10-10 RX ADMIN — AMIODARONE HYDROCHLORIDE 200 MG: 200 TABLET ORAL at 08:58

## 2017-10-10 RX ADMIN — MEROPENEM 1 G: 1 INJECTION, POWDER, FOR SOLUTION INTRAVENOUS at 16:57

## 2017-10-10 RX ADMIN — IPRATROPIUM BROMIDE AND ALBUTEROL SULFATE 1 AMPULE: .5; 3 SOLUTION RESPIRATORY (INHALATION) at 07:10

## 2017-10-10 RX ADMIN — IPRATROPIUM BROMIDE AND ALBUTEROL SULFATE 1 AMPULE: .5; 3 SOLUTION RESPIRATORY (INHALATION) at 14:33

## 2017-10-10 RX ADMIN — IPRATROPIUM BROMIDE AND ALBUTEROL SULFATE 1 AMPULE: .5; 3 SOLUTION RESPIRATORY (INHALATION) at 23:00

## 2017-10-10 RX ADMIN — MEROPENEM 1 G: 1 INJECTION, POWDER, FOR SOLUTION INTRAVENOUS at 08:57

## 2017-10-10 RX ADMIN — METOPROLOL TARTRATE 25 MG: 25 TABLET ORAL at 20:39

## 2017-10-10 RX ADMIN — IOPAMIDOL 90 ML: 755 INJECTION, SOLUTION INTRAVENOUS at 07:01

## 2017-10-10 RX ADMIN — METHYLPREDNISOLONE SODIUM SUCCINATE 40 MG: 40 INJECTION, POWDER, FOR SOLUTION INTRAMUSCULAR; INTRAVENOUS at 23:56

## 2017-10-10 RX ADMIN — FUROSEMIDE 40 MG: 10 INJECTION, SOLUTION INTRAMUSCULAR; INTRAVENOUS at 20:38

## 2017-10-10 RX ADMIN — METHYLPREDNISOLONE SODIUM SUCCINATE 40 MG: 40 INJECTION, POWDER, FOR SOLUTION INTRAMUSCULAR; INTRAVENOUS at 20:38

## 2017-10-10 RX ADMIN — IPRATROPIUM BROMIDE AND ALBUTEROL SULFATE 1 AMPULE: .5; 3 SOLUTION RESPIRATORY (INHALATION) at 10:38

## 2017-10-10 RX ADMIN — TRAMADOL HYDROCHLORIDE 50 MG: 50 TABLET, FILM COATED ORAL at 08:58

## 2017-10-10 RX ADMIN — TRAMADOL HYDROCHLORIDE 50 MG: 50 TABLET, FILM COATED ORAL at 20:38

## 2017-10-10 RX ADMIN — Medication 10 ML: at 08:58

## 2017-10-10 RX ADMIN — MEROPENEM 1 G: 1 INJECTION, POWDER, FOR SOLUTION INTRAVENOUS at 23:56

## 2017-10-10 RX ADMIN — ENOXAPARIN SODIUM 70 MG: 80 INJECTION SUBCUTANEOUS at 08:57

## 2017-10-10 RX ADMIN — PRAVASTATIN SODIUM 40 MG: 20 TABLET ORAL at 08:58

## 2017-10-10 RX ADMIN — IPRATROPIUM BROMIDE AND ALBUTEROL SULFATE 1 AMPULE: .5; 3 SOLUTION RESPIRATORY (INHALATION) at 18:58

## 2017-10-10 RX ADMIN — GUAIFENESIN 600 MG: 600 TABLET, EXTENDED RELEASE ORAL at 20:38

## 2017-10-10 RX ADMIN — METOPROLOL TARTRATE 25 MG: 25 TABLET ORAL at 08:58

## 2017-10-10 RX ADMIN — IPRATROPIUM BROMIDE AND ALBUTEROL SULFATE 1 AMPULE: .5; 3 SOLUTION RESPIRATORY (INHALATION) at 03:05

## 2017-10-10 RX ADMIN — Medication 10 ML: at 23:33

## 2017-10-10 ASSESSMENT — PAIN SCALES - GENERAL
PAINLEVEL_OUTOF10: 0
PAINLEVEL_OUTOF10: 0
PAINLEVEL_OUTOF10: 3
PAINLEVEL_OUTOF10: 4

## 2017-10-10 NOTE — PROGRESS NOTES
Physical Therapy  [id number]     10/10/17 0953   Subjective   Subjective Pt states she feels very weak but willing to get up to chair. Bed Mobility   Supine to Sit Modified independent   Scooting Modified independent   Comment pt SOA just getting to bedside and had to rest before walking to chair. Transfers   Sit to Stand Contact guard assistance   Stand to sit Contact guard assistance   Bed to Chair Contact guard assistance   Ambulation   Ambulation? Yes   Ambulation 1   Surface level tile   Device No Device   Assistance Contact guard assistance   Distance 7'   Comments pt SOA after getting to chair and refused exercises   Short term goals   Time Frame for Short term goals 2 weeks   Short term goal 1 Independent with all bed mobility and transfers   Short term goal 2 Ambulate 400 feet independently with assistive device   Activity Tolerance   Activity Tolerance Patient limited by endurance   Safety Devices   Type of devices Call light within reach; Left in chair   Electronically signed by Dileep Burns PTA on 10/10/2017 at 9:56 AM

## 2017-10-10 NOTE — CONSULTS
Inpatient consult to Cardiothoracic Surgery  Consult performed by: Rena So  Consult ordered by: Sarina Stearns  Assessment/Recommendations: Dictated #[id number]

## 2017-10-10 NOTE — PROGRESS NOTES
HOSPITAL MEDICINE  - PROGRESS NOTE    Admit Date: 9/28/2017         CC; dyspnea    Subjective: short of air and tired    Objective: no acute distress, tearfull    Diet: DIET DENTAL SOFT; Low Sodium (2 GM)  Pain is:None  Nausea:None  Bowel Movement/Flatus yes    Data:   Scheduled Meds: Reviewed  Current Facility-Administered Medications   Medication Dose Route Frequency Provider Last Rate Last Dose    ipratropium-albuterol (DUONEB) nebulizer solution 1 ampule  1 ampule Inhalation Q4H Tracee Marks MD   1 ampule at 10/10/17 0710    metoprolol tartrate (LOPRESSOR) tablet 25 mg  25 mg Oral BID Jennifer Barillas MD   25 mg at 10/09/17 2054    enoxaparin (LOVENOX) injection 70 mg  1 mg/kg Subcutaneous BID Tracey Torres MD   70 mg at 10/09/17 2053    meropenem (MERREM) 1 g in sodium chloride 0.9 % 100 mL IVPB (mini-bag)  1 g Intravenous Q8H Bubba D Emile, DO   Stopped at 10/10/17 0151    amiodarone (CORDARONE) tablet 200 mg  200 mg Oral Daily Tracey Torres MD   200 mg at 10/09/17 0820    0.9 % sodium chloride infusion 250 mL  250 mL Intravenous Once Bubba D Emile, DO        sodium chloride flush 0.9 % injection 10 mL  10 mL Intravenous 2 times per day Bubba D Emile, DO   10 mL at 10/09/17 2054    sodium chloride flush 0.9 % injection 10 mL  10 mL Intravenous PRN Bubba D Emile, DO   10 mL at 10/02/17 0454    traMADol (ULTRAM) tablet 50 mg  50 mg Oral BID Bubba D Emile, DO   50 mg at 10/09/17 2054    pravastatin (PRAVACHOL) tablet 40 mg  40 mg Oral Daily Bubba D Emile, DO   40 mg at 10/09/17 0820    acetaminophen (TYLENOL) tablet 650 mg  650 mg Oral Q4H PRN Bubba D Emile, DO        magnesium hydroxide (MILK OF MAGNESIA) 400 MG/5ML suspension 30 mL  30 mL Oral Daily PRN Bubba D Emile, DO        ondansetron (ZOFRAN) injection 4 mg  4 mg Intravenous Q6H PRN Bubba D Emile, DO   4 mg at 10/04/17 1450     DVT Prophylaxis: lovneox    Intake/Output Summary (Last 24 hours) at 10/10/17 0837  Last data filed at 10/10/17 0200   Gross per 24 hour   Intake              600 ml   Output              125 ml   Net              475 ml     CBC:   Recent Labs      10/08/17   0620  10/09/17   0435  10/10/17   0203   WBC  23.1*  23.0*  22.7*   HGB  8.7*  8.8*  8.6*   PLT  335  386  381     BMP:  Recent Labs      10/08/17   0620  10/09/17   0435  10/10/17   0203   NA  143  141  140   K  4.6  4.3  4.4   CL  108  105  104   CO2  27  26  31*   BUN  13  15  16   CREATININE  0.9  0.9  0.8   GLUCOSE  96  84  95         Objective:   Vitals: /73   Pulse 118   Temp 97.6 °F (36.4 °C) (Temporal)   Resp 18   Ht 5' 9\" (1.753 m)   Wt 166 lb 9 oz (75.6 kg)   SpO2 90%   BMI 24.60 kg/m²   General appearance: alert, appears stated age and cooperative  Skin: Skin color, texture, turgor normal.   HEENT: Head: Normocephalic, no lesions, without obvious abnormality. Neck: no adenopathy, no carotid bruit, no JVD and supple, symmetrical, trachea midline  Lungs: wheezes bilaterally  Heart: regular rate and rhythm, S1, S2 normal, no murmur, click, rub or gallop  Abdomen: soft, non-tender; bowel sounds normal; no masses,  no organomegaly  Extremities: extremities normal, atraumatic, no cyanosis or edema  Lymphatic: No significant lymph node enlargement papable  Neurologic: Mental status: Alert, oriented, thought content appropriate        Assessment & Plan:    1.  Gastrointestinal hemorrhage with melena--resolved, no source identified  Active Problems:    Essential hypertension--controlled     Lung mass, recent biopsy confirming NSCLC    Paroxysmal atrial fibrillation-- with RVR--cardiology    Acute blood loss anemia--stable    Warfarin-induced coagulopathy--reversed / resolved    HCAP--on merrem    Polyp of descending colon    Diverticulosis of large intestine without hemorrhage    Anemia of chronic disease     Leukocytosis    Moderate mitral regurgitation    COPD AE- add steroid          Romana Frater

## 2017-10-10 NOTE — PROGRESS NOTES
Nutrition Assessment    Type and Reason for Visit: Reassess    Nutrition Recommendations: continue ONS    Malnutrition Assessment:  · Malnutrition Status: At risk for malnutrition  · Context: Acute illness or injury    Nutrition Diagnosis:   · Problem: Inadequate oral intake  · Etiology: related to Alteration in GI function     Signs and symptoms:  as evidenced by Intake 0-25%    Nutrition Assessment:  · Subjective Assessment: Po intake of meals served here remains decreased 0-25%. Is taking all of Ensure Enilve (getting 3 a day. )  Aware family did bring in [address] last night and she ate % of it. · Nutrition-Focused Physical Findings:    · Wound Type: Skin Tears  · Current Nutrition Therapies:  · Oral Diet Orders: Dental Soft, 2gm Sodium   · Oral Diet intake: 0%, 1-25%  · Oral Nutrition Supplement (ONS) Orders: Standard High Calorie Oral Supplement  · ONS intake: %     · Anthropometric Measures:   · Ht: 5' 9\" (175.3 cm)   · Current Body Wt: 166 lb 9 oz (75.6 kg)  · Admission Body Wt: 160 lb (72.6 kg) (stated)  · Ideal Body Wt: 145 lb (65.8 kg),   · BMI Classification: BMI 18.5 - 24.9 Normal Weight    Estimated Intake vs Estimated Needs: Intake Less Than Needs    Nutrition Risk Level: Moderate    Nutrition Interventions:   Continue current diet, Continue current ONS  Continued Inpatient Monitoring    Nutrition Evaluation:   · Evaluation: No progress toward goals   · Goals: po intake 50% or greater    · Monitoring: Meal Intake, Supplement Intake, Diet Tolerance, Skin Integrity, Wound Healing, Weight, Pertinent Labs    See Adult Nutrition Doc Flowsheet for more detail.      Electronically signed by Rohini Chacon, MS, RD, LD on 10/10/17 at 1:02 PM    Contact Number: [phone]

## 2017-10-10 NOTE — CONSULTS
[facility] [employer] [facility] [address] [facility]                                 CONSULTATION    PATIENT NAME: Jennifer Kulkarni                         :             1940  MED REC NO:   [id number]                               ROOM:            [facility]  ACCOUNT NO:   [de-identified]                            ADMISSION DATE:  2017  PROVIDER:     Raya Garrett MD          CARDIOTHORACIC SURGERY CONSULT    CONSULT DATE:  10/10/2017    CONSULTING PHYSICIAN:  Dr. Dilip Mosley:  Advanced stage left lung cancer with pleural  effusion. HISTORY OF PRESENT ILLNESS:  The patient is a 76-year-old female,  cigarette smoker with COPD who is admitted with acute lower GI bleed  secondary to Coumadin toxicity. This patient was hospitalized last   month with shortness of breath and on chest x-ray examination was  found to have a large left lung mass. During that admission, she  underwent a CT-guided needle biopsy with tissue diagnosis of non-small  cell lung cancer. She was appointed to see Dr. Anisa Cotto,  for management suggestions. During that admission, she was  also diagnosed with atrial fibrillation and was placed on Coumadin. She was discharged home. She presented again about 12 days ago with acute lower GI bleed with a  prothrombin time and INR that were supratherapeutic. Since her  admission, her INR has now normalized. She continues to complain of  shortness of breath and a CT scan demonstrated a moderate left pleural  effusion. She underwent an ultrasound-guided thoracentesis, removing  1000 mL of vish color fluid. She had fairly minimal relief of her  shortness of breath and it recurred 2 days later. She was seen in  consultation by Dr. Boris Bear, who recommended a PleurX catheter. Therefore, I am seeing her today for further evaluation regarding  management of her pleural effusion.     PAST MEDICAL HISTORY:  Significant for atrial fibrillation, breast  surgery, cataract extraction, cholecystectomy, colonoscopy, partial  mastectomy for breast cancer. Medical illnesses include recent  diagnosis of inoperable advanced stage lung cancer, hypertension,  hyperlipidemia, and atrial fibrillation. MEDICATIONS PRIOR TO ADMISSION:  Amiodarone, Lovenox, Coumadin,  NicoDerm,Protonix, Norco, tramadol, diazepam, Adalat and Pravachol. FAMILY HISTORY:  Positive for other members who have had lung cancer  and cardiac disease. SOCIAL HISTORY:  The patient is , lives in at home by herself. She lives in [place]. Habits:  Smokes 1 pack of  cigarettes per day, has done so her entire adult life. She has a  greater than 100-pack-year history of cigarette smoking. Does not  drink alcohol or use illicit drugs. REVIEW OF SYSTEMS:  HEENT:  Denies sore throat, hoarseness or hemoptysis. RESPIRATORY:  Increasing shortness of breath. No wheezes or rhonchi. CARDIAC:  Denies chest pain or palpitations, but she does have  irregular heart beat. No lower extremity edema. GI:  Denies nausea, vomiting or abdominal pain. Recent diagnosis of  hematochezia, currently being worked up GI. :  Denies urinary frequency or dysuria. MUSCULOSKELETAL:  Complains of severe scapular back pain. No joint  swelling. NEUROLOGIC:  Denies diplopia, headache, or ataxia. The other 14-systems have been reviewed and are negative. PHYSICAL EXAMINATION:  GENERAL:  Physical exam demonstrates an elderly chronically ill,  mildly debilitated female, who appears uncomfortable with mild  distress. VITAL SIGNS:  BMI is 24.6. Blood pressure is 118/73, pulse is 118 in  atrial fibrillation, respirations are 18 and O2 saturations 90% with a  2 liter nasal cannula,  afebrile. HEENT:  Demonstrates slightly distended neck veins. There is no  crepitance. The trachea appears to be midline. There is no cervical  adenopathy. Mouth is clear.   No oral lesions are seen.  Tongue  protrudes in the midline. CHEST:  Coarse breath sounds on the right side and nearly absent  breath sounds on the left. No wheezes are heard. There is no  fremitus or use of accessory muscles. CARDIAC:  Demonstrates an irregular rate with irregular rhythm  consistent with atrial fibrillation. Carotid upstrokes are  diminished. PMI is displaced. ABDOMEN:  Soft without masses or tenderness. Liver and spleen are not  particularly enlarged. Peripheral pulses are normal in the radial,  femoral and carotid areas. EXTREMITIES:  Shows mild cyanosis. There is no clubbing. No  peripheral edema is seen. NEUROLOGICAL:  Demonstrates no lateralizing findings. Cranial nerves  II through XII are normal.  PSYCHIATRIC:  Reveals no evidence of depression, anxiety, or  agitation. LYMPHATICS:  No lymphadenopathy in the cervical, femoral, or axillary  areas. DIAGNOSTIC DATA:  Review of CT scan performed today demonstrates a  very large left upper lobe lung mass that is invading the superior  mediastinum, the left hilum and the left AP window. There is a  bronchial obstruction to the left upper lobe with nearly complete  atelectasis of the left upper lobe with consolidation. The left lower  lobe, the bronchus to the left lower lobe is open and patent with an  expanded left lower lobe that reaches the apex as the upper lobe is  almost completely atelectatic from tumor obstruction. There is also  extensive adenopathy in both, the right and left hilar areas mass with  subcarinal adenopathy and paratracheal adenopathy as well. This  is  also contralateral.  There is also a small right pleural effusion with  moderate size left pleural effusion. There is a lytic lesion seen in  the thoracic spine also consistent with metastatic disease. IMPRESSION:  1.   Advanced stage non-small cell lung cancer, left lung; invading the  mediastinum, right and left hilum, subcarinal and paratracheal  space, inoperable

## 2017-10-10 NOTE — PROGRESS NOTES
hypotension    Plan:     Atrial fibrillation - BP rather boderline to increase lopressor, will add digoxin, pt will have to be tx to 7 for administration    Hypotension -stable    Disposition - continue to monitor    Rik Carrillo MD  [facility] [place]    10/9/2017 10:06 PM

## 2017-10-10 NOTE — CARE COORDINATION
Pt continues to state will return home with her son in the home and daughter neighboring upon dc. [facility] and has continuous 02 provided through "[employer]. Pt denies any further dc needs at this time. SW will continue to follow and assist with dc needs as necessary.

## 2017-10-10 NOTE — PROGRESS NOTES
Physical Therapy  [id number]     10/10/17 1459   General   Missed reason Patient declined   Subjective   Subjective Pt stating she is not getting back out of bed     Electronically signed by Daljit Villarreal PTA on 10/10/2017 at 3:00 PM

## 2017-10-10 NOTE — PLAN OF CARE
Problem: Falls - Risk of  Goal: Absence of falls  Outcome: Ongoing      Problem: Bleeding:  Goal: Will show no signs and symptoms of excessive bleeding  Will show no signs and symptoms of excessive bleeding   Outcome: Ongoing      Problem: Pain:  Goal: Pain level will decrease  Pain level will decrease   Outcome: Ongoing

## 2017-10-11 LAB
HCT VFR BLD CALC: 30.2 % (ref 37–47)
HEMOGLOBIN: 9.1 G/DL (ref 12–16)
INR BLD: 1.16 (ref 0.88–1.18)
MCH RBC QN AUTO: 31 PG (ref 27–31)
MCHC RBC AUTO-ENTMCNC: 30.1 G/DL (ref 33–37)
MCV RBC AUTO: 102.7 FL (ref 81–99)
PDW BLD-RTO: 18.5 % (ref 11.5–14.5)
PLATELET # BLD: 377 K/UL (ref 130–400)
PMV BLD AUTO: 10 FL (ref 9.4–12.3)
PROTHROMBIN TIME: 14.8 SEC (ref 12–14.6)
RBC # BLD: 2.94 M/UL (ref 4.2–5.4)
WBC # BLD: 25.5 K/UL (ref 4.8–10.8)

## 2017-10-11 PROCEDURE — 6360000002 HC RX W HCPCS: Performed by: INTERNAL MEDICINE

## 2017-10-11 PROCEDURE — G8988 SELF CARE GOAL STATUS: HCPCS

## 2017-10-11 PROCEDURE — 6370000000 HC RX 637 (ALT 250 FOR IP): Performed by: HOSPITALIST

## 2017-10-11 PROCEDURE — 2580000003 HC RX 258: Performed by: INTERNAL MEDICINE

## 2017-10-11 PROCEDURE — 6370000000 HC RX 637 (ALT 250 FOR IP): Performed by: INTERNAL MEDICINE

## 2017-10-11 PROCEDURE — 99232 SBSQ HOSP IP/OBS MODERATE 35: CPT | Performed by: HOSPITALIST

## 2017-10-11 PROCEDURE — 2700000000 HC OXYGEN THERAPY PER DAY

## 2017-10-11 PROCEDURE — 94640 AIRWAY INHALATION TREATMENT: CPT

## 2017-10-11 PROCEDURE — 6360000002 HC RX W HCPCS: Performed by: HOSPITALIST

## 2017-10-11 PROCEDURE — 85027 COMPLETE CBC AUTOMATED: CPT

## 2017-10-11 PROCEDURE — 2140000000 HC CCU INTERMEDIATE R&B

## 2017-10-11 PROCEDURE — 97165 OT EVAL LOW COMPLEX 30 MIN: CPT

## 2017-10-11 PROCEDURE — 85610 PROTHROMBIN TIME: CPT

## 2017-10-11 PROCEDURE — G8987 SELF CARE CURRENT STATUS: HCPCS

## 2017-10-11 RX ORDER — MEROPENEM 1 G/1
1 INJECTION, POWDER, FOR SOLUTION INTRAVENOUS EVERY 8 HOURS
Qty: 21 G | Refills: 0 | Status: SHIPPED | OUTPATIENT
Start: 2017-10-11 | End: 2017-10-13 | Stop reason: HOSPADM

## 2017-10-11 RX ORDER — DOXYCYCLINE HYCLATE 100 MG
100 TABLET ORAL 2 TIMES DAILY
Qty: 14 TABLET | Refills: 0 | Status: SHIPPED | OUTPATIENT
Start: 2017-10-11 | End: 2017-10-13 | Stop reason: HOSPADM

## 2017-10-11 RX ORDER — GUAIFENESIN 600 MG/1
600 TABLET, EXTENDED RELEASE ORAL 2 TIMES DAILY
Qty: 10 TABLET | Refills: 0 | Status: SHIPPED | OUTPATIENT
Start: 2017-10-11 | End: 2017-10-13

## 2017-10-11 RX ORDER — PANTOPRAZOLE SODIUM 40 MG/1
40 TABLET, DELAYED RELEASE ORAL
Status: DISCONTINUED | OUTPATIENT
Start: 2017-10-12 | End: 2017-10-13 | Stop reason: HOSPADM

## 2017-10-11 RX ORDER — DILTIAZEM HYDROCHLORIDE 60 MG/1
30 TABLET, FILM COATED ORAL EVERY 6 HOURS SCHEDULED
Status: DISCONTINUED | OUTPATIENT
Start: 2017-10-11 | End: 2017-10-12

## 2017-10-11 RX ORDER — MEROPENEM 1 G/1
1 INJECTION, POWDER, FOR SOLUTION INTRAVENOUS EVERY 8 HOURS
Status: DISCONTINUED | OUTPATIENT
Start: 2017-10-11 | End: 2017-10-12

## 2017-10-11 RX ORDER — IPRATROPIUM BROMIDE AND ALBUTEROL SULFATE 2.5; .5 MG/3ML; MG/3ML
3 SOLUTION RESPIRATORY (INHALATION) 4 TIMES DAILY
Qty: 50 ML | Refills: 0 | Status: ON HOLD
Start: 2017-10-11 | End: 2017-11-27 | Stop reason: HOSPADM

## 2017-10-11 RX ORDER — METHYLPREDNISOLONE SODIUM SUCCINATE 40 MG/ML
40 INJECTION, POWDER, LYOPHILIZED, FOR SOLUTION INTRAMUSCULAR; INTRAVENOUS EVERY 12 HOURS
Status: DISCONTINUED | OUTPATIENT
Start: 2017-10-12 | End: 2017-10-12

## 2017-10-11 RX ADMIN — TRAMADOL HYDROCHLORIDE 50 MG: 50 TABLET, FILM COATED ORAL at 20:17

## 2017-10-11 RX ADMIN — GUAIFENESIN 600 MG: 600 TABLET, EXTENDED RELEASE ORAL at 07:49

## 2017-10-11 RX ADMIN — IPRATROPIUM BROMIDE AND ALBUTEROL SULFATE 1 AMPULE: .5; 3 SOLUTION RESPIRATORY (INHALATION) at 10:44

## 2017-10-11 RX ADMIN — METOPROLOL TARTRATE 25 MG: 25 TABLET ORAL at 07:50

## 2017-10-11 RX ADMIN — IPRATROPIUM BROMIDE AND ALBUTEROL SULFATE 1 AMPULE: .5; 3 SOLUTION RESPIRATORY (INHALATION) at 03:43

## 2017-10-11 RX ADMIN — DILTIAZEM HYDROCHLORIDE 30 MG: 60 TABLET, FILM COATED ORAL at 11:49

## 2017-10-11 RX ADMIN — IPRATROPIUM BROMIDE AND ALBUTEROL SULFATE 1 AMPULE: .5; 3 SOLUTION RESPIRATORY (INHALATION) at 15:33

## 2017-10-11 RX ADMIN — DILTIAZEM HYDROCHLORIDE 30 MG: 60 TABLET, FILM COATED ORAL at 08:37

## 2017-10-11 RX ADMIN — WATER 20 ML: 1 INJECTION INTRAMUSCULAR; INTRAVENOUS; SUBCUTANEOUS at 18:02

## 2017-10-11 RX ADMIN — WARFARIN SODIUM 7.5 MG: 2.5 TABLET ORAL at 17:58

## 2017-10-11 RX ADMIN — DILTIAZEM HYDROCHLORIDE 30 MG: 60 TABLET, FILM COATED ORAL at 17:59

## 2017-10-11 RX ADMIN — TRAMADOL HYDROCHLORIDE 50 MG: 50 TABLET, FILM COATED ORAL at 07:49

## 2017-10-11 RX ADMIN — Medication 10 ML: at 07:49

## 2017-10-11 RX ADMIN — PRAVASTATIN SODIUM 40 MG: 20 TABLET ORAL at 07:50

## 2017-10-11 RX ADMIN — METHYLPREDNISOLONE SODIUM SUCCINATE 40 MG: 40 INJECTION, POWDER, FOR SOLUTION INTRAMUSCULAR; INTRAVENOUS at 11:49

## 2017-10-11 RX ADMIN — GUAIFENESIN 600 MG: 600 TABLET, EXTENDED RELEASE ORAL at 20:18

## 2017-10-11 RX ADMIN — IPRATROPIUM BROMIDE AND ALBUTEROL SULFATE 1 AMPULE: .5; 3 SOLUTION RESPIRATORY (INHALATION) at 22:31

## 2017-10-11 RX ADMIN — METHYLPREDNISOLONE SODIUM SUCCINATE 40 MG: 40 INJECTION, POWDER, FOR SOLUTION INTRAMUSCULAR; INTRAVENOUS at 18:04

## 2017-10-11 RX ADMIN — FUROSEMIDE 40 MG: 10 INJECTION, SOLUTION INTRAMUSCULAR; INTRAVENOUS at 07:49

## 2017-10-11 RX ADMIN — MEROPENEM 1 G: 1 INJECTION, POWDER, FOR SOLUTION INTRAVENOUS at 18:02

## 2017-10-11 RX ADMIN — ENOXAPARIN SODIUM 70 MG: 80 INJECTION SUBCUTANEOUS at 20:17

## 2017-10-11 RX ADMIN — ENOXAPARIN SODIUM 70 MG: 80 INJECTION SUBCUTANEOUS at 07:48

## 2017-10-11 RX ADMIN — MEROPENEM 1 G: 1 INJECTION, POWDER, FOR SOLUTION INTRAVENOUS at 07:48

## 2017-10-11 RX ADMIN — METHYLPREDNISOLONE SODIUM SUCCINATE 40 MG: 40 INJECTION, POWDER, FOR SOLUTION INTRAMUSCULAR; INTRAVENOUS at 06:15

## 2017-10-11 RX ADMIN — IPRATROPIUM BROMIDE AND ALBUTEROL SULFATE 1 AMPULE: .5; 3 SOLUTION RESPIRATORY (INHALATION) at 06:55

## 2017-10-11 RX ADMIN — IPRATROPIUM BROMIDE AND ALBUTEROL SULFATE 1 AMPULE: .5; 3 SOLUTION RESPIRATORY (INHALATION) at 19:05

## 2017-10-11 RX ADMIN — Medication 10 ML: at 20:18

## 2017-10-11 RX ADMIN — AMIODARONE HYDROCHLORIDE 200 MG: 200 TABLET ORAL at 07:50

## 2017-10-11 ASSESSMENT — PAIN SCALES - GENERAL
PAINLEVEL_OUTOF10: 0
PAINLEVEL_OUTOF10: 9

## 2017-10-11 ASSESSMENT — PAIN DESCRIPTION - FREQUENCY: FREQUENCY: INTERMITTENT

## 2017-10-11 ASSESSMENT — PAIN DESCRIPTION - DESCRIPTORS: DESCRIPTORS: ACHING

## 2017-10-11 ASSESSMENT — PAIN DESCRIPTION - PROGRESSION: CLINICAL_PROGRESSION: NOT CHANGED

## 2017-10-11 ASSESSMENT — PAIN DESCRIPTION - PAIN TYPE
TYPE: CHRONIC PAIN
TYPE: CHRONIC PAIN

## 2017-10-11 ASSESSMENT — PAIN DESCRIPTION - ORIENTATION: ORIENTATION: RIGHT

## 2017-10-11 ASSESSMENT — PAIN DESCRIPTION - LOCATION: LOCATION: BACK

## 2017-10-11 NOTE — DISCHARGE INSTR - DIET

## 2017-10-11 NOTE — CARE COORDINATION
Spoke with daughter present in room who states prefers to transport pt with home 02 rather than wait for ambulance transport. SW instructed to call tomorrow first thing to determine when pt will be dc ready. Family agreed.

## 2017-10-11 NOTE — PROGRESS NOTES
Clinical Pharmacy Note    Cuate Arias is a 69 y.o. female for whom pharmacy has been asked to manage warfarin therapy. Reason for Admission: Gi hemorrhage    Consulting Physician: Unique  Warfarin dose prior to admission: 5mg daily  Warfarin indication: Afib  Target INR range: 2-3    Past Medical History:   Diagnosis Date    [facility]fib ([facility] [address]     Anticoagulated on Coumadin     Anxiety     HTN (hypertension)     Hyperlipidemia     Malignant neoplasm of breast (female), unspecified site     Occlusion and stenosis of carotid artery     Osteoporosis     Palliative care encounter     Respiratory distress     Tobacco use disorder                 Recent Labs      10/11/17   1155   INR  1.16     Recent Labs      10/09/17   0435  10/10/17   0203  10/11/17   0226   HGB  8.8*  8.6*  9.1*   HCT  28.8*  28.5*  30.2*   PLT  386  381  377       Current warfarin drug-drug interactions: Cordarone/ lovenox/Methylprednisolone/ Pravachol/ Tylenol/ Ultram.        Date INR Warfarin Dose   09-28 to now  Held due to bleed   10/11/17 1.16 7.5 mg per MD order                              PLAN: Coumadin 7.5mg po X1 as per ordered by Dr. Sharla Ordonez. Lovenox bridging in place. Daily PT/INR until stable within therapeutic range. Thank you for the consult.      Electronically signed by Israel Wells, [facility] on 10/11/2017 at 1:58 PM

## 2017-10-11 NOTE — PROGRESS NOTES
[facility]  Daily Progress Note      Chief Complaint: f/u A. fib    Interval Hx: No chest pain or shortness of air, palpitations, says she feels better today    ROS:  The rest of the systems are negative except Interval Hx    Current Inpatient Medications:   furosemide  40 mg Intravenous Daily    methylPREDNISolone  40 mg Intravenous Q6H    guaiFENesin  600 mg Oral BID    ipratropium-albuterol  1 ampule Inhalation Q4H    metoprolol tartrate  25 mg Oral BID    enoxaparin  1 mg/kg Subcutaneous BID    meropenem  1 g Intravenous Q8H    amiodarone  200 mg Oral Daily    0.9 % sodium chloride  250 mL Intravenous Once    sodium chloride flush  10 mL Intravenous 2 times per day    traMADol  50 mg Oral BID    pravastatin  40 mg Oral Daily       IV Infusions (if any):       Physical Exam:   /68   Pulse 121   Temp 97.6 °F (36.4 °C) (Temporal)   Resp 18   Ht 5' 9\" (1.753 m)   Wt 168 lb 9.6 oz (76.5 kg)   SpO2 93%   BMI 24.90 kg/m²       Intake/Output Summary (Last 24 hours) at 10/11/17 0752  Last data filed at 10/11/17 0326   Gross per 24 hour   Intake              900 ml   Output             1250 ml   Net             -350 ml       Gen - NAD  Neck - Supple  ENMT - MMM, OP Clear  Cardio - No JVD     Irregularly irregular, no gallop, rub, murmur                No edema, 2+ radials  Resp - Normal effort, Crackles  GI - soft, non-tender, no HSM  Psych - A+O x 3, normal affect     Diagnostics:      Recent Labs      10/09/17   0435  10/10/17   0203  10/11/17   0226   WBC  23.0*  22.7*  25.5*   HGB  8.8*  8.6*  9.1*   PLT  386  381  377      Recent Labs      10/09/17   0435  10/10/17   0203  10/10/17   0840   NA  141  140   --    K  4.3  4.4   --    CL  105  104   --    CO2  26  31*   --    BUN  15  16   --    CREATININE  0.9  0.8   --    LABGLOM  >60  >60   --    MG   --    --   2.1   CALCIUM  8.5*  8.4*   --       Recent Labs      10/10/17   0840   PROBNP  2,932*        Tele -rate controlled

## 2017-10-11 NOTE — CARE COORDINATION
Spoke with pt who states is agreeable to rehab services and would prefer [facility] d/t proximity to her home and family. BRIDGETTE made referral to [facility] and is awaiting a response at this time. BRIDGETTE notified Luisana Fair with admissions of referral cell [phone]  Fax [phone].

## 2017-10-11 NOTE — PROGRESS NOTES
8837    sodium chloride flush 0.9 % injection 10 mL  10 mL Intravenous PRN Bubba D Emile, DO   10 mL at 10/02/17 0454    traMADol (ULTRAM) tablet 50 mg  50 mg Oral BID Bubba D Emile, DO   50 mg at 10/11/17 0749    pravastatin (PRAVACHOL) tablet 40 mg  40 mg Oral Daily Bubba D Emile, DO   40 mg at 10/11/17 0750    acetaminophen (TYLENOL) tablet 650 mg  650 mg Oral Q4H PRN Bubba D Emile, DO        magnesium hydroxide (MILK OF MAGNESIA) 400 MG/5ML suspension 30 mL  30 mL Oral Daily PRN Bubba D Emile, DO        ondansetron (ZOFRAN) injection 4 mg  4 mg Intravenous Q6H PRN Bubba D Emile, DO   4 mg at 10/04/17 1450     DVT Prophylaxis: warfarin    Intake/Output Summary (Last 24 hours) at 10/11/17 1803  Last data filed at 10/11/17 1506   Gross per 24 hour   Intake              902 ml   Output             1850 ml   Net             -948 ml     CBC:   Recent Labs      10/09/17   0435  10/10/17   0203  10/11/17   0226   WBC  23.0*  22.7*  25.5*   HGB  8.8*  8.6*  9.1*   PLT  386  381  377     BMP:  Recent Labs      10/09/17   0435  10/10/17   0203   NA  141  140   K  4.3  4.4   CL  105  104   CO2  26  31*   BUN  15  16   CREATININE  0.9  0.8   GLUCOSE  84  95       Objective:   Vitals: /72   Pulse 91   Temp 97.2 °F (36.2 °C) (Temporal)   Resp 20   Ht 5' 9\" (1.753 m)   Wt 168 lb 9.6 oz (76.5 kg)   SpO2 95%   BMI 24.90 kg/m²   General appearance: alert, appears stated age and cooperative  Skin: Skin color, texture, turgor normal.   HEENT: Head: Normocephalic, no lesions, without obvious abnormality.   Neck: no adenopathy, no carotid bruit, no JVD and supple, symmetrical, trachea midline  Lungs: clear to auscultation bilaterally  Heart: irregularly irregular rhythm  Abdomen: soft, non-tender; bowel sounds normal; no masses,  no organomegaly  Extremities: extremities normal, atraumatic, no cyanosis or edema  Lymphatic: No significant lymph node enlargement papable  Neurologic: Mental status: Alert, oriented, thought content

## 2017-10-11 NOTE — PROGRESS NOTES
Occupational Therapy   Occupational Therapy Initial Assessment  Date: 10/11/2017   Patient Name: Allen Zhao  MRN: [id number]     : 1940    Patient Diagnosis(es): The primary encounter diagnosis was Gastrointestinal hemorrhage with melena. Diagnoses of Warfarin toxicity, accidental or unintentional, initial encounter, Anemia due to blood loss, acute, and S/P thoracentesis were also pertinent to this visit. has a past medical history of A-fib ([address] Anticoagulated on Coumadin; Anxiety; HTN (hypertension); Hyperlipidemia; Malignant neoplasm of breast (female), unspecified site; Occlusion and stenosis of carotid artery; Osteoporosis; Palliative care encounter; Respiratory distress; and Tobacco use disorder. has a past surgical history that includes Cholecystectomy; Cataract removal; Breast surgery; lymph node biopsy; Mastectomy, partial; Colonoscopy; Upper gastrointestinal endoscopy (N/A, 2017); and colsc flx w/removal lesion by hot bx forceps (N/A, 10/2/2017).     Treatment Diagnosis: GI Bleed       Restrictions  Restrictions/Precautions  Restrictions/Precautions: Fall Risk  Position Activity Restriction  Other position/activity restrictions: O2 saturation     Subjective   General  Chart Reviewed: Yes  Referring Practitioner: Dr. Ronnie Covarrubias  Diagnosis: GI Bleed  Subjective  Subjective: Pt pleasant and cooperative for session  Pain Assessment  Patient Currently in Pain: No  Pain Assessment: 0-10  Pain Level: 0  Pain Type: Chronic pain  Pain Location: Back  Pain Orientation: Right  Pain Descriptors: Aching  Pain Frequency: Intermittent  Clinical Progression: Not changed  Pain Intervention(s): Medication (see eMar)  Response to Pain Intervention: Asleep with RR greater than 10  Pre Treatment Pain Screening  Pain at present: 0  Scale Used: Numeric Score  Intervention List: Patient able to continue with treatment  Oxygen Therapy  SpO2: 95 %  Social/Functional History  Social/Functional History  Lives With: Son  Type of Home: House  Home Layout: One level  Home Access: Stairs to enter with rails  Entrance Stairs - Number of Steps: 4  Bathroom Shower/Tub: Walk-in shower  Bathroom Toilet: Standard  Bathroom Equipment: 3-in-1 commode  Home Equipment: Standard walker  Receives Help From: Family  ADL Assistance: Independent  Homemaking Assistance: Independent  Ambulation Assistance: Independent  Transfer Assistance: Independent  Active : Yes  Mode of Transportation: Car  Occupation: Retired  Additional Comments: Plan to d/c home with son and daughter assist       Objective   Vision: Within Functional Limits  Hearing: Within functional limits    Orientation  Overall Orientation Status: Within Functional Limits  Observation/Palpation  Posture: Good  Observation: Pt good posture when up, tall stature, NC, no SOB at this time  Balance  Sitting Balance: Stand by assistance  Standing Balance: Stand by assistance  Standing Balance  Time: Pt SBA sit to stand from bed to chair   Sit to stand: Stand by assistance  Stand to sit: Stand by assistance  Functional Mobility  Functional - Mobility Device: No device  Assist Level: Stand by assistance  ADL  Feeding: Independent  Grooming: Independent  UE Bathing: Independent  LE Bathing: Stand by assistance  UE Dressing: Independent  LE Dressing: Stand by assistance  Toileting: Stand by assistance        Bed mobility  Supine to Sit: Modified independent  Sit to Supine: Modified independent  Scooting: Modified independent  Transfers  Sit to stand: Stand by assistance  Stand to sit: Stand by assistance     Cognition  Overall Cognitive Status: WFL                 LUE AROM (degrees)  LUE AROM : WFL  Left Hand AROM (degrees)  Left Hand AROM: WFL  RUE AROM (degrees)  RUE AROM : WFL  Right Hand AROM (degrees)  Right Hand AROM: WFL  LUE Strength  Gross LUE Strength: WFL  RUE Strength  Gross RUE Strength: WFL          Assessment   Performance deficits / Impairments: Decreased functional mobility ;Decreased ADL status; Decreased endurance;Decreased balance  Assessment: Pt benefits from skilled OT to address decreased pt I to complete functional mobility, ADL tasks, and endurance for increased safety for d/c home. Treatment Diagnosis: GI Bleed   Prognosis: Fair  Decision Making: Medium Complexity  Discharge Recommendations: Patient would benefit from continued therapy after discharge  REQUIRES OT FOLLOW UP: Yes  Activity Tolerance  Activity Tolerance: Patient Tolerated treatment well  Safety Devices  Safety Devices in place: Yes  Type of devices: Left in chair        Discharge Recommendations:  Patient would benefit from continued therapy after discharge     Plan   Plan  Times per week: 3-5x/wk  Current Treatment Recommendations: Balance Training, Functional Mobility Training, Endurance Training, Equipment Evaluation, Education, & procurement, Patient/Caregiver Education & Training, Home Management Training, Safety Education & Training, Self-Care / ADL    G-Code  OT G-codes  Functional Assessment Tool Used: self-care and ADL tasks   Functional Limitation: Self care  Self Care Current Status (): At least 20 percent but less than 40 percent impaired, limited or restricted  Self Care Goal Status (): 0 percent impaired, limited or restricted    Goals  Short term goals  Time Frame for Short term goals: 1 week  Short term goal 1: Pt will complete total body bathing/dressing Modified I  Short term goal 2: Pt will be Modified I toileting task/transfer   Short term goal 3: Pt will be Modified I functional mobility to/from bathroom   Short term goal 4: Pt will be I BUE HEP to increase strenght and endurance for ADL tasks.   Long term goals  Long term goal 1: Home vs SNF  Patient Goals   Patient goals : Home vs SNF           Electronically signed by Tad Leone OT on 10/11/2017 at 2:28 PM    Tad Leone OT

## 2017-10-11 NOTE — PROGRESS NOTES
Follow up visit:  Pt is resting in bed. Awake and oriented. Pt states she feels good today. Cardizem initiated today for pt and she states it has helped her feel better getting HR down. Pt also tells me she is hoping to be accepted to [facility] for rehab. She is excited she will be close to home and able to have company. We discussed what she knew about pleurx catheter. Pt states she only knows how it would be placed. Her nurse was able to explain what was told in report this morning. Pt denies pain. No other needs voiced. She asked to be kept I our prayers. PC will follow for support and POC.     Electronically signed by Jeancarlos Grace RN on 10/11/2017 at 12:05 PM

## 2017-10-12 LAB
ANION GAP SERPL CALCULATED.3IONS-SCNC: 6 MMOL/L (ref 7–19)
BUN BLDV-MCNC: 29 MG/DL (ref 8–23)
CALCIUM SERPL-MCNC: 8.9 MG/DL (ref 8.8–10.2)
CHLORIDE BLD-SCNC: 100 MMOL/L (ref 98–111)
CO2: 34 MMOL/L (ref 22–29)
CREAT SERPL-MCNC: 1.3 MG/DL (ref 0.5–0.9)
GFR NON-AFRICAN AMERICAN: 40
GLUCOSE BLD-MCNC: 190 MG/DL (ref 74–109)
HCT VFR BLD CALC: 26.6 % (ref 37–47)
HEMOGLOBIN: 8.2 G/DL (ref 12–16)
INR BLD: 1.12 (ref 0.88–1.18)
MCH RBC QN AUTO: 31.4 PG (ref 27–31)
MCHC RBC AUTO-ENTMCNC: 30.8 G/DL (ref 33–37)
MCV RBC AUTO: 101.9 FL (ref 81–99)
PDW BLD-RTO: 18.6 % (ref 11.5–14.5)
PLATELET # BLD: 380 K/UL (ref 130–400)
PMV BLD AUTO: 10 FL (ref 9.4–12.3)
POTASSIUM SERPL-SCNC: 4.3 MMOL/L (ref 3.5–5)
PROTHROMBIN TIME: 14.3 SEC (ref 12–14.6)
RBC # BLD: 2.61 M/UL (ref 4.2–5.4)
SODIUM BLD-SCNC: 140 MMOL/L (ref 136–145)
WBC # BLD: 19.9 K/UL (ref 4.8–10.8)

## 2017-10-12 PROCEDURE — 6370000000 HC RX 637 (ALT 250 FOR IP): Performed by: HOSPITALIST

## 2017-10-12 PROCEDURE — 85610 PROTHROMBIN TIME: CPT

## 2017-10-12 PROCEDURE — 6360000002 HC RX W HCPCS: Performed by: INTERNAL MEDICINE

## 2017-10-12 PROCEDURE — 6370000000 HC RX 637 (ALT 250 FOR IP): Performed by: INTERNAL MEDICINE

## 2017-10-12 PROCEDURE — 6360000002 HC RX W HCPCS: Performed by: HOSPITALIST

## 2017-10-12 PROCEDURE — 2140000000 HC CCU INTERMEDIATE R&B

## 2017-10-12 PROCEDURE — 2580000003 HC RX 258: Performed by: INTERNAL MEDICINE

## 2017-10-12 PROCEDURE — 99231 SBSQ HOSP IP/OBS SF/LOW 25: CPT | Performed by: INTERNAL MEDICINE

## 2017-10-12 PROCEDURE — 2700000000 HC OXYGEN THERAPY PER DAY

## 2017-10-12 PROCEDURE — 80048 BASIC METABOLIC PNL TOTAL CA: CPT

## 2017-10-12 PROCEDURE — 85027 COMPLETE CBC AUTOMATED: CPT

## 2017-10-12 PROCEDURE — 99232 SBSQ HOSP IP/OBS MODERATE 35: CPT | Performed by: INTERNAL MEDICINE

## 2017-10-12 PROCEDURE — 94640 AIRWAY INHALATION TREATMENT: CPT

## 2017-10-12 RX ORDER — DILTIAZEM HYDROCHLORIDE 120 MG/1
120 CAPSULE, COATED, EXTENDED RELEASE ORAL DAILY
Status: DISCONTINUED | OUTPATIENT
Start: 2017-10-13 | End: 2017-10-13 | Stop reason: HOSPADM

## 2017-10-12 RX ORDER — WARFARIN SODIUM 5 MG/1
10 TABLET ORAL
Status: COMPLETED | OUTPATIENT
Start: 2017-10-12 | End: 2017-10-12

## 2017-10-12 RX ORDER — FUROSEMIDE 40 MG/1
40 TABLET ORAL DAILY
Status: DISCONTINUED | OUTPATIENT
Start: 2017-10-12 | End: 2017-10-13 | Stop reason: HOSPADM

## 2017-10-12 RX ADMIN — IPRATROPIUM BROMIDE AND ALBUTEROL SULFATE 1 AMPULE: .5; 3 SOLUTION RESPIRATORY (INHALATION) at 20:04

## 2017-10-12 RX ADMIN — TRAMADOL HYDROCHLORIDE 50 MG: 50 TABLET, FILM COATED ORAL at 08:32

## 2017-10-12 RX ADMIN — IPRATROPIUM BROMIDE AND ALBUTEROL SULFATE 1 AMPULE: .5; 3 SOLUTION RESPIRATORY (INHALATION) at 11:13

## 2017-10-12 RX ADMIN — GUAIFENESIN 600 MG: 600 TABLET, EXTENDED RELEASE ORAL at 08:32

## 2017-10-12 RX ADMIN — PANTOPRAZOLE SODIUM 40 MG: 40 TABLET, DELAYED RELEASE ORAL at 05:41

## 2017-10-12 RX ADMIN — ENOXAPARIN SODIUM 70 MG: 80 INJECTION SUBCUTANEOUS at 08:32

## 2017-10-12 RX ADMIN — PRAVASTATIN SODIUM 40 MG: 20 TABLET ORAL at 08:32

## 2017-10-12 RX ADMIN — WARFARIN SODIUM 10 MG: 5 TABLET ORAL at 17:56

## 2017-10-12 RX ADMIN — DILTIAZEM HYDROCHLORIDE 30 MG: 60 TABLET, FILM COATED ORAL at 17:56

## 2017-10-12 RX ADMIN — MEROPENEM 1 G: 1 INJECTION, POWDER, FOR SOLUTION INTRAVENOUS at 00:48

## 2017-10-12 RX ADMIN — AMIODARONE HYDROCHLORIDE 200 MG: 200 TABLET ORAL at 08:32

## 2017-10-12 RX ADMIN — DILTIAZEM HYDROCHLORIDE 30 MG: 60 TABLET, FILM COATED ORAL at 00:04

## 2017-10-12 RX ADMIN — ENOXAPARIN SODIUM 70 MG: 80 INJECTION SUBCUTANEOUS at 20:29

## 2017-10-12 RX ADMIN — IPRATROPIUM BROMIDE AND ALBUTEROL SULFATE 1 AMPULE: .5; 3 SOLUTION RESPIRATORY (INHALATION) at 07:22

## 2017-10-12 RX ADMIN — TRAMADOL HYDROCHLORIDE 50 MG: 50 TABLET, FILM COATED ORAL at 20:29

## 2017-10-12 RX ADMIN — WATER 20 ML: 1 INJECTION INTRAMUSCULAR; INTRAVENOUS; SUBCUTANEOUS at 00:48

## 2017-10-12 RX ADMIN — GUAIFENESIN 600 MG: 600 TABLET, EXTENDED RELEASE ORAL at 20:29

## 2017-10-12 RX ADMIN — DILTIAZEM HYDROCHLORIDE 30 MG: 60 TABLET, FILM COATED ORAL at 05:41

## 2017-10-12 RX ADMIN — FUROSEMIDE 40 MG: 40 TABLET ORAL at 13:14

## 2017-10-12 RX ADMIN — DILTIAZEM HYDROCHLORIDE 30 MG: 60 TABLET, FILM COATED ORAL at 13:14

## 2017-10-12 RX ADMIN — IPRATROPIUM BROMIDE AND ALBUTEROL SULFATE 1 AMPULE: .5; 3 SOLUTION RESPIRATORY (INHALATION) at 14:41

## 2017-10-12 ASSESSMENT — PAIN SCALES - GENERAL
PAINLEVEL_OUTOF10: 8
PAINLEVEL_OUTOF10: 0
PAINLEVEL_OUTOF10: 0
PAINLEVEL_OUTOF10: 4

## 2017-10-12 NOTE — PROGRESS NOTES
Temp 97 °F (36.1 °C) (Temporal)   Resp 18   Ht 5' 9\" (1.753 m)   Wt 165 lb 3.2 oz (74.9 kg)   SpO2 93%   BMI 24.40 kg/m²   General appearance: No apparent distress, appears stated age and cooperative. HEENT: Normal cephalic, atraumatic without obvious deformity. Pupils equal, round, and reactive to light.  Extra ocular muscles intact. Conjunctivae/corneas clear. Neck: Supple, with full range of motion. No jugular venous distention. Trachea midline.  No thyroid tenderness.  No masses palpated.    Respiratory:  Normal respiratory effort. Clear to auscultation, bilaterally without wheezes or rhonchi. Few fine crackles noted bilateral bases. Cardiovascular: Regular rate and rhythm with normal S1/S2.  5/7 soft systolic ejection murmur.  No rubs or gallops. Abdomen: Soft, non-tender, non-distended with normal bowel sounds.  No hepatosplenomegaly.    Musculoskeletal: No clubbing, cyanosis or edema bilaterally.  Full range of motion without deformity. Skin: Skin color pale, texture, turgor normal.  No rashes or lesions. Neurologic:  Neurovascularly intact without any focal sensory/motor deficits. Cranial nerves: II-XII intact, grossly non-focal.  Generalized weakness. Psychiatric: Alert and oriented, thought content appropriate, normal insight    Assessment & Plan:       Active Hospital Problems    Diagnosis Date Noted    Non-small cell lung cancer ([facility] [C34.90]      Priority: High    Warfarin toxicity [T45.511A]      Priority: High    Diastolic heart failure, stage C (HCC) [I50.30]      Priority: High    Polyp of descending colon [D12.4]     Diverticulosis of large intestine without hemorrhage [K57.30]     HCAP (healthcare-associated pneumonia) [J18.9] 09/30/2017    Gastroesophageal reflux disease with esophagitis [K21.0]     Paroxysmal atrial fibrillation ([facility] [I48.0] 09/28/2017    Hypercoagulable state ([facility] [D68.59] 09/28/2017    Gastrointestinal hemorrhage with melena [K92.1] 09/28/2017   

## 2017-10-12 NOTE — PROGRESS NOTES
Clinical Pharmacy Note    Warfarin consult follow-up    Recent Labs      10/12/17   0100   INR  1.12     Recent Labs      10/10/17   0203  10/11/17   0226  10/12/17   0100   HGB  8.6*  9.1*  8.2*   HCT  28.5*  30.2*  26.6*   PLT  381  377  380       Significant Drug-Drug Interactions:  Current warfarin drug-drug interactions: Amiodarone / Enoxaparin / Doxycycline / Tramadol  Discontinued drug-drug interactions: None    Date INR Warfarin Dose   09-28 to now   Held due to bleed   10/11/17 1.16 7.5 mg per MD order   10/12/17  1.12  10 mg                                      Notes: Give Coumadin 10 mg po x 1 tonight. Daily PT/INR until stable within therapeutic range.      Electronically signed by Varsha Carey [facility] on 10/12/2017 at 10:41 AM

## 2017-10-12 NOTE — PROGRESS NOTES
10/12/17 0905   Subjective   Subjective I just don't feel like it know I just got cleaned up and got back in bed . I am waiting for the doctor  I may leave today.   If you could come back later  we will try take a few steps   Physical Therapy  Facility/Department: [facility] PROGRESSIVE CARE  Daily Treatment Note  NAME: Ama Luna  : 1940  MRN: [id number]    Date of Service: 10/12/2017    Patient Diagnosis(es):   Patient Active Problem List    Diagnosis Date Noted    Non-small cell lung cancer [facility])      Priority: High    Warfarin toxicity      Priority: High    Diastolic heart failure, stage C (HCC)      Priority: High    Polyp of descending colon     Diverticulosis of large intestine without hemorrhage     HCAP (healthcare-associated pneumonia) 2017    Gastroesophageal reflux disease with esophagitis     Paroxysmal atrial fibrillation ([facility] 2017    Hypercoagulable state ([facility] 2017    Gastrointestinal hemorrhage with melena 2017    Acute blood loss anemia 2017    Warfarin-induced coagulopathy (HCC)     Melena     Severe protein-calorie malnutrition ([address] 2017    Pneumonia of left lung due to infectious organism 2017    Atrial fibrillation with RVR ([facility] 2017    Lung mass     Mixed hyperlipidemia     Essential hypertension        Past Medical History:   Diagnosis Date    A-fib ([facility]     Anticoagulated on Coumadin     Anxiety     HTN (hypertension)     Hyperlipidemia     Malignant neoplasm of breast (female), unspecified site     Occlusion and stenosis of carotid artery     Osteoporosis     Palliative care encounter     Respiratory distress     Tobacco use disorder      Past Surgical History:   Procedure Laterality Date    BREAST SURGERY      CATARACT REMOVAL      CHOLECYSTECTOMY      COLONOSCOPY      LYMPH NODE BIOPSY      MASTECTOMY, PARTIAL      ME COLSC FLX W/REMOVAL LESION BY HOT BX FORCEPS N/A 10/2/2017    Dr Jacquenette Denver prep, diverticulosis-Tubular AP (-) dysplasia--1 yr recall    UPPER GASTROINTESTINAL ENDOSCOPY N/A 9/29/2017    Dr Rodolfo Zuleta       Restrictions  Restrictions/Precautions  Restrictions/Precautions: Fall Risk  Position Activity Restriction  Other position/activity restrictions: O2 saturation   Subjective   Subjective  Subjective: (P) I just don't feel like it know I just got cleaned up and got back in bed . I am waiting for the doctor  I may leave today.   If you could come back later  we will try take a few steps          Orientation     Objective                                           Assessment              Discharge Recommendations:       G-Code     OutComes Score                                                      Goals  Short term goals  Time Frame for Short term goals: 2 weeks  Short term goal 1: Independent with all bed mobility and transfers  Short term goal 2: Ambulate 400 feet independently with assistive device    Plan    Plan  Times per week: 7  Times per day: Daily  Plan weeks: 2  Current Treatment Recommendations: Strengthening, Gait Training, Functional Mobility Training  Safety Devices  Type of devices: Call light within reach, Left in chair     Therapy Time   Individual Concurrent Group Co-treatment   Time In           Time Out           Minutes                   Valerie Vidal PTA     Electronically signed by Valerie Vidal PTA on 10/12/2017 at 9:09 AM

## 2017-10-12 NOTE — PROGRESS NOTES
Palliative Care  made a follow up visit to offer support to Patient. Patient was supposed to go home on yesterday, but Patient reports that she felt that she needed to stay a little longer to make sure that she is fine when she goes home. Patient says that she is feeling fine, but worried about having a relapse.  promised to follow up with Patient tomorrow morning.       Electronically signed by Donald Schwab on 10/12/2017 at 3:57 PM

## 2017-10-13 VITALS
OXYGEN SATURATION: 93 % | TEMPERATURE: 97.8 F | HEIGHT: 69 IN | BODY MASS INDEX: 25.8 KG/M2 | HEART RATE: 106 BPM | DIASTOLIC BLOOD PRESSURE: 61 MMHG | WEIGHT: 174.2 LBS | RESPIRATION RATE: 16 BRPM | SYSTOLIC BLOOD PRESSURE: 103 MMHG

## 2017-10-13 LAB
ANION GAP SERPL CALCULATED.3IONS-SCNC: 7 MMOL/L (ref 7–19)
BUN BLDV-MCNC: 37 MG/DL (ref 8–23)
CALCIUM SERPL-MCNC: 8.8 MG/DL (ref 8.8–10.2)
CHLORIDE BLD-SCNC: 99 MMOL/L (ref 98–111)
CO2: 34 MMOL/L (ref 22–29)
CREAT SERPL-MCNC: 1.4 MG/DL (ref 0.5–0.9)
CULTURE, RESPIRATORY: ABNORMAL
CULTURE, RESPIRATORY: ABNORMAL
GFR NON-AFRICAN AMERICAN: 37
GLUCOSE BLD-MCNC: 158 MG/DL (ref 74–109)
GRAM STAIN RESULT: ABNORMAL
HCT VFR BLD CALC: 25.1 % (ref 37–47)
HEMOGLOBIN: 7.9 G/DL (ref 12–16)
INR BLD: 1.31 (ref 0.88–1.18)
MCH RBC QN AUTO: 31.2 PG (ref 27–31)
MCHC RBC AUTO-ENTMCNC: 31.5 G/DL (ref 33–37)
MCV RBC AUTO: 99.2 FL (ref 81–99)
ORGANISM: ABNORMAL
PDW BLD-RTO: 18.6 % (ref 11.5–14.5)
PLATELET # BLD: 388 K/UL (ref 130–400)
PMV BLD AUTO: 9.8 FL (ref 9.4–12.3)
POTASSIUM SERPL-SCNC: 4.6 MMOL/L (ref 3.5–5)
PROTHROMBIN TIME: 16.3 SEC (ref 12–14.6)
RBC # BLD: 2.53 M/UL (ref 4.2–5.4)
SODIUM BLD-SCNC: 140 MMOL/L (ref 136–145)
WBC # BLD: 23.1 K/UL (ref 4.8–10.8)

## 2017-10-13 PROCEDURE — 6370000000 HC RX 637 (ALT 250 FOR IP): Performed by: HOSPITALIST

## 2017-10-13 PROCEDURE — 99238 HOSP IP/OBS DSCHRG MGMT 30/<: CPT | Performed by: INTERNAL MEDICINE

## 2017-10-13 PROCEDURE — 6370000000 HC RX 637 (ALT 250 FOR IP): Performed by: INTERNAL MEDICINE

## 2017-10-13 PROCEDURE — 85027 COMPLETE CBC AUTOMATED: CPT

## 2017-10-13 PROCEDURE — 85610 PROTHROMBIN TIME: CPT

## 2017-10-13 PROCEDURE — 36415 COLL VENOUS BLD VENIPUNCTURE: CPT

## 2017-10-13 PROCEDURE — 2580000003 HC RX 258: Performed by: INTERNAL MEDICINE

## 2017-10-13 PROCEDURE — 94640 AIRWAY INHALATION TREATMENT: CPT

## 2017-10-13 PROCEDURE — 80048 BASIC METABOLIC PNL TOTAL CA: CPT

## 2017-10-13 PROCEDURE — 6360000002 HC RX W HCPCS: Performed by: INTERNAL MEDICINE

## 2017-10-13 PROCEDURE — 2700000000 HC OXYGEN THERAPY PER DAY

## 2017-10-13 RX ORDER — PRAVASTATIN SODIUM 40 MG
40 TABLET ORAL DAILY
Qty: 30 TABLET | Refills: 3 | Status: ON HOLD | DISCHARGE
Start: 2017-10-13 | End: 2017-12-15 | Stop reason: HOSPADM

## 2017-10-13 RX ORDER — GUAIFENESIN 600 MG/1
600 TABLET, EXTENDED RELEASE ORAL 2 TIMES DAILY
Qty: 10 TABLET | Refills: 0 | DISCHARGE
Start: 2017-10-13 | End: 2017-10-18

## 2017-10-13 RX ORDER — WARFARIN SODIUM 5 MG/1
5 TABLET ORAL DAILY
Qty: 5 TABLET | Refills: 0 | Status: ON HOLD | DISCHARGE
Start: 2017-10-13 | End: 2017-11-27 | Stop reason: HOSPADM

## 2017-10-13 RX ORDER — PANTOPRAZOLE SODIUM 40 MG/1
40 TABLET, DELAYED RELEASE ORAL
Qty: 30 TABLET | Refills: 3 | Status: ON HOLD | DISCHARGE
Start: 2017-10-14 | End: 2017-11-11 | Stop reason: HOSPADM

## 2017-10-13 RX ORDER — HYDROCODONE BITARTRATE AND ACETAMINOPHEN 5; 325 MG/1; MG/1
1 TABLET ORAL EVERY 6 HOURS PRN
Qty: 20 TABLET | Refills: 0 | Status: SHIPPED | OUTPATIENT
Start: 2017-10-13 | End: 2017-10-18

## 2017-10-13 RX ORDER — FUROSEMIDE 40 MG/1
20 TABLET ORAL DAILY
Qty: 60 TABLET | Refills: 3 | DISCHARGE
Start: 2017-10-14

## 2017-10-13 RX ORDER — DILTIAZEM HYDROCHLORIDE 120 MG/1
120 CAPSULE, COATED, EXTENDED RELEASE ORAL DAILY
Qty: 30 CAPSULE | Refills: 3 | Status: ON HOLD | DISCHARGE
Start: 2017-10-14 | End: 2017-12-15 | Stop reason: HOSPADM

## 2017-10-13 RX ORDER — AMIODARONE HYDROCHLORIDE 200 MG/1
200 TABLET ORAL DAILY
Qty: 30 TABLET | Refills: 0 | Status: ON HOLD | DISCHARGE
Start: 2017-10-13 | End: 2017-12-15 | Stop reason: HOSPADM

## 2017-10-13 RX ORDER — TRAMADOL HYDROCHLORIDE 50 MG/1
50 TABLET ORAL 2 TIMES DAILY
Qty: 10 TABLET | Refills: 0 | Status: SHIPPED | OUTPATIENT
Start: 2017-10-13 | End: 2017-10-18

## 2017-10-13 RX ADMIN — AMIODARONE HYDROCHLORIDE 200 MG: 200 TABLET ORAL at 08:45

## 2017-10-13 RX ADMIN — IPRATROPIUM BROMIDE AND ALBUTEROL SULFATE 1 AMPULE: .5; 3 SOLUTION RESPIRATORY (INHALATION) at 07:01

## 2017-10-13 RX ADMIN — PANTOPRAZOLE SODIUM 40 MG: 40 TABLET, DELAYED RELEASE ORAL at 06:26

## 2017-10-13 RX ADMIN — ENOXAPARIN SODIUM 70 MG: 80 INJECTION SUBCUTANEOUS at 08:45

## 2017-10-13 RX ADMIN — GUAIFENESIN 600 MG: 600 TABLET, EXTENDED RELEASE ORAL at 08:45

## 2017-10-13 RX ADMIN — ACETAMINOPHEN 650 MG: 325 TABLET, FILM COATED ORAL at 10:52

## 2017-10-13 RX ADMIN — IPRATROPIUM BROMIDE AND ALBUTEROL SULFATE 1 AMPULE: .5; 3 SOLUTION RESPIRATORY (INHALATION) at 10:31

## 2017-10-13 RX ADMIN — PRAVASTATIN SODIUM 40 MG: 20 TABLET ORAL at 08:45

## 2017-10-13 RX ADMIN — TRAMADOL HYDROCHLORIDE 50 MG: 50 TABLET, FILM COATED ORAL at 08:45

## 2017-10-13 RX ADMIN — FUROSEMIDE 40 MG: 40 TABLET ORAL at 08:46

## 2017-10-13 RX ADMIN — DILTIAZEM HYDROCHLORIDE 120 MG: 120 CAPSULE, COATED, EXTENDED RELEASE ORAL at 08:45

## 2017-10-13 ASSESSMENT — PAIN SCALES - GENERAL
PAINLEVEL_OUTOF10: 0
PAINLEVEL_OUTOF10: 5
PAINLEVEL_OUTOF10: 5
PAINLEVEL_OUTOF10: 0

## 2017-10-13 NOTE — DISCHARGE SUMMARY
Hospitalist Discharge Summary    Annika MelchorCross Timbers  :    MRN:  [id number]    Admit date:  2017  Discharge date:  10/13/2017    Admitting Physician:  Noemi Aguilar MD  Primary Care Physician:  Raman Chan    Discharge Diagnoses:  Principal Problem:    Gastrointestinal hemorrhage with melena  Active Problems:    Diastolic heart failure, stage C (HCC)    Non-small cell lung cancer (HCC)    Warfarin toxicity    Essential hypertension    Lung mass    Paroxysmal atrial fibrillation (HCC)    Hypercoagulable state ([address]    Acute blood loss anemia    Warfarin-induced coagulopathy (HCC)    Melena    Gastroesophageal reflux disease with esophagitis    HCAP (healthcare-associated pneumonia)    Polyp of descending colon    Diverticulosis of large intestine without hemorrhage      Hospital Course:     77F with recent lung cancer diagnosis, atrial fibrillation uncontrolled, left pleural effusion, pneumonia, and other medical problems. Original admission for GI bleeding with an INR greater than 18, had negative EGD and Colonoscopy, bleeding stopped and has been transitioned back to coumadin due to her increased risk of clot due to cancer and atrial fibrillation. She has completed a 14 day course of IV antibiotics in regards to her HCAP. She was seen in consultation by CTS in regards to possible pleurx catheter and it would not be of much benefit in regards to her trapped lung at this point. Cardiology has followed her for her atrial fibrillation and rate control which is improved. [facility]. INR on Monday with results to Coumadin [facility] associated with Dr. Mary Núñez office. Outpatient heme/onc consult will need to be re-scheduled as well. Discharge in stable condition.       Discharge Medications:       Jeny Marrow   Home Medication Instructions [facility]:[id number]    Printed on:10/13/17 1008   Medication Information                      amiodarone (CORDARONE) 200 MG tablet  Take 1 tablet by mouth daily             diltiazem (CARDIZEM CD) 120 MG extended release capsule  Take 1 capsule by mouth daily             enoxaparin (LOVENOX) 80 MG/0.8ML injection  Inject 0.7 mLs into the skin 2 times daily for 3 days             furosemide (LASIX) 40 MG tablet  Take 0.5 tablets by mouth daily             guaiFENesin (MUCINEX) 600 MG extended release tablet  Take 1 tablet by mouth 2 times daily for 5 days             HYDROcodone-acetaminophen (NORCO) 5-325 MG per tablet  Take 1 tablet by mouth every 6 hours as needed for Pain .             ipratropium-albuterol (DUONEB) 0.5-2.5 (3) MG/3ML SOLN nebulizer solution  Inhale 3 mLs into the lungs 4 times daily for 5 days             pantoprazole (PROTONIX) 40 MG tablet  Take 1 tablet by mouth every morning (before breakfast)             pantoprazole (PROTONIX) 40 MG tablet  Take 1 tablet by mouth every morning (before breakfast)             pravastatin (PRAVACHOL) 40 MG tablet  Take 1 tablet by mouth daily             traMADol (ULTRAM) 50 MG tablet  Take 50 mg by mouth 2 times daily             traMADol (ULTRAM) 50 MG tablet  Take 1 tablet by mouth 2 times daily for 5 days             warfarin (COUMADIN) 5 MG tablet  Take 1 tablet by mouth daily Keep INR 2.0-3.0                 Consults:  Dr. Praneeth Garcia with GI          Dr. Joanne Jackson with Cardiothoracic surgery          Dr. Javier Gardner with cardiology    Significant Diagnostic Studies:    CT Chest (10-10-17): Impression:         1. Large left hilar mass with ill-defined margins with postobstructive  consolidation of the left upper lobe and a large left-sided pleural  effusion. There is associated bulky mediastinal lymphadenopathy as  well as some small left supraclavicular nodes. A right adrenal nodule  is present with differential to include a metastatic lesion. PET/CT  may be helpful for further characterization and to assess for more  distant metastatic disease.   2. There is a small to moderate right-sided effusion with right  basilar atelectasis. There are several small noncalcified nodules  within the right lung as described above potentially representing lung  to lung metastases. They can be followed up at the time of subsequent  CT. .               XR Chest Portable [[id number]] Resulted: 09/29/17 1704       Order Status: Completed Updated: 09/29/17 1606     Narrative:       XR CHEST PORTABLE   9/29/2017 4:03 PM  History: Leukocytosis. Portable chest x-ray compared with 09/08/2017. Increased dense consolidation of left lower lobe compatible with  pneumonia. Left PICC line now in good position. Underlying chronic lung changes. No pneumothorax.     Impression:       1. Extensive left lower lobe pneumonia is increased as compared with 3  weeks ago.         CT Chest:  09-30-17          Impression:         1. Superimposed pneumonia surrounding a biopsy-proven lung carcinoma  in the left lingula. 2. Consolidation in the right lower lobe may also represent pneumonia  or atelectasis. 3. Moderate-sized left pleural effusion. 4. Metastatic lymphadenopathy in the mediastinum. 5. Lucent lesion in the L3 vertebral body is partially visualized and  is concerning for metastatic disease. Disposition:   Discharge [facility] stable condition to [facility]. Follow up with Dariela Trinidad in 2 weeks.     Signed:  Bubba Baptiste DO  10/13/2017, 10:08 AM

## 2017-10-13 NOTE — PROGRESS NOTES
Nutrition Assessment    Type and Reason for Visit: Reassess    Nutrition Recommendations: continue current POC    Malnutrition Assessment:  · Malnutrition Status: At risk for malnutrition  · Context: Acute illness or injury    Nutrition Diagnosis:   · Problem: Inadequate oral intake  · Etiology: related to Alteration in GI function     Signs and symptoms:  as evidenced by Intake 0-25%    Nutrition Assessment:  · Subjective Assessment: Aware pt to be discharged today. Appetite is improving slowly with intake %. Does take Ensure Enlive  · Nutrition-Focused Physical Findings:    · Wound Type: Skin Tears  · Current Nutrition Therapies:  · Oral Diet Orders: Dental Soft, 2gm Sodium, Fluid Restriction   · Oral Diet intake: 26-50%, 51-75%, %  · Oral Nutrition Supplement (ONS) Orders: Standard High Calorie Oral Supplement  · ONS intake: %     · Anthropometric Measures:  · Ht: 5' 9\" (175.3 cm)   · Current Body Wt: 174 lb 3 oz (79 kg)  · Admission Body Wt: 160 lb (72.6 kg) (stated)  · Ideal Body Wt: 145 lb (65.8 kg),  · BMI Classification: BMI 25.0 - 29.9 Overweight    Estimated Intake vs Estimated Needs: Intake Improving    Nutrition Risk Level: Moderate    Nutrition Interventions:   Continue current diet, Continue current ONS  Continued Inpatient Monitoring    Nutrition Evaluation:   · Evaluation: Progressing toward goals   · Goals: po intake 50% or greater    · Monitoring: Meal Intake, Supplement Intake, Diet Tolerance, Skin Integrity, Wound Healing, Weight, Pertinent Labs    See Adult Nutrition Doc Flowsheet for more detail.      Electronically signed by Pedro Del Cid MS, RD, LD on 10/13/17 at 12:38 PM    Contact Number: [phone]

## 2017-10-13 NOTE — PROGRESS NOTES
10/13/17 0900   Subjective   Subjective I am leaving I am going to rehab today, no I do not want to do anything this morning   Physical Therapy  Facility/Department: [facility] PROGRESSIVE CARE  Daily Treatment Note  NAME: Kiersten Mcallister  : 1940  MRN: [id number]    Date of Service: 10/13/2017    Patient Diagnosis(es):   Patient Active Problem List    Diagnosis Date Noted    Non-small cell lung cancer [facility])      Priority: High    Warfarin toxicity      Priority: High    Diastolic heart failure, stage C (HCC)      Priority: High    Polyp of descending colon     Diverticulosis of large intestine without hemorrhage     HCAP (healthcare-associated pneumonia) 2017    Gastroesophageal reflux disease with esophagitis     Paroxysmal atrial fibrillation ([address] 2017    Hypercoagulable state ([address] [facility] [address] 2017    Gastrointestinal hemorrhage with melena 2017    Acute blood loss anemia 2017    Warfarin-induced coagulopathy (HCC)     Melena     Severe protein-calorie malnutrition ([address] 2017    Pneumonia of left lung due to infectious organism 2017    Atrial fibrillation with RVR ([address] 2017    Lung mass     Mixed hyperlipidemia     Essential hypertension        Past Medical History:   Diagnosis Date    A-fib ([address]     Anticoagulated on Coumadin     Anxiety     HTN (hypertension)     Hyperlipidemia     Malignant neoplasm of breast (female), unspecified site     Occlusion and stenosis of carotid artery     Osteoporosis     Palliative care encounter     Respiratory distress     Tobacco use disorder      Past Surgical History:   Procedure Laterality Date    BREAST SURGERY      CATARACT REMOVAL      CHOLECYSTECTOMY      COLONOSCOPY      LYMPH NODE BIOPSY      MASTECTOMY, PARTIAL      AR COLSC FLX W/REMOVAL LESION BY HOT BX FORCEPS N/A 10/2/2017    Dr Gerard Pallas prep, diverticulosis-Tubular AP (-) dysplasia--1 yr recall    UPPER GASTROINTESTINAL ENDOSCOPY N/A 9/29/2017    Dr Aime Cash       Restrictions  Restrictions/Precautions  Restrictions/Precautions: Fall Risk  Position Activity Restriction  Other position/activity restrictions: O2 saturation   Subjective   Subjective  Subjective: (P) I am leaving I am going to rehab today, no I do not want to do anything this morning          Orientation     Objective                                           Assessment              Discharge Recommendations:       G-Code     OutComes Score                                                      Goals  Short term goals  Time Frame for Short term goals: 2 weeks  Short term goal 1: Independent with all bed mobility and transfers  Short term goal 2: Ambulate 400 feet independently with assistive device    Plan    Plan  Times per week: 7  Times per day: Daily  Plan weeks: 2  Current Treatment Recommendations: Strengthening, Gait Training, Functional Mobility Training  Safety Devices  Type of devices: Call light within reach, Left in chair     Therapy Time   Individual Concurrent Group Co-treatment   Time In           Time Out           Minutes                Electronically signed by Peyton Wagner PTA on 10/13/2017 at 9:02 AM    Peyton Wagner PTA

## 2017-10-17 ENCOUNTER — TELEPHONE (OUTPATIENT)
Dept: CARDIOLOGY | Age: 77
End: 2017-10-17

## 2017-10-17 NOTE — TELEPHONE ENCOUNTER
Spoke with Bubba Ramos (patients nurse at [facility]) and she said her PT 26.2 INR 2.2. They give her coumadin last night and Dr. Nguyen Must dc'd her Lovenox.

## 2017-10-17 NOTE — TELEPHONE ENCOUNTER
Patient was supposed to have an INR done at [facility] yesterday, they were going to call the INR in to the coumadin clinic. I do not see any record of them calling it in. Will you call the patient to make sure she had the INR done and that she was dosed?

## 2017-10-18 LAB
AFB CULTURE (MYCOBACTERIA): NORMAL
AFB SMEAR: NORMAL

## 2017-11-01 LAB
EKG P AXIS: NORMAL DEGREES
EKG P-R INTERVAL: NORMAL MS
EKG Q-T INTERVAL: 312 MS
EKG QRS DURATION: 108 MS
EKG QTC CALCULATION (BAZETT): 396 MS
EKG T AXIS: 25 DEGREES

## 2017-11-03 ENCOUNTER — HOSPITAL ENCOUNTER (INPATIENT)
Age: 77
LOS: 5 days | Discharge: HOME | DRG: 683 | End: 2017-11-11
Attending: HOSPITALIST | Admitting: HOSPITALIST
Payer: MEDICARE

## 2017-11-03 DIAGNOSIS — Z98.890 S/P THORACENTESIS: ICD-10-CM

## 2017-11-03 PROBLEM — N17.9 AKI (ACUTE KIDNEY INJURY) (HCC): Status: ACTIVE | Noted: 2017-11-03

## 2017-11-03 PROCEDURE — 1210000000 HC MED SURG R&B

## 2017-11-03 PROCEDURE — 99223 1ST HOSP IP/OBS HIGH 75: CPT | Performed by: FAMILY MEDICINE

## 2017-11-03 RX ORDER — ONDANSETRON 2 MG/ML
4 INJECTION INTRAMUSCULAR; INTRAVENOUS EVERY 6 HOURS PRN
Status: DISCONTINUED | OUTPATIENT
Start: 2017-11-03 | End: 2017-11-11 | Stop reason: HOSPADM

## 2017-11-03 RX ORDER — SODIUM CHLORIDE 0.9 % (FLUSH) 0.9 %
10 SYRINGE (ML) INJECTION EVERY 12 HOURS SCHEDULED
Status: DISCONTINUED | OUTPATIENT
Start: 2017-11-04 | End: 2017-11-11 | Stop reason: HOSPADM

## 2017-11-03 RX ORDER — SODIUM CHLORIDE 9 MG/ML
INJECTION, SOLUTION INTRAVENOUS CONTINUOUS
Status: DISCONTINUED | OUTPATIENT
Start: 2017-11-04 | End: 2017-11-06

## 2017-11-03 RX ORDER — ACETAMINOPHEN 325 MG/1
650 TABLET ORAL EVERY 4 HOURS PRN
Status: DISCONTINUED | OUTPATIENT
Start: 2017-11-03 | End: 2017-11-11 | Stop reason: HOSPADM

## 2017-11-04 ENCOUNTER — APPOINTMENT (OUTPATIENT)
Dept: ULTRASOUND IMAGING | Age: 77
DRG: 683 | End: 2017-11-04
Attending: HOSPITALIST
Payer: MEDICARE

## 2017-11-04 PROBLEM — D72.829 LEUKOCYTOSIS: Status: ACTIVE | Noted: 2017-11-04

## 2017-11-04 PROBLEM — J96.11 CHRONIC HYPOXEMIC RESPIRATORY FAILURE (HCC): Status: ACTIVE | Noted: 2017-11-04

## 2017-11-04 LAB
ANION GAP SERPL CALCULATED.3IONS-SCNC: 12 MMOL/L (ref 7–19)
ANISOCYTOSIS: ABNORMAL
BACTERIA: ABNORMAL /HPF
BANDED NEUTROPHILS RELATIVE PERCENT: 1 % (ref 0–5)
BASOPHILS ABSOLUTE: 0 K/UL (ref 0–0.2)
BASOPHILS MANUAL: 0 %
BASOPHILS RELATIVE PERCENT: 0 % (ref 0–1)
BILIRUBIN URINE: ABNORMAL
BLOOD, URINE: NEGATIVE
BUN BLDV-MCNC: 43 MG/DL (ref 8–23)
CALCIUM SERPL-MCNC: 7.9 MG/DL (ref 8.8–10.2)
CASTS 2: ABNORMAL /LPF
CASTS: ABNORMAL /LPF
CHLORIDE BLD-SCNC: 100 MMOL/L (ref 98–111)
CLARITY: ABNORMAL
CO2: 28 MMOL/L (ref 22–29)
COLOR: YELLOW
CREAT SERPL-MCNC: 2.6 MG/DL (ref 0.5–0.9)
CREATININE URINE: 104 MG/DL (ref 4.2–622)
EOSINOPHILS ABSOLUTE: 0 K/UL (ref 0–0.6)
EOSINOPHILS RELATIVE PERCENT: 0 % (ref 0–5)
EPITHELIAL CELLS, UA: ABNORMAL /HPF
GFR NON-AFRICAN AMERICAN: 18
GLUCOSE BLD-MCNC: 162 MG/DL (ref 74–109)
GLUCOSE URINE: NEGATIVE MG/DL
HCT VFR BLD CALC: 30 % (ref 37–47)
HEMOGLOBIN: 9 G/DL (ref 12–16)
HYPOCHROMIA: ABNORMAL
INR BLD: 2.45 (ref 0.88–1.18)
KETONES, URINE: NEGATIVE MG/DL
LEUKOCYTE ESTERASE, URINE: ABNORMAL
LYMPHOCYTES ABSOLUTE: 0.7 K/UL (ref 1.1–4.5)
LYMPHOCYTES RELATIVE PERCENT: 2 % (ref 20–40)
MCH RBC QN AUTO: 30.1 PG (ref 27–31)
MCHC RBC AUTO-ENTMCNC: 30 G/DL (ref 33–37)
MCV RBC AUTO: 100.3 FL (ref 81–99)
MONOCYTES ABSOLUTE: 0.4 K/UL (ref 0–0.9)
MONOCYTES RELATIVE PERCENT: 1 % (ref 0–10)
NEUTROPHILS ABSOLUTE: 35.3 K/UL (ref 1.5–7.5)
NEUTROPHILS MANUAL: 96 %
NEUTROPHILS RELATIVE PERCENT: 96 % (ref 50–65)
NITRITE, URINE: NEGATIVE
OSMOLALITY URINE: 345 MOSM/KG (ref 250–1200)
PDW BLD-RTO: 17.2 % (ref 11.5–14.5)
PH UA: 5.5
PLATELET # BLD: 501 K/UL (ref 130–400)
PLATELET SLIDE REVIEW: ABNORMAL
PMV BLD AUTO: 9.4 FL (ref 9.4–12.3)
POTASSIUM SERPL-SCNC: 4.5 MMOL/L (ref 3.5–5)
PROTEIN UA: ABNORMAL MG/DL
PROTHROMBIN TIME: 26.8 SEC (ref 12–14.6)
RBC # BLD: 2.99 M/UL (ref 4.2–5.4)
RENAL EPITHELIAL, UA: ABNORMAL /HPF
SODIUM BLD-SCNC: 140 MMOL/L (ref 136–145)
SODIUM URINE: <20 MMOL/L
SPECIFIC GRAVITY UA: 1.01
UROBILINOGEN, URINE: 0.2 E.U./DL
WBC # BLD: 36.4 K/UL (ref 4.8–10.8)
WBC UA: ABNORMAL /HPF (ref 0–5)
YEAST: ABNORMAL /HPF

## 2017-11-04 PROCEDURE — 87086 URINE CULTURE/COLONY COUNT: CPT

## 2017-11-04 PROCEDURE — 2700000000 HC OXYGEN THERAPY PER DAY

## 2017-11-04 PROCEDURE — 80048 BASIC METABOLIC PNL TOTAL CA: CPT

## 2017-11-04 PROCEDURE — 84300 ASSAY OF URINE SODIUM: CPT

## 2017-11-04 PROCEDURE — 82570 ASSAY OF URINE CREATININE: CPT

## 2017-11-04 PROCEDURE — 36415 COLL VENOUS BLD VENIPUNCTURE: CPT

## 2017-11-04 PROCEDURE — 83935 ASSAY OF URINE OSMOLALITY: CPT

## 2017-11-04 PROCEDURE — 87186 SC STD MICRODIL/AGAR DIL: CPT

## 2017-11-04 PROCEDURE — 87040 BLOOD CULTURE FOR BACTERIA: CPT

## 2017-11-04 PROCEDURE — 99233 SBSQ HOSP IP/OBS HIGH 50: CPT | Performed by: HOSPITALIST

## 2017-11-04 PROCEDURE — 6370000000 HC RX 637 (ALT 250 FOR IP): Performed by: HOSPITALIST

## 2017-11-04 PROCEDURE — 1210000000 HC MED SURG R&B

## 2017-11-04 PROCEDURE — 81001 URINALYSIS AUTO W/SCOPE: CPT

## 2017-11-04 PROCEDURE — 6360000002 HC RX W HCPCS: Performed by: FAMILY MEDICINE

## 2017-11-04 PROCEDURE — 2580000003 HC RX 258: Performed by: HOSPITALIST

## 2017-11-04 PROCEDURE — 85610 PROTHROMBIN TIME: CPT

## 2017-11-04 PROCEDURE — 76770 US EXAM ABDO BACK WALL COMP: CPT

## 2017-11-04 PROCEDURE — 2580000003 HC RX 258: Performed by: FAMILY MEDICINE

## 2017-11-04 PROCEDURE — 85025 COMPLETE CBC W/AUTO DIFF WBC: CPT

## 2017-11-04 RX ORDER — DRONABINOL 2.5 MG/1
2.5 CAPSULE ORAL 2 TIMES DAILY
Status: DISCONTINUED | OUTPATIENT
Start: 2017-11-04 | End: 2017-11-04

## 2017-11-04 RX ORDER — HYDROCODONE BITARTRATE AND ACETAMINOPHEN 5; 325 MG/1; MG/1
1 TABLET ORAL EVERY 6 HOURS PRN
Status: DISCONTINUED | OUTPATIENT
Start: 2017-11-04 | End: 2017-11-11 | Stop reason: HOSPADM

## 2017-11-04 RX ORDER — HYDROCODONE BITARTRATE AND ACETAMINOPHEN 5; 325 MG/1; MG/1
1 TABLET ORAL EVERY 6 HOURS PRN
Status: ON HOLD | COMMUNITY
End: 2017-11-27

## 2017-11-04 RX ADMIN — HYDROCODONE BITARTRATE AND ACETAMINOPHEN 1 TABLET: 5; 325 TABLET ORAL at 12:57

## 2017-11-04 RX ADMIN — Medication 10 ML: at 11:01

## 2017-11-04 RX ADMIN — AZITHROMYCIN MONOHYDRATE 500 MG: 500 INJECTION, POWDER, LYOPHILIZED, FOR SOLUTION INTRAVENOUS at 11:49

## 2017-11-04 RX ADMIN — SODIUM CHLORIDE: 9 INJECTION, SOLUTION INTRAVENOUS at 03:31

## 2017-11-04 RX ADMIN — ENOXAPARIN SODIUM 30 MG: 40 INJECTION SUBCUTANEOUS at 11:01

## 2017-11-04 RX ADMIN — SODIUM CHLORIDE: 9 INJECTION, SOLUTION INTRAVENOUS at 11:49

## 2017-11-04 RX ADMIN — CEFTRIAXONE 1 G: 1 INJECTION, SOLUTION INTRAVENOUS at 10:59

## 2017-11-04 RX ADMIN — Medication 10 ML: at 21:30

## 2017-11-04 RX ADMIN — HYDROCODONE BITARTRATE AND ACETAMINOPHEN 1 TABLET: 5; 325 TABLET ORAL at 21:40

## 2017-11-04 ASSESSMENT — PAIN SCALES - GENERAL
PAINLEVEL_OUTOF10: 7
PAINLEVEL_OUTOF10: 4
PAINLEVEL_OUTOF10: 8

## 2017-11-04 NOTE — H&P
[facility] - History & Physical      PCP: Dariela Trinidad    Date of Admission: 11/3/2017    Date of Service: 11/3/2017    Chief Complaint:  Renal failure    History Of Present Illness: The patient is a 69 y.o. female who presented to ER in [place] [address], under the recommendation of Dr Paty Frey due to abnormal blood work. She had elevated creatinine and signs of acute renal failure. The patient states, she just has had no appetite and did not feel like eating, denies nausea/vmiting or diarrhea. Recently diagnosed with lung cancer, undergoing work up in anticipation of treatment. Had bone scan and port placement scheduled in the following weeks. Wear oxygen 3-6 lpm at home. Quit smoking. Past Medical History:        Diagnosis Date    A-fib ([address]     Anticoagulated on Coumadin     Anxiety     HTN (hypertension)     Hyperlipidemia     Malignant neoplasm of breast (female), unspecified site     Occlusion and stenosis of carotid artery     Osteoporosis     Palliative care encounter     Respiratory distress     Tobacco use disorder        Past Surgical History:        Procedure Laterality Date    BREAST SURGERY      CATARACT REMOVAL      CHOLECYSTECTOMY      COLONOSCOPY      LYMPH NODE BIOPSY      MASTECTOMY, PARTIAL      FL COLSC FLX W/REMOVAL LESION BY HOT BX FORCEPS N/A 10/2/2017    Dr Raghu Cifuentes prep, diverticulosis-Tubular AP (-) dysplasia--1 yr recall    UPPER GASTROINTESTINAL ENDOSCOPY N/A 9/29/2017    Dr León Viera Medications:  Prior to Admission medications    Medication Sig Start Date End Date Taking?  Authorizing Provider   amiodarone (CORDARONE) 200 MG tablet Take 1 tablet by mouth daily 10/13/17   Bubba Baptiste, DO   warfarin (COUMADIN) 5 MG tablet Take 1 tablet by mouth daily Keep INR 2.0-3.0 10/13/17   Bubba Baptiste, DO   pravastatin (PRAVACHOL) 40 MG tablet Take 1 tablet by mouth daily 10/13/17   Bubba Baptiste, DO   diltiazem (CARDIZEM CD) 120 MG extended release capsule Take 1 capsule by mouth daily 10/14/17   Bubba Baptiste, DO   furosemide (LASIX) 40 MG tablet Take 0.5 tablets by mouth daily 10/14/17   Bubba Baptiste, DO   pantoprazole (PROTONIX) 40 MG tablet Take 1 tablet by mouth every morning (before breakfast) 10/14/17   Bubba Baptiste, DO   ipratropium-albuterol (DUONEB) 0.5-2.5 (3) MG/3ML SOLN nebulizer solution Inhale 3 mLs into the lungs 4 times daily for 5 days 10/11/17 10/16/17  Danni Decker MD   pantoprazole (PROTONIX) 40 MG tablet Take 1 tablet by mouth every morning (before breakfast) 9/9/17   Dulce Maria Steinberg PA-C   traMADol (ULTRAM) 50 MG tablet Take 50 mg by mouth 2 times daily    Historical Provider, MD       Allergies:    Morphine; Aspirin; and Penicillins    Social History:    The patient currently lives independently. Tobacco:   reports that she has quit smoking. She does not have any smokeless tobacco history on file. Alcohol:   reports that she does not drink alcohol. Illicit Drugs: denies    Family History:      Problem Relation Age of Onset    Lung Cancer Other     Stomach Cancer Other     Brain Cancer Other     Other Father      cardiac event       Review of Systems:   Constitutional / general:  No fever / chills / sweats  HEENT: No sore throat / hoarseness / vision changes  CV:  No palpitations/ orthopnea   Respiratory:  No sputum / hemoptysis  GI: No nausea / vomiting / abdominal pain / diarrhea / constipation  :  No dysuria / hesitancy / urgency / hematuria   Neuro: No muscle weakness / dysphagia / headache / paresthesias  Musculoskeletal:  No edema / cyanosis / pain  Psychiatric:  No depression / anxiety / insomnia  Skin:  No new rashes / lesions    Physical Examination:         Abnormal   Deferred   Yes   No   BP (!) 104/53   Pulse 79   Temp 97.8 °F (36.6 °C) (Temporal)   Resp 16   SpO2 90%   General appearance: No apparent distress, appears stated age and cooperative. Weak, dehydrated.   HEENT: Normal cephalic,

## 2017-11-04 NOTE — PROGRESS NOTES
Patient:  Ama Luna  YOB: 1940  Date of Service: 11/4/2017  MRN: [id number]   Acct: [de-identified]   Primary Care Physician: Jhonatan Barragan  Advance Directive: Full Code  Admit Date: 11/3/2017       Hospital Day: 1        SUBJECTIVE:    Seen patient for acute renal failure treatment and management of other medical conditons. No new complaints today      Objective:   VITALS:  BP (!) 114/58   Pulse 77   Temp 98.6 °F (37 °C) (Temporal)   Resp 18   SpO2 90%   24HR INTAKE/OUTPUT:    Intake/Output Summary (Last 24 hours) at 11/04/17 1001  Last data filed at 11/04/17 0442   Gross per 24 hour   Intake                0 ml   Output              200 ml   Net             -200 ml       General appearance: No acute distress  HEENT: Normocephalic, atraumatic, no pallor or icterus  Neck: Supple  Lungs: clear to auscultation bilaterally,no rales or wheezes   Heart: regular rate and rhythm, S1, S2 normal, no murmur  Abdomen:soft, non-tender; non-distended, normal bowel sounds  G/U: No urinary catheter  MSK/Ext:No lower extremity edema  Skin: Warm and dry  Neurologic: Alert and oriented   Psychiatric: Mood and effect appropriate      Medications:   Prescriptions Prior to Admission: HYDROcodone-acetaminophen (NORCO) 5-325 MG per tablet, Take 1 tablet by mouth every 6 hours as needed for Pain .   amiodarone (CORDARONE) 200 MG tablet, Take 1 tablet by mouth daily  pravastatin (PRAVACHOL) 40 MG tablet, Take 1 tablet by mouth daily  furosemide (LASIX) 40 MG tablet, Take 0.5 tablets by mouth daily  pantoprazole (PROTONIX) 40 MG tablet, Take 1 tablet by mouth every morning (before breakfast)  pantoprazole (PROTONIX) 40 MG tablet, Take 1 tablet by mouth every morning (before breakfast)  warfarin (COUMADIN) 5 MG tablet, Take 1 tablet by mouth daily Keep INR 2.0-3.0  diltiazem (CARDIZEM CD) 120 MG extended release capsule, Take 1 capsule by mouth daily  ipratropium-albuterol (DUONEB) 0.5-2.5 (3) MG/3ML SOLN nebulizer solution,

## 2017-11-04 NOTE — CONSULTS
MEDICAL ONCOLOGY CONSULTATION    Pt Name: Brice Cortez  MRN: [id number]  YOB: 1940  Date of evaluation: 11/4/2017   HISTORY OF PRESENT ILLNESS:    The the patient was recently seen by us for a first visit for further assessment of a newly diagnosed non-small cell lung cancer. She was seen on 11/2/2017. Laboratory workup showed elevated creatinine at 2.6. Patient was advised to proceed to the emergency. She denies any nausea vomiting or diarrhea. She denies any prior history of chronic kidney disease. In fact, last creatinine in October, 10th 2017 was 0.8, but at discharge was up to 1.4. Calcium levels are normal.       Kenia Logan is a 13-year-old  female referred to the clinic by Dr. Deo Brand for treatment recommendations regarding a new diagnosis of YULISA non-small cell lung carcinoma. She is accompanied by her daughter, Carmen Abad today. TARGET SITES:  1. 10 cm YULISA primary lung mass with pleural effusion   2. 6 mm RLL lung nodule   3. bulky mediastinal lymphadenopathy   4. left supraclavicular LN   5. indeterminate 1.8 cm right adrenal nodule  ONCOLOGIC HISTORY: YULISA NSCLC, 9/8/2017   Kamilah Lin was seen in initial oncologic consultation on 11/02/17, referred by Dr. Deo Brand regarding a new diagnosis of YULISA non-small cell lung carcinoma. Mary initially presented to [place], [place] on 8/25/17 for progressive cough and dyspnea, associated with right-sided pleuritic chest discomfort. She was treated for pneumonia and transferred to [facility] for further evaluation of the 10 cm left-sided chest mass and mediastinal invasion with pathologic adenopathy noted on chest CT completed the same day. Her hospital course was complicated by atrial fibrillation with RVR, followed by cardiologist, Dr. Carlos Rodriguez. She was followed by pulmonologist, Dr. Deo Brand, eventually able undergo a CT-guided left thoracentesis on 9/1/2017 with 1100 mL of yellow, translucent fluid evacuated.   Cytology was negative for malignant cells. Noted on CT during thoracentesis was moderate increase in the volume of pleural fluid compared to the CT from [place] on 8/25/17 in addition to a new passive collapse of the left upper lobe and moderate amount of debris within the left main stem bronchus and left upper lobe bronchus. Abdominal/pelvic CT WO contrast 9/4/17 documented a 6 mm subpleural nodule in the RLL, likely benign. Thickening of the adrenal glands were felt likely adenomatous change, the largest nodule was 1.8 cm on the right. CT-guided biopsy of the YULISA lung mass was performed 9/8/2017. Pathology was consistent with CK7 positive non-small cell carcinoma. Mary's initial appointment for oncology consultation on 9/28/17 was interrupted by hospitalization at [facility] for GI bleeding with an INR greater than 18, on warfarin for atrial fibrillation. Endoscopy 9/29/17 and colonoscopy 10/2/17 by Dr. Flo Riley were both negative. During hospitalization, Mary was evaluated by cardiothoracic surgeon, Dr. 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 She was discharged on 10/13/17 to [facility], subsequently returned home 11/2/17. Findings were reviewed in detail with Mary and her daughter Mau Rene at initial consultation on 11/2/17. Mary desires to proceed with treatment as delineated below:  1. Bone and PET scan to complete the metastatic workup. 2. Refer to [place] for XRT to the YULISA lung mass with associated postobstruction   3. Recommend systemic chemotherapy with carboplatinum/Abraxane   4. Refer to [facility] for Mediport placement. 5. Labs requested for abn CBC   6.  RTC 2 weeks        Past Medical History:    Past Medical History:   Diagnosis Date    A-fib ([address]     Anticoagulated on Coumadin     Anxiety     HTN (hypertension)     Hyperlipidemia     Malignant neoplasm of breast (female), unspecified site     Occlusion and stenosis of carotid artery     Osteoporosis     Palliative care encounter     Respiratory distress     Tobacco use disorder        Past Surgical History:    Past Surgical History:   Procedure Laterality Date    BREAST SURGERY      CATARACT REMOVAL      CHOLECYSTECTOMY      COLONOSCOPY      LYMPH NODE BIOPSY      MASTECTOMY, PARTIAL      DC COLSC FLX W/REMOVAL LESION BY HOT BX FORCEPS N/A 10/2/2017    Dr Luiza Samuels prep, diverticulosis-Tubular AP (-) dysplasia--1 yr recall    UPPER GASTROINTESTINAL ENDOSCOPY N/A 9/29/2017    Dr Navin Castillo       Immunizations:    Immunization History   Administered Date(s) Administered    Pneumococcal 13-valent Conjugate ([id number]) 09/29/2017       Current Hospital Medications:    Current Facility-Administered Medications: dronabinol (MARINOL) capsule 2.5 mg, 2.5 mg, Oral, BID  cefTRIAXone (ROCEPHIN) 1 g in 50 mL IVPB (premix), 1 g, Intravenous, Q24H  azithromycin (ZITHROMAX) 500 mg in D5W 250ml Vial Mate, 500 mg, Intravenous, Q24H  sodium chloride flush 0.9 % injection 10 mL, 10 mL, Intravenous, 2 times per day  acetaminophen (TYLENOL) tablet 650 mg, 650 mg, Oral, Q4H PRN  magnesium hydroxide (MILK OF MAGNESIA) 400 MG/5ML suspension 30 mL, 30 mL, Oral, Daily PRN  ondansetron (ZOFRAN) injection 4 mg, 4 mg, Intravenous, Q6H PRN  enoxaparin (LOVENOX) injection 30 mg, 30 mg, Subcutaneous, Daily  0.9 % sodium chloride infusion, , Intravenous, Continuous    Allergies: Allergies   Allergen Reactions    Morphine Other (See Comments)     Bottoms out her heart rate    Aspirin Hives    Penicillins Hives       Social History:    Social History     Social History    Marital status:      Spouse name: N/A    Number of children: N/A    Years of education: N/A     Occupational History    Not on file.      Social History Main Topics    Smoking status: Former Smoker    Smokeless tobacco: Not on file    Alcohol use No    Drug use: No    Sexual activity: Not on file     Other Topics Concern    Not on file     Social History Narrative    No narrative on file       Family History:   Family History   Problem Relation Age of Onset    Lung Cancer Other     Stomach Cancer Other     Brain Cancer Other     Other Father      cardiac event       REVIEW OF SYSTEMS:    Constitutional: no fever, no night sweatsfatigue;   HEENT: no blurring of vision, no double vision, no hearing difficulty, no tinnitus,no ulceration, no dental caries, no dysphagia; Lungs: no cough, no shortness of breath, no wheeze;   CVS: no palpitation, no chest pain, no shortness of breath;   GI: no abdominal pain, no nausea , no vomiting, no constipation;   CORTEZ: no dysuria, frequency and urgency, no hematuria, no kidney stones;   Musculoskeletal: no joint pain, swelling , stiffness;   Endocrine: no polyuria, polydypsia, no cold or heat intolerence; Hematology: no anemia, no easy brusing or bleeding, no hx of clotting disorder; anemia  Dermatology: no skin rash, no eczema, no pruritis;   Psychiatry: no depression, no anxiety,no panic attacks, no suicide ideation;    Neurology: no syncope, no seizures, no numbness or tingling of hands, no numbness or tingling of feet, no paresis;     Vitals:    BP (!) 114/58   Pulse 77   Temp 98.6 °F (37 °C) (Temporal)   Resp 18   SpO2 90%     PHYSICAL EXAM:    CONSTITUTIONAL: Alert, appropriate, no acute distress  EYES: Non icteric, EOM intact, pupils equal round   ENT: Mucus membranes moist, no oral pharyngeal lesions   NECK: Supple, no masses   CHEST/LUNGS: CTA bilaterally, normal respiratory effort   CARDIOVASCULAR: RRR, no murmurs  ABDOMEN: soft non-tender, active bowel sounds, no HSM  EXTREMITIES: warm, moves all fours  SKIN: warm, dry with no rashes or lesions  LYMPH: No cervical, clavicular, axillary, or inguinal lymphadenopathy  NEUROLOGIC: follows commands, non focal   PSYCH: mood and affect appropriate      Recent Labs      11/04/17 2331  10/13/17   0138  10/12/17   0100   WBC  36.4*  23.1*  19.9*   HGB  9.0*  7.9*  8.2*   HCT  30.0*  25.1*  26.6*   MCV  100.3*  99.2*  101.9*   PLT  501*  388  380       Lab Results   Component Value Date     11/04/2017    K 4.5 11/04/2017     11/04/2017    CO2 28 11/04/2017    BUN 43 (H) 11/04/2017    CREATININE 2.6 (H) 11/04/2017    GLUCOSE 162 (H) 11/04/2017    CALCIUM 7.9 (L) 11/04/2017    PROT 5.5 (L) 09/28/2017    LABALBU 2.6 (L) 09/28/2017    BILITOT <0.2 09/28/2017    ALKPHOS 55 09/28/2017    AST 15 09/28/2017    ALT 15 09/28/2017    LABGLOM 18 (A) 11/04/2017       Lab Results   Component Value Date    INR 2.45 (H) 11/04/2017    INR 1.31 (H) 10/13/2017    INR 1.12 10/12/2017    PROTIME 26.8 (H) 11/04/2017    PROTIME 16.3 (H) 10/13/2017    PROTIME 14.3 10/12/2017       ASSESSMENT/PLAN:    Lung Cancer- NSCLC  · Further assessment/evaluation . Deferred to clinic    Acute kidney injury- Unknown etiology    · Management as per IM  · Consider consulting nephrology  · Continue IV fluids, as per your discretion  · Calcium 7.8. Normocytic anemia- multifactorial 2/2 malignancy, anemia chronic disease, hypothyroidism  · Labs at clinic: Ferritin 198, iron saturation 10%, [id number],  B12 922, TSH 8.4,  Folate 5.3,  · , haptoglobin 647    Neutrophilic leukocytosis- likely 2/2 Postobstructive pneumonia vs leukemoid reaction. WBC has been elevated at least since August 2017. Range 15,000-48,000  · Afebrile  · Continue antibiotics as per your discretion  · Blood cultures ordered. · 11/2/2017WBC 48,000->36,000/96% neutrophils. Hypothyroidism- TSH 8.4 on 11/2/2017 (clinic)  · We'll defer to IM. [place]. fibrillationanticoagulation with warfarin  · INR 2.4  · Management as per your discretion    We'll continue to follow.      [address] [facility]    11/04/17  9:42 AM

## 2017-11-05 ENCOUNTER — APPOINTMENT (OUTPATIENT)
Dept: GENERAL RADIOLOGY | Age: 77
DRG: 683 | End: 2017-11-05
Attending: HOSPITALIST
Payer: MEDICARE

## 2017-11-05 LAB
ALBUMIN SERPL-MCNC: 2 G/DL (ref 3.5–5.2)
ALP BLD-CCNC: 92 U/L (ref 35–104)
ALT SERPL-CCNC: 13 U/L (ref 5–33)
ANION GAP SERPL CALCULATED.3IONS-SCNC: 13 MMOL/L (ref 7–19)
AST SERPL-CCNC: 16 U/L (ref 5–32)
BASOPHILS ABSOLUTE: 0.1 K/UL (ref 0–0.2)
BASOPHILS RELATIVE PERCENT: 0.2 % (ref 0–1)
BILIRUB SERPL-MCNC: <0.2 MG/DL (ref 0.2–1.2)
BUN BLDV-MCNC: 40 MG/DL (ref 8–23)
CALCIUM SERPL-MCNC: 8.2 MG/DL (ref 8.8–10.2)
CHLORIDE BLD-SCNC: 104 MMOL/L (ref 98–111)
CO2: 26 MMOL/L (ref 22–29)
CREAT SERPL-MCNC: 2.1 MG/DL (ref 0.5–0.9)
EOSINOPHILS ABSOLUTE: 0 K/UL (ref 0–0.6)
EOSINOPHILS RELATIVE PERCENT: 0.1 % (ref 0–5)
GFR NON-AFRICAN AMERICAN: 23
GLUCOSE BLD-MCNC: 136 MG/DL (ref 74–109)
HCT VFR BLD CALC: 26.3 % (ref 37–47)
HEMOGLOBIN: 8.1 G/DL (ref 12–16)
INR BLD: 2.36 (ref 0.88–1.18)
LYMPHOCYTES ABSOLUTE: 2.3 K/UL (ref 1.1–4.5)
LYMPHOCYTES RELATIVE PERCENT: 6.9 % (ref 20–40)
MCH RBC QN AUTO: 29.9 PG (ref 27–31)
MCHC RBC AUTO-ENTMCNC: 30.8 G/DL (ref 33–37)
MCV RBC AUTO: 97 FL (ref 81–99)
MONOCYTES ABSOLUTE: 1.6 K/UL (ref 0–0.9)
MONOCYTES RELATIVE PERCENT: 4.9 % (ref 0–10)
NEUTROPHILS ABSOLUTE: 28.6 K/UL (ref 1.5–7.5)
NEUTROPHILS RELATIVE PERCENT: 86.1 % (ref 50–65)
PDW BLD-RTO: 17.1 % (ref 11.5–14.5)
PLATELET # BLD: 436 K/UL (ref 130–400)
PMV BLD AUTO: 9.3 FL (ref 9.4–12.3)
POTASSIUM SERPL-SCNC: 4.2 MMOL/L (ref 3.5–5)
PROTHROMBIN TIME: 26 SEC (ref 12–14.6)
RBC # BLD: 2.71 M/UL (ref 4.2–5.4)
SODIUM BLD-SCNC: 143 MMOL/L (ref 136–145)
TOTAL PROTEIN: 5.6 G/DL (ref 6.6–8.7)
WBC # BLD: 33.2 K/UL (ref 4.8–10.8)

## 2017-11-05 PROCEDURE — 1210000000 HC MED SURG R&B

## 2017-11-05 PROCEDURE — 80053 COMPREHEN METABOLIC PANEL: CPT

## 2017-11-05 PROCEDURE — 85610 PROTHROMBIN TIME: CPT

## 2017-11-05 PROCEDURE — 6370000000 HC RX 637 (ALT 250 FOR IP): Performed by: FAMILY MEDICINE

## 2017-11-05 PROCEDURE — 2700000000 HC OXYGEN THERAPY PER DAY

## 2017-11-05 PROCEDURE — 36415 COLL VENOUS BLD VENIPUNCTURE: CPT

## 2017-11-05 PROCEDURE — 85025 COMPLETE CBC W/AUTO DIFF WBC: CPT

## 2017-11-05 PROCEDURE — 6370000000 HC RX 637 (ALT 250 FOR IP)

## 2017-11-05 PROCEDURE — 2580000003 HC RX 258: Performed by: FAMILY MEDICINE

## 2017-11-05 PROCEDURE — 99233 SBSQ HOSP IP/OBS HIGH 50: CPT | Performed by: HOSPITALIST

## 2017-11-05 PROCEDURE — 6370000000 HC RX 637 (ALT 250 FOR IP): Performed by: HOSPITALIST

## 2017-11-05 PROCEDURE — 93005 ELECTROCARDIOGRAM TRACING: CPT

## 2017-11-05 PROCEDURE — 71020 XR CHEST STANDARD TWO VW: CPT

## 2017-11-05 PROCEDURE — 94640 AIRWAY INHALATION TREATMENT: CPT

## 2017-11-05 PROCEDURE — 6360000002 HC RX W HCPCS: Performed by: FAMILY MEDICINE

## 2017-11-05 RX ORDER — IPRATROPIUM BROMIDE AND ALBUTEROL SULFATE 2.5; .5 MG/3ML; MG/3ML
SOLUTION RESPIRATORY (INHALATION)
Status: COMPLETED
Start: 2017-11-05 | End: 2017-11-05

## 2017-11-05 RX ORDER — IPRATROPIUM BROMIDE AND ALBUTEROL SULFATE 2.5; .5 MG/3ML; MG/3ML
1 SOLUTION RESPIRATORY (INHALATION)
Status: DISCONTINUED | OUTPATIENT
Start: 2017-11-05 | End: 2017-11-11 | Stop reason: HOSPADM

## 2017-11-05 RX ORDER — TRAMADOL HYDROCHLORIDE 50 MG/1
50 TABLET ORAL 2 TIMES DAILY
Status: DISCONTINUED | OUTPATIENT
Start: 2017-11-05 | End: 2017-11-11 | Stop reason: HOSPADM

## 2017-11-05 RX ORDER — WARFARIN SODIUM 5 MG/1
5 TABLET ORAL DAILY
Status: DISCONTINUED | OUTPATIENT
Start: 2017-11-05 | End: 2017-11-11 | Stop reason: HOSPADM

## 2017-11-05 RX ORDER — PANTOPRAZOLE SODIUM 40 MG/1
40 TABLET, DELAYED RELEASE ORAL
Status: DISCONTINUED | OUTPATIENT
Start: 2017-11-06 | End: 2017-11-11 | Stop reason: HOSPADM

## 2017-11-05 RX ORDER — IPRATROPIUM BROMIDE AND ALBUTEROL SULFATE 2.5; .5 MG/3ML; MG/3ML
3 SOLUTION RESPIRATORY (INHALATION) 4 TIMES DAILY
Status: DISCONTINUED | OUTPATIENT
Start: 2017-11-05 | End: 2017-11-05

## 2017-11-05 RX ORDER — DILTIAZEM HYDROCHLORIDE 120 MG/1
120 CAPSULE, COATED, EXTENDED RELEASE ORAL DAILY
Status: DISCONTINUED | OUTPATIENT
Start: 2017-11-05 | End: 2017-11-11 | Stop reason: HOSPADM

## 2017-11-05 RX ORDER — AMIODARONE HYDROCHLORIDE 200 MG/1
200 TABLET ORAL DAILY
Status: DISCONTINUED | OUTPATIENT
Start: 2017-11-05 | End: 2017-11-11 | Stop reason: HOSPADM

## 2017-11-05 RX ORDER — PANTOPRAZOLE SODIUM 40 MG/1
40 TABLET, DELAYED RELEASE ORAL
Status: DISCONTINUED | OUTPATIENT
Start: 2017-11-06 | End: 2017-11-05 | Stop reason: SDUPTHER

## 2017-11-05 RX ADMIN — CEFTRIAXONE 1 G: 1 INJECTION, SOLUTION INTRAVENOUS at 10:49

## 2017-11-05 RX ADMIN — IPRATROPIUM BROMIDE AND ALBUTEROL SULFATE: .5; 3 SOLUTION RESPIRATORY (INHALATION) at 02:23

## 2017-11-05 RX ADMIN — TRAMADOL HYDROCHLORIDE 50 MG: 50 TABLET, FILM COATED ORAL at 11:50

## 2017-11-05 RX ADMIN — HYDROCODONE BITARTRATE AND ACETAMINOPHEN 1 TABLET: 5; 325 TABLET ORAL at 23:59

## 2017-11-05 RX ADMIN — IPRATROPIUM BROMIDE AND ALBUTEROL SULFATE 1 AMPULE: .5; 3 SOLUTION RESPIRATORY (INHALATION) at 20:26

## 2017-11-05 RX ADMIN — HYDROCODONE BITARTRATE AND ACETAMINOPHEN 1 TABLET: 5; 325 TABLET ORAL at 04:15

## 2017-11-05 RX ADMIN — WARFARIN SODIUM 5 MG: 5 TABLET ORAL at 17:31

## 2017-11-05 RX ADMIN — IPRATROPIUM BROMIDE AND ALBUTEROL SULFATE 1 AMPULE: .5; 3 SOLUTION RESPIRATORY (INHALATION) at 06:58

## 2017-11-05 RX ADMIN — IPRATROPIUM BROMIDE AND ALBUTEROL SULFATE 1 AMPULE: .5; 3 SOLUTION RESPIRATORY (INHALATION) at 14:38

## 2017-11-05 RX ADMIN — AMIODARONE HYDROCHLORIDE 200 MG: 200 TABLET ORAL at 11:51

## 2017-11-05 RX ADMIN — IPRATROPIUM BROMIDE AND ALBUTEROL SULFATE 1 AMPULE: .5; 3 SOLUTION RESPIRATORY (INHALATION) at 10:31

## 2017-11-05 RX ADMIN — Medication 10 ML: at 19:40

## 2017-11-05 RX ADMIN — TRAMADOL HYDROCHLORIDE 50 MG: 50 TABLET, FILM COATED ORAL at 19:40

## 2017-11-05 RX ADMIN — IPRATROPIUM BROMIDE AND ALBUTEROL SULFATE: .5; 2.5 SOLUTION RESPIRATORY (INHALATION) at 02:23

## 2017-11-05 RX ADMIN — ENOXAPARIN SODIUM 30 MG: 40 INJECTION SUBCUTANEOUS at 08:18

## 2017-11-05 RX ADMIN — DILTIAZEM HYDROCHLORIDE 120 MG: 120 CAPSULE, COATED, EXTENDED RELEASE ORAL at 11:50

## 2017-11-05 RX ADMIN — AZITHROMYCIN MONOHYDRATE 500 MG: 500 INJECTION, POWDER, LYOPHILIZED, FOR SOLUTION INTRAVENOUS at 11:50

## 2017-11-05 ASSESSMENT — PAIN SCALES - GENERAL
PAINLEVEL_OUTOF10: 9
PAINLEVEL_OUTOF10: 6
PAINLEVEL_OUTOF10: 9
PAINLEVEL_OUTOF10: 5
PAINLEVEL_OUTOF10: 5
PAINLEVEL_OUTOF10: 8

## 2017-11-05 NOTE — PROGRESS NOTES
PROGRESS NOTE  Patient name: Allen Zhao  Patient : 1940      SUBJECTIVE: Feeling better today. Slept well. INTERVAL HISTORY  The the patient was recently seen by us for a first visit for further assessment of a newly diagnosed non-small cell lung cancer. She was seen on 2017. Laboratory workup showed elevated creatinine at 2.6. Patient was advised to proceed to the emergency. She denies any nausea vomiting or diarrhea. She denies any prior history of chronic kidney disease. In fact, last creatinine in  was 0.8, but at discharge was up to 1.4. Calcium levels are normal.         Chantal Arrieta is a 69-year-old  female referred to the clinic by Dr. Zohra Desai for treatment recommendations regarding a new diagnosis of YULISA non-small cell lung carcinoma. She is accompanied by her daughter, Nikki Barney today. TARGET SITES:  1. 10 cm YULISA primary lung mass with pleural effusion   2. 6 mm RLL lung nodule   3. bulky mediastinal lymphadenopathy   4. left supraclavicular LN   5. indeterminate 1.8 cm right adrenal nodule  ONCOLOGIC HISTORY: YULISA NSCLC, 2017   Mary Blanc was seen in initial oncologic consultation on 17, referred by Dr. Zohra Desai regarding a new diagnosis of YULISA non-small cell lung carcinoma. Mary initially presented to [place], [place] on 17 for progressive cough and dyspnea, associated with right-sided pleuritic chest discomfort. She was treated for pneumonia and transferred to [facility] for further evaluation of the 10 cm left-sided chest mass and mediastinal invasion with pathologic adenopathy noted on chest CT completed the same day. Her hospital course was complicated by atrial fibrillation with RVR, followed by cardiologist, Dr. Eamon Serra. She was followed by pulmonologist, Dr. Zohra Desai, eventually able undergo a CT-guided left thoracentesis on 2017 with 1100 mL of yellow, translucent fluid evacuated.   Cytology was negative for malignant cells. Noted on CT during thoracentesis was moderate increase in the volume of pleural fluid compared to the CT from [facility] on 8/25/17 in addition to a new passive collapse of the left upper lobe and moderate amount of debris within the left main stem bronchus and left upper lobe bronchus. Abdominal/pelvic CT WO contrast 9/4/17 documented a 6 mm subpleural nodule in the RLL, likely benign. Thickening of the adrenal glands were felt likely adenomatous change, the largest nodule was 1.8 cm on the right. CT-guided biopsy of the YULISA lung mass was performed 9/8/2017. Pathology was consistent with CK7 positive non-small cell carcinoma. Mary's initial appointment for oncology consultation on 9/28/17 was interrupted by hospitalization at [facility] for GI bleeding with an INR greater than 18, on warfarin for atrial fibrillation. Endoscopy 9/29/17 and colonoscopy 10/2/17 by Dr. Wallace Mena were both negative. During hospitalization, Mary was evaluated by cardiothoracic surgeon, Dr. 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 She was discharged on 10[facility]13/17 [facility] for [facility], subsequently returned home 11/2/17. Findings were reviewed in detail with Mary and her daughter Lenore Wagner at initial consultation on 11/2/17. Mary desires to proceed with treatment as delineated below:  1. Bone and PET scan to complete the metastatic workup. 2. Refer to [facility] for XRT to the YULISA lung mass with associated postobstruction   3. Recommend systemic chemotherapy with carboplatinum/Abraxane   4. Refer to [facility] for Mediport placement. 5. Labs requested for abn CBC   6.  RTC 2 weeks       OBJECTIVE:  Vitals:    11/05/17 0621   BP: 132/64   Pulse: 73   Resp: 20   Temp: 96.8 °F (36 °C)   SpO2: 93%       Intake/Output Summary (Last 24 hours) at 11/05/17 0933  Last data filed at 11/05/17 0800   Gross per 24 hour   Intake 2537 ml   Output             1000 ml   Net             1537 ml       PHYSICAL EXAM:   CONSTITUTIONAL: Alert, appropriate, no acute distress  EYES: Non icteric, EOM intact, pupils equal round   ENT: Mucus membranes moist, no oral pharyngeal lesions   NECK: Supple, no masses   CHEST/LUNGS: CTA bilaterally, normal respiratory effort   CARDIOVASCULAR: RRR, no murmurs  ABDOMEN: soft non-tender, active bowel sounds, no HSM  EXTREMITIES: warm, moves all fours  SKIN: warm, dry with no rashes or lesions  LYMPH: No cervical, clavicular, axillary, or inguinal lymphadenopathy  NEUROLOGIC: follows commands, non focal   PSYCH: mood and affect appropriate    Recent Labs      11/05/17   0256  11/04/17   0258  10/13/17   0138   WBC  33.2*  36.4*  23.1*   HGB  8.1*  9.0*  7.9*   HCT  26.3*  30.0*  25.1*   MCV  97.0  100.3*  99.2*   PLT  436*  501*  388       Lab Results   Component Value Date     11/05/2017    K 4.2 11/05/2017     11/05/2017    CO2 26 11/05/2017    BUN 40 (H) 11/05/2017    CREATININE 2.1 (H) 11/05/2017    GLUCOSE 136 (H) 11/05/2017    CALCIUM 8.2 (L) 11/05/2017    PROT 5.6 (L) 11/05/2017    LABALBU 2.0 (L) 11/05/2017    BILITOT <0.2 11/05/2017    ALKPHOS 92 11/05/2017    AST 16 11/05/2017    ALT 13 11/05/2017    LABGLOM 23 (A) 11/05/2017       Lab Results   Component Value Date    INR 2.36 (H) 11/05/2017    INR 2.45 (H) 11/04/2017    INR 1.31 (H) 10/13/2017    PROTIME 26.0 (H) 11/05/2017    PROTIME 26.8 (H) 11/04/2017    PROTIME 16.3 (H) 10/13/2017     ASSESSMENT/PLAN:  Lung Cancer- NSCLC  · Further assessment/evaluation .   Deferred to clinic     Acute kidney injury- Unknown etiology     · Management as per IM  · Cr 2.6->2.1  · Continue IV fluids, as per your discretion  · Calcium 7.8.      Normocytic anemia- multifactorial 2/2 malignancy, anemia chronic disease, hypothyroidism  · Labs at clinic: Ferritin 198, iron saturation 10%, [id number],  B12 922, TSH 8.4,  Folate 5.3,  · , haptoglobin 187     Neutrophilic leukocytosis- likely 2/2 Postobstructive pneumonia vs leukemoid reaction. WBC has been elevated at least since August 2017. Range 15,000-48,000  · Afebrile  · Continue antibiotics as per your discretion  · Blood cultures ordered.    · 11/2/2017WBC 48,000->36,000/96% neutrophils.      Hypothyroidism- TSH 8.4 on 11/2/2017 (clinic)  · We'll defer to IM.      [place]. fibrillationanticoagulation with warfarin  · INR 2.4  · Management as per your discretion      Jeancarlos Loges    11/05/17  9:33 AM

## 2017-11-05 NOTE — PROGRESS NOTES
12 hour chart check complete. Pt resting in bed not showing any S/S of distress. Bed in lowest position and call light is within reach. Fall precautions in place.   Electronically signed by Lebron Funk RN on 11/5/2017 at 2:37 AM Clear bilaterally, pupils equal, round and reactive to light.

## 2017-11-05 NOTE — PROGRESS NOTES
EKG completed. Results placed in soft chart. [email or web address].   Electronically signed by Shona Simeon RN on 11/5/2017 at 4:29 PM

## 2017-11-05 NOTE — CONSULTS
Pulmonary and Critical Care Consult Note  [id number] [address]     MR# [id number]    Acct# [de-identified]  11/5/2017   3:56 PM    Referring Provider: Nils Daniels MD  Chief Complaint: Elevated creatinine, and non-small cell lung cancer with recurrent left pleural effusion. HPI: We have been consulted to see this 69y.o. year old female born on 1940. The patient is well known to me from being seen in consultation on 2 prior admissions here to [facility]. I initially saw her in late August when she is admitted here to [facility] with a left lung mass and associated pleural effusion. She did have a thoracentesis performed which was negative for evidence of malignancy. She had some problems with atrial arrhythmias which required anticoagulants, but eventually these were able to be held were able be held and a needle biopsy was performed which confirmed non-small cell lung cancer. I saw on a 2nd occasion when she was admitted here to [facility] in late September. Axis consult in early October during that admission. She had evidence of recurrence of her effusion at that time and it was re-tapped. I cannot locate any cytology results but it appeared to be a transudate. Dr. Keya Vaca did see her for consideration of Pleurx catheter insertion felt she is a poor candidate for same. She is admitted this time because of an elevated creatinine. She is in the process of being assessed for medical treatment of her lung cancer per oncology. Her chest x-ray again shows evidence of a left lung mass with associated effusion. CT scan apparently is scheduled for tomorrow. She does have dyspnea on a chronic basis. She is on oxygen at home. She did smoke in the past but does not smoke at this time. She has had some outpatient laboratory studies showing elevated creatinine for that reason she's been admitted.   Past Medical History      Past Medical History:   Diagnosis Date    A-fib ([address]     Anticoagulated on Coumadin     Anxiety     HTN (hypertension)     Hyperlipidemia     Malignant neoplasm of breast (female), unspecified site     Occlusion and stenosis of carotid artery     Osteoporosis     Palliative care encounter     Respiratory distress     Tobacco use disorder      Surgical History  Past Surgical History:   Procedure Laterality Date    BREAST SURGERY      CATARACT REMOVAL      CHOLECYSTECTOMY      COLONOSCOPY      LYMPH NODE BIOPSY      MASTECTOMY, PARTIAL      CO COLSC FLX W/REMOVAL LESION BY HOT BX FORCEPS N/A 10/2/2017    Dr Rohith Fallon prep, diverticulosis-Tubular AP (-) dysplasia--1 yr recall    UPPER GASTROINTESTINAL ENDOSCOPY N/A 9/29/2017    Dr Jareth Muro     Allergies  Allergies   Allergen Reactions    Morphine Other (See Comments)     Bottoms out her heart rate    Aspirin Hives    Penicillins Hives     Medications    ipratropium-albuterol 1 ampule Inhalation Q4H WA   traMADol 50 mg Oral BID   amiodarone 200 mg Oral Daily   warfarin 5 mg Oral Daily   diltiazem 120 mg Oral Daily   [START ON 11/6/2017] pantoprazole 40 mg Oral QAM AC   cefTRIAXone (ROCEPHIN) IV 1 g Intravenous Q24H   azithromycin 500 mg Intravenous Q24H   sodium chloride flush 10 mL Intravenous 2 times per day   enoxaparin 30 mg Subcutaneous Daily      sodium chloride 75 mL/hr at 11/04/17 1149     Social History   reports that she has quit smoking. She does not have any smokeless tobacco history on file. She reports that she does not drink alcohol or use drugs. Family History  family history includes Brain Cancer in an other family member; Nu Carry in an other family member; Other in her father; Aleah Medici in an other family member. Review of Systems:Constitutional: She is had poor appetite. HEENT: Negative. Respiratory: She has chronic shortness of breath. Cardiac: She has not had any chest pain. GI: She has again had poor appetite. : Negative. Neurologic: Negative.   Musculoskeletal: lung base, stable from the recent   comparison exam. This appears to reflect a moderate sized layering   left pleural effusion. The lungs are otherwise clear. Overall stable   heart size. The pulmonary vasculature are unremarkable. No acute bone   or soft tissue abnormality.       Impression   1. Overall stable exam. No new focal lung infiltrates. Stable   opacification of the left lung base which is thought to reflect a   moderate sized layering pleural effusion. Signed by Dr Efra Ma on 11/5/2017 9:32 AM       My radiograph interpretation/independent review of imaging: Her chest x-ray again demonstrates her left lung mass with probable associated effusion. Other test results (not lab or imaging): Previous 2-D echocardiogram had shown evidence of mitral regurgitation. Independent review of ekg: Her most recent EKG did show atrial fibrillation. Current EKG is pending. Patient Active Problem List   Diagnosis    Mixed hyperlipidemia    Essential hypertension    Lung mass    Atrial fibrillation with RVR (HCC)    Diastolic heart failure, stage C (HCC)    Severe protein-calorie malnutrition (HCC)    Pneumonia of left lung due to infectious organism    Paroxysmal atrial fibrillation (HCC)    Hypercoagulable state ([address]    Gastrointestinal hemorrhage with melena    Acute blood loss anemia    Warfarin-induced coagulopathy (HCC)    Melena    Gastroesophageal reflux disease with esophagitis    HCAP (healthcare-associated pneumonia)    Polyp of descending colon    Diverticulosis of large intestine without hemorrhage    Non-small cell lung cancer (HCC)    Warfarin toxicity    AMOS (acute kidney injury) ([address]    Chronic hypoxemic respiratory failure (HCC)    Leukocytosis     Pulmonary Assessment:  New problem (to me), with additional workup planned:   1. Acute renal insufficiency. 2. History of elevated TSH. New problem (to me), no additional workup planned:  1. Chronic leukocytosis.   Other problems either stable, failing to improve or worsenin. Non-small cell lung cancer. 2. Recurrent left pleural effusion with negative cytologies at least ×1 in the past.  3. Paroxysmal atrial fibrillation. Recommend/plan:   · I will check a follow-up TSH level along with a BNP level. If it appears that her effusion might relate to volume issues, could consider diuretic therapy, realizing this may be somewhat difficult to do with her elevated creatinine. Otherwise, other than having her reassessed by thoracic surgery to see if she is a potential candidate for a Pleurx catheter at some point, I have no suggestions. In the past Dr. Michele Beck felt she was not a good candidate for this. Hopefully she can be started on medical therapy for her non-small cell lung cancer soon. .    Electronically signed by Coughlin Side on 17 at 3:56 PM

## 2017-11-06 ENCOUNTER — APPOINTMENT (OUTPATIENT)
Dept: GENERAL RADIOLOGY | Age: 77
DRG: 683 | End: 2017-11-06
Attending: HOSPITALIST
Payer: MEDICARE

## 2017-11-06 LAB
ANION GAP SERPL CALCULATED.3IONS-SCNC: 15 MMOL/L (ref 7–19)
BASOPHILS ABSOLUTE: 0.1 K/UL (ref 0–0.2)
BASOPHILS RELATIVE PERCENT: 0.2 % (ref 0–1)
BUN BLDV-MCNC: 30 MG/DL (ref 8–23)
CALCIUM SERPL-MCNC: 8.1 MG/DL (ref 8.8–10.2)
CHLORIDE BLD-SCNC: 107 MMOL/L (ref 98–111)
CO2: 25 MMOL/L (ref 22–29)
CREAT SERPL-MCNC: 1.7 MG/DL (ref 0.5–0.9)
EKG P AXIS: NORMAL DEGREES
EKG P-R INTERVAL: NORMAL MS
EKG Q-T INTERVAL: 372 MS
EKG QRS DURATION: 100 MS
EKG QTC CALCULATION (BAZETT): 386 MS
EKG T AXIS: 71 DEGREES
EOSINOPHILS ABSOLUTE: 3 K/UL (ref 0–0.6)
EOSINOPHILS RELATIVE PERCENT: 9.8 % (ref 0–5)
GFR NON-AFRICAN AMERICAN: 29
GLUCOSE BLD-MCNC: 68 MG/DL (ref 74–109)
HCT VFR BLD CALC: 28.3 % (ref 37–47)
HEMOGLOBIN: 8.6 G/DL (ref 12–16)
INR BLD: 1.98 (ref 0.88–1.18)
LYMPHOCYTES ABSOLUTE: 2.2 K/UL (ref 1.1–4.5)
LYMPHOCYTES RELATIVE PERCENT: 7.2 % (ref 20–40)
MCH RBC QN AUTO: 30.2 PG (ref 27–31)
MCHC RBC AUTO-ENTMCNC: 30.4 G/DL (ref 33–37)
MCV RBC AUTO: 99.3 FL (ref 81–99)
MONOCYTES ABSOLUTE: 1.9 K/UL (ref 0–0.9)
MONOCYTES RELATIVE PERCENT: 6.2 % (ref 0–10)
NEUTROPHILS ABSOLUTE: 23.4 K/UL (ref 1.5–7.5)
NEUTROPHILS RELATIVE PERCENT: 74.9 % (ref 50–65)
PDW BLD-RTO: 17.4 % (ref 11.5–14.5)
PLATELET # BLD: 431 K/UL (ref 130–400)
PMV BLD AUTO: 10 FL (ref 9.4–12.3)
POTASSIUM SERPL-SCNC: 4.6 MMOL/L (ref 3.5–5)
PRO-BNP: 3071 PG/ML (ref 0–1800)
PROTHROMBIN TIME: 22.6 SEC (ref 12–14.6)
RBC # BLD: 2.85 M/UL (ref 4.2–5.4)
SODIUM BLD-SCNC: 147 MMOL/L (ref 136–145)
TSH SERPL DL<=0.05 MIU/L-ACNC: 6.7 UIU/ML (ref 0.27–4.2)
WBC # BLD: 31.2 K/UL (ref 4.8–10.8)

## 2017-11-06 PROCEDURE — 80048 BASIC METABOLIC PNL TOTAL CA: CPT

## 2017-11-06 PROCEDURE — 6370000000 HC RX 637 (ALT 250 FOR IP): Performed by: HOSPITALIST

## 2017-11-06 PROCEDURE — 6360000002 HC RX W HCPCS: Performed by: INTERNAL MEDICINE

## 2017-11-06 PROCEDURE — 99233 SBSQ HOSP IP/OBS HIGH 50: CPT | Performed by: INTERNAL MEDICINE

## 2017-11-06 PROCEDURE — 2580000003 HC RX 258: Performed by: HOSPITALIST

## 2017-11-06 PROCEDURE — 2580000003 HC RX 258: Performed by: FAMILY MEDICINE

## 2017-11-06 PROCEDURE — 6370000000 HC RX 637 (ALT 250 FOR IP): Performed by: FAMILY MEDICINE

## 2017-11-06 PROCEDURE — 71020 XR CHEST STANDARD TWO VW: CPT

## 2017-11-06 PROCEDURE — 84443 ASSAY THYROID STIM HORMONE: CPT

## 2017-11-06 PROCEDURE — 85025 COMPLETE CBC W/AUTO DIFF WBC: CPT

## 2017-11-06 PROCEDURE — 1210000000 HC MED SURG R&B

## 2017-11-06 PROCEDURE — 6360000002 HC RX W HCPCS: Performed by: FAMILY MEDICINE

## 2017-11-06 PROCEDURE — 94640 AIRWAY INHALATION TREATMENT: CPT

## 2017-11-06 PROCEDURE — 2700000000 HC OXYGEN THERAPY PER DAY

## 2017-11-06 PROCEDURE — 83880 ASSAY OF NATRIURETIC PEPTIDE: CPT

## 2017-11-06 PROCEDURE — 36415 COLL VENOUS BLD VENIPUNCTURE: CPT

## 2017-11-06 PROCEDURE — 85610 PROTHROMBIN TIME: CPT

## 2017-11-06 RX ORDER — FUROSEMIDE 10 MG/ML
20 INJECTION INTRAMUSCULAR; INTRAVENOUS 2 TIMES DAILY
Status: DISCONTINUED | OUTPATIENT
Start: 2017-11-06 | End: 2017-11-11 | Stop reason: HOSPADM

## 2017-11-06 RX ORDER — FUROSEMIDE 10 MG/ML
40 INJECTION INTRAMUSCULAR; INTRAVENOUS ONCE
Status: COMPLETED | OUTPATIENT
Start: 2017-11-06 | End: 2017-11-06

## 2017-11-06 RX ADMIN — CEFTRIAXONE 1 G: 1 INJECTION, SOLUTION INTRAVENOUS at 10:30

## 2017-11-06 RX ADMIN — AMIODARONE HYDROCHLORIDE 200 MG: 200 TABLET ORAL at 08:26

## 2017-11-06 RX ADMIN — SODIUM CHLORIDE: 9 INJECTION, SOLUTION INTRAVENOUS at 05:56

## 2017-11-06 RX ADMIN — FUROSEMIDE 20 MG: 10 INJECTION, SOLUTION INTRAMUSCULAR; INTRAVENOUS at 18:29

## 2017-11-06 RX ADMIN — Medication 10 ML: at 20:02

## 2017-11-06 RX ADMIN — TRAMADOL HYDROCHLORIDE 50 MG: 50 TABLET, FILM COATED ORAL at 20:02

## 2017-11-06 RX ADMIN — TRAMADOL HYDROCHLORIDE 50 MG: 50 TABLET, FILM COATED ORAL at 08:26

## 2017-11-06 RX ADMIN — IPRATROPIUM BROMIDE AND ALBUTEROL SULFATE 1 AMPULE: .5; 3 SOLUTION RESPIRATORY (INHALATION) at 10:15

## 2017-11-06 RX ADMIN — AZITHROMYCIN MONOHYDRATE 500 MG: 500 INJECTION, POWDER, LYOPHILIZED, FOR SOLUTION INTRAVENOUS at 11:54

## 2017-11-06 RX ADMIN — FUROSEMIDE 40 MG: 10 INJECTION, SOLUTION INTRAMUSCULAR; INTRAVENOUS at 14:45

## 2017-11-06 RX ADMIN — IPRATROPIUM BROMIDE AND ALBUTEROL SULFATE 1 AMPULE: .5; 3 SOLUTION RESPIRATORY (INHALATION) at 14:53

## 2017-11-06 RX ADMIN — DILTIAZEM HYDROCHLORIDE 120 MG: 120 CAPSULE, COATED, EXTENDED RELEASE ORAL at 08:26

## 2017-11-06 RX ADMIN — PANTOPRAZOLE SODIUM 40 MG: 40 TABLET, DELAYED RELEASE ORAL at 05:56

## 2017-11-06 RX ADMIN — IPRATROPIUM BROMIDE AND ALBUTEROL SULFATE 1 AMPULE: .5; 3 SOLUTION RESPIRATORY (INHALATION) at 18:52

## 2017-11-06 RX ADMIN — IPRATROPIUM BROMIDE AND ALBUTEROL SULFATE 1 AMPULE: .5; 3 SOLUTION RESPIRATORY (INHALATION) at 06:33

## 2017-11-06 RX ADMIN — Medication 10 ML: at 08:26

## 2017-11-06 RX ADMIN — WARFARIN SODIUM 5 MG: 5 TABLET ORAL at 18:28

## 2017-11-06 RX ADMIN — HYDROCODONE BITARTRATE AND ACETAMINOPHEN 1 TABLET: 5; 325 TABLET ORAL at 14:45

## 2017-11-06 ASSESSMENT — ENCOUNTER SYMPTOMS
DIARRHEA: 0
NAUSEA: 0
BLURRED VISION: 0
VOMITING: 0
ABDOMINAL PAIN: 0
SPUTUM PRODUCTION: 0
CONSTIPATION: 0
COUGH: 0
SHORTNESS OF BREATH: 1
DOUBLE VISION: 0

## 2017-11-06 ASSESSMENT — PAIN SCALES - GENERAL
PAINLEVEL_OUTOF10: 10
PAINLEVEL_OUTOF10: 5
PAINLEVEL_OUTOF10: 10
PAINLEVEL_OUTOF10: 8
PAINLEVEL_OUTOF10: 6

## 2017-11-06 NOTE — PROGRESS NOTES
Progress Note  11/6/2017 2:46 PM  Subjective:   Admit Date: 11/3/2017  PCP: Kaylan Rosas    Subjective: Patient seen and examined. States its hard to breathe at times. Feels breathing has become worse over the last 24 hours. Denies any chest pain or sputum. No other changes. ROS: 14 point review of systems is negative except as specifically addressed above.     DIET CARDIAC; Low Sodium (2 GM)    Intake/Output Summary (Last 24 hours) at 11/06/17 1446  Last data filed at 11/06/17 1206   Gross per 24 hour   Intake             2936 ml   Output             1700 ml   Net             1236 ml     Medications:   Current Facility-Administered Medications   Medication Dose Route Frequency Provider Last Rate Last Dose    warfarin (COUMADIN) daily dosing (placeholder)   Other RX Placeholder Pepe Davis MD        furosemide (LASIX) injection 20 mg  20 mg Intravenous BID Durga Handley DO        ipratropium-albuterol (DUONEB) nebulizer solution 1 ampule  1 ampule Inhalation Q4H WA Remi Casas MD   1 ampule at 11/06/17 1015    traMADol (ULTRAM) tablet 50 mg  50 mg Oral BID Pepe Davis MD   50 mg at 11/06/17 0826    amiodarone (CORDARONE) tablet 200 mg  200 mg Oral Daily Pepe Davis MD   200 mg at 11/06/17 0826    warfarin (COUMADIN) tablet 5 mg  5 mg Oral Daily Venus Moore MD   5 mg at 11/05/17 1731    diltiazem (CARDIZEM CD) extended release capsule 120 mg  120 mg Oral Daily Venus Moore MD   120 mg at 11/06/17 0826    pantoprazole (PROTONIX) tablet 40 mg  40 mg Oral QAM AC Venus Moore MD   40 mg at 11/06/17 0556    cefTRIAXone (ROCEPHIN) 1 g in 50 mL IVPB (premix)  1 g Intravenous Q24H Remi Casas MD   Stopped at 11/06/17 1100    azithromycin (ZITHROMAX) 500 mg in D5W 250ml Vial Mate  500 mg Intravenous Q24H Remi Casas MD   Stopped at 11/06/17 1254    HYDROcodone-acetaminophen (NORCO) 5-325 MG per tablet 1 tablet  1 tablet Oral Q6H PRN Pepe Davis MD   1 tablet at 11/06/17 1445    sodium chloride flush 0.9 % injection 10 mL  10 mL Intravenous 2 times per day Bita Valdovinos MD   10 mL at 11/06/17 0826    acetaminophen (TYLENOL) tablet 650 mg  650 mg Oral Q4H PRN Bita Valdovinos MD        magnesium hydroxide (MILK OF MAGNESIA) 400 MG/5ML suspension 30 mL  30 mL Oral Daily PRN Bita Valdovinos MD        ondansetron [facility]) injection 4 mg  4 mg Intravenous Q6H PRN Bita Valdovinos MD        enoxaparin (LOVENOX) injection 30 mg  30 mg Subcutaneous Daily Bita Valdovinos MD   Stopped at 11/06/17 0826        Labs:     Recent Labs      11/04/17   0258  11/05/17   0256 11/06/17   0340   WBC  36.4*  33.2*  31.2*   RBC  2.99*  2.71*  2.85*   HGB  9.0*  8.1*  8.6*   HCT  30.0*  26.3*  28.3*   MCV  100.3*  97.0  99.3*   MCH  30.1  29.9  30.2   MCHC  30.0*  30.8*  30.4*   PLT  501*  436*  431*     Recent Labs      11/04/17   0258  11/05/17   0256  11/06/17   0340   NA  140  143  147*   K  4.5  4.2  4.6   ANIONGAP  12  13  15   CL  100  104  107   CO2  28  26  25   BUN  43*  40*  30*   CREATININE  2.6*  2.1*  1.7*   GLUCOSE  162*  136*  68*   CALCIUM  7.9*  8.2*  8.1*     No results for input(s): MG, PHOS in the last 72 hours. Recent Labs      11/05/17 0256   AST  16   ALT  13   BILITOT  <0.2   ALKPHOS  92     ABGs:No results for input(s): PHART, SVE3JMX, PO2ART, BQY1YHZ, BEART, HGBAE, Z2ENDKQL, CARBOXHGBART, 02THERAPY in the last 72 hours. HgBA1c: No results for input(s): LABA1C in the last 72 hours. FLP:  Lab Results   Component Value Date    TRIG 123 09/03/2017    HDL 43 09/03/2017    LDLCALC 86 09/03/2017     TSH:    Lab Results   Component Value Date    TSH 6.700 11/06/2017     Troponin T: No results for input(s): TROPONINI in the last 72 hours.   INR:   Recent Labs      11/04/17   0258  11/05/17   0256  11/06/17   0340   INR  2.45*  2.36*  1.98*       Objective:   Vitals: BP (!) 128/56   Pulse 76   Temp 98.4 °F (36.9 °C) (Temporal)   Resp 20   SpO2 90%   24HR

## 2017-11-06 NOTE — PROGRESS NOTES
for malignant cells. Noted on CT during thoracentesis was moderate increase in the volume of pleural fluid compared to the CT from [facility] on 8/25/17 in addition to a new passive collapse of the left upper lobe and moderate amount of debris within the left main stem bronchus and left upper lobe bronchus. Abdominal/pelvic CT WO contrast 9/4/17 documented a 6 mm subpleural nodule in the RLL, likely benign. Thickening of the adrenal glands were felt likely adenomatous change, the largest nodule was 1.8 cm on the right. CT-guided biopsy of the YULISA lung mass was performed 9/8/2017. Pathology was consistent with CK7 positive non-small cell carcinoma. Mary's initial appointment for oncology consultation on 9/28/17 was interrupted by hospitalization at [facility] for GI bleeding with an INR greater than 18, on warfarin for atrial fibrillation. Endoscopy 9/29/17 and colonoscopy 10/2/17 by Dr. Julio Rodriguez were both negative. During hospitalization, Mary was evaluated by cardiothoracic surgeon, Dr. Yandy Mercer, who felt Pleur-x catheter would not be beneficial with regard to her trapped lung on the left. She was discharged on 10/13/17 to [facility] for rehabilitation, subsequently returned home 11/2/17. Findings were reviewed in detail with Mary and her daughter Gisselle at initial consultation on 11/2/17. Mary desires to proceed with treatment as delineated below:  1. Bone and PET scan to complete the metastatic workup. 2. Refer to [facility] for XRT to the YULISA lung mass with associated postobstruction   3. Recommend systemic chemotherapy with carboplatinum/Abraxane   4. Refer to [facility] Cu[facility]ra/[facility]ie for Mediport placement. 5. Labs requested for abn CBC   6.  RTC 2 weeks       OBJECTIVE:  Vitals:    11/06/17 0631   BP:    Pulse:    Resp:    Temp:    SpO2: 95%       Intake/Output Summary (Last 24 hours) at 11/06/17 0655  Last data filed at 11/06/17 0558   Gross per 24 hour   Intake             3176 ml   Output 1400 ml   Net             1776 ml       PHYSICAL EXAM:   CONSTITUTIONAL: Alert, appropriate, no acute distress  EYES: Non icteric, EOM intact, pupils equal round   ENT: Mucus membranes moist, no oral pharyngeal lesions   NECK: Supple, no masses   CHEST/LUNGS: CTA bilaterally, normal respiratory effort   CARDIOVASCULAR: RRR, no murmurs  ABDOMEN: soft non-tender, active bowel sounds, no HSM  EXTREMITIES: warm, moves all fours  SKIN: warm, dry with no rashes or lesions  LYMPH: No cervical, clavicular, axillary, or inguinal lymphadenopathy  NEUROLOGIC: follows commands, non focal   PSYCH: mood and affect appropriate    Recent Labs      11/06/17   0340  11/05/17   0256  11/04/17   0258   WBC  31.2*  33.2*  36.4*   HGB  8.6*  8.1*  9.0*   HCT  28.3*  26.3*  30.0*   MCV  99.3*  97.0  100.3*   PLT  431*  436*  501*       Lab Results   Component Value Date     (H) 11/06/2017    K 4.6 11/06/2017     11/06/2017    CO2 25 11/06/2017    BUN 30 (H) 11/06/2017    CREATININE 1.7 (H) 11/06/2017    GLUCOSE 68 (L) 11/06/2017    CALCIUM 8.1 (L) 11/06/2017    PROT 5.6 (L) 11/05/2017    LABALBU 2.0 (L) 11/05/2017    BILITOT <0.2 11/05/2017    ALKPHOS 92 11/05/2017    AST 16 11/05/2017    ALT 13 11/05/2017    LABGLOM 29 (A) 11/06/2017       Lab Results   Component Value Date    INR 1.98 (H) 11/06/2017    INR 2.36 (H) 11/05/2017    INR 2.45 (H) 11/04/2017    PROTIME 22.6 (H) 11/06/2017    PROTIME 26.0 (H) 11/05/2017    PROTIME 26.8 (H) 11/04/2017     ASSESSMENT/PLAN:  Lung Cancer- advanced NSCLC/recurrent pleural effusion. · Further assessment/evaluation . · Pulmonary following-  Pleurx catheter ? ? .       Acute kidney injury- Unknown etiology     · Management as per IM  · Cr 2.6->2.1->1.7  · Continue IV fluids, as per your discretion  · Calcium 7.8.      Normocytic anemia- multifactorial 2/2 malignancy, anemia chronic disease, hypothyroidism  · Labs at clinic: Ferritin 198, iron saturation 10%, [id number],  B12 922,

## 2017-11-06 NOTE — PROGRESS NOTES
reviewed. Recent Labs      11/04/17 0258 11/05/17 0256 11/06/17   0340   WBC  36.4*  33.2*  31.2*   RBC  2.99*  2.71*  2.85*   HGB  9.0*  8.1*  8.6*   HCT  30.0*  26.3*  28.3*   PLT  501*  436*  431*   MCV  100.3*  97.0  99.3*   MCH  30.1  29.9  30.2   MCHC  30.0*  30.8*  30.4*   RDW  17.2*  17.1*  17.4*   BANDSPCT  1   --    --       Recent Labs      11/04/17 0258 11/05/17 0256  11/06/17   0340   NA  140  143  147*   K  4.5  4.2  4.6   CL  100  104  107   CO2  28  26  25   BUN  43*  40*  30*   CREATININE  2.6*  2.1*  1.7*   CALCIUM  7.9*  8.2*  8.1*   GLUCOSE  162*  136*  68*   AST   --   16   --    ALT   --   13   --    ALKPHOS   --   92   --    BILITOT   --   <0.2   --    INR  2.45*  2.36*  1.98*   TSH   --    --   6.700*      Recent Labs      11/04/17   0922   BC  No Growth to date. Any change in status will be called. Radiograph:   HISTORY: No left breast sounds   CXR: 2 views of the chest are obtained. COMPARISON: 11/5/2017. FINDINGS:    There is a moderate to large left pleural effusion with left lung   opacities. The right lung is hyperinflated. Increasing right basilar   densities may be atelectasis. There is diffuse thoracic aortic   calcification. A trace right pleural effusion is suspected. There is a small left apical pneumothorax suspected. There is no   shifting of the cardiac mediastinal structures. No acute bony pathology is identified.       Impression   1. Moderate to large left pleural effusion with left lung opacities. Opacities may represent a combination of compressive atelectasis and   consolidation. 2. Small left apical pneumothorax suspected. 3. Increasing right basilar densities may be patchy atelectasis. Trace   right pleural effusion. 4. Prominent pulmonary vessels and interstitial markings suggesting a   component of underlying edema.    Signed by Dr Gianna Paz on 11/6/2017 9:20 AM     My radiograph interpretation: large left pleural effusion, small left apical pneumothorax, likely pulmonary edema     Pulmonary Assessment:   1. Dyspnea   2. Recurrent left pleural effusion   3. Non-small cell lung CA   4. Acute renal insufficiency  5. Elevated TSH  6. Elevated BNP   7. Chronic leukocytosis  8. Paroxysmal atrial fibrillation  9. E. Coli UTI     Recommend:   · Repeat CXR shows no improvement and now with possible small apical pneumothorax. · Per Dr. Zeke Steiner other than her being reassessed by thoracic surgery to see if she is a potential candidate for a Pleurx catheter at some point, I have no suggestions. In the past Dr. Reed Lopez felt she was not a good candidate for this. See Dr. Rosi Vivas consult note from 10/10/17. · Further orders per Dr. Zeke Steiner     Electronically signed by Silver Ngo on 11/6/2017 at 9:41 AM  Physician's substantive portion:  Subjective: The patient is somewhat more dyspneic today. Objective: Breath sounds are markedly diminished on the left. She has a few coarse rales on the right. Her chest x-ray now shows a pulmonary edema pattern. She has her chronic left lung mass with associated effusion but does now appear to have superimposed pulmonary edema. There was a question of a small left apical pneumothorax. To my review there are lung markings on either side of the possible pleural line so if this were a pneumothorax it would possibly be loculated but again it may not even be a pneumothorax. If there are any question this regard could obtain a chest CT. She has significant elevation of her BNP level and also an elevated TSH level  Assessment/recommendation: She apparently are had an appointment to see her cardiologist here in [place] as an outpatient so recommend cardiology consultation and also diuresis but will defer this to the attending physician and cardiology.  She probably is hypothyroid and that could contribute to a transudate of effusion but would have to be cautious in treating her because of her history of atrial

## 2017-11-07 ENCOUNTER — APPOINTMENT (OUTPATIENT)
Dept: GENERAL RADIOLOGY | Age: 77
DRG: 683 | End: 2017-11-07
Attending: HOSPITALIST
Payer: MEDICARE

## 2017-11-07 LAB
ANION GAP SERPL CALCULATED.3IONS-SCNC: 11 MMOL/L (ref 7–19)
BUN BLDV-MCNC: 22 MG/DL (ref 8–23)
CALCIUM SERPL-MCNC: 8.3 MG/DL (ref 8.8–10.2)
CHLORIDE BLD-SCNC: 101 MMOL/L (ref 98–111)
CO2: 31 MMOL/L (ref 22–29)
CREAT SERPL-MCNC: 1.5 MG/DL (ref 0.5–0.9)
GFR NON-AFRICAN AMERICAN: 34
GLUCOSE BLD-MCNC: 79 MG/DL (ref 74–109)
INR BLD: 2.05 (ref 0.88–1.18)
ORGANISM: ABNORMAL
POTASSIUM SERPL-SCNC: 3.7 MMOL/L (ref 3.5–5)
PROTHROMBIN TIME: 23.3 SEC (ref 12–14.6)
SODIUM BLD-SCNC: 143 MMOL/L (ref 136–145)
T4 FREE: 1.2 NG/DL (ref 0.9–1.7)
URINE CULTURE, ROUTINE: ABNORMAL

## 2017-11-07 PROCEDURE — 84439 ASSAY OF FREE THYROXINE: CPT

## 2017-11-07 PROCEDURE — 2580000003 HC RX 258: Performed by: INTERNAL MEDICINE

## 2017-11-07 PROCEDURE — 80048 BASIC METABOLIC PNL TOTAL CA: CPT

## 2017-11-07 PROCEDURE — 2140000000 HC CCU INTERMEDIATE R&B

## 2017-11-07 PROCEDURE — 6360000002 HC RX W HCPCS: Performed by: HOSPITALIST

## 2017-11-07 PROCEDURE — 2500000003 HC RX 250 WO HCPCS: Performed by: INTERNAL MEDICINE

## 2017-11-07 PROCEDURE — 6360000002 HC RX W HCPCS: Performed by: FAMILY MEDICINE

## 2017-11-07 PROCEDURE — 2700000000 HC OXYGEN THERAPY PER DAY

## 2017-11-07 PROCEDURE — 6370000000 HC RX 637 (ALT 250 FOR IP): Performed by: HOSPITALIST

## 2017-11-07 PROCEDURE — 6360000002 HC RX W HCPCS: Performed by: INTERNAL MEDICINE

## 2017-11-07 PROCEDURE — 99223 1ST HOSP IP/OBS HIGH 75: CPT | Performed by: INTERNAL MEDICINE

## 2017-11-07 PROCEDURE — 2580000003 HC RX 258: Performed by: FAMILY MEDICINE

## 2017-11-07 PROCEDURE — 99233 SBSQ HOSP IP/OBS HIGH 50: CPT | Performed by: HOSPITALIST

## 2017-11-07 PROCEDURE — 71020 XR CHEST STANDARD TWO VW: CPT

## 2017-11-07 PROCEDURE — 6370000000 HC RX 637 (ALT 250 FOR IP): Performed by: FAMILY MEDICINE

## 2017-11-07 PROCEDURE — 36415 COLL VENOUS BLD VENIPUNCTURE: CPT

## 2017-11-07 PROCEDURE — 94640 AIRWAY INHALATION TREATMENT: CPT

## 2017-11-07 PROCEDURE — 85610 PROTHROMBIN TIME: CPT

## 2017-11-07 RX ORDER — PHYTONADIONE 10 MG/ML
10 INJECTION, EMULSION INTRAMUSCULAR; INTRAVENOUS; SUBCUTANEOUS ONCE
Status: COMPLETED | OUTPATIENT
Start: 2017-11-07 | End: 2017-11-07

## 2017-11-07 RX ADMIN — IPRATROPIUM BROMIDE AND ALBUTEROL SULFATE 1 AMPULE: .5; 3 SOLUTION RESPIRATORY (INHALATION) at 03:33

## 2017-11-07 RX ADMIN — Medication 10 ML: at 19:18

## 2017-11-07 RX ADMIN — IPRATROPIUM BROMIDE AND ALBUTEROL SULFATE 1 AMPULE: .5; 3 SOLUTION RESPIRATORY (INHALATION) at 11:02

## 2017-11-07 RX ADMIN — IPRATROPIUM BROMIDE AND ALBUTEROL SULFATE 1 AMPULE: .5; 3 SOLUTION RESPIRATORY (INHALATION) at 19:13

## 2017-11-07 RX ADMIN — TRAMADOL HYDROCHLORIDE 50 MG: 50 TABLET, FILM COATED ORAL at 09:36

## 2017-11-07 RX ADMIN — FUROSEMIDE 20 MG: 10 INJECTION, SOLUTION INTRAMUSCULAR; INTRAVENOUS at 09:36

## 2017-11-07 RX ADMIN — PHYTONADIONE 10 MG: 10 INJECTION, EMULSION INTRAMUSCULAR; INTRAVENOUS; SUBCUTANEOUS at 09:36

## 2017-11-07 RX ADMIN — IPRATROPIUM BROMIDE AND ALBUTEROL SULFATE 1 AMPULE: .5; 3 SOLUTION RESPIRATORY (INHALATION) at 06:38

## 2017-11-07 RX ADMIN — HYDROCODONE BITARTRATE AND ACETAMINOPHEN 1 TABLET: 5; 325 TABLET ORAL at 16:30

## 2017-11-07 RX ADMIN — AMIODARONE HYDROCHLORIDE 200 MG: 200 TABLET ORAL at 09:37

## 2017-11-07 RX ADMIN — DILTIAZEM HYDROCHLORIDE 5 MG/HR: 5 INJECTION INTRAVENOUS at 23:56

## 2017-11-07 RX ADMIN — CEFTRIAXONE 1 G: 1 INJECTION, SOLUTION INTRAVENOUS at 09:46

## 2017-11-07 RX ADMIN — IPRATROPIUM BROMIDE AND ALBUTEROL SULFATE 1 AMPULE: .5; 3 SOLUTION RESPIRATORY (INHALATION) at 14:48

## 2017-11-07 RX ADMIN — PANTOPRAZOLE SODIUM 40 MG: 40 TABLET, DELAYED RELEASE ORAL at 06:12

## 2017-11-07 RX ADMIN — DILTIAZEM HYDROCHLORIDE 120 MG: 120 CAPSULE, COATED, EXTENDED RELEASE ORAL at 09:37

## 2017-11-07 RX ADMIN — FUROSEMIDE 20 MG: 10 INJECTION, SOLUTION INTRAMUSCULAR; INTRAVENOUS at 19:18

## 2017-11-07 RX ADMIN — TRAMADOL HYDROCHLORIDE 50 MG: 50 TABLET, FILM COATED ORAL at 19:18

## 2017-11-07 ASSESSMENT — PAIN SCALES - GENERAL
PAINLEVEL_OUTOF10: 10
PAINLEVEL_OUTOF10: 10
PAINLEVEL_OUTOF10: 8
PAINLEVEL_OUTOF10: 4

## 2017-11-07 ASSESSMENT — ENCOUNTER SYMPTOMS: SHORTNESS OF BREATH: 1

## 2017-11-07 NOTE — PROGRESS NOTES
12 hour chart check complete. Pt resting in bed not showing any S/S of distress. Bed in lowest position and call light is within reach. Fall precautions in place. Pt denies any needs at this time but will continue to monitor.   Electronically signed by Dank Sampson RN on 11/6/2017 at 11:13 PM

## 2017-11-07 NOTE — PROGRESS NOTES
PROGRESS NOTE  Patient name: Antoni Alarcon  Patient : 1940      SUBJECTIVE: she continues to report significant shortness of breath. She feels uncomfortable. She is afebrile. Intermittent cough. INTERVAL HISTORY  The the patient was recently seen by us for a first visit for further assessment of a newly diagnosed non-small cell lung cancer. She was seen on 2017. Laboratory workup showed elevated creatinine at 2.6. Patient was advised to proceed to the emergency. She denies any nausea vomiting or diarrhea. She denies any prior history of chronic kidney disease. In fact, last creatinine in  was 0.8, but at discharge was up to 1.4. Calcium levels are normal.   Russ Flores is a 60-year-old  female referred to the clinic by Dr. Dewey Yuen for treatment recommendations regarding a new diagnosis of YULISA non-small cell lung carcinoma. She is accompanied by her daughter, Lenore Wagner today. TARGET SITES:  1. 10 cm YULISA primary lung mass with pleural effusion   2. 6 mm RLL lung nodule   3. bulky mediastinal lymphadenopathy   4. left supraclavicular LN   5. indeterminate 1.8 cm right adrenal nodule  ONCOLOGIC HISTORY: YULISA NSCLC, 2017   Mary Campoverde was seen in initial oncologic consultation on 17, referred by Dr. Dewey Yuen regarding a new diagnosis of YULISA non-small cell lung carcinoma. Mary initially presented to [place], [place] on 17 for progressive cough and dyspnea, associated with right-sided pleuritic chest discomfort. She was treated for pneumonia and transferred to [facility] for further evaluation of the 10 cm left-sided chest mass and mediastinal invasion with pathologic adenopathy noted on chest CT completed the same day. Her hospital course was complicated by atrial fibrillation with RVR, followed by cardiologist, Dr. Joanne Salinas.   She was followed by pulmonologist, Dr. Dewey Yuen, eventually able undergo a CT-guided left thoracentesis on hours) at 11/07/17 0748  Last data filed at 11/07/17 0311   Gross per 24 hour   Intake              680 ml   Output             2350 ml   Net            -1670 ml       PHYSICAL EXAM:   CONSTITUTIONAL: Alert, appropriate, no acute distress  EYES: Non icteric, EOM intact, pupils equal round   ENT: Mucus membranes moist, no oral pharyngeal lesions   NECK: Supple, no masses   CHEST/LUNGS: CTA bilaterally, normal respiratory effort   CARDIOVASCULAR: RRR, no murmurs  ABDOMEN: soft non-tender, active bowel sounds, no HSM  EXTREMITIES: warm, moves all fours  SKIN: warm, dry with no rashes or lesions  LYMPH: No cervical, clavicular, axillary, or inguinal lymphadenopathy  NEUROLOGIC: follows commands, non focal   PSYCH: mood and affect appropriate    Recent Labs      11/06/17   0340  11/05/17   0256  11/04/17   0258   WBC  31.2*  33.2*  36.4*   HGB  8.6*  8.1*  9.0*   HCT  28.3*  26.3*  30.0*   MCV  99.3*  97.0  100.3*   PLT  431*  436*  501*       Lab Results   Component Value Date     11/07/2017    K 3.7 11/07/2017     11/07/2017    CO2 31 (H) 11/07/2017    BUN 22 11/07/2017    CREATININE 1.5 (H) 11/07/2017    GLUCOSE 79 11/07/2017    CALCIUM 8.3 (L) 11/07/2017    PROT 5.6 (L) 11/05/2017    LABALBU 2.0 (L) 11/05/2017    BILITOT <0.2 11/05/2017    ALKPHOS 92 11/05/2017    AST 16 11/05/2017    ALT 13 11/05/2017    LABGLOM 34 (A) 11/07/2017       Lab Results   Component Value Date    INR 2.05 (H) 11/07/2017    INR 1.98 (H) 11/06/2017    INR 2.36 (H) 11/05/2017    PROTIME 23.3 (H) 11/07/2017    PROTIME 22.6 (H) 11/06/2017    PROTIME 26.0 (H) 11/05/2017     ASSESSMENT/PLAN:  Lung Cancer- advanced NSCLC/recurrent persistent pleural effusion. · Further assessment/evaluation . · Pulmonary following-  Pleurx catheter ? ?.apparently, Dr. Jordin Shankar has seen her in the recent past and did not believe Pleurx catheter was a good option for her. · I would suggest a therapeutic thoracocenthesis for symptom relief.  I would suggest to stop warfarin given 2 mg of vitamin K today, recheck INR and plan for a thoracentesis tomorrow.     Acute kidney injury- Unknown etiology. improving     · Management as per IM  · Cr 2.6->2.1->1.7->1.5  · Continue IV fluids, as per your discretion  · Calcium 7.8.      Normocytic anemia- multifactorial 2/2 malignancy, anemia chronic disease, hypothyroidism  · Labs at clinic: Ferritin 198, iron saturation 10%, [id number],  B12 922, TSH 8.4,  Folate 5.3,  · , haptoglobin 471     Neutrophilic leukocytosis- likely 2/2 Postobstructive pneumonia vs leukemoid reaction. WBC has been elevated at least since August 2017. Range 15,000-48,000  · Afebrile  · Continue antibiotics as per your discretion  · Blood cultures ordered. · 11/2/2017WBC 48,000->36,000/96% neutrophils->31,000.      Hypothyroidism- TSH 8.4 on 11/2/2017   · We'll defer to IM.      Mario. fibrillationanticoagulation with warfarin  · INR 2.05  · Management as per your discretion    Disposition- I would suggest therapeutic thoracocenthesis.     [facility]    11/07/17  7:48 AM

## 2017-11-07 NOTE — PROGRESS NOTES
Patient is tearful, very SOA, [email or web address]. O2 sat is 93%. Medications given. Emotional support provided. Will monitor.  Electronically signed by Efrain Rubinstein, RN on 11/7/2017 at 9:45 AM

## 2017-11-07 NOTE — CONSULTS
[facility] [place]  Cardiology Consult    Requesting MD:  Queta Gutierres MD Admit Status:  Inpatient       History obtained from:   [x] Patient  [x] Other (specify): chart    Cc:  Shortness of air    HPI: Ms. Merlinda Creamer is a 69 y.o. female with a history of hypertension, hyperlipidemia, PAF, NSCLC evaluated for SOA. The patient was admitted 2 nights ago for worsening shortness of air was found to have acute kidney injury. She was also found to have bilateral pleural effusions with greater fusion on the left. She was started on diuresis IV, and is -1.6 L for the past 24 hours. She still positive for the hospitalization. She was planning on having a thoracentesis today but her INR still 2. She's not had any palpitations angles currently in normal sinus rhythm. She said she had a horrible night last night not being able to breathe. She is looking forward to the thoracentesis. She previously was turned down for pneumocath because of her atelectasis and obliteration of the lower lobe her left lung. She otherwise has no other complaints. Review of Systems   Constitutional: Negative for malaise/fatigue. Respiratory: Positive for shortness of breath. Cardiovascular: Negative for chest pain. Neurological: Negative for weakness. All other systems reviewed and are negative.       Past Medical History:   Diagnosis Date    A-fib ([address]     Anticoagulated on Coumadin     Anxiety     HTN (hypertension)     Hyperlipidemia     Malignant neoplasm of breast (female), unspecified site     Occlusion and stenosis of carotid artery     Osteoporosis     Palliative care encounter     Respiratory distress     Tobacco use disorder         Past Surgical History:   Procedure Laterality Date    BREAST SURGERY      CATARACT REMOVAL      CHOLECYSTECTOMY      COLONOSCOPY      LYMPH NODE BIOPSY      MASTECTOMY, PARTIAL      CA COLSC FLX W/REMOVAL LESION BY HOT BX FORCEPS N/A 10/2/2017     Angles-poor prep, diverticulosis-Tubular AP (-) dysplasia--1 yr recall    UPPER GASTROINTESTINAL ENDOSCOPY N/A 9/29/2017    Dr Navin Castillo       Family History   Problem Relation Age of Onset    Lung Cancer Other     Stomach Cancer Other     Brain Cancer Other     Other Father      cardiac event       Social History     Social History    Marital status:      Spouse name: N/A    Number of children: N/A    Years of education: N/A     Occupational History    Not on file. Social History Main Topics    Smoking status: Former Smoker    Smokeless tobacco: Not on file    Alcohol use No    Drug use: No    Sexual activity: Not on file     Other Topics Concern    Not on file     Social History Narrative    No narrative on file       Allergies   Allergen Reactions    Morphine Other (See Comments)     Bottoms out her heart rate    Aspirin Hives    Penicillins Hives       Current Meds   warfarin (COUMADIN) daily dosing (placeholder)   Other RX Placeholder    furosemide  20 mg Intravenous BID    ipratropium-albuterol  1 ampule Inhalation Q4H WA    traMADol  50 mg Oral BID    amiodarone  200 mg Oral Daily    warfarin  5 mg Oral Daily    diltiazem  120 mg Oral Daily    pantoprazole  40 mg Oral QAM AC    cefTRIAXone (ROCEPHIN) IV  1 g Intravenous Q24H    azithromycin  500 mg Intravenous Q24H    sodium chloride flush  10 mL Intravenous 2 times per day    enoxaparin  30 mg Subcutaneous Daily       Current Infused Meds       PE:  Vitals:    11/07/17 0646   BP: (!) 133/54   Pulse: 69   Resp: 20   Temp: 97.3 °F (36.3 °C)   SpO2: 93%       Intake/Output Summary (Last 24 hours) at 11/07/17 1129  Last data filed at 11/07/17 0311   Gross per 24 hour   Intake              440 ml   Output             2150 ml   Net            -1710 ml     Estimated body mass index is 24.51 kg/m² as calculated from the following:    Height as of 9/28/17: 5' 9\" (1.753 m).     Weight as of this encounter: 166 lb (75.3 kg).    General - No acute distress  Eyes - PERRL, anicteric sclerae; no lid-lag  ENMT - Atraumatic; Mucous membranes moist, oropharynx clear  Neck - trachea midline, thyroid non-tender  Cardio - No jugular venous distension                Clear s1 s2, no gallop, rub, murmur                 No edema, normal pulses  Resp - Normal effort, Clear to auscultation On right, poor breath sounds in left  GI - abdomen soft, non-tender, no hepatosplenomegaly  Skin - warm and dry; no rashes  Psych - A+O x 3, normal affect    Recent Labs      11/05/17 0256  11/06/17   0340   WBC  33.2*  31.2*   HGB  8.1*  8.6*   PLT  436*  431*     Recent Labs      11/05/17 0256 11/06/17 0340  11/07/17   0335   NA  143  147*  143   K  4.2  4.6  3.7   CL  104  107  101   CO2  26  25  31*   BUN  40*  30*  22   CREATININE  2.1*  1.7*  1.5*   LABGLOM  23*  29*  34*   CALCIUM  8.2*  8.1*  8.3*     Recent Labs      11/06/17   0340   PROBNP  3,071*       Telemetry - sinus rhythm       Assessment, Recommendations, & Plan:  69 y.o. female with paroxysmal atrial fibrillation, shortness of air, pleural effusion, diastolic dysfunction, non-small cell lung cancer    I agree with the other consultants that the origin of the pleural effusion is likely from the cancer itself. She does have diastolic dysfunction but has been compensated. She also went into renal failure which may make her retain fluid. Regardless she needs IV diuresis which are doing. A thoracentesis would be beneficial as well likely. Paroxysmal atrial fibrillation - primary team has decided to give vitamin K to reverse her anticoagulation, since she is in normal sinus rhythm bridging may not be necessary. She goes back into A. fib would bridge. Hypertension - blood pressures been controlled with isolated highs     As always, thank you for allowing me to participate in the care of your patient. Please do not hesitate to contact me with any questions or concerns.      Kirk ZARCO Josette Mccoy MD, MSc  Director of the [facility] [place]

## 2017-11-08 ENCOUNTER — APPOINTMENT (OUTPATIENT)
Dept: GENERAL RADIOLOGY | Age: 77
DRG: 683 | End: 2017-11-08
Attending: HOSPITALIST
Payer: MEDICARE

## 2017-11-08 ENCOUNTER — APPOINTMENT (OUTPATIENT)
Dept: ULTRASOUND IMAGING | Age: 77
DRG: 683 | End: 2017-11-08
Attending: HOSPITALIST
Payer: MEDICARE

## 2017-11-08 PROBLEM — B96.20 E-COLI UTI: Status: ACTIVE | Noted: 2017-11-08

## 2017-11-08 PROBLEM — N39.0 E-COLI UTI: Status: ACTIVE | Noted: 2017-11-08

## 2017-11-08 PROBLEM — J90 PLEURAL EFFUSION ON LEFT: Status: ACTIVE | Noted: 2017-11-08

## 2017-11-08 PROBLEM — E03.4 HYPOTHYROIDISM DUE TO ACQUIRED ATROPHY OF THYROID: Status: ACTIVE | Noted: 2017-11-08

## 2017-11-08 LAB
ANION GAP SERPL CALCULATED.3IONS-SCNC: 7 MMOL/L (ref 7–19)
BASOPHILS ABSOLUTE: 0.2 K/UL (ref 0–0.2)
BASOPHILS RELATIVE PERCENT: 0.3 % (ref 0–1)
BUN BLDV-MCNC: 19 MG/DL (ref 8–23)
CALCIUM SERPL-MCNC: 8.1 MG/DL (ref 8.8–10.2)
CHLORIDE BLD-SCNC: 100 MMOL/L (ref 98–111)
CO2: 35 MMOL/L (ref 22–29)
CREAT SERPL-MCNC: 1.3 MG/DL (ref 0.5–0.9)
EOSINOPHILS ABSOLUTE: 4.7 K/UL (ref 0–0.6)
EOSINOPHILS RELATIVE PERCENT: 9.2 % (ref 0–5)
GFR NON-AFRICAN AMERICAN: 40
GLUCOSE BLD-MCNC: 102 MG/DL (ref 74–109)
HCT VFR BLD CALC: 30.6 % (ref 37–47)
HEMOGLOBIN: 9.5 G/DL (ref 12–16)
INR BLD: 1.53 (ref 0.88–1.18)
LYMPHOCYTES ABSOLUTE: 2.5 K/UL (ref 1.1–4.5)
LYMPHOCYTES RELATIVE PERCENT: 5 % (ref 20–40)
MCH RBC QN AUTO: 30.1 PG (ref 27–31)
MCHC RBC AUTO-ENTMCNC: 31 G/DL (ref 33–37)
MCV RBC AUTO: 96.8 FL (ref 81–99)
MONOCYTES ABSOLUTE: 2.3 K/UL (ref 0–0.9)
MONOCYTES RELATIVE PERCENT: 4.5 % (ref 0–10)
NEUTROPHILS ABSOLUTE: 39.5 K/UL (ref 1.5–7.5)
NEUTROPHILS RELATIVE PERCENT: 78.6 % (ref 50–65)
PDW BLD-RTO: 17.2 % (ref 11.5–14.5)
PLATELET # BLD: 453 K/UL (ref 130–400)
PMV BLD AUTO: 9.3 FL (ref 9.4–12.3)
POTASSIUM SERPL-SCNC: 3.5 MMOL/L (ref 3.5–5)
PROTHROMBIN TIME: 18.4 SEC (ref 12–14.6)
RBC # BLD: 3.16 M/UL (ref 4.2–5.4)
SODIUM BLD-SCNC: 142 MMOL/L (ref 136–145)
WBC # BLD: 50.4 K/UL (ref 4.8–10.8)

## 2017-11-08 PROCEDURE — 6360000002 HC RX W HCPCS: Performed by: INTERNAL MEDICINE

## 2017-11-08 PROCEDURE — 80048 BASIC METABOLIC PNL TOTAL CA: CPT

## 2017-11-08 PROCEDURE — 36415 COLL VENOUS BLD VENIPUNCTURE: CPT

## 2017-11-08 PROCEDURE — 2500000003 HC RX 250 WO HCPCS: Performed by: INTERNAL MEDICINE

## 2017-11-08 PROCEDURE — 6360000002 HC RX W HCPCS: Performed by: FAMILY MEDICINE

## 2017-11-08 PROCEDURE — 2140000000 HC CCU INTERMEDIATE R&B

## 2017-11-08 PROCEDURE — 6370000000 HC RX 637 (ALT 250 FOR IP): Performed by: FAMILY MEDICINE

## 2017-11-08 PROCEDURE — 85610 PROTHROMBIN TIME: CPT

## 2017-11-08 PROCEDURE — 2580000003 HC RX 258: Performed by: INTERNAL MEDICINE

## 2017-11-08 PROCEDURE — 99231 SBSQ HOSP IP/OBS SF/LOW 25: CPT | Performed by: INTERNAL MEDICINE

## 2017-11-08 PROCEDURE — 90791 PSYCH DIAGNOSTIC EVALUATION: CPT | Performed by: PSYCHIATRY & NEUROLOGY

## 2017-11-08 PROCEDURE — 2700000000 HC OXYGEN THERAPY PER DAY

## 2017-11-08 PROCEDURE — 2580000003 HC RX 258: Performed by: FAMILY MEDICINE

## 2017-11-08 PROCEDURE — 32555 ASPIRATE PLEURA W/ IMAGING: CPT

## 2017-11-08 PROCEDURE — 85025 COMPLETE CBC W/AUTO DIFF WBC: CPT

## 2017-11-08 PROCEDURE — 71010 XR CHEST LIMITED ONE VIEW: CPT

## 2017-11-08 PROCEDURE — 99232 SBSQ HOSP IP/OBS MODERATE 35: CPT | Performed by: INTERNAL MEDICINE

## 2017-11-08 PROCEDURE — 0W9B3ZX DRAINAGE OF LEFT PLEURAL CAVITY, PERCUTANEOUS APPROACH, DIAGNOSTIC: ICD-10-PCS | Performed by: RADIOLOGY

## 2017-11-08 PROCEDURE — 6370000000 HC RX 637 (ALT 250 FOR IP): Performed by: HOSPITALIST

## 2017-11-08 PROCEDURE — 94640 AIRWAY INHALATION TREATMENT: CPT

## 2017-11-08 PROCEDURE — 88112 CYTOPATH CELL ENHANCE TECH: CPT

## 2017-11-08 RX ADMIN — IPRATROPIUM BROMIDE AND ALBUTEROL SULFATE 1 AMPULE: .5; 3 SOLUTION RESPIRATORY (INHALATION) at 10:49

## 2017-11-08 RX ADMIN — CEFTRIAXONE 1 G: 1 INJECTION, SOLUTION INTRAVENOUS at 10:02

## 2017-11-08 RX ADMIN — HYDROCODONE BITARTRATE AND ACETAMINOPHEN 1 TABLET: 5; 325 TABLET ORAL at 00:43

## 2017-11-08 RX ADMIN — IPRATROPIUM BROMIDE AND ALBUTEROL SULFATE 1 AMPULE: .5; 3 SOLUTION RESPIRATORY (INHALATION) at 18:40

## 2017-11-08 RX ADMIN — DILTIAZEM HYDROCHLORIDE 120 MG: 120 CAPSULE, COATED, EXTENDED RELEASE ORAL at 09:05

## 2017-11-08 RX ADMIN — FUROSEMIDE 20 MG: 10 INJECTION, SOLUTION INTRAMUSCULAR; INTRAVENOUS at 09:05

## 2017-11-08 RX ADMIN — TRAMADOL HYDROCHLORIDE 50 MG: 50 TABLET, FILM COATED ORAL at 20:47

## 2017-11-08 RX ADMIN — WARFARIN SODIUM 5 MG: 5 TABLET ORAL at 17:12

## 2017-11-08 RX ADMIN — ACETAMINOPHEN 650 MG: 325 TABLET, FILM COATED ORAL at 05:39

## 2017-11-08 RX ADMIN — Medication 10 ML: at 20:49

## 2017-11-08 RX ADMIN — AMIODARONE HYDROCHLORIDE 200 MG: 200 TABLET ORAL at 09:05

## 2017-11-08 RX ADMIN — PANTOPRAZOLE SODIUM 40 MG: 40 TABLET, DELAYED RELEASE ORAL at 05:35

## 2017-11-08 RX ADMIN — ONDANSETRON 4 MG: 2 INJECTION INTRAMUSCULAR; INTRAVENOUS at 10:27

## 2017-11-08 RX ADMIN — AZITHROMYCIN MONOHYDRATE 500 MG: 500 INJECTION, POWDER, LYOPHILIZED, FOR SOLUTION INTRAVENOUS at 13:11

## 2017-11-08 RX ADMIN — HYDROCODONE BITARTRATE AND ACETAMINOPHEN 1 TABLET: 5; 325 TABLET ORAL at 09:05

## 2017-11-08 RX ADMIN — FUROSEMIDE 20 MG: 10 INJECTION, SOLUTION INTRAMUSCULAR; INTRAVENOUS at 17:12

## 2017-11-08 RX ADMIN — DILTIAZEM HYDROCHLORIDE 10 MG/HR: 5 INJECTION INTRAVENOUS at 00:49

## 2017-11-08 RX ADMIN — DILTIAZEM HYDROCHLORIDE 5 MG/HR: 5 INJECTION INTRAVENOUS at 06:20

## 2017-11-08 RX ADMIN — HYDROCODONE BITARTRATE AND ACETAMINOPHEN 1 TABLET: 5; 325 TABLET ORAL at 17:28

## 2017-11-08 RX ADMIN — IPRATROPIUM BROMIDE AND ALBUTEROL SULFATE 1 AMPULE: .5; 3 SOLUTION RESPIRATORY (INHALATION) at 15:21

## 2017-11-08 ASSESSMENT — PAIN SCALES - GENERAL
PAINLEVEL_OUTOF10: 7
PAINLEVEL_OUTOF10: 5
PAINLEVEL_OUTOF10: 5
PAINLEVEL_OUTOF10: 10
PAINLEVEL_OUTOF10: 8
PAINLEVEL_OUTOF10: 7
PAINLEVEL_OUTOF10: 10
PAINLEVEL_OUTOF10: 9
PAINLEVEL_OUTOF10: 8
PAINLEVEL_OUTOF10: 5

## 2017-11-08 ASSESSMENT — PAIN DESCRIPTION - LOCATION
LOCATION: CHEST
LOCATION: CHEST

## 2017-11-08 ASSESSMENT — PAIN DESCRIPTION - ORIENTATION: ORIENTATION: LEFT

## 2017-11-08 ASSESSMENT — PAIN DESCRIPTION - PAIN TYPE
TYPE: ACUTE PAIN
TYPE: ACUTE PAIN

## 2017-11-08 NOTE — PROGRESS NOTES
Palliative Care:      Initiated services for support. Pt followed by palliative care previous admissions. She is resting in bed and reports breathing much better after thoracentesis this am. Hx of non small cell lung cancer and unfortunately has had several recent admissions. Past Medical History:     Afib, Anxiety, HTN, breast cancer,     Advance Directives:      Full code    Pain/Other Symptoms:    Denies acute pain, just soreness in her lt side from procedure    Activity:         As tolerated    Psychological/Spiritual:       Good spiritual support and has been followed by P/C  as well. Patient/Family Discussion:    Very pleasant visit with pt, who is thankful to be breathing better and hopeful to go home soon. She lives with her son, and has a daughter, Wilbur Lentz who participates in her care as well. She has home oxygen in place and is followed by [facility], they are in the home 5x week. Plan/expectations:  Return home with family and home health when medically stable for discharge. Long term goals:      Follow up with oncology for options and treatment plans      Offered encouragement and support, will continue to follow POC    Electronically signed by Yanely Mcmahon RN on 11/8/2017 at 5:00 PM

## 2017-11-08 NOTE — PROGRESS NOTES
Telemetry notified me of pt converting into afib with rates up to 150. I called Dr. Stacy Sidhu to notify him of rhythm change and he wants her to be moved to 7th floor on a cardizem drip. Pt in no acute S/S of distress but will continue to monitor.   Electronically signed by Amberly Werner RN on 11/7/2017 at 10:55 PM

## 2017-11-08 NOTE — PROGRESS NOTES
undergo a CT-guided left thoracentesis on 9/1/2017 with 1100 mL of yellow, translucent fluid evacuated. Cytology was negative for malignant cells. Noted on CT during thoracentesis was moderate increase in the volume of pleural fluid compared to the CT from [facility] on 8/25/17 in addition to a new passive collapse of the left upper lobe and moderate amount of debris within the left main stem bronchus and left upper lobe bronchus. Abdominal/pelvic CT WO contrast 9/4/17 documented a 6 mm subpleural nodule in the RLL, likely benign. Thickening of the adrenal glands were felt likely adenomatous change, the largest nodule was 1.8 cm on the right. CT-guided biopsy of the YULISA lung mass was performed 9/8/2017. Pathology was consistent with CK7 positive non-small cell carcinoma. Mary's initial appointment for oncology consultation on 9/28/17 was interrupted by hospitalization at [facility] for GI bleeding with an INR greater than 18, on warfarin for atrial fibrillation. Endoscopy 9/29/17 and colonoscopy 10/2/17 by Dr. Jenniffer Najjar were both negative. During hospitalization, Mary was evaluated by cardiothoracic surgeon, Dr. 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 She was discharged on 10/13/17 to [facility] for rehabilitation, subsequently returned home 11/2/17. Findings were reviewed in detail with Mary and her daughter Laya Altamirano at initial consultation on 11/2/17. Mary desires to proceed with treatment as delineated below:  1. Bone and PET scan to complete the metastatic workup. 2. Refer to [facility] for XRT to the YULISA lung mass with associated postobstruction   3. Recommend systemic chemotherapy with carboplatinum/Abraxane   4. Refer to Jeanie Cuadra/Tammie for Mediport placement. 5. Labs requested for abn CBC   6.  RTC 2 weeks       OBJECTIVE:  Vitals:    11/08/17 0609   BP: 124/71   Pulse: 88   Resp: 17   Temp: 97.3 °F (36.3 °C)   SpO2: 91% AM for symptom relief with the removal of 2 L of straw-colored fluid. Cytology requested. Acute kidney injury- Unknown etiology. improving     · Management as per IM  · Cr 2.6->2.1->1.7->1. 5 ~ 1.3  · Continue IV fluids, as per your discretion  · Calcium 8.1      Normocytic anemia- multifactorial 2/2 malignancy, anemia chronic disease, hypothyroidism  · Labs at clinic: Ferritin 198, iron saturation 10%, [id number],  B12 922, TSH 8.4,  Folate 5.3,  · , haptoglobin 589     Neutrophilic leukocytosis- likely 2/2 Postobstructive pneumonia vs leukemoid reaction. WBC has been elevated at least since August 2017. Range 15,000-48,000  · Afebrile  · Continue antibiotics as per your discretion  · Blood cultures ordered. · 11/2/2017WBC 48,000->36,000/96% neutrophils->31,000 ~ 50.4 with 39.5/78.6% neutrophils.      Hypothyroidism- TSH 8.4 on 11/2/2017   · We'll defer to IM.      [place]. fibrillationanticoagulation with warfarin  · INR 1.53  · Management as per your discretion  · Now on Cardizem drip     Disposition- Continue with current plan of care. Cytology requested on pleural fluid.      Krista Che    11/08/17  7:44 AM

## 2017-11-08 NOTE — PROGRESS NOTES
[facility]  Daily Progress Note      Chief Complaint: f/u afib    Interval Hx: during the evening, pt went into afib with RVR, she was moved to 7 and placed on cardizem drip. She had her thoracentesis and is doing well. She is breathing much better. Her HR is controlled.     ROS:  The rest of the systems are negative except Interval Hx    Current Inpatient Medications:   warfarin (COUMADIN) daily dosing (placeholder)   Other RX Placeholder    furosemide  20 mg Intravenous BID    ipratropium-albuterol  1 ampule Inhalation Q4H WA    traMADol  50 mg Oral BID    amiodarone  200 mg Oral Daily    warfarin  5 mg Oral Daily    diltiazem  120 mg Oral Daily    pantoprazole  40 mg Oral QAM AC    cefTRIAXone (ROCEPHIN) IV  1 g Intravenous Q24H    azithromycin  500 mg Intravenous Q24H    sodium chloride flush  10 mL Intravenous 2 times per day    enoxaparin  30 mg Subcutaneous Daily       IV Infusions (if any):   diltiazem (CARDIZEM) 125 mg in dextrose 5% 125 mL infusion 2.5 mg/mL (11/08/17 0909)       Physical Exam:   /62   Pulse 95   Temp 97.3 °F (36.3 °C) (Temporal)   Resp 17   Wt 157 lb 9.6 oz (71.5 kg)   SpO2 91%   BMI 23.27 kg/m²       Intake/Output Summary (Last 24 hours) at 11/08/17 1221  Last data filed at 11/08/17 0745   Gross per 24 hour   Intake              880 ml   Output             3350 ml   Net            -2470 ml       Gen - NAD  Neck - Supple  ENMT - MMM, OP Clear  Cardio - No JVD     Irregurarly irregular, no gallop, rub, murmur                No edema, 2+ radials  Resp - Normal effort, CTA Bilat  GI - soft, non-tender, no HSM  Psych - A+O x 3, normal affect     Diagnostics:      Recent Labs      11/06/17   0340  11/08/17   0111   WBC  31.2*  50.4*   HGB  8.6*  9.5*   PLT  431*  453*      Recent Labs      11/06/17   0340  11/07/17   0335  11/08/17   0111   NA  147*  143  142   K  4.6  3.7  3.5   CL  107  101  100   CO2  25  31*  35*   BUN  30*  22  19   CREATININE 1. 7*  1.5*  1.3*   LABGLOM  29*  34*  40*   CALCIUM  8.1*  8.3*  8.1*      Recent Labs      11/06/17   0340   PROBNP  3,071*        Tele - rate controlled afib    Assessment:     69 y.o. female with paroxysmal atrial fibrillation, shortness of air, pleural effusion, diastolic dysfunction, non-small cell lung cancer    Plan:     AFib - will wean the cardizem drip to off and continue oral therapy. I anticipate she will do much better now that her effusion has drained.     Manan Guerrero MD  [facility] [place]    11/8/2017 12:21 PM

## 2017-11-08 NOTE — CONSULTS
they  removed some fluid from her chest, I believe it was. FAMILY PSYCHIATRIC HISTORY:  The daughter reports that she herself has been  depressed and is treated for that. The patient originally told me no  family history of any psychiatric conditions. SOCIAL HISTORY:  She has been  twice before. She is . She  completed the ninth grade. She is [employer]. LABORATORY DATA:  Current results include a complete metabolic profile with  a creatinine of 1.3 and calcium 8.1. White blood count _____ 50.4, H and H  of 9.5 and 30.6. CURRENT MEDICATIONS:  Include Tylenol, Cordarone, Zithromax, Rocephin,  Cardizem, Lovenox, Lasix, Norco, DuoNeb nebulizer solution, milk of  magnesia, Zofran, Protonix, tramadol, and warfarin. MENTAL STATUS EXAMINATIION:  A 80-year-old white female lying in bed supine  in no acute distress watching television, awake, alert, oriented,  cooperative, well related. Good eye contact. Normal speech. No formal  thought disorder. No evidence of delusions or hallucinations. No suicidal  or homicidal ideas. Mood is okay. Affect is congruent, broad in range,  appropriate. Good sense of humor. Cognition grossly intact. Insight and  judgment good during the interview. Impulse control good during the  interview. IMPRESSION:  In essence then, this is a 80-year-old white female that is  battling lung cancer, who in frustration in the emergency room while being  very ill verbalized some suicidal ideas with no intentions, no plans,  history of a prior remote attempt. My impression at this point is that she  is not suicidal.  I would go ahead and stop the sitter. The daughter  agrees that the patient does not seem to be suicidal at this point. I will  go ahead and see her again tomorrow or Friday if she is here. I do not see  any reason to start psychotropic medications at this point.   I see  palliative care has been consulted and I fully endorse and agree with that.    Thank you for the opportunity to participate in the care of the patient.         Javier Breaux MD    D: 11/08/2017 17:14:24       T: 11/08/2017 18:36:12     [email or web address]  Job#: [id number]     Doc#: [id number]

## 2017-11-08 NOTE — PROGRESS NOTES
Hospitalist Progress Note  11/7/2017 7:56 PM  Subjective:   Admit Date: 11/3/2017  PCP: Lata Preston  [id number]      Chief Complaint: Short of breath    Subjective / History of present illness:  Had a miserable night with shortness of breath. Just couldn't get comfortable. No CP/N/V. No rest.    Cumulative hospital history:   The patient is a 69 y.o. female who presented to ER in [place] [address], under the recommendation of Dr Severo Marine due to abnormal blood work. She had elevated creatinine and signs of acute renal failure. The patient states, she just has had no appetite and did not feel like eating, denies nausea/vmiting or diarrhea.      Recently diagnosed with lung cancer, undergoing work up in anticipation of treatment. Had bone scan and port placement scheduled in the following weeks. Wear oxygen 3-6 lpm at home. Quit smoking. ROS:  Review of Systems   Constitutional: Positive for malaise/fatigue and weight loss. Negative for chills and fever. HENT: Negative for congestion, hearing loss, nosebleeds and sore throat. Eyes: Negative for blurred vision and double vision. Respiratory: Positive for cough and shortness of breath. Negative for hemoptysis and sputum production. Cardiovascular: Positive for orthopnea. Negative for chest pain, palpitations and leg swelling. Gastrointestinal: Negative for abdominal pain, heartburn, nausea and vomiting. Genitourinary: Negative for dysuria, frequency and urgency. Musculoskeletal: Negative for back pain, myalgias and neck pain. Skin: Negative for itching and rash. Neurological: Positive for weakness. Negative for dizziness, focal weakness, seizures and headaches. Endo/Heme/Allergies: Does not bruise/bleed easily. Psychiatric/Behavioral: Positive for depression. Negative for suicidal ideas. The patient is nervous/anxious. 14 point review of systems is negative except as specifically addressed above.     Medications:   Current Facility-Administered Medications   Medication Dose Route Frequency Provider Last Rate Last Dose    warfarin (COUMADIN) daily dosing (placeholder)   Other RX Placeholder Marlo Schaffer MD        furosemide (LASIX) injection 20 mg  20 mg Intravenous BID Kei Handley DO   20 mg at 11/07/17 1918    ipratropium-albuterol (DUONEB) nebulizer solution 1 ampule  1 ampule Inhalation Q4H WA Bita Valdovinos MD   1 ampule at 11/07/17 1913    traMADol (ULTRAM) tablet 50 mg  50 mg Oral BID Marlo Schaffer MD   50 mg at 11/07/17 1918    amiodarone (CORDARONE) tablet 200 mg  200 mg Oral Daily Marlo Schaffer MD   200 mg at 11/07/17 0937    warfarin (COUMADIN) tablet 5 mg  5 mg Oral Daily Marlo Schaffer MD   Stopped at 11/07/17 1630    diltiazem (CARDIZEM CD) extended release capsule 120 mg  120 mg Oral Daily Venus Moore MD   120 mg at 11/07/17 0937    pantoprazole (PROTONIX) tablet 40 mg  40 mg Oral QAM AC Venus Moore MD   40 mg at 11/07/17 0612    cefTRIAXone (ROCEPHIN) 1 g in 50 mL IVPB (premix)  1 g Intravenous Q24H Bita Valdovinos MD   Stopped at 11/07/17 1015    azithromycin (ZITHROMAX) 500 mg in D5W 250ml Vial Mate  500 mg Intravenous Q24H Bita Valdovinos MD   Stopped at 11/06/17 1254    HYDROcodone-acetaminophen (NORCO) 5-325 MG per tablet 1 tablet  1 tablet Oral Q6H PRN Marlo Schaffer MD   1 tablet at 11/07/17 1630    sodium chloride flush 0.9 % injection 10 mL  10 mL Intravenous 2 times per day Bita Valdovinos MD   10 mL at 11/07/17 1918    acetaminophen (TYLENOL) tablet 650 mg  650 mg Oral Q4H PRN Bita Valdovinos MD        magnesium hydroxide (MILK OF MAGNESIA) 400 MG/5ML suspension 30 mL  30 mL Oral Daily PRN Bita Valdovinos MD        [facility]dansetron [facility]) injection 4 mg  4 mg Intravenous Q6H PRN Bita Valdovinos MD        enoxaparin (LOVENOX) injection 30 mg  30 mg Subcutaneous Daily Bita Valdovinos MD   Stopped at 11/06/17 0826        Objective:   Vitals: BP (!) 133/59   Pulse 69 Temp 98.1 °F (36.7 °C) (Temporal)   Resp 20   Wt 166 lb (75.3 kg)   SpO2 96%   BMI 24.51 kg/m²   24HR INTAKE/OUTPUT:    Intake/Output Summary (Last 24 hours) at 11/07/17 1956  Last data filed at 11/07/17 1805   Gross per 24 hour   Intake             1240 ml   Output             3050 ml   Net            -1810 ml     DIET CARDIAC; Low Sodium (2 GM)  Last BM  Physical Exam   Constitutional: She is oriented to person, place, and time. She appears cachectic. She has a sickly appearance. No distress. HENT:   Head: Normocephalic and atraumatic. Mouth/Throat: No oropharyngeal exudate. Eyes: No scleral icterus. Neck: Normal range of motion. Neck supple. No JVD present. Cardiovascular: Normal rate, regular rhythm, normal heart sounds and intact distal pulses. No murmur heard. Pulmonary/Chest: Accessory muscle usage present. She has decreased breath sounds. She has rhonchi. She has rales. Abdominal: Soft. Bowel sounds are normal. She exhibits no distension. There is no tenderness. Musculoskeletal: Normal range of motion. She exhibits no edema or deformity. Neurological: She is alert and oriented to person, place, and time. No cranial nerve deficit. She exhibits normal muscle tone. Coordination normal.   Skin: Skin is warm and dry. Capillary refill takes less than 2 seconds. No erythema. There is pallor. Psychiatric: She has a normal mood and affect.  Her behavior is normal. Judgment and thought content normal.         Labs:     Recent Labs      11/05/17 0256 11/06/17   0340   WBC  33.2*  31.2*   RBC  2.71*  2.85*   HGB  8.1*  8.6*   HCT  26.3*  28.3*   MCV  97.0  99.3*   MCH  29.9  30.2   MCHC  30.8*  30.4*   PLT  436*  431*     Recent Labs      11/05/17 0256 11/06/17   0340  11/07/17   0335   NA  143  147*  143   K  4.2  4.6  3.7   ANIONGAP  13  15  11   CL  104  107  101   CO2  26  25  31*   BUN  40*  30*  22   CREATININE  2.1*  1.7*  1.5*   GLUCOSE  136*  68*  79   CALCIUM  8.2*  8.1*  8.3* prophylaxis: warfarin--being reversed with Vitamin K IV for thoracentesis tomorrow  GI: protonix  Antibiotics: rocephin / zithromax  Discharge planning: TBD      TXU Sumi, DO  Internal Medicine Hospitalist

## 2017-11-09 ENCOUNTER — APPOINTMENT (OUTPATIENT)
Dept: MRI IMAGING | Age: 77
DRG: 683 | End: 2017-11-09
Attending: HOSPITALIST
Payer: MEDICARE

## 2017-11-09 ENCOUNTER — APPOINTMENT (OUTPATIENT)
Dept: NUCLEAR MEDICINE | Age: 77
DRG: 683 | End: 2017-11-09
Attending: HOSPITALIST
Payer: MEDICARE

## 2017-11-09 LAB
ANION GAP SERPL CALCULATED.3IONS-SCNC: 12 MMOL/L (ref 7–19)
ANISOCYTOSIS: ABNORMAL
BANDED NEUTROPHILS RELATIVE PERCENT: 8 % (ref 0–5)
BASOPHILS ABSOLUTE: 0 K/UL (ref 0–0.2)
BASOPHILS MANUAL: 0 %
BASOPHILS RELATIVE PERCENT: 0 % (ref 0–1)
BLOOD CULTURE, ROUTINE: NORMAL
BUN BLDV-MCNC: 17 MG/DL (ref 8–23)
CALCIUM SERPL-MCNC: 8.1 MG/DL (ref 8.8–10.2)
CHLORIDE BLD-SCNC: 100 MMOL/L (ref 98–111)
CO2: 32 MMOL/L (ref 22–29)
CREAT SERPL-MCNC: 1.2 MG/DL (ref 0.5–0.9)
CULTURE, BLOOD 2: NORMAL
EOSINOPHILS ABSOLUTE: 6.27 K/UL (ref 0–0.6)
EOSINOPHILS RELATIVE PERCENT: 13 % (ref 0–5)
GFR NON-AFRICAN AMERICAN: 44
GLUCOSE BLD-MCNC: 87 MG/DL (ref 74–109)
HCT VFR BLD CALC: 29.6 % (ref 37–47)
HEMOGLOBIN: 9.1 G/DL (ref 12–16)
HYPOCHROMIA: ABNORMAL
INR BLD: 1.21 (ref 0.88–1.18)
LYMPHOCYTES ABSOLUTE: 1.4 K/UL (ref 1.1–4.5)
LYMPHOCYTES RELATIVE PERCENT: 3 % (ref 20–40)
MCH RBC QN AUTO: 29.9 PG (ref 27–31)
MCHC RBC AUTO-ENTMCNC: 30.7 G/DL (ref 33–37)
MCV RBC AUTO: 97.4 FL (ref 81–99)
MONOCYTES ABSOLUTE: 2.9 K/UL (ref 0–0.9)
MONOCYTES RELATIVE PERCENT: 6 % (ref 0–10)
NEUTROPHILS ABSOLUTE: 37.6 K/UL (ref 1.5–7.5)
NEUTROPHILS MANUAL: 70 %
NEUTROPHILS RELATIVE PERCENT: 70 % (ref 50–65)
PDW BLD-RTO: 17.1 % (ref 11.5–14.5)
PLATELET # BLD: 447 K/UL (ref 130–400)
PLATELET SLIDE REVIEW: ABNORMAL
PMV BLD AUTO: 9.3 FL (ref 9.4–12.3)
POTASSIUM SERPL-SCNC: 4 MMOL/L (ref 3.5–5)
PROTHROMBIN TIME: 15.3 SEC (ref 12–14.6)
RBC # BLD: 3.04 M/UL (ref 4.2–5.4)
SODIUM BLD-SCNC: 144 MMOL/L (ref 136–145)
WBC # BLD: 48.2 K/UL (ref 4.8–10.8)

## 2017-11-09 PROCEDURE — 2580000003 HC RX 258: Performed by: FAMILY MEDICINE

## 2017-11-09 PROCEDURE — 99232 SBSQ HOSP IP/OBS MODERATE 35: CPT | Performed by: INTERNAL MEDICINE

## 2017-11-09 PROCEDURE — 78306 BONE IMAGING WHOLE BODY: CPT

## 2017-11-09 PROCEDURE — 6370000000 HC RX 637 (ALT 250 FOR IP): Performed by: FAMILY MEDICINE

## 2017-11-09 PROCEDURE — 6360000002 HC RX W HCPCS: Performed by: FAMILY MEDICINE

## 2017-11-09 PROCEDURE — 85025 COMPLETE CBC W/AUTO DIFF WBC: CPT

## 2017-11-09 PROCEDURE — 94640 AIRWAY INHALATION TREATMENT: CPT

## 2017-11-09 PROCEDURE — 6370000000 HC RX 637 (ALT 250 FOR IP): Performed by: HOSPITALIST

## 2017-11-09 PROCEDURE — 6360000004 HC RX CONTRAST MEDICATION: Performed by: NURSE PRACTITIONER

## 2017-11-09 PROCEDURE — 70553 MRI BRAIN STEM W/O & W/DYE: CPT

## 2017-11-09 PROCEDURE — 36415 COLL VENOUS BLD VENIPUNCTURE: CPT

## 2017-11-09 PROCEDURE — 2700000000 HC OXYGEN THERAPY PER DAY

## 2017-11-09 PROCEDURE — 1210000000 HC MED SURG R&B

## 2017-11-09 PROCEDURE — A9561 TC99M OXIDRONATE: HCPCS | Performed by: INTERNAL MEDICINE

## 2017-11-09 PROCEDURE — 3430000000 HC RX DIAGNOSTIC RADIOPHARMACEUTICAL: Performed by: INTERNAL MEDICINE

## 2017-11-09 PROCEDURE — A9577 INJ MULTIHANCE: HCPCS | Performed by: NURSE PRACTITIONER

## 2017-11-09 PROCEDURE — 80048 BASIC METABOLIC PNL TOTAL CA: CPT

## 2017-11-09 PROCEDURE — 6360000002 HC RX W HCPCS: Performed by: INTERNAL MEDICINE

## 2017-11-09 PROCEDURE — 85610 PROTHROMBIN TIME: CPT

## 2017-11-09 RX ADMIN — GADOBENATE DIMEGLUMINE 13 ML: 529 INJECTION, SOLUTION INTRAVENOUS at 16:19

## 2017-11-09 RX ADMIN — IPRATROPIUM BROMIDE AND ALBUTEROL SULFATE 1 AMPULE: .5; 3 SOLUTION RESPIRATORY (INHALATION) at 06:38

## 2017-11-09 RX ADMIN — HYDROCODONE BITARTRATE AND ACETAMINOPHEN 1 TABLET: 5; 325 TABLET ORAL at 14:15

## 2017-11-09 RX ADMIN — IPRATROPIUM BROMIDE AND ALBUTEROL SULFATE 1 AMPULE: .5; 3 SOLUTION RESPIRATORY (INHALATION) at 18:46

## 2017-11-09 RX ADMIN — TECHNETIUM TC 99M OXIDRONATE 20 MILLICURIE: 3.15 INJECTION, POWDER, LYOPHILIZED, FOR SOLUTION INTRAVENOUS at 15:27

## 2017-11-09 RX ADMIN — CEFTRIAXONE 1 G: 1 INJECTION, SOLUTION INTRAVENOUS at 10:06

## 2017-11-09 RX ADMIN — HYDROCODONE BITARTRATE AND ACETAMINOPHEN 1 TABLET: 5; 325 TABLET ORAL at 06:26

## 2017-11-09 RX ADMIN — PANTOPRAZOLE SODIUM 40 MG: 40 TABLET, DELAYED RELEASE ORAL at 06:26

## 2017-11-09 RX ADMIN — FUROSEMIDE 20 MG: 10 INJECTION, SOLUTION INTRAMUSCULAR; INTRAVENOUS at 08:54

## 2017-11-09 RX ADMIN — FUROSEMIDE 20 MG: 10 INJECTION, SOLUTION INTRAMUSCULAR; INTRAVENOUS at 17:58

## 2017-11-09 RX ADMIN — DILTIAZEM HYDROCHLORIDE 120 MG: 120 CAPSULE, COATED, EXTENDED RELEASE ORAL at 08:55

## 2017-11-09 RX ADMIN — TRAMADOL HYDROCHLORIDE 50 MG: 50 TABLET, FILM COATED ORAL at 08:54

## 2017-11-09 RX ADMIN — TRAMADOL HYDROCHLORIDE 50 MG: 50 TABLET, FILM COATED ORAL at 22:53

## 2017-11-09 RX ADMIN — ENOXAPARIN SODIUM 30 MG: 40 INJECTION SUBCUTANEOUS at 08:54

## 2017-11-09 RX ADMIN — AMIODARONE HYDROCHLORIDE 200 MG: 200 TABLET ORAL at 08:55

## 2017-11-09 RX ADMIN — WARFARIN SODIUM 5 MG: 5 TABLET ORAL at 17:58

## 2017-11-09 RX ADMIN — Medication 10 ML: at 08:54

## 2017-11-09 RX ADMIN — Medication 10 ML: at 22:54

## 2017-11-09 RX ADMIN — AZITHROMYCIN MONOHYDRATE 500 MG: 500 INJECTION, POWDER, LYOPHILIZED, FOR SOLUTION INTRAVENOUS at 10:51

## 2017-11-09 RX ADMIN — IPRATROPIUM BROMIDE AND ALBUTEROL SULFATE 1 AMPULE: .5; 3 SOLUTION RESPIRATORY (INHALATION) at 10:50

## 2017-11-09 ASSESSMENT — PAIN SCALES - GENERAL
PAINLEVEL_OUTOF10: 8
PAINLEVEL_OUTOF10: 10
PAINLEVEL_OUTOF10: 8
PAINLEVEL_OUTOF10: 10

## 2017-11-09 NOTE — PROGRESS NOTES
Pt has got up off and on during the night to use bedside commode. No resp distress noted. No c/o voiced. Call light within reach.

## 2017-11-09 NOTE — PLAN OF CARE
Problem: Falls - Risk of  Goal: Absence of falls  Outcome: Ongoing      Problem: Risk for Impaired Skin Integrity  Goal: Tissue integrity - skin and mucous membranes  Structural intactness and normal physiological function of skin and  mucous membranes.    Outcome: Ongoing      Problem: Cardiac Output - Decreased:  Goal: Hemodynamic stability will improve  Hemodynamic stability will improve   Outcome: Ongoing      Problem: Breathing Pattern - Ineffective:  Goal: Ability to achieve and maintain a regular respiratory rate will improve  Ability to achieve and maintain a regular respiratory rate will improve   Outcome: Ongoing

## 2017-11-09 NOTE — PROGRESS NOTES
normal  SKIN - turgor is normal, no rash  PSYCHIATRIC - normal mood and affect, alert and orientated x 3, judgement and insight appear appropriate    I examined the patient for these specific problems:    ASSESSMENT:    ALL THE CARDIOLOGY PROBLEMS ARE LISTED ABOVE; HOWEVER, THE FOLLOWING SPECIFIC CARDIAC PROBLEMS WERE ADDRESSED AND TREATED DURING THE HOSPITAL VISIT TODAY:                                                                                                                                                                                                                                              MEDICAL DECISION MAKING             Cardiac Specific Problem / Diagnosis  Discussion and Data Reviewed Diagnostic Procedures Ordered Management Options Selected           1. Paroxsymal atrial fibrillation  are improving   Review and summation of old records: To NSR this AM on Amiodarone and Cardizem  No Continue current medications:     Yes:            2. Diastolic Dysfunction Initial presentation during this evaluation   Echo Summary   Mitral valve leaflets are mildly thickened with preserved leaflet   mobility.   Mild-moderate mitral regurgitation noted.   Normal left ventricular size with preserved LV function and an estimated   ejection fraction of approximately 55%.   No regional wall motion abnormalities identified.   Normal left ventricular wall thickness.   Normal diastolic function.   No evidence of left ventricular mass or thrombus noted. No Continue current medications:    Yes:            3. HTN Initial presentation during this evaluation Patient has a history of hypertension, which is managed and is on current therapy. The blood pressure history is:  Systolic (77QZV), FXZ:614 , Min:93 , MHZ:337    Diastolic (75PDL), JDA:43, Min:52, Max:77   No Continue current medications:       Yes:        PLAN:    1. Continue present medications except for changes as noted above  2.  Continue to monitor rhythm  3. Further orders per clinical course. 4. Continue Cardizem and Amiodarone PO. Discussed with patient and nursing.     Electronically signed by Paramjit Dumont MD on 11/9/17    [facility] audra

## 2017-11-09 NOTE — PROGRESS NOTES
PROGRESS NOTE  Patient name: Jose Raul Duffy  Patient : 1940      SUBJECTIVE:  A fib with RVR with HR improved  last 24 hours, Cardizem drip off. Afebrile. Intermittent cough. Tolerated thoracentesis without difficulty yesterday, SOA improved. INTERVAL HISTORY  The the patient was recently seen by us for a first visit for further assessment of a newly diagnosed non-small cell lung cancer. She was seen on 2017. Laboratory workup showed elevated creatinine at 2.6. Patient was advised to proceed to the emergency. She denies any nausea vomiting or diarrhea. She denies any prior history of chronic kidney disease. In fact, last creatinine in  was 0.8, but at discharge was up to 1.4. Calcium levels are normal.   Olivia Adams is a 41-year-old  female referred to the clinic by Dr. Leisa Valencia for treatment recommendations regarding a new diagnosis of YULISA non-small cell lung carcinoma. She is accompanied by her daughter, Tj Estrella today. TARGET SITES:  1. 10 cm YULISA primary lung mass with pleural effusion   2. 6 mm RLL lung nodule   3. bulky mediastinal lymphadenopathy   4. left supraclavicular LN   5. indeterminate 1.8 cm right adrenal nodule    ONCOLOGIC HISTORY: YULISA NSCLC, 2017   Mary Miramontes was seen in initial oncologic consultation on 17, referred by Dr. Leisa Valencia regarding a new diagnosis of YULISA non-small cell lung carcinoma. Mary initially presented to [place], [place] on 17 for progressive cough and dyspnea, associated with right-sided pleuritic chest discomfort. She was treated for pneumonia and transferred to [facility] for further evaluation of the 10 cm left-sided chest mass and mediastinal invasion with pathologic adenopathy noted on chest CT completed the same day. Her hospital course was complicated by atrial fibrillation with RVR, followed by cardiologist, Dr. Rocio Peck.   She was followed by pulmonologist, Dr. Leisa Valencia, eventually able undergo a CT-guided left thoracentesis on 9/1/2017 with 1100 mL of yellow, translucent fluid evacuated. Cytology was negative for malignant cells. Noted on CT during thoracentesis was moderate increase in the volume of pleural fluid compared to the CT from [facility] on 8/25/17 in addition to a new passive collapse of the left upper lobe and moderate amount of debris within the left main stem bronchus and left upper lobe bronchus. Abdominal/pelvic CT WO contrast 9/4/17 documented a 6 mm subpleural nodule in the RLL, likely benign. Thickening of the adrenal glands were felt likely adenomatous change, the largest nodule was 1.8 cm on the right. CT-guided biopsy of the YULISA lung mass was performed 9/8/2017. Pathology was consistent with CK7 positive non-small cell carcinoma. Mary's initial appointment for oncology consultation on 9/28/17 was interrupted by hospitalization at [facility] for GI bleeding with an INR greater than 18, on warfarin for atrial fibrillation. Endoscopy 9/29/17 and colonoscopy 10/2/17 by Dr. Jazmyn Chavez were both negative. During hospitalization, Mary was evaluated by cardiothoracic surgeon, Dr. 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 She was discharged on 10/13/17 to [facility] for rehabilitation, subsequently returned home 11/2/17. Findings were reviewed in detail with Mary and her daughter Amari Beckford at initial consultation on 11/2/17. Mary desires to proceed with treatment as delineated below:  1. Bone and PET scan to complete the metastatic workup. 2. Refer to [facility] for XRT to the YULISA lung mass with associated postobstruction   3. Recommend systemic chemotherapy with carboplatinum/Abraxane   4. Refer to [facility] for Mediport placement. 5. Labs requested for abn CBC   6.  RTC 2 weeks       OBJECTIVE:  Vitals:    11/09/17 0612   BP: 120/77   Pulse: 113   Resp: 18   Temp: 96 °F (35.6 thoracocenthesis this AM for symptom relief with the removal of 2 L of straw-colored fluid. Cytology requested. · Bone scan for staging  · MRI kaleb  for staging  · PET scan to be rescheduled    Acute kidney injury- Unknown etiology. improving     · Management as per IM  · Cr 2.6->2.1->1.7->1. 5 ~ 1.3 ~1.2  · Continue IV fluids, as per your discretion  · Calcium 8.1      Normocytic anemia- multifactorial 2/2 malignancy, anemia chronic disease, hypothyroidism  · Labs at clinic: Ferritin 198, iron saturation 10%, [id number],  B12 922, TSH 8.4,  Folate 5.3,  · , haptoglobin 997     Neutrophilic leukocytosis- likely 2/2 Postobstructive pneumonia vs leukemoid reaction. WBC has been elevated at least since August 2017. Range 15,000-48,000  · Afebrile  · Continue antibiotics as per your discretion  · Blood cultures ordered. · 11/2/2017WBC 48,000->36,000/96% neutrophils->31,000 ~ 50.4 with 39.5/78.6% neutrophils. ~ 48.2/70.0%     Hypothyroidism- TSH 8.4 on 11/2/2017   · We'll defer to IM.      [place]. fibrillationanticoagulation with warfarin  · INR 1.21  · Management as per your discretion      UTI- Escherichia coli   · Rocephin 1 gram q 24 hours         Disposition- Continue with current plan of care. Cytology requested on pleural fluid pending.  Ok to discharge from Oncology standpoint, follow up scheduled for 11/16/2017 at [address] [place] [address]    11/09/17  7:40 AM

## 2017-11-10 LAB
ANION GAP SERPL CALCULATED.3IONS-SCNC: 7 MMOL/L (ref 7–19)
BASOPHILS ABSOLUTE: 0.2 K/UL (ref 0–0.2)
BASOPHILS RELATIVE PERCENT: 0.4 % (ref 0–1)
BUN BLDV-MCNC: 21 MG/DL (ref 8–23)
CALCIUM SERPL-MCNC: 8.3 MG/DL (ref 8.8–10.2)
CHLORIDE BLD-SCNC: 99 MMOL/L (ref 98–111)
CO2: 36 MMOL/L (ref 22–29)
CREAT SERPL-MCNC: 1.3 MG/DL (ref 0.5–0.9)
EOSINOPHILS ABSOLUTE: 5 K/UL (ref 0–0.6)
EOSINOPHILS RELATIVE PERCENT: 9.2 % (ref 0–5)
GFR NON-AFRICAN AMERICAN: 40
GLUCOSE BLD-MCNC: 86 MG/DL (ref 74–109)
HCT VFR BLD CALC: 30.4 % (ref 37–47)
HEMOGLOBIN: 9 G/DL (ref 12–16)
INR BLD: 1.21 (ref 0.88–1.18)
LYMPHOCYTES ABSOLUTE: 2.4 K/UL (ref 1.1–4.5)
LYMPHOCYTES RELATIVE PERCENT: 4.4 % (ref 20–40)
MCH RBC QN AUTO: 29.3 PG (ref 27–31)
MCHC RBC AUTO-ENTMCNC: 29.6 G/DL (ref 33–37)
MCV RBC AUTO: 99 FL (ref 81–99)
MONOCYTES ABSOLUTE: 3.2 K/UL (ref 0–0.9)
MONOCYTES RELATIVE PERCENT: 6 % (ref 0–10)
NEUTROPHILS ABSOLUTE: 40.9 K/UL (ref 1.5–7.5)
NEUTROPHILS RELATIVE PERCENT: 75.5 % (ref 50–65)
PDW BLD-RTO: 17 % (ref 11.5–14.5)
PLATELET # BLD: 432 K/UL (ref 130–400)
PMV BLD AUTO: 9.3 FL (ref 9.4–12.3)
POTASSIUM SERPL-SCNC: 4.4 MMOL/L (ref 3.5–5)
PROTHROMBIN TIME: 15.3 SEC (ref 12–14.6)
RBC # BLD: 3.07 M/UL (ref 4.2–5.4)
SODIUM BLD-SCNC: 142 MMOL/L (ref 136–145)
WBC # BLD: 54.1 K/UL (ref 4.8–10.8)

## 2017-11-10 PROCEDURE — 94640 AIRWAY INHALATION TREATMENT: CPT

## 2017-11-10 PROCEDURE — 99232 SBSQ HOSP IP/OBS MODERATE 35: CPT | Performed by: HOSPITALIST

## 2017-11-10 PROCEDURE — 6370000000 HC RX 637 (ALT 250 FOR IP): Performed by: HOSPITALIST

## 2017-11-10 PROCEDURE — 97165 OT EVAL LOW COMPLEX 30 MIN: CPT

## 2017-11-10 PROCEDURE — 36415 COLL VENOUS BLD VENIPUNCTURE: CPT

## 2017-11-10 PROCEDURE — 2700000000 HC OXYGEN THERAPY PER DAY

## 2017-11-10 PROCEDURE — 6360000002 HC RX W HCPCS: Performed by: FAMILY MEDICINE

## 2017-11-10 PROCEDURE — 85025 COMPLETE CBC W/AUTO DIFF WBC: CPT

## 2017-11-10 PROCEDURE — 85610 PROTHROMBIN TIME: CPT

## 2017-11-10 PROCEDURE — 2580000003 HC RX 258: Performed by: FAMILY MEDICINE

## 2017-11-10 PROCEDURE — 1210000000 HC MED SURG R&B

## 2017-11-10 PROCEDURE — G8988 SELF CARE GOAL STATUS: HCPCS

## 2017-11-10 PROCEDURE — G8987 SELF CARE CURRENT STATUS: HCPCS

## 2017-11-10 PROCEDURE — 6360000002 HC RX W HCPCS: Performed by: INTERNAL MEDICINE

## 2017-11-10 PROCEDURE — 99232 SBSQ HOSP IP/OBS MODERATE 35: CPT | Performed by: INTERNAL MEDICINE

## 2017-11-10 PROCEDURE — 6370000000 HC RX 637 (ALT 250 FOR IP): Performed by: FAMILY MEDICINE

## 2017-11-10 PROCEDURE — 80048 BASIC METABOLIC PNL TOTAL CA: CPT

## 2017-11-10 RX ORDER — PRAVASTATIN SODIUM 20 MG
40 TABLET ORAL NIGHTLY
Status: DISCONTINUED | OUTPATIENT
Start: 2017-11-10 | End: 2017-11-11 | Stop reason: HOSPADM

## 2017-11-10 RX ADMIN — AZITHROMYCIN MONOHYDRATE 500 MG: 500 INJECTION, POWDER, LYOPHILIZED, FOR SOLUTION INTRAVENOUS at 13:29

## 2017-11-10 RX ADMIN — TRAMADOL HYDROCHLORIDE 50 MG: 50 TABLET, FILM COATED ORAL at 08:55

## 2017-11-10 RX ADMIN — HYDROCODONE BITARTRATE AND ACETAMINOPHEN 1 TABLET: 5; 325 TABLET ORAL at 06:10

## 2017-11-10 RX ADMIN — HYDROCODONE BITARTRATE AND ACETAMINOPHEN 1 TABLET: 5; 325 TABLET ORAL at 14:32

## 2017-11-10 RX ADMIN — IPRATROPIUM BROMIDE AND ALBUTEROL SULFATE 1 AMPULE: .5; 3 SOLUTION RESPIRATORY (INHALATION) at 07:39

## 2017-11-10 RX ADMIN — IPRATROPIUM BROMIDE AND ALBUTEROL SULFATE 1 AMPULE: .5; 3 SOLUTION RESPIRATORY (INHALATION) at 14:38

## 2017-11-10 RX ADMIN — DILTIAZEM HYDROCHLORIDE 120 MG: 120 CAPSULE, COATED, EXTENDED RELEASE ORAL at 08:55

## 2017-11-10 RX ADMIN — TRAMADOL HYDROCHLORIDE 50 MG: 50 TABLET, FILM COATED ORAL at 21:07

## 2017-11-10 RX ADMIN — CEFTRIAXONE 1 G: 1 INJECTION, SOLUTION INTRAVENOUS at 12:53

## 2017-11-10 RX ADMIN — IPRATROPIUM BROMIDE AND ALBUTEROL SULFATE 1 AMPULE: .5; 3 SOLUTION RESPIRATORY (INHALATION) at 19:05

## 2017-11-10 RX ADMIN — PANTOPRAZOLE SODIUM 40 MG: 40 TABLET, DELAYED RELEASE ORAL at 06:11

## 2017-11-10 RX ADMIN — ENOXAPARIN SODIUM 30 MG: 40 INJECTION SUBCUTANEOUS at 08:55

## 2017-11-10 RX ADMIN — WARFARIN SODIUM 5 MG: 5 TABLET ORAL at 16:58

## 2017-11-10 RX ADMIN — Medication 10 ML: at 08:57

## 2017-11-10 RX ADMIN — IPRATROPIUM BROMIDE AND ALBUTEROL SULFATE 1 AMPULE: .5; 3 SOLUTION RESPIRATORY (INHALATION) at 11:03

## 2017-11-10 RX ADMIN — PRAVASTATIN SODIUM 40 MG: 20 TABLET ORAL at 21:07

## 2017-11-10 RX ADMIN — FUROSEMIDE 20 MG: 10 INJECTION, SOLUTION INTRAMUSCULAR; INTRAVENOUS at 08:55

## 2017-11-10 RX ADMIN — FUROSEMIDE 20 MG: 10 INJECTION, SOLUTION INTRAMUSCULAR; INTRAVENOUS at 16:58

## 2017-11-10 RX ADMIN — AMIODARONE HYDROCHLORIDE 200 MG: 200 TABLET ORAL at 08:55

## 2017-11-10 ASSESSMENT — ENCOUNTER SYMPTOMS
COUGH: 1
DIARRHEA: 0
PHOTOPHOBIA: 0
NAUSEA: 0
DOUBLE VISION: 0
SPUTUM PRODUCTION: 0
VOMITING: 0
ORTHOPNEA: 0
HEMOPTYSIS: 0
BACK PAIN: 0
HEARTBURN: 0
BLURRED VISION: 0
ABDOMINAL PAIN: 0
SORE THROAT: 0
SHORTNESS OF BREATH: 1
ORTHOPNEA: 1

## 2017-11-10 ASSESSMENT — PAIN SCALES - GENERAL
PAINLEVEL_OUTOF10: 6
PAINLEVEL_OUTOF10: 8
PAINLEVEL_OUTOF10: 7
PAINLEVEL_OUTOF10: 7

## 2017-11-10 NOTE — PROGRESS NOTES
1100 mL of yellow, translucent fluid evacuated. Cytology was negative for malignant cells. Noted on CT during thoracentesis was moderate increase in the volume of pleural fluid compared to the CT from [facility] on 8/25/17 in addition to a new passive collapse of the left upper lobe and moderate amount of debris within the left main stem bronchus and left upper lobe bronchus. Abdominal/pelvic CT WO contrast 9/4/17 documented a 6 mm subpleural nodule in the RLL, likely benign. Thickening of the adrenal glands were felt likely adenomatous change, the largest nodule was 1.8 cm on the right. CT-guided biopsy of the YULISA lung mass was performed 9/8/2017. Pathology was consistent with CK7 positive non-small cell carcinoma. Mary's initial appointment for oncology consultation on 9/28/17 was interrupted by hospitalization at [facility] for GI bleeding with an INR greater than 18, on warfarin for atrial fibrillation. Endoscopy 9/29/17 and colonoscopy 10/2/17 by Dr. Carmine Leblanc were both negative. During hospitalization, Mary was evaluated by cardiothoracic surgeon, Dr. Caren Ewing, who felt Pleur-x catheter would not be beneficial with regard to her trapped lung on the left. She was discharged on 10/13/17 to [facility] for [facility], subsequently returned home 11/2/17. Findings were reviewed in detail with Mary and her daughter Hesham Ruano at initial consultation on 11/2/17. Mary desires to proceed with treatment as delineated below:  1. Bone and PET scan to complete the metastatic workup. 2. Refer to [facility] for XRT to the YULISA lung mass with associated postobstruction   3. Recommend systemic chemotherapy with carboplatinum/Abraxane   4. Refer to [facility] for Mediport placement. 5. Labs requested for abn CBC   6.  RTC 2 weeks       OBJECTIVE:  Vitals:    11/10/17 0623   BP: (!) 97/49   Pulse: 70   Resp: 16   Temp: 98 °F (36.7 °C)   SpO2: 91%       Intake/Output Summary (Last 24 hours) at 11/10/17 7566  Last data filed at 11/10/17 0417   Gross per 24 hour   Intake              620 ml   Output             1150 ml   Net             -530 ml       PHYSICAL EXAM:   CONSTITUTIONAL: Alert, appropriate, no acute distress  EYES: Non icteric, EOM intact, pupils equal round   ENT: Mucus membranes moist, no oral pharyngeal lesions   NECK: Supple, no masses   CHEST/LUNGS: CTA bilaterally, normal respiratory effort   CARDIOVASCULAR: RRR, no murmurs  ABDOMEN: soft non-tender, active bowel sounds, no HSM  EXTREMITIES: warm, moves all fours  SKIN: warm, dry with no rashes or lesions  LYMPH: No cervical, clavicular, axillary, or inguinal lymphadenopathy  NEUROLOGIC: follows commands, non focal   PSYCH: mood and affect appropriate    Recent Labs      11/10/17   0338  11/09/17   0200  11/08/17   0111   WBC  54.1*  48.2*  50.4*   HGB  9.0*  9.1*  9.5*   HCT  30.4*  29.6*  30.6*   MCV  99.0  97.4  96.8   PLT  432*  447*  453*       Lab Results   Component Value Date     11/10/2017    K 4.4 11/10/2017    CL 99 11/10/2017    CO2 36 (H) 11/10/2017    BUN 21 11/10/2017    CREATININE 1.3 (H) 11/10/2017    GLUCOSE 86 11/10/2017    CALCIUM 8.3 (L) 11/10/2017    PROT 5.6 (L) 11/05/2017    LABALBU 2.0 (L) 11/05/2017    BILITOT <0.2 11/05/2017    ALKPHOS 92 11/05/2017    AST 16 11/05/2017    ALT 13 11/05/2017    LABGLOM 40 (A) 11/10/2017       Lab Results   Component Value Date    INR 1.21 (H) 11/10/2017    INR 1.21 (H) 11/09/2017    INR 1.53 (H) 11/08/2017    PROTIME 15.3 (H) 11/10/2017    PROTIME 15.3 (H) 11/09/2017    PROTIME 18.4 (H) 11/08/2017     ASSESSMENT/PLAN:  Lung Cancer- advanced NSCLC/recurrent persistent pleural effusion. · Further assessment/evaluation . · Pulmonary following-  Dr. Gabi Palma has seen her in the recent past and did not believe Pleurx catheter was a good option for her. · Therapeutic thoracocenthesis on 11/08/2017 for symptom relief with the removal of 2 L of straw-colored fluid.  Cytology revealed no malignancy. · Bone scan on 11/9/2017- No scintigraphic evidence of metastatic disease. · MRI kaleb on 11/9/2017 secondary to new onset confusion- No evidence of intracranial metastasis. · PET scan planned as outpatient    Acute kidney injury- Unknown etiology. improving     · Management as per IM  · Cr 2.6->2.1->1.7->1. 5 ~ 1.3 ~1.2 ~ 1.3  · Continue IV fluids, as per your discretion  · Calcium 8.1 ~ 8.3     Normocytic anemia- multifactorial 2/2 malignancy, anemia chronic disease, hypothyroidism  · Labs at clinic: Ferritin 198, iron saturation 10%, OAVT210,  B12 922, TSH 8.4,  Folate 5.3,  · , haptoglobin 213     Neutrophilic leukocytosis- likely 2/2 Postobstructive pneumonia vs leukemoid reaction. WBC has been elevated at least since August 2017. Range 15,000-48,000  · Afebrile  · Continue antibiotics as per your discretion  · Blood cultures ordered. · 11/2/2017WBC 48,000->36,000/96% neutrophils->31,000 ~ 50.4 with 39.5/78.6% neutrophils. ~ 54.1 with 40.9 /75.5%      Hypothyroidism- TSH 8.4 on 11/2/2017   · We'll defer to IM.      [employer]. fibrillationanticoagulation with warfarin  · INR 1.21  · Management as per your discretion      UTI- Escherichia coli   · Rocephin 1 gram q 24 hours         Disposition- Continue with current plan of care. Ok to discharge from Oncology standpoint, follow up scheduled for 11/16/2017 at [facility]. Dr. Anali Moser will be covering this weekend. Dr. Donny Phan will return on Monday.     Krista Che    11/10/17  7:38 AM

## 2017-11-10 NOTE — PROGRESS NOTES
Balance  Sitting Balance: Independent  Standing Balance: Supervision  Toilet Transfers  Toilet - Technique: Stand step  Toilet Transfer: Supervision  ADL  Feeding: Independent  Grooming: Independent  UE Bathing: Independent  LE Bathing: Supervision  UE Dressing: Independent  LE Dressing: Supervision  Toileting: Supervision  Tone RUE  RUE Tone: Normotonic  Tone LUE  LUE Tone: Normotonic  Coordination  Movements Are Fluid And Coordinated: Yes     Bed mobility  Supine to Sit: Independent  Sit to Supine: Independent  Transfers  Stand Step Transfers: Independent     Cognition  Cognition Comment: Awake alert following directions                 LUE PROM (degrees)  LUE PROM: WNL  LUE AROM (degrees)  LUE AROM : WNL  RUE PROM (degrees)  RUE PROM: WNL  RUE AROM (degrees)  RUE AROM : WNL  LUE Strength  Gross LUE Strength: WFL  RUE Strength  Gross RUE Strength: WFL                  Assessment   Assessment: Performing basic ADL tasks at a supervision level. Treatment Diagnosis: AMOS, new diagnosis lung CA, A Fib  Decision Making: Low Complexity  Patient Education: OT plan of care  Barriers to Learning: none  REQUIRES OT FOLLOW UP: Yes  Activity Tolerance  Activity Tolerance: Patient Tolerated treatment well  Safety Devices  Safety Devices in place: Yes  Type of devices: Bed alarm in place; Left in bed;Call light within reach        Discharge Recommendations:        Plan   Plan  Times per week: 3-5x weekly  Current Treatment Recommendations: Functional Mobility Training, Safety Education & Training, Equipment Evaluation, Education, & procurement, Patient/Caregiver Education & Training, Self-Care / ADL, Home Management Training    G-Code  OT G-codes  Functional Assessment Tool Used: bathe dress toilet  Functional Limitation: Self care  Self Care Current Status (): At least 20 percent but less than 40 percent impaired, limited or restricted  Self Care Goal Status ():  At least 1 percent but less than 20 percent impaired,

## 2017-11-10 NOTE — PROGRESS NOTES
______________________________________________________________________  I have seen and evaluated the patient and have reviewed her recent test results.     Review of Systems:   Constitutional / general: feeling generally better  CV: no palpitations    Respiratory: breathing much better since fluid removed from her chest   no cough  GI: poor appetite  Musculoskeletal: muscles generally weak  Skin: no rash or itching  Neuro: no dizziness or headache    VITALS:  BP (!) 93/53   Pulse 77   Temp 98 °F (36.7 °C) (Temporal)   Resp 18   Ht 5' 8.75\" (1.746 m)   Wt 149 lb 9.6 oz (67.9 kg)   SpO2 90%   BMI 22.25 kg/m²   24HR INTAKE/OUTPUT:    Intake/Output Summary (Last 24 hours) at 11/09/17 1807  Last data filed at 11/09/17 1422   Gross per 24 hour   Intake              970 ml   Output             1050 ml   Net              -80 ml     General appearance: no distress  HEENT: no hoarseness  Lungs: loud left pleural rub  Heart: irreg irreg rhythm with NL rate; no pericardial rub  Abdomen: no distension  Extremities: generalized muscular wasting  Joints: no synovitis signs  Skin: pale, warm, dry  Neurologic: NL orientation; no focal motor deficits    Plan:  Principal Problem:    AMOS (acute kidney injury) (improving)  Active Problems:    Essential hypertension (good control): cont current Rx    Atrial fibrillation with RVR (now back in NSR): continue diltiazem and amiodarone    Diastolic heart failure, stage C (stable): cont current Rx    Paroxysmal atrial fibrillation: continue warfarin    Non-small cell lung cancer (MRI of Brain and Total Body Nuclear Bone Scan negative for mets)    Chronic hypoxemic respiratory failure (compensated with supplemental N/C oxygen)    Leukocytosis (leukemoid reaction - unchanged)    E coli UTI: cont ceftriaxone    S/P thoracentesis on 11/8/2017 (2 liters removed)    Pleural effusion on left (drained)    Hypothyroidism due to acquired atrophy of thyroid (mild): no supplemental treatment indicated at present in light of her cachexia and recent rapid Afib.          Plan transfer back to 4th Floor Oncology Unit        Electronically signed by Anitra Persaud MD on 11/9/17 at 6:07 PM  30 \" spent in rounding

## 2017-11-10 NOTE — PROGRESS NOTES
Hospitalist Progress Note  11/10/2017 5:43 PM  Subjective:   Admit Date: 11/3/2017  PCP: Isela Watson  [id number]      Chief Complaint: Feeling much improved    Subjective / History of present illness:  Still short of breath but much better after thoracentesis, diuresis and rate control of her atrial fibrillation. Not eating well but trying to consume 2-3 ensures per day. She has been up and about some. No N/V/CP. Cumulative hospital history:   The patient is a 69 y. o. female who presented to ER in [place] [address], under the recommendation of Dr Anisa Cotto due to abnormal blood work. She had elevated creatinine and signs of acute renal failure. The patient states, she just has had no appetite and did not feel like eating, denies nausea/vmiting or diarrhea.      Recently diagnosed with lung cancer, undergoing work up in anticipation of treatment. Had bone scan and port placement scheduled in the following weeks. Wear oxygen 3-6 lpm at home. Quit smoking as well. She is s/p thoracentesis and diuresis which gave her great relief. After getting her Afib with RVR under control she feels overall improved. Persistent anorexia but she is taking in 2-3 ensure plus a few bites here and there. She is stable for discharge home but her family who care for her are at  services today and tomorrow morning. ROS:  Review of Systems   Constitutional: Positive for malaise/fatigue and weight loss. Negative for chills and fever. HENT: Negative for congestion, hearing loss, nosebleeds and sore throat. Eyes: Negative for blurred vision, double vision and photophobia. Respiratory: Positive for cough and shortness of breath. Negative for hemoptysis and sputum production. Cardiovascular: Negative for chest pain, palpitations, orthopnea and leg swelling. Gastrointestinal: Negative for abdominal pain, diarrhea, heartburn, nausea and vomiting. Genitourinary: Negative for dysuria, frequency, hematuria and urgency. % injection 10 mL  10 mL Intravenous 2 times per day Opal Whitley MD   10 mL at 11/10/17 0857    acetaminophen (TYLENOL) tablet 650 mg  650 mg Oral Q4H PRN Opal Whitley MD   650 mg at 11/08/17 0539    magnesium hydroxide (MILK OF MAGNESIA) 400 MG/5ML suspension 30 mL  30 mL Oral Daily PRN Opal Whitley MD        [facility] 4 mg  4 mg Intravenous Q6H PRN Opal Whitley MD   4 mg at 11/08/17 1027    enoxaparin (LOVENOX) injection 30 mg  30 mg Subcutaneous Daily Opal Whitley MD   30 mg at 11/10/17 0855        Objective:   Vitals: BP (!) 101/52   Pulse 76   Temp 97.6 °F (36.4 °C) (Temporal)   Resp 16   Ht 5' 8.75\" (1.746 m)   Wt 155 lb 8 oz (70.5 kg)   SpO2 90%   BMI 23.13 kg/m²   24HR INTAKE/OUTPUT:    Intake/Output Summary (Last 24 hours) at 11/10/17 1743  Last data filed at 11/10/17 1200   Gross per 24 hour   Intake             1070 ml   Output              600 ml   Net              470 ml     DIET CARDIAC; Low Sodium (2 GM)  Last BM  ? Physical Exam   Constitutional: She is oriented to person, place, and time. Vital signs are normal. She appears well-developed. She appears cachectic. HENT:   Head: Normocephalic and atraumatic. Mouth/Throat: No oropharyngeal exudate. Eyes: Conjunctivae and EOM are normal. Pupils are equal, round, and reactive to light. No scleral icterus. Neck: Normal range of motion. Neck supple. No JVD present. Cardiovascular: Normal rate, regular rhythm, normal heart sounds and intact distal pulses. No murmur heard. Pulmonary/Chest: Effort normal. She has decreased breath sounds. She has rales. Abdominal: Soft. Bowel sounds are normal. She exhibits no distension. There is no tenderness. There is no guarding. Musculoskeletal: Normal range of motion. She exhibits no edema. Neurological: She is alert and oriented to person, place, and time. She displays normal reflexes. No cranial nerve deficit.  Coordination normal.   Skin: Skin is warm and dry. Capillary refill takes less than 2 seconds. There is pallor. Psychiatric: She has a normal mood and affect. Her behavior is normal. Judgment and thought content normal.         Labs:     Recent Labs      11/08/17   0111  11/09/17   0200  11/10/17   0338   WBC  50.4*  48.2*  54.1*   RBC  3.16*  3.04*  3.07*   HGB  9.5*  9.1*  9.0*   HCT  30.6*  29.6*  30.4*   MCV  96.8  97.4  99.0   MCH  30.1  29.9  29.3   MCHC  31.0*  30.7*  29.6*   PLT  453*  447*  432*     Recent Labs      11/08/17   0111  11/09/17   0200  11/10/17   0338   NA  142  144  142   K  3.5  4.0  4.4   ANIONGAP  7  12  7   CL  100  100  99   CO2  35*  32*  36*   BUN  19  17  21   CREATININE  1.3*  1.2*  1.3*   GLUCOSE  102  87  86   CALCIUM  8.1*  8.1*  8.3*     No results for input(s): MG, PHOS in the last 72 hours. No results for input(s): AST, ALT, ALB, BILITOT, ALKPHOS, ALB in the last 72 hours. @LABRCNT (ABG:3)@  HgBA1c:  No components found for: HGBA1C  FLP:  Lab Results   Component Value Date    TRIG 123 09/03/2017    HDL 43 09/03/2017    LDLCALC 86 09/03/2017     TSH:    Lab Results   Component Value Date    TSH 6.700 11/06/2017     Troponin T: No results for input(s): TROPONINI in the last 72 hours. INR:   Recent Labs      11/08/17   0111  11/09/17   0200  11/10/17   0338   INR  1.53*  1.21*  1.21*       RAD:   MRI BRAIN FOR CONFUSION  Impression   1. No evidence of intracranial metastasis. 2. Nonenhancing hyperintense FLAIR signal changes likely due to   chronic small vessel ischemia. Mild atrophy/cerebral volume loss. .   3. Bilateral mastoid effusions. Signed by Dr Lyndsay Sousa on 11/9/2017 4:34 PM         BONE SCAN NM  Narrative   EXAMINATION: Whole-body bone scan 11/9/2017   HISTORY: Initial staging lung cancer.    FINDINGS: Approximately 3 hours after injection of 20.1 mCi of   technetium 99m HDP intravenously whole-body planar imaging was   obtained of the appendicular and axial skeleton in the anterior and posterior projections. There is mild arthritic change of the knees. There are no focal intense areas of abnormal uptake to suggest occult   fracture, primary bone lesion or metastatic disease. ECHO 2017  Summary   Mitral valve leaflets are mildly thickened with preserved leaflet   mobility.   Mild-moderate mitral regurgitation noted.   Normal left ventricular size with preserved LV function and an estimated   ejection fraction of approximately 55%.   No regional wall motion abnormalities identified.   Normal left ventricular wall thickness.   Normal diastolic function.   No evidence of left ventricular mass or thrombus noted.       Micro:   Culture & Susceptibility     ESCHERICHIA COLI     Antibiotic Interpretation TERRI Unit   ampicillin Resistant >=32 mcg/mL   aztreonam Sensitive <=1 mcg/mL   ceFAZolin Sensitive 8 mcg/mL   cefTRIAXone Sensitive <=1 mcg/mL   cefepime Sensitive <=1 mcg/mL   gentamicin Resistant >=16 mcg/mL   levofloxacin Resistant >=8 mcg/mL   meropenem Sensitive <=0.25 mcg/mL   nitrofurantoin Sensitive <=16 mcg/mL   piperacillin-tazobactam Sensitive <=4 mcg/mL   tobramycin Intermediate 8 mcg/mL   trimethoprim-sulfamethoxazole Sensitive <=20 mcg/mL            Impression / Plan:         Principal Problem: AMOS--much improved  Active Problems:    Mixed hyperlipidemia--pravachol    Essential hypertension--cardizem    Atrial fibrillation with RVR--cardizem CD, warfarin    Diastolic heart failure, stage C--now euvolemic    Paroxysmal atrial fibrillation--warfarin / cardizem    Non-small cell lung cancer    Chronic hypoxemic respiratory failure (HCC)    Leukocytosis    S/P thoracentesis    Pleural effusion on left    Hypothyroidism due to acquired atrophy of thyroid    E-coli UTI      Advance Directive: Full Code    DVT prophylaxis: warfarin  GI: protonix  Antibiotics: rocephin / zithromax  Discharge planning: Home tomorrow.   Her family is currently doing  services and can't be

## 2017-11-10 NOTE — PROGRESS NOTES
[facility]  Daily Progress Note      Chief Complaint: F/U atrial fib     Interval Hx: denies chest pain or palpitations, Denies presyncope or syncope. States breathing is getting better. She does states she has an oozing of clear fluid from her left forearm. She reports that overall she feels better but just tired.      ROS:  The rest of the systems are negative except Interval Hx    Current Inpatient Medications:   warfarin (COUMADIN) daily dosing (placeholder)   Other RX Placeholder    furosemide  20 mg Intravenous BID    ipratropium-albuterol  1 ampule Inhalation Q4H WA    traMADol  50 mg Oral BID    amiodarone  200 mg Oral Daily    warfarin  5 mg Oral Daily    diltiazem  120 mg Oral Daily    pantoprazole  40 mg Oral QAM AC    cefTRIAXone (ROCEPHIN) IV  1 g Intravenous Q24H    azithromycin  500 mg Intravenous Q24H    sodium chloride flush  10 mL Intravenous 2 times per day    enoxaparin  30 mg Subcutaneous Daily       IV Infusions (if any):       Physical Exam:   BP (!) 97/49   Pulse 70   Temp 98 °F (36.7 °C) (Temporal)   Resp 16   Ht 5' 8.75\" (1.746 m)   Wt 155 lb 8 oz (70.5 kg)   SpO2 91%   BMI 23.13 kg/m²       Intake/Output Summary (Last 24 hours) at 11/10/17 0807  Last data filed at 11/10/17 0417   Gross per 24 hour   Intake              620 ml   Output             1150 ml   Net             -530 ml       Gen - NAD  Neck - Supple  ENMT - MMM, OP Clear  Cardio - No JVD     Clear s1 s2, no gallop, rub, murmur                No edema, 2+ radials  Resp - Normal effort, CTA Bilat  GI - soft, non-tender, no HSM  Psych - A+O x 3, normal affect     Diagnostics:      Recent Labs      11/08/17   0111  11/09/17   0200  11/10/17   0338   WBC  50.4*  48.2*  54.1*   HGB  9.5*  9.1*  9.0*   PLT  453*  447*  432*      Recent Labs      11/08/17   0111  11/09/17   0200  11/10/17   0338   NA  142  144  142   K  3.5  4.0  4.4   CL  100  100  99   CO2  35*  32*  36*   BUN  19  17  21

## 2017-11-11 VITALS
HEART RATE: 67 BPM | BODY MASS INDEX: 22.96 KG/M2 | OXYGEN SATURATION: 93 % | WEIGHT: 155 LBS | RESPIRATION RATE: 20 BRPM | SYSTOLIC BLOOD PRESSURE: 117 MMHG | HEIGHT: 69 IN | TEMPERATURE: 98.7 F | DIASTOLIC BLOOD PRESSURE: 51 MMHG

## 2017-11-11 LAB
ANION GAP SERPL CALCULATED.3IONS-SCNC: 5 MMOL/L (ref 7–19)
ANISOCYTOSIS: ABNORMAL
BANDED NEUTROPHILS RELATIVE PERCENT: 10 % (ref 0–5)
BASOPHILS ABSOLUTE: 0.5 K/UL (ref 0–0.2)
BASOPHILS MANUAL: 1 %
BASOPHILS RELATIVE PERCENT: 1 % (ref 0–1)
BUN BLDV-MCNC: 25 MG/DL (ref 8–23)
CALCIUM SERPL-MCNC: 8.2 MG/DL (ref 8.8–10.2)
CHLORIDE BLD-SCNC: 98 MMOL/L (ref 98–111)
CO2: 36 MMOL/L (ref 22–29)
CREAT SERPL-MCNC: 1.3 MG/DL (ref 0.5–0.9)
EOSINOPHILS ABSOLUTE: 5.98 K/UL (ref 0–0.6)
EOSINOPHILS RELATIVE PERCENT: 12 % (ref 0–5)
GFR NON-AFRICAN AMERICAN: 40
GLUCOSE BLD-MCNC: 87 MG/DL (ref 74–109)
HCT VFR BLD CALC: 27.8 % (ref 37–47)
HEMOGLOBIN: 8.5 G/DL (ref 12–16)
HYPOCHROMIA: ABNORMAL
INR BLD: 1.29 (ref 0.88–1.18)
LYMPHOCYTES ABSOLUTE: 1.5 K/UL (ref 1.1–4.5)
LYMPHOCYTES RELATIVE PERCENT: 3 % (ref 20–40)
MACROCYTES: ABNORMAL
MCH RBC QN AUTO: 30.1 PG (ref 27–31)
MCHC RBC AUTO-ENTMCNC: 30.6 G/DL (ref 33–37)
MCV RBC AUTO: 98.6 FL (ref 81–99)
MONOCYTES ABSOLUTE: 3 K/UL (ref 0–0.9)
MONOCYTES RELATIVE PERCENT: 6 % (ref 0–10)
NEUTROPHILS ABSOLUTE: 38.8 K/UL (ref 1.5–7.5)
NEUTROPHILS MANUAL: 68 %
NEUTROPHILS RELATIVE PERCENT: 68 % (ref 50–65)
PDW BLD-RTO: 16.9 % (ref 11.5–14.5)
PLATELET # BLD: 420 K/UL (ref 130–400)
PLATELET SLIDE REVIEW: ABNORMAL
PMV BLD AUTO: 9.5 FL (ref 9.4–12.3)
POTASSIUM SERPL-SCNC: 4.3 MMOL/L (ref 3.5–5)
PROTHROMBIN TIME: 16.1 SEC (ref 12–14.6)
RBC # BLD: 2.82 M/UL (ref 4.2–5.4)
SODIUM BLD-SCNC: 139 MMOL/L (ref 136–145)
WBC # BLD: 49.8 K/UL (ref 4.8–10.8)

## 2017-11-11 PROCEDURE — 80048 BASIC METABOLIC PNL TOTAL CA: CPT

## 2017-11-11 PROCEDURE — 6370000000 HC RX 637 (ALT 250 FOR IP): Performed by: FAMILY MEDICINE

## 2017-11-11 PROCEDURE — 6360000002 HC RX W HCPCS: Performed by: FAMILY MEDICINE

## 2017-11-11 PROCEDURE — 6370000000 HC RX 637 (ALT 250 FOR IP): Performed by: HOSPITALIST

## 2017-11-11 PROCEDURE — 85025 COMPLETE CBC W/AUTO DIFF WBC: CPT

## 2017-11-11 PROCEDURE — 2580000003 HC RX 258: Performed by: FAMILY MEDICINE

## 2017-11-11 PROCEDURE — G8979 MOBILITY GOAL STATUS: HCPCS

## 2017-11-11 PROCEDURE — 99239 HOSP IP/OBS DSCHRG MGMT >30: CPT | Performed by: HOSPITALIST

## 2017-11-11 PROCEDURE — 85610 PROTHROMBIN TIME: CPT

## 2017-11-11 PROCEDURE — G8978 MOBILITY CURRENT STATUS: HCPCS

## 2017-11-11 PROCEDURE — 6360000002 HC RX W HCPCS: Performed by: INTERNAL MEDICINE

## 2017-11-11 PROCEDURE — 94640 AIRWAY INHALATION TREATMENT: CPT

## 2017-11-11 PROCEDURE — 36415 COLL VENOUS BLD VENIPUNCTURE: CPT

## 2017-11-11 PROCEDURE — 2700000000 HC OXYGEN THERAPY PER DAY

## 2017-11-11 PROCEDURE — 97162 PT EVAL MOD COMPLEX 30 MIN: CPT

## 2017-11-11 RX ORDER — CEFDINIR 300 MG/1
300 CAPSULE ORAL 2 TIMES DAILY
Qty: 6 CAPSULE | Refills: 0 | Status: SHIPPED | OUTPATIENT
Start: 2017-11-11 | End: 2017-11-14

## 2017-11-11 RX ADMIN — HYDROCODONE BITARTRATE AND ACETAMINOPHEN 1 TABLET: 5; 325 TABLET ORAL at 05:57

## 2017-11-11 RX ADMIN — TRAMADOL HYDROCHLORIDE 50 MG: 50 TABLET, FILM COATED ORAL at 09:25

## 2017-11-11 RX ADMIN — IPRATROPIUM BROMIDE AND ALBUTEROL SULFATE 1 AMPULE: .5; 3 SOLUTION RESPIRATORY (INHALATION) at 11:30

## 2017-11-11 RX ADMIN — PANTOPRAZOLE SODIUM 40 MG: 40 TABLET, DELAYED RELEASE ORAL at 05:55

## 2017-11-11 RX ADMIN — AMIODARONE HYDROCHLORIDE 200 MG: 200 TABLET ORAL at 09:25

## 2017-11-11 RX ADMIN — DILTIAZEM HYDROCHLORIDE 120 MG: 120 CAPSULE, COATED, EXTENDED RELEASE ORAL at 09:25

## 2017-11-11 RX ADMIN — CEFTRIAXONE 1 G: 1 INJECTION, SOLUTION INTRAVENOUS at 11:07

## 2017-11-11 RX ADMIN — ENOXAPARIN SODIUM 30 MG: 40 INJECTION SUBCUTANEOUS at 09:28

## 2017-11-11 RX ADMIN — AZITHROMYCIN MONOHYDRATE 500 MG: 500 INJECTION, POWDER, LYOPHILIZED, FOR SOLUTION INTRAVENOUS at 12:01

## 2017-11-11 RX ADMIN — FUROSEMIDE 20 MG: 10 INJECTION, SOLUTION INTRAMUSCULAR; INTRAVENOUS at 09:26

## 2017-11-11 RX ADMIN — Medication 10 ML: at 09:27

## 2017-11-11 RX ADMIN — IPRATROPIUM BROMIDE AND ALBUTEROL SULFATE 1 AMPULE: .5; 3 SOLUTION RESPIRATORY (INHALATION) at 06:39

## 2017-11-11 ASSESSMENT — PAIN SCALES - GENERAL
PAINLEVEL_OUTOF10: 9
PAINLEVEL_OUTOF10: 9
PAINLEVEL_OUTOF10: 8
PAINLEVEL_OUTOF10: 0

## 2017-11-11 NOTE — PROGRESS NOTES
Physical Therapy    Facility/Department: [facility] ONCOLOGY UNIT  Initial Assessment    NAME: Viktoriya Portillo  :   MRN: [id number]    Date of Service: 2017    Patient Diagnosis(es): The encounter diagnosis was S/P thoracentesis. has a past medical history of A-fib ([address] Anticoagulated on Coumadin; Anxiety; HTN (hypertension); Hyperlipidemia; Malignant neoplasm of breast (female), unspecified site; Occlusion and stenosis of carotid artery; Osteoporosis; Palliative care encounter; Respiratory distress; and Tobacco use disorder. has a past surgical history that includes Cholecystectomy; Cataract removal; Breast surgery; lymph node biopsy; Mastectomy, partial; Colonoscopy; Upper gastrointestinal endoscopy (N/A, 2017); and colsc flx w/removal lesion by hot bx forceps (N/A, 10/2/2017). Restrictions  Restrictions/Precautions  Restrictions/Precautions: Fall Risk  Required Braces or Orthoses?: No  Vision/Hearing  Vision: Impaired  Vision Exceptions: Wears glasses for reading  Hearing: Within functional limits     Subjective  General  Chart Reviewed: Yes  Patient assessed for rehabilitation services?: Yes  Response To Previous Treatment: Not applicable  Family / Caregiver Present: No  Referring Practitioner: Dr. Pamela Tang  Referral Date : 17  Diagnosis: AMOS, lung CA, afib  Follows Commands: Within Functional Limits  General Comment  Comments: RNMelvi PT and states pt. may go home later today. Subjective  Subjective: States she is supposed is supposed to go home today.   Pain Screening  Patient Currently in Pain: No  Pain Assessment  Pain Level: 0  Vital Signs  Patient Currently in Pain: No  Pre Treatment Pain Screening  Intervention List: Patient able to continue with treatment    Orientation  Orientation  Overall Orientation Status: Within Functional Limits    Social/Functional History  Social/Functional History  Lives With: Son  Type of Home: House  Home Layout: One level  Bathroom Shower/Tub: Walk-in shower  Bathroom Toilet: Standard  Bathroom Equipment: Shower chair, Toilet raiser  Home Equipment: [employer], Rolling walker  ADL Assistance: Independent  Homemaking Assistance:  (son and daughter do cooking and cleaning)  Ambulation Assistance: Independent  Additional Comments: States she was in rehab in [facility] prior to hospitalization for 3 wks. Objective     Observation/Palpation  Posture: Good  Observation: 5LO2    AROM RLE (degrees)  RLE AROM: WFL  AROM LLE (degrees)  LLE AROM : WFL  Strength RLE  Comment: grossly 4/5  Strength LLE  Comment: grossly 4/5     Sensation  Overall Sensation Status: WFL  Bed mobility  Supine to Sit: Independent  Sit to Supine: Independent  Comment: sat on EOB x 2 mins. Pt. was asleep, holding breathing treatment upon PT entry to room. Transfers  Sit to Stand: Contact guard assistance (with RW)  Stand to sit:  (poor eccentric control with lowering to chair)  Ambulation  Ambulation?: Yes  WB Status: no restrictions  Ambulation 1  Surface: level tile  Device: Rolling Walker  Other Apparatus: O2 (6LO2)  Assistance: Contact guard assistance  Quality of Gait: slow pace  Distance: 76'  Comments: 6LO2 (O2 tank does not adjust to 5L)     Balance  Posture: Good  Sitting - Static: Good;+  Sitting - Dynamic: Good  Standing - Static: Good;-  Standing - Dynamic: Fair;+        Assessment   Body structures, Functions, Activity limitations: Decreased functional mobility ; Decreased safe awareness;Decreased endurance;Decreased balance  Assessment: Pt. will benefit from skilled PT to decrease impairments. Pt. a fall risk and should not attempt mobility on her own at this time. Anticipate pt. will be able to d/c home with family.   Treatment Diagnosis: impaired mobility and gait  Prognosis: Good  Decision Making: Medium Complexity  Patient Education: purpose of PT, safety, staff A  Barriers to Learning: none noted  REQUIRES PT FOLLOW UP: Yes  Activity Tolerance  Activity

## 2017-11-11 NOTE — DISCHARGE SUMMARY
Hospitalist  Discharge Summary    Patient ID: Kelvin Melvin      Patient's PCP: Annette Gomez    Admit Date: 11/3/2017     Discharge Date:   11/11/2017    Admitting Physician: Telma Johnson MD     Discharge Physician: Telma Johnson MD     Discharge Diagnoses:  Principal Problem:    AMOS (acute kidney injury) [facility])  Active Problems:    Diastolic heart failure, stage C ([facility] [address]    Non-small cell cancer of left lung ([facility] [address]    Mixed hyperlipidemia    Essential hypertension    Atrial fibrillation with RVR (HCC)    Paroxysmal atrial fibrillation (HCC)    Chronic hypoxemic respiratory failure (HCC)    Leukocytosis    S/P thoracentesis    Pleural effusion on left    Hypothyroidism due to acquired atrophy of thyroid    E-coli UTI       The patient was seen and examined on day of discharge and this discharge summary is in conjunction with any daily progress note from day of discharge. Hospital Course: The patient is a 69 y. o. female who presented to ER in [place] [address], under the recommendation of Dr Nirali Shah due to abnormal blood work. She had elevated creatinine and signs of acute renal failure. The patient states, she just has had no appetite and did not feel like eating, denies nausea/vmiting or diarrhea.      Recently diagnosed with lung cancer, undergoing work up in anticipation of treatment. Had bone scan and port placement scheduled in the following weeks.  Wear oxygen 3-6 lpm at home. Quit smoking as well.     She is s/p thoracentesis and diuresis which gave her great relief. After getting her Afib with RVR under control she feels overall improved. Persistent anorexia but she is taking in 2-3 ensure plus a few bites here and there. She is stable for discharge home to finish oral medications.     Exam:   BP (!) 117/51   Pulse 67   Temp 98.7 °F (37.1 °C) (Temporal)   Resp 20   Ht 5' 8.75\" (1.746 m)   Wt 155 lb (70.3 kg)   SpO2 93%   BMI 23.06 kg/m²   General appearance: alert, appears stated age, cooperative and no distress  Head: Normocephalic, without obvious abnormality, atraumatic  Eyes: conjunctivae/corneas clear. PERRL, EOM's intact. Ears: normal external ears  Neck: no adenopathy, no carotid bruit, no JVD, supple, symmetrical, trachea midline and thyroid not enlarged, symmetric, no tenderness/mass/nodules  Lungs: clear to auscultation bilaterally,no rales or wheezes   Heart: regular rate and rhythm, S1, S2 normal, no murmur,  regular rate and rhythm   Abdomen:soft, non-tender; non-distended normal bowel sounds no masses, no organomegaly  Extremities:Pedal edema none  Homans sign is negative, no sign of DVT, warm and dry  Skin: Skin color, texture, turgor normal. No rashes or lesions  Lymphatic: No palpable lymph node enlargment  Neurologic: Alert and oriented X 3, normal strength and tone. Normal symmetric reflexes. Mental status: Alert, oriented, thought content appropriate  Cranial nerves:II-XII Grossly intact Sensory: normal Motor:grossly normal  Psychiatric:  Alert and oriented, thought content appropriate, normal insight, mood appropriate      Consults:   IP CONSULT TO ONCOLOGY  IP CONSULT TO PULMONOLOGY  IP CONSULT TO CARDIOLOGY  IP CONSULT TO PSYCHIATRY  PALLIATIVE CARE EVAL    Significant Diagnostic Studies:     MRI BRAIN  Narrative   History: Lung cancer staging. MRI BRAIN: Multiplanar imaging the brain is performed pre- and post-IV   contrast.   COMPARISON: None   FINDINGS: There is no abnormal diffusion restriction. There is no   intracranial hemorrhage. There is mild cerebral volume loss/atrophy. Nonenhancing hyperintense FLAIR signal changes in periventricular   white matter regions likely due to chronic small vessel ischemia. There is no abnormal extra-axial fluid collection. There is no abnormal intracranial enhancement. There is no evidence of   intracranial metastasis. Limited assessment of the orbits is unremarkable.  There is   opacification of the bilateral mastoid air cells. No air-fluid level   seen within the paranasal sinuses. There are normal flow voids in the   distal internal carotid and basilar arteries. There is homogeneous   enhancement of the sagittal sinus. NM BONE SCAN  Narrative   EXAMINATION: Whole-body bone scan 11/9/2017   HISTORY: Initial staging lung cancer. FINDINGS: Approximately 3 hours after injection of 20.1 mCi of   technetium 99m HDP intravenously whole-body planar imaging was   obtained of the appendicular and axial skeleton in the anterior and   posterior projections. There is mild arthritic change of the knees. There are no focal intense areas of abnormal uptake to suggest occult   fracture, primary bone lesion or metastatic disease. Disposition:  Home with family     Discharge Instructions/Follow-up:  Dr. Bita Rincon 3-5 days    Code Status:  Full Code     Activity: activity as tolerated    Diet: regular diet    Labs: For convenience and continuity at follow-up the following most recent labs are provided:  CBC:    Lab Results   Component Value Date    WBC 49.8 11/11/2017    HGB 8.5 11/11/2017    HCT 27.8 11/11/2017     11/11/2017       Renal:    Lab Results   Component Value Date     11/11/2017    K 4.3 11/11/2017    CL 98 11/11/2017    CO2 36 11/11/2017    BUN 25 11/11/2017    CREATININE 1.3 11/11/2017    CALCIUM 8.2 11/11/2017       Discharge Medications:   Current Discharge Medication List           Details   cefdinir (OMNICEF) 300 MG capsule Take 1 capsule by mouth 2 times daily for 3 days Noted PCN allergy, tolerated rocephin without difficulty  Qty: 6 capsule, Refills: 0      enoxaparin (LOVENOX) 30 MG/0.3ML injection Inject 1 mL into the skin daily for 3 days  Qty: 3 Syringe, Refills: 0              Details   HYDROcodone-acetaminophen (NORCO) 5-325 MG per tablet Take 1 tablet by mouth every 6 hours as needed for Pain .       amiodarone (CORDARONE) 200 MG tablet Take 1 tablet by mouth daily  Qty: 30 tablet, Refills: 0

## 2017-11-11 NOTE — PROGRESS NOTES
Discharge paperwork completed with patient and family members. All verbalized understanding. All paper belongings with patient and family. Daughter taking patient home in personal car.

## 2017-11-20 PROBLEM — C34.90 NON-SMALL CELL CARCINOMA OF LUNG (HCC): Status: ACTIVE | Noted: 2017-11-20

## 2017-11-21 ENCOUNTER — HOSPITAL ENCOUNTER (INPATIENT)
Age: 77
LOS: 6 days | Discharge: NURSING, REHAB OR LONG-TERM CARE | DRG: 180 | End: 2017-11-27
Attending: EMERGENCY MEDICINE | Admitting: INTERNAL MEDICINE
Payer: MEDICARE

## 2017-11-21 ENCOUNTER — APPOINTMENT (OUTPATIENT)
Dept: ONCOLOGY | Facility: HOSPITAL | Age: 77
End: 2017-11-21

## 2017-11-21 ENCOUNTER — APPOINTMENT (OUTPATIENT)
Dept: CT IMAGING | Age: 77
DRG: 180 | End: 2017-11-21
Payer: MEDICARE

## 2017-11-21 ENCOUNTER — APPOINTMENT (OUTPATIENT)
Dept: GENERAL RADIOLOGY | Age: 77
DRG: 180 | End: 2017-11-21
Payer: MEDICARE

## 2017-11-21 DIAGNOSIS — I48.91 ATRIAL FIBRILLATION WITH RVR (HCC): ICD-10-CM

## 2017-11-21 DIAGNOSIS — R79.1 ELEVATED INR: ICD-10-CM

## 2017-11-21 DIAGNOSIS — J90 RECURRENT LEFT PLEURAL EFFUSION: Primary | ICD-10-CM

## 2017-11-21 DIAGNOSIS — R07.9 CHEST PAIN, UNSPECIFIED TYPE: ICD-10-CM

## 2017-11-21 PROBLEM — N18.30 CHRONIC KIDNEY DISEASE (CKD), STAGE III (MODERATE) (HCC): Chronic | Status: ACTIVE | Noted: 2017-11-21

## 2017-11-21 LAB
ANION GAP SERPL CALCULATED.3IONS-SCNC: 7 MMOL/L (ref 7–19)
BASE EXCESS ARTERIAL: 12.8 MMOL/L (ref -2–2)
BASE EXCESS ARTERIAL: 13.9 MMOL/L (ref -2–2)
BUN BLDV-MCNC: 23 MG/DL (ref 8–23)
CALCIUM SERPL-MCNC: 8.2 MG/DL (ref 8.8–10.2)
CARBOXYHEMOGLOBIN ARTERIAL: 3.2 % (ref 0–5)
CARBOXYHEMOGLOBIN ARTERIAL: 3.3 % (ref 0–5)
CHLORIDE BLD-SCNC: 96 MMOL/L (ref 98–111)
CO2: 35 MMOL/L (ref 22–29)
CREAT SERPL-MCNC: 1.5 MG/DL (ref 0.5–0.9)
GFR NON-AFRICAN AMERICAN: 34
GLUCOSE BLD-MCNC: 113 MG/DL (ref 74–109)
HCO3 ARTERIAL: 38.9 MMOL/L (ref 22–26)
HCO3 ARTERIAL: 39.6 MMOL/L (ref 22–26)
HCT VFR BLD CALC: 39.8 % (ref 37–47)
HEMOGLOBIN, ART, EXTENDED: 8.6 G/DL (ref 12–16)
HEMOGLOBIN, ART, EXTENDED: 8.7 G/DL (ref 12–16)
HEMOGLOBIN: 11.4 G/DL (ref 12–16)
INR BLD: 6.35 (ref 0.88–1.18)
MCH RBC QN AUTO: 28.8 PG (ref 27–31)
MCHC RBC AUTO-ENTMCNC: 28.6 G/DL (ref 33–37)
MCV RBC AUTO: 100.5 FL (ref 81–99)
METHEMOGLOBIN ARTERIAL: 1.4 %
METHEMOGLOBIN ARTERIAL: 1.8 %
O2 CONTENT ARTERIAL: 10.3 ML/DL
O2 CONTENT ARTERIAL: 11.2 ML/DL
O2 SAT, ARTERIAL: 85.1 %
O2 SAT, ARTERIAL: 90.7 %
O2 THERAPY: ABNORMAL
O2 THERAPY: ABNORMAL
PCO2 ARTERIAL: 57 MMHG (ref 35–45)
PCO2 ARTERIAL: 60 MMHG (ref 35–45)
PDW BLD-RTO: 17.1 % (ref 11.5–14.5)
PERFORMED ON: NORMAL
PERFORMED ON: NORMAL
PH ARTERIAL: 7.42 (ref 7.35–7.45)
PH ARTERIAL: 7.45 (ref 7.35–7.45)
PLATELET # BLD: 648 K/UL (ref 130–400)
PMV BLD AUTO: 8.7 FL (ref 9.4–12.3)
PO2 ARTERIAL: 49 MMHG (ref 80–100)
PO2 ARTERIAL: 61 MMHG (ref 80–100)
POC TROPONIN I: 0.01 NG/ML (ref 0–0.08)
POC TROPONIN I: 0.01 NG/ML (ref 0–0.08)
POTASSIUM SERPL-SCNC: 4.8 MMOL/L (ref 3.5–5)
POTASSIUM, WHOLE BLOOD: 4.2
POTASSIUM, WHOLE BLOOD: 4.4
PRO-BNP: 5083 PG/ML (ref 0–1800)
PROTHROMBIN TIME: 57.5 SEC (ref 12–14.6)
RBC # BLD: 3.96 M/UL (ref 4.2–5.4)
SODIUM BLD-SCNC: 138 MMOL/L (ref 136–145)
WBC # BLD: 54.6 K/UL (ref 4.8–10.8)

## 2017-11-21 PROCEDURE — 2580000003 HC RX 258: Performed by: EMERGENCY MEDICINE

## 2017-11-21 PROCEDURE — 70450 CT HEAD/BRAIN W/O DYE: CPT

## 2017-11-21 PROCEDURE — 84484 ASSAY OF TROPONIN QUANT: CPT

## 2017-11-21 PROCEDURE — 83880 ASSAY OF NATRIURETIC PEPTIDE: CPT

## 2017-11-21 PROCEDURE — 6360000002 HC RX W HCPCS: Performed by: PHYSICIAN ASSISTANT

## 2017-11-21 PROCEDURE — 99285 EMERGENCY DEPT VISIT HI MDM: CPT

## 2017-11-21 PROCEDURE — 6370000000 HC RX 637 (ALT 250 FOR IP): Performed by: PHYSICIAN ASSISTANT

## 2017-11-21 PROCEDURE — 84132 ASSAY OF SERUM POTASSIUM: CPT

## 2017-11-21 PROCEDURE — 6360000002 HC RX W HCPCS: Performed by: INTERNAL MEDICINE

## 2017-11-21 PROCEDURE — 2700000000 HC OXYGEN THERAPY PER DAY

## 2017-11-21 PROCEDURE — 36415 COLL VENOUS BLD VENIPUNCTURE: CPT

## 2017-11-21 PROCEDURE — 93005 ELECTROCARDIOGRAM TRACING: CPT

## 2017-11-21 PROCEDURE — 99223 1ST HOSP IP/OBS HIGH 75: CPT | Performed by: INTERNAL MEDICINE

## 2017-11-21 PROCEDURE — 6360000002 HC RX W HCPCS: Performed by: EMERGENCY MEDICINE

## 2017-11-21 PROCEDURE — 94640 AIRWAY INHALATION TREATMENT: CPT

## 2017-11-21 PROCEDURE — 6370000000 HC RX 637 (ALT 250 FOR IP): Performed by: EMERGENCY MEDICINE

## 2017-11-21 PROCEDURE — 82803 BLOOD GASES ANY COMBINATION: CPT

## 2017-11-21 PROCEDURE — 2500000003 HC RX 250 WO HCPCS: Performed by: EMERGENCY MEDICINE

## 2017-11-21 PROCEDURE — 85610 PROTHROMBIN TIME: CPT

## 2017-11-21 PROCEDURE — 85027 COMPLETE CBC AUTOMATED: CPT

## 2017-11-21 PROCEDURE — 71010 XR CHEST PORTABLE: CPT

## 2017-11-21 PROCEDURE — 99284 EMERGENCY DEPT VISIT MOD MDM: CPT | Performed by: EMERGENCY MEDICINE

## 2017-11-21 PROCEDURE — 2140000000 HC CCU INTERMEDIATE R&B

## 2017-11-21 PROCEDURE — 36600 WITHDRAWAL OF ARTERIAL BLOOD: CPT

## 2017-11-21 PROCEDURE — 94660 CPAP INITIATION&MGMT: CPT

## 2017-11-21 PROCEDURE — 80048 BASIC METABOLIC PNL TOTAL CA: CPT

## 2017-11-21 RX ORDER — SODIUM CHLORIDE 0.9 % (FLUSH) 0.9 %
10 SYRINGE (ML) INJECTION EVERY 12 HOURS SCHEDULED
Status: DISCONTINUED | OUTPATIENT
Start: 2017-11-21 | End: 2017-11-27 | Stop reason: HOSPADM

## 2017-11-21 RX ORDER — METHYLPREDNISOLONE SODIUM SUCCINATE 40 MG/ML
40 INJECTION, POWDER, LYOPHILIZED, FOR SOLUTION INTRAMUSCULAR; INTRAVENOUS EVERY 8 HOURS
Status: DISCONTINUED | OUTPATIENT
Start: 2017-11-21 | End: 2017-11-24

## 2017-11-21 RX ORDER — FUROSEMIDE 10 MG/ML
40 INJECTION INTRAMUSCULAR; INTRAVENOUS ONCE
Status: COMPLETED | OUTPATIENT
Start: 2017-11-21 | End: 2017-11-21

## 2017-11-21 RX ORDER — ONDANSETRON 2 MG/ML
4 INJECTION INTRAMUSCULAR; INTRAVENOUS EVERY 6 HOURS PRN
Status: DISCONTINUED | OUTPATIENT
Start: 2017-11-21 | End: 2017-11-27 | Stop reason: HOSPADM

## 2017-11-21 RX ORDER — PANTOPRAZOLE SODIUM 40 MG/1
40 TABLET, DELAYED RELEASE ORAL
Status: DISCONTINUED | OUTPATIENT
Start: 2017-11-22 | End: 2017-11-27 | Stop reason: HOSPADM

## 2017-11-21 RX ORDER — IPRATROPIUM BROMIDE AND ALBUTEROL SULFATE 2.5; .5 MG/3ML; MG/3ML
1 SOLUTION RESPIRATORY (INHALATION)
Status: DISCONTINUED | OUTPATIENT
Start: 2017-11-21 | End: 2017-11-27 | Stop reason: HOSPADM

## 2017-11-21 RX ORDER — PHYTONADIONE 5 MG/1
5 TABLET ORAL ONCE
Status: COMPLETED | OUTPATIENT
Start: 2017-11-21 | End: 2017-11-21

## 2017-11-21 RX ORDER — ONDANSETRON 2 MG/ML
4 INJECTION INTRAMUSCULAR; INTRAVENOUS ONCE
Status: COMPLETED | OUTPATIENT
Start: 2017-11-21 | End: 2017-11-21

## 2017-11-21 RX ORDER — HYDROCODONE BITARTRATE AND ACETAMINOPHEN 5; 325 MG/1; MG/1
1 TABLET ORAL ONCE
Status: COMPLETED | OUTPATIENT
Start: 2017-11-21 | End: 2017-11-21

## 2017-11-21 RX ORDER — AMIODARONE HYDROCHLORIDE 200 MG/1
200 TABLET ORAL DAILY
Status: DISCONTINUED | OUTPATIENT
Start: 2017-11-21 | End: 2017-11-27 | Stop reason: HOSPADM

## 2017-11-21 RX ORDER — ACETAMINOPHEN 325 MG/1
650 TABLET ORAL EVERY 4 HOURS PRN
Status: DISCONTINUED | OUTPATIENT
Start: 2017-11-21 | End: 2017-11-27 | Stop reason: HOSPADM

## 2017-11-21 RX ORDER — PRAVASTATIN SODIUM 20 MG
40 TABLET ORAL DAILY
Status: DISCONTINUED | OUTPATIENT
Start: 2017-11-21 | End: 2017-11-27 | Stop reason: HOSPADM

## 2017-11-21 RX ORDER — SODIUM CHLORIDE 0.9 % (FLUSH) 0.9 %
10 SYRINGE (ML) INJECTION PRN
Status: DISCONTINUED | OUTPATIENT
Start: 2017-11-21 | End: 2017-11-27 | Stop reason: HOSPADM

## 2017-11-21 RX ORDER — TRAMADOL HYDROCHLORIDE 50 MG/1
50 TABLET ORAL 2 TIMES DAILY
Status: DISCONTINUED | OUTPATIENT
Start: 2017-11-21 | End: 2017-11-27 | Stop reason: HOSPADM

## 2017-11-21 RX ORDER — HYDROCODONE BITARTRATE AND ACETAMINOPHEN 5; 325 MG/1; MG/1
1 TABLET ORAL EVERY 6 HOURS PRN
Status: DISCONTINUED | OUTPATIENT
Start: 2017-11-21 | End: 2017-11-27 | Stop reason: HOSPADM

## 2017-11-21 RX ADMIN — FUROSEMIDE 40 MG: 10 INJECTION, SOLUTION INTRAMUSCULAR; INTRAVENOUS at 18:54

## 2017-11-21 RX ADMIN — IPRATROPIUM BROMIDE AND ALBUTEROL SULFATE 1 AMPULE: .5; 3 SOLUTION RESPIRATORY (INHALATION) at 14:58

## 2017-11-21 RX ADMIN — HYDROCODONE BITARTRATE AND ACETAMINOPHEN 1 TABLET: 5; 325 TABLET ORAL at 06:53

## 2017-11-21 RX ADMIN — IPRATROPIUM BROMIDE AND ALBUTEROL SULFATE 1 AMPULE: .5; 3 SOLUTION RESPIRATORY (INHALATION) at 19:45

## 2017-11-21 RX ADMIN — AMIODARONE HYDROCHLORIDE 200 MG: 200 TABLET ORAL at 18:54

## 2017-11-21 RX ADMIN — PHYTONADIONE 5 MG: 5 TABLET ORAL at 18:54

## 2017-11-21 RX ADMIN — ONDANSETRON 4 MG: 2 INJECTION INTRAMUSCULAR; INTRAVENOUS at 06:53

## 2017-11-21 RX ADMIN — TRAMADOL HYDROCHLORIDE 50 MG: 50 TABLET, COATED ORAL at 21:07

## 2017-11-21 RX ADMIN — HYDROCODONE BITARTRATE AND ACETAMINOPHEN 1 TABLET: 5; 325 TABLET ORAL at 14:27

## 2017-11-21 RX ADMIN — PRAVASTATIN SODIUM 40 MG: 20 TABLET ORAL at 14:30

## 2017-11-21 RX ADMIN — TRAMADOL HYDROCHLORIDE 50 MG: 50 TABLET, COATED ORAL at 14:30

## 2017-11-21 RX ADMIN — METHYLPREDNISOLONE SODIUM SUCCINATE 40 MG: 125 INJECTION, POWDER, FOR SOLUTION INTRAMUSCULAR; INTRAVENOUS at 21:11

## 2017-11-21 RX ADMIN — DILTIAZEM HYDROCHLORIDE 5 MG/HR: 5 INJECTION INTRAVENOUS at 06:27

## 2017-11-21 ASSESSMENT — PAIN DESCRIPTION - PAIN TYPE: TYPE: ACUTE PAIN

## 2017-11-21 ASSESSMENT — PAIN SCALES - GENERAL
PAINLEVEL_OUTOF10: 0
PAINLEVEL_OUTOF10: 9
PAINLEVEL_OUTOF10: 10
PAINLEVEL_OUTOF10: 9
PAINLEVEL_OUTOF10: 10
PAINLEVEL_OUTOF10: 6
PAINLEVEL_OUTOF10: 5
PAINLEVEL_OUTOF10: 10
PAINLEVEL_OUTOF10: 0

## 2017-11-21 ASSESSMENT — ENCOUNTER SYMPTOMS
ABDOMINAL PAIN: 0
EYE PAIN: 0
VOMITING: 0
DIARRHEA: 0
SHORTNESS OF BREATH: 1

## 2017-11-21 ASSESSMENT — PAIN DESCRIPTION - LOCATION
LOCATION: CHEST
LOCATION: CHEST

## 2017-11-21 ASSESSMENT — PAIN DESCRIPTION - DESCRIPTORS: DESCRIPTORS: PRESSURE

## 2017-11-21 NOTE — ED NOTES
Bed: 08  Expected date:   Expected time:   Means of arrival: [facility]  Comments:  EMS: Chest pain     Bradley Rodriguez RN  11/21/17 8225

## 2017-11-21 NOTE — H&P
Historical Provider, MD   amiodarone (CORDARONE) 200 MG tablet Take 1 tablet by mouth daily 10/13/17   Bubba Baptiste, DO   warfarin (COUMADIN) 5 MG tablet Take 1 tablet by mouth daily Keep INR 2.0-3.0 10/13/17   Bubba Baptiste, DO   pravastatin (PRAVACHOL) 40 MG tablet Take 1 tablet by mouth daily 10/13/17   Bubba Baptiste, DO   diltiazem (CARDIZEM CD) 120 MG extended release capsule Take 1 capsule by mouth daily 10/14/17   Bubba Baptiste, DO   furosemide (LASIX) 40 MG tablet Take 0.5 tablets by mouth daily 10/14/17   Bubba Baptiste, DO   ipratropium-albuterol (DUONEB) 0.5-2.5 (3) MG/3ML SOLN nebulizer solution Inhale 3 mLs into the lungs 4 times daily for 5 days 10/11/17 10/16/17  Karon Schwartz MD   pantoprazole (PROTONIX) 40 MG tablet Take 1 tablet by mouth every morning (before breakfast) 9/9/17   Rosanna Lang PA-C   traMADol (ULTRAM) 50 MG tablet Take 50 mg by mouth 2 times daily    Historical Provider, MD     Allergies:    Morphine; Aspirin; and Penicillins    Social History:    The patient currently lives at home. Tobacco:   reports that she has quit smoking. She has never used smokeless tobacco.  Alcohol:   reports that she does not drink alcohol.   Illicit Drugs: denies    Family History:      Problem Relation Age of Onset    Lung Cancer Other     Stomach Cancer Other     Brain Cancer Other     Other Father      cardiac event     Review of Systems:   Constitutional / general:  Denies fever / chills / sweats +fatigue  Head:  Denies headache / neck stiffness / trauma / visual change  Eyes:  Denies blurry vision / acute visual change or loss / itching / redness  ENT: Denies sore throat / hoarseness / nasal drainage / ear pain  CV:  + chest pain /+ palpitations/ +orthopnea   Respiratory:  + cough / + shortness of breath / + sputum / hemoptysis  GI: + nausea / Denies vomiting / abdominal pain / diarrhea / constipation  :  Denies dysuria / hesitancy / urgency / hematuria   Neuro: Denies paralysis / syncope / seizure / dysphagia / headache / paresthesias  Musculoskeletal: + muscle weakness /Denies joint stiffness / pain  Vascular: + edema /Denies claudication / varicosities  Heme / endocrine: Denies easy bruising / bleeding / excessive sweating / heat or cold intolerance  Psychiatric:  Denies depression / anxiety / insomnia / mood changes  Skin:  Denies new rashes / lesions / skin hair or nail changes    14 point review of systems is negative except as specifically addressed above. Physical Examination:  /68   Pulse 103   Temp 97.1 °F (36.2 °C) (Temporal)   Resp 18   Ht 5' 8\" (1.727 m)   Wt 153 lb 6.4 oz (69.6 kg)   SpO2 97%   BMI 23.32 kg/m²   General appearance: alert, appears stated age, cooperative and mild distress  Head: Normocephalic, without obvious abnormality, atraumatic  Eyes: conjunctivae/corneas clear. PERRL, EOM's intact. Ears: normal external ears and nose, throat without exudate  Neck: no adenopathy, no carotid bruit, no JVD, supple, symmetrical, trachea midline and thyroid not enlarged, symmetric, no tenderness/mass/nodules  Lungs: Diminished air exchange throughout much more significant on the left, right basilar rales, no rhonchi or wheezes    Heart: Irregularly irregular with II/VI murmur, tachycardia   Abdomen:soft, non-tender; non-distended, normal bowel sounds no masses, no organomegaly  Extremities:Trace-1+ bilateral lower extremity edema,  No erythema, no tenderness to palpation  Skin: Skin warm and dry, mepilex to coccyx   Lymphatic: No palpable lymph node enlargment  Neurologic: Alert and oriented X 3, generalized weakness and normal tone. Normal symmetric reflexes.  Mental status: Alert, oriented, thought content appropriate  Cranial nerves:II-XII Grossly intact Sensory: normal Motor:grossly normal  Psychiatric: Alert and oriented, thought content appropriate, normal insight, mood appropriate    Diagnostic Data:  CBC:  Recent Labs      11/21/17   0558   WBC  54.6*   HGB  11.4*

## 2017-11-21 NOTE — ED NOTES
ASSESSMENT:    PT ALERT/ORIENTED X4. PUPILS EQUAL/REACTIVE    SKIN:  WARM/DRY PINK CAPILLARY REFILL < 2SECS    CARDIAC:  PT ARRIVED TO ED WITH C/O CHEST PAIN. ONSET 0430. PT STATES IT FEELS LIKE SOMETHING IS SITTING ON HER CHEST. PAIN WORSE WITH PALPATION. LUNGS: PT IS ON CONTINUOUS 6L O2. PT HAS LUNG CANCER. O2 SAT 94% ON 6L NC. DIMINISHED LUNGS SOUNDS ON LEFT SIDE. ABDOMEN: BOWEL SOUNDS NOTED UPPER AND LOWER QUADRANTS                     SOFT AND NONTENDER. PT C/O NAUSEA. EXTREMITIES:  BILATERAL DP AND PT AND NO EDEMA NOTED. NO DISTRESS NOTED. SIDE RAILS UP AND CALL LIGHT IN REACH.      Ashutosh Jones RN  11/21/17 0617       Ashutosh Jones RN  11/21/17 0608

## 2017-11-21 NOTE — ED PROVIDER NOTES
[facility] EMERGENCY DEPT  eMERGENCY dEPARTMENT eNCOUnter      Pt Name: Boris Styles  MRN: [id number]  [place] 1940  Date of evaluation: 11/21/2017  Provider: Rita Baig MD    CHIEF COMPLAINT       Chief Complaint   Patient presents with    Chest Pain     Pt arrived to the ed with c/o chest pain; onset 0430. HISTORY OF PRESENT ILLNESS   (Location/Symptom, Timing/Onset, Context/Setting, Quality, Duration, Modifying Factors, Severity)  Note limiting factors. Boris Styles is a 69 y.o. female who presents to the emergency department Complaining of chest discomfort and shortness of breath. Patient has a history of atrial fibrillation. She also has history of lung cancer and sees Dr. Manuel Aguilar. Has not yet started any kind of treatment but said that she is supposed to have an appointment for port placement soon. After this she is supposed to start her chemo. She was recently admitted to the hospital with A. fib with RVR, renal failure and large left pleural effusion. She underwent thoracentesis and symptoms improved and she was discharged. This was 10 days ago. Said that over the past couple days she's had increasing chest discomfort and palpitations. Heart rate is in the 130s. She has shortness of breath at baseline but this has been worse than usual today. She said she wears 5 L by nasal cannula. Currently she is on 6. No fevers or other constitutional symptoms. Has a mild headache. HPI    Nursing Notes were reviewed. REVIEW OF SYSTEMS    (2-9 systems for level 4, 10 or more for level 5)     Review of Systems   Constitutional: Negative for fever. Eyes: Negative for pain. Respiratory: Positive for shortness of breath. Cardiovascular: Positive for chest pain and palpitations. Gastrointestinal: Negative for abdominal pain, diarrhea and vomiting. Genitourinary: Negative for dysuria. Skin: Negative for rash. Neurological: Positive for headaches. Negative for weakness.    All other systems this chart in the absence of a cardiologist.    Atrial fibrillation. Tachycardic rate of 127. Mild artifact. Nonspecific T-wave changes. QT prolonged. RADIOLOGY:   Non-plain film images such as CT, Ultrasound and MRI are read by the radiologist. Plain radiographic images are visualized and preliminarily interpreted by the emergency physician with the below findings:    Interpretation per the Radiologist below, if available at the time of this note:    XR Chest Portable   Final Result   Increasing consolidation and pleural effusion in the left   chest since the previous study. A small pleural effusion on the right base. Mild pulmonary vascular congestion. The above findings are recorded on a digital voice clip in PACS. Signed by Dr Matthew Hurley on 11/21/2017 7:23 AM      CT Head WO Contrast    (Results Pending)             LABS:  Labs Reviewed   CBC - Abnormal; Notable for the following:        Result Value    WBC 54.6 (*)     RBC 3.96 (*)     Hemoglobin 11.4 (*)     .5 (*)     MCHC 28.6 (*)     RDW 17.1 (*)     Platelets 449 (*)     MPV 8.7 (*)     All other components within normal limits   BASIC METABOLIC PANEL - Abnormal; Notable for the following:     Chloride 96 (*)     CO2 35 (*)     Glucose 113 (*)     CREATININE 1.5 (*)     GFR Non-African American 34 (*)     Calcium 8.2 (*)     All other components within normal limits    Narrative:     Previously hemolyzed. PROTIME-INR   BRAIN NATRIURETIC PEPTIDE   POCT TROPONIN   POCT VENOUS   POCT TROPONIN       All other labs were within normal range or not returned as of this dictation.     EMERGENCY DEPARTMENT COURSE and DIFFERENTIAL DIAGNOSIS/MDM:   Vitals:    Vitals:    11/21/17 0550 11/21/17 0555 11/21/17 0629 11/21/17 0701   BP:  (!) 141/104 (!) 118/58 125/63   Pulse:  125 140 107   Resp:  24     Temp:  97.9 °F (36.6 °C)     TempSrc:  Oral     SpO2:  94% 93% (!) 88%   Weight: 153 lb (69.4 kg)      Height: 5' 3\" (1.6 m)          MDM  Cardizem has been started. Heart rate improving. Patient feeling better. Case discussed with Dr. Nyla Constantino who will be assuming care. CONSULTS:  None    PROCEDURES:  Unless otherwise noted below, none     Procedures    FINAL IMPRESSION      1. Recurrent left pleural effusion    2. Atrial fibrillation with RVR (HCC)    3. Chest pain, unspecified type          DISPOSITION/PLAN   DISPOSITION     PATIENT REFERRED TO:  No follow-up provider specified.     DISCHARGE MEDICATIONS:  New Prescriptions    No medications on file          (Please note that portions of this note were completed with a voice recognition program.  Efforts were made to edit the dictations but occasionally words are mis-transcribed.)    Marca Bloch, MD (electronically signed)  Attending Emergency Physician        Marca Bloch, MD  11/21/17 1016

## 2017-11-21 NOTE — ED PROVIDER NOTES
chest since the previous study. A small pleural effusion on the right base. Mild pulmonary vascular congestion. The above findings are recorded on a digital voice clip in PACS. Signed by Dr Samira Cornell on 11/21/2017 7:23 AM              LABS:  Labs Reviewed   CBC - Abnormal; Notable for the following:        Result Value    WBC 54.6 (*)     RBC 3.96 (*)     Hemoglobin 11.4 (*)     .5 (*)     MCHC 28.6 (*)     RDW 17.1 (*)     Platelets 458 (*)     MPV 8.7 (*)     All other components within normal limits   BASIC METABOLIC PANEL - Abnormal; Notable for the following:     Chloride 96 (*)     CO2 35 (*)     Glucose 113 (*)     CREATININE 1.5 (*)     GFR Non-African American 34 (*)     Calcium 8.2 (*)     All other components within normal limits    Narrative:     Previously hemolyzed. PROTIME-INR - Abnormal; Notable for the following:     Protime 57.5 (*)     INR 6.35 (*)     All other components within normal limits    Narrative:     Previously hemolyzed. CALL  Martini  KLEALEXIS tel. ,  Coag results called to and read back by evie sanches er, 11/21/2017 08:32, by  [address] - Abnormal; Notable for the following:     Pro-BNP 5,083 (*)     All other components within normal limits   POCT TROPONIN   POCT TROPONIN   POCT VENOUS       All other labs were within normal range or not returned as of this dictation. EMERGENCY DEPARTMENT COURSE and DIFFERENTIAL DIAGNOSIS/MDM:   Vitals:    Vitals:    11/21/17 0701 11/21/17 0732 11/21/17 0742 11/21/17 0801   BP: 125/63 109/60 (!) 107/59 128/64   Pulse: 107 108 110 117   Resp:       Temp:       TempSrc:       SpO2: (!) 88% 94% 92% 93%   Weight:       Height:           MDM    Reassessment  9:06 AM patient is without chest pain resting comfortably she denies shortness of air at this time she states after her resuscitation in the ER her symptoms have resolved now.   Call is placed to the hospitalist    CONSULTS:  None    PROCEDURES:  Unless otherwise noted below, none     Procedures    FINAL IMPRESSION      1. Recurrent left pleural effusion    2. Atrial fibrillation with RVR (HCC)    3. Chest pain, unspecified type    4. Elevated INR          DISPOSITION/PLAN   DISPOSITION Decision To Admit    PATIENT REFERRED TO:  No follow-up provider specified.     DISCHARGE MEDICATIONS:  New Prescriptions    No medications on file          (Please note that portions of this note were completed with a voice recognition program.  Efforts were made to edit the dictations but occasionally words are mis-transcribed.)    Zena Adamson MD (electronically signed)  Attending Emergency Physician       Cristofer Bishop MD  11/25/17 1958

## 2017-11-21 NOTE — ED TRIAGE NOTES
Pt is gowned. Patient placed on cardiac monitor, continuous pulse oximeter, and NIBP monitor. Monitor alarms on.

## 2017-11-21 NOTE — PROGRESS NOTES
Blood Gas, Arterial [[id number]] (Abnormal) Collected: 11/21/17 1721      Specimen: Blood gases Updated: 11/21/17 1724      pH, Arterial 7.420      pCO2, Arterial 60.0 (H) mmHg       pO2, Arterial 49.0 (LL) mmHg       HCO3, Arterial 38.9 (H) mmol/L       Base Excess, Arterial 12.8 (H) mmol/L       Hemoglobin, Art, Extended 8.6 (L) g/dL       O2 Sat, Arterial 85.1 (LL) %       Carboxyhgb, Arterial 3.2 %       Methemoglobin, Arterial 1.4 %       O2 Content, Arterial 10.3 mL/dL       O2 Therapy Unknown     Narrative:       CALL  Vini Wang RN, 11/21/2017 17:24, by Phoebe Lyon     Potassium, Whole Blood [[id number]] Collected: 11/21/17 1721      Updated: 11/21/17 1722      Potassium, Whole Blood 4.2     6 LPM  SITE RB

## 2017-11-22 LAB
ANION GAP SERPL CALCULATED.3IONS-SCNC: 8 MMOL/L (ref 7–19)
BUN BLDV-MCNC: 23 MG/DL (ref 8–23)
CALCIUM SERPL-MCNC: 8.2 MG/DL (ref 8.8–10.2)
CHLORIDE BLD-SCNC: 97 MMOL/L (ref 98–111)
CO2: 35 MMOL/L (ref 22–29)
CREAT SERPL-MCNC: 1.7 MG/DL (ref 0.5–0.9)
EKG P AXIS: NORMAL DEGREES
EKG P-R INTERVAL: NORMAL MS
EKG Q-T INTERVAL: 302 MS
EKG QRS DURATION: 88 MS
EKG QTC CALCULATION (BAZETT): 414 MS
EKG T AXIS: 127 DEGREES
GFR NON-AFRICAN AMERICAN: 29
GLUCOSE BLD-MCNC: 122 MG/DL (ref 74–109)
HCT VFR BLD CALC: 33.7 % (ref 37–47)
HEMOGLOBIN: 10.3 G/DL (ref 12–16)
INR BLD: 5.46 (ref 0.88–1.18)
MAGNESIUM: 2.3 MG/DL (ref 1.6–2.4)
MCH RBC QN AUTO: 29 PG (ref 27–31)
MCHC RBC AUTO-ENTMCNC: 30.6 G/DL (ref 33–37)
MCV RBC AUTO: 94.9 FL (ref 81–99)
PDW BLD-RTO: 16.5 % (ref 11.5–14.5)
PLATELET # BLD: 580 K/UL (ref 130–400)
PMV BLD AUTO: 9.1 FL (ref 9.4–12.3)
POTASSIUM SERPL-SCNC: 4.7 MMOL/L (ref 3.5–5)
PROTHROMBIN TIME: 50.9 SEC (ref 12–14.6)
RBC # BLD: 3.55 M/UL (ref 4.2–5.4)
SODIUM BLD-SCNC: 140 MMOL/L (ref 136–145)
TSH SERPL DL<=0.05 MIU/L-ACNC: 5.26 UIU/ML (ref 0.27–4.2)
WBC # BLD: 52.8 K/UL (ref 4.8–10.8)

## 2017-11-22 PROCEDURE — 6370000000 HC RX 637 (ALT 250 FOR IP): Performed by: INTERNAL MEDICINE

## 2017-11-22 PROCEDURE — 2140000000 HC CCU INTERMEDIATE R&B

## 2017-11-22 PROCEDURE — 99233 SBSQ HOSP IP/OBS HIGH 50: CPT | Performed by: THORACIC SURGERY (CARDIOTHORACIC VASCULAR SURGERY)

## 2017-11-22 PROCEDURE — 94640 AIRWAY INHALATION TREATMENT: CPT

## 2017-11-22 PROCEDURE — 2700000000 HC OXYGEN THERAPY PER DAY

## 2017-11-22 PROCEDURE — 80048 BASIC METABOLIC PNL TOTAL CA: CPT

## 2017-11-22 PROCEDURE — 85610 PROTHROMBIN TIME: CPT

## 2017-11-22 PROCEDURE — 99222 1ST HOSP IP/OBS MODERATE 55: CPT | Performed by: INTERNAL MEDICINE

## 2017-11-22 PROCEDURE — 36415 COLL VENOUS BLD VENIPUNCTURE: CPT

## 2017-11-22 PROCEDURE — 94660 CPAP INITIATION&MGMT: CPT

## 2017-11-22 PROCEDURE — 99231 SBSQ HOSP IP/OBS SF/LOW 25: CPT | Performed by: INTERNAL MEDICINE

## 2017-11-22 PROCEDURE — 2500000003 HC RX 250 WO HCPCS: Performed by: EMERGENCY MEDICINE

## 2017-11-22 PROCEDURE — 2580000003 HC RX 258: Performed by: EMERGENCY MEDICINE

## 2017-11-22 PROCEDURE — 6370000000 HC RX 637 (ALT 250 FOR IP): Performed by: PHYSICIAN ASSISTANT

## 2017-11-22 PROCEDURE — 2500000003 HC RX 250 WO HCPCS: Performed by: NURSE PRACTITIONER

## 2017-11-22 PROCEDURE — 83735 ASSAY OF MAGNESIUM: CPT

## 2017-11-22 PROCEDURE — 85027 COMPLETE CBC AUTOMATED: CPT

## 2017-11-22 PROCEDURE — 2580000003 HC RX 258: Performed by: PHYSICIAN ASSISTANT

## 2017-11-22 PROCEDURE — 84443 ASSAY THYROID STIM HORMONE: CPT

## 2017-11-22 PROCEDURE — 6360000002 HC RX W HCPCS: Performed by: INTERNAL MEDICINE

## 2017-11-22 RX ORDER — DILTIAZEM HYDROCHLORIDE 120 MG/1
120 CAPSULE, COATED, EXTENDED RELEASE ORAL DAILY
Status: DISCONTINUED | OUTPATIENT
Start: 2017-11-22 | End: 2017-11-27 | Stop reason: HOSPADM

## 2017-11-22 RX ORDER — PHYTONADIONE 5 MG/1
5 TABLET ORAL ONCE
Status: COMPLETED | OUTPATIENT
Start: 2017-11-22 | End: 2017-11-22

## 2017-11-22 RX ADMIN — PANTOPRAZOLE SODIUM 40 MG: 40 TABLET, DELAYED RELEASE ORAL at 06:02

## 2017-11-22 RX ADMIN — HYDROCODONE BITARTRATE AND ACETAMINOPHEN 1 TABLET: 5; 325 TABLET ORAL at 01:55

## 2017-11-22 RX ADMIN — PRAVASTATIN SODIUM 40 MG: 20 TABLET ORAL at 08:29

## 2017-11-22 RX ADMIN — METHYLPREDNISOLONE SODIUM SUCCINATE 40 MG: 125 INJECTION, POWDER, FOR SOLUTION INTRAMUSCULAR; INTRAVENOUS at 03:37

## 2017-11-22 RX ADMIN — IPRATROPIUM BROMIDE AND ALBUTEROL SULFATE 1 AMPULE: .5; 3 SOLUTION RESPIRATORY (INHALATION) at 11:15

## 2017-11-22 RX ADMIN — TRAMADOL HYDROCHLORIDE 50 MG: 50 TABLET, COATED ORAL at 08:28

## 2017-11-22 RX ADMIN — IPRATROPIUM BROMIDE AND ALBUTEROL SULFATE 1 AMPULE: .5; 3 SOLUTION RESPIRATORY (INHALATION) at 14:52

## 2017-11-22 RX ADMIN — PHYTONADIONE 5 MG: 5 TABLET ORAL at 15:28

## 2017-11-22 RX ADMIN — DILTIAZEM HYDROCHLORIDE 10 MG/HR: 5 INJECTION INTRAVENOUS at 01:29

## 2017-11-22 RX ADMIN — IPRATROPIUM BROMIDE AND ALBUTEROL SULFATE 1 AMPULE: .5; 3 SOLUTION RESPIRATORY (INHALATION) at 18:34

## 2017-11-22 RX ADMIN — IPRATROPIUM BROMIDE AND ALBUTEROL SULFATE 1 AMPULE: .5; 3 SOLUTION RESPIRATORY (INHALATION) at 07:01

## 2017-11-22 RX ADMIN — Medication 10 ML: at 08:28

## 2017-11-22 RX ADMIN — TRAMADOL HYDROCHLORIDE 50 MG: 50 TABLET, COATED ORAL at 21:37

## 2017-11-22 RX ADMIN — DILTIAZEM HYDROCHLORIDE 120 MG: 120 CAPSULE, COATED, EXTENDED RELEASE ORAL at 10:09

## 2017-11-22 RX ADMIN — SILVER SULFADIAZINE: 10 CREAM TOPICAL at 14:58

## 2017-11-22 RX ADMIN — METHYLPREDNISOLONE SODIUM SUCCINATE 40 MG: 125 INJECTION, POWDER, FOR SOLUTION INTRAMUSCULAR; INTRAVENOUS at 12:01

## 2017-11-22 RX ADMIN — METHYLPREDNISOLONE SODIUM SUCCINATE 40 MG: 125 INJECTION, POWDER, FOR SOLUTION INTRAMUSCULAR; INTRAVENOUS at 21:37

## 2017-11-22 RX ADMIN — Medication 10 ML: at 21:37

## 2017-11-22 RX ADMIN — AMIODARONE HYDROCHLORIDE 200 MG: 200 TABLET ORAL at 08:28

## 2017-11-22 ASSESSMENT — ENCOUNTER SYMPTOMS
EYES NEGATIVE: 1
SHORTNESS OF BREATH: 1
DOUBLE VISION: 0
ABDOMINAL PAIN: 0
BACK PAIN: 0
VOMITING: 0
WHEEZING: 0
HEARTBURN: 0
EYE PAIN: 0
DIARRHEA: 0
HEMOPTYSIS: 0
SPUTUM PRODUCTION: 0
GASTROINTESTINAL NEGATIVE: 1
ORTHOPNEA: 0
BLOOD IN STOOL: 0
EYE DISCHARGE: 0
BLURRED VISION: 0
BACK PAIN: 1
COUGH: 0
NAUSEA: 0
CONSTIPATION: 0
EYE REDNESS: 0
SORE THROAT: 0

## 2017-11-22 ASSESSMENT — PAIN SCALES - GENERAL
PAINLEVEL_OUTOF10: 9
PAINLEVEL_OUTOF10: 5
PAINLEVEL_OUTOF10: 1
PAINLEVEL_OUTOF10: 3
PAINLEVEL_OUTOF10: 0
PAINLEVEL_OUTOF10: 10

## 2017-11-22 ASSESSMENT — PAIN DESCRIPTION - LOCATION: LOCATION: BACK

## 2017-11-22 ASSESSMENT — PAIN DESCRIPTION - PAIN TYPE: TYPE: CHRONIC PAIN

## 2017-11-22 NOTE — PROGRESS NOTES
Attempted visit with pt. She is having breathing tx. Pt and PC nurse agreed that a visit after lunch would be preferred. Pt states she wants to talk to me about some things. This nurse will revisit pt after lunch.     Electronically signed by Alice Williamson RN on 11/22/2017 at 11:29 AM

## 2017-11-22 NOTE — CONSULTS
Consultation     Pt Name: Mariama Serrano  MRN: [id number]  YOB: 1940  Date of evaluation: 11/22/2017      REASON FOR CONSULTATION:  Non-small cell lung carcinom    REQUESTING Charlsie Bloch, MD    ATTENDING/ADMITTING Charlsie Bloch, MD     History Obtained From:  patient, electronic medical record    HISTORY OF PRESENT ILLNESS:    Nely Olson is a 15-year-old  female with a recent diagnosis of YULISA non-small cell lung cancer. Unfortunately, initiation of chemotherapy treatment has be delayed due to recent hospitalizations. Mary was scheduled to initiate treatment with carboplatin/Abraxane on 11/21/2017, unfortunately, she has been rehospitalized for complaints of chest pain and shortness of air. She is presently being treated for atrial fibrillation with RVR and recurrent left pleural effusion. TARGET SITES:  1. 10 cm YULISA primary lung mass with pleural effusion   2. 6 mm RLL lung nodule   3. bulky mediastinal lymphadenopathy   4. left supraclavicular LN   5. indeterminate 1.8 cm right adrenal nodule    ONCOLOGIC HISTORY: YULISA NSCLC 9/8/2017   Mary Toscano Or was seen in initial oncologic consultation on 11/02/17, referred by Dr. Vasiliy Reilly regarding a new diagnosis of YULISA non-small cell lung carcinoma. Mary initially presented to [place], [place] on 8/25/17 for progressive cough and dyspnea, associated with right-sided pleuritic chest discomfort. She was treated for pneumonia. A CT scan of the chest on 8/25/2017 documented a 10 cm left-sided chest mass with mediastinal invasion and pathologic adenopathy. She was transferred to [facility] for further evaluation and management. Her hospital course was complicated by atrial fibrillation with RVR, followed by cardiologist, Dr. Vivien Sultana. She was also evaluated by pulmonologist, Dr. Vasiliy Reilly,    A CT-guided left thoracentesis was performed on 9/1/2017  1100 mL of yellow, translucent fluid was evacuated.   Cytology was negative for malignant cells. Noted on the CT scan of 9/1/2017. during the thoracentesis procedure was a moderate increase in the volume of pleural fluid compared to the CT scan from [facility] on 8/25/17. Additionally a new passive collapse of the left upper lobe and moderate amount of debris within the left main stem bronchus and left upper lobe bronchus. An Abdominal/pelvic CT WO contrast 9/4/2017 documented a 6 mm subpleural nodule in the RLL, likely benign. Thickening of the adrenal glands were felt likely adenomatous change, the largest nodule was 1.8 cm on the right. A CT-guided biopsy of the YULISA lung mass was performed on 9/8/2017. Pathology was consistent with CK7 positive NON-SMALL CELL CARCINOMA. Mary was unable to make her initial appointment for oncology consultation on 9/28/2017 because she was hospitalized at [address] [place] [address] for GI bleeding with an INR greater than 18, on warfarin for atrial fibrillation. Endoscopy 9/29/20 17 and colonoscopy 10/2/17 by Dr. Juan David Su were both negative, unrevealing for endoluminal lesions. .    During the hospitalization, Mary was evaluated by cardiothoracic surgeon, Dr. Dipti Templeton, who felt Pleur-x catheter would not be beneficial with regard to her trapped lung on the left. She was discharged on 10/13/17 to [facility] for rehabilitation. She was able to return home 11/2/2017. Mary was seen in initial oncology consultation the day of her discharge from [facility] on 11/2/2017. Findings were reviewed in detail with Mary and her daughter Madhu Cash at her initial consultation on 11/2/2017. Mary desired to proceed with workup and treatment. Mary was again admitted to the hospitalist at [facility] on 11/3/2017 with dyspnea, atrial fibrillation, intermittent cough and her renal insufficiency with a creatinine of 2.6.     An ultrasound-guided thoracentesis was performed on 11/8/2017 obtaining 2 L of straw-colored fluid with significant symptomatic relief. Cytology was again reported as negative for malignancy. A bone scan on 11/9/2017 did not reveal evidence of bony metastatic disease. An MRI of the brain on 11/9/2017 did not reveal evidence of metastatic disease. A PET scan was requested and scheduled at her initial clinic visit on 11/2/2017 but she was unable to keep this appointment due to her recent hospitalization at [address] [place] [address] Street was referred to [facility] for XRT to the YULISA lung mass with associated postobstruction , but was unable to keep that appointment due to her recent hospitalization at [facility].  Systemic chemotherapy with carboplatinum/Abraxane is recommended once stabilized. She was refered to [facility] for Mediport placement , but due to her hospitalization was unable to keep the appointment. She is currently residing in a nursing home and [facility] and comes today accompanied by staff. Systemic chemotherapy with carboplatinum/Abraxane is recommended once stabilized. Mary was last evaluated in clinic on 11/16/2017. That day, she was relatively stable but with decreased breath sounds on the left. She was in no acute distress and able to interact intelligently inappropriately. Mary was reschedule for the following :  · PET scan on 11/29/2017  · Port placement on 11/29/2017  · Consult Dr. Vinny Erwin for XRT to the chest   · Chemotherapy at [facility] to be able to coordinate with XRT on 11/21/2017.      Past Medical History:    Past Medical History:   Diagnosis Date    A-fib ([address]     Anticoagulated on Coumadin     Anxiety     GERD (gastroesophageal reflux disease)     HTN (hypertension)     Hyperlipidemia     Lung cancer ([address]     Malignant neoplasm of breast (female), unspecified site     Occlusion and stenosis of carotid artery     Osteoporosis     Palliative care encounter     Respiratory distress     Thrombocytopenia ([address]     Tobacco use disorder Results   Component Value Date    PROTIME 50.9 11/22/2017    INR 5.46 11/22/2017     Recent Labs      11/21/17   0602  11/21/17   0944   TROPONINI  0.01  0.01     Lab Results   Component Value Date    NITRU Negative 11/04/2017    COLORU YELLOW 11/04/2017    PHUR 5.5 11/04/2017    LABCAST 2-4 Coarse Gran 11/04/2017    WBCUA 6-10 11/04/2017    YEAST 3+ 11/04/2017    BACTERIA 2+ 11/04/2017    CLARITYU CLOUDY 11/04/2017    SPECGRAV 1.015 11/04/2017    LEUKOCYTESUR TRACE 11/04/2017    UROBILINOGEN 0.2 11/04/2017    BILIRUBINUR SMALL 11/04/2017    BLOODU Negative 11/04/2017    GLUCOSEU Negative 11/04/2017    KETUA Negative 11/04/2017     Lab Results   Component Value Date    MG 2.3 11/22/2017     No results found for: LABA1C  Lab Results   Component Value Date    TSH 5.260 11/22/2017    RDCICBGK90 362 10/10/2017    FOLATE 7.3 10/10/2017    FERRITIN 158.9 10/10/2017    IRON 18 10/10/2017    TIBC 203 09/28/2017     Lab Results   Component Value Date    TRIG 123 09/03/2017    HDL 43 09/03/2017    LDLCALC 86 09/03/2017     Lab Results   Component Value Date    AMYLASE 24 09/04/2017    LIPASE 11 09/04/2017       Lab Results   Component Value Date    LACTA 1.9 10/10/2017     Lab Results   Component Value Date    PHART 7.450 11/21/2017    KPP0OCN 57.0 11/21/2017    PO2ART 61.0 11/21/2017    WYH1KWI 39.6 11/21/2017    BEART 13.9 11/21/2017    A5NQQHGN 90.7 11/21/2017       Radiology  Xr Chest Standard (2 Vw)    Result Date: 11/7/2017  Examination. XR CHEST STANDARD TWO VW History: A single frontal portable upright view of the chest is compared with the previous study dated 11/6/2017. There is a focal pleural thickening in the left apex. I do not see any pneumothorax in the area. There is a persistent bibasilar pleural effusion, more on the left side. There is left lower lobar consolidation. There are interstitial changes in the left parahilar area with complete obliteration of the left hilum.  This may represent an interstitial pneumonitis or pulmonary vascular congestion. There is moderate pulmonary vascular congestion in the right side as well. The heart size is enlarged. There is moderate fullness in the right hilum which was also noted in the previous study which probably represent distended pulmonary arteries. There is no bony abnormality. No pneumothorax. The remaining cardiopulmonary status is unchanged. Signed by Dr Ric Ricci on 11/7/2017 7:41 AM    Xr Chest Standard (2 Vw)    Result Date: 11/6/2017  HISTORY: No left breast sounds CXR: 2 views of the chest are obtained. COMPARISON: 11/5/2017. FINDINGS: There is a moderate to large left pleural effusion with left lung opacities. The right lung is hyperinflated. Increasing right basilar densities may be atelectasis. There is diffuse thoracic aortic calcification. A trace right pleural effusion is suspected. There is a small left apical pneumothorax suspected. There is no shifting of the cardiac mediastinal structures. No acute bony pathology is identified. 1. Moderate to large left pleural effusion with left lung opacities. Opacities may represent a combination of compressive atelectasis and consolidation. 2. Small left apical pneumothorax suspected. 3. Increasing right basilar densities may be patchy atelectasis. Trace right pleural effusion. 4. Prominent pulmonary vessels and interstitial markings suggesting a component of underlying edema. Signed by Dr Snow Shaw on 11/6/2017 9:20 AM    Xr Chest Standard (2 Vw)    Result Date: 11/5/2017  XR CHEST STANDARD TWO VW 11/4/2017 11:00 PM HISTORY: Leukocytosis COMPARISON: Exam dated 10/9/2017. FINDINGS: There is opacification of the left lung base, stable from the recent comparison exam. This appears to reflect a moderate sized layering left pleural effusion. The lungs are otherwise clear. Overall stable heart size. The pulmonary vasculature are unremarkable. No acute bone or soft tissue abnormality.     1. Overall stable exam. No new focal lung infiltrates. Stable opacification of the left lung base which is thought to reflect a moderate sized layering pleural effusion. Signed by Dr Tin Paula on 11/5/2017 9:32 AM    Ct Head Wo Contrast    Result Date: 11/21/2017  Examination. CT HEAD WO CONTRAST History: Headache. DLP: 955 mGy. The CT scan of the head is performed without intravenous contrast enhancement. The images are acquired in axial plane with subsequent reconstruction in coronal and sagittal planes. There is no previous study for comparison. There is no evidence of a mass or midline shift. There is moderate dilatation of ventricles, cisterns and the cortical sulci suggesting chronic volume loss/atrophy. There are scattered low-density areas in the centrum semiovale bilaterally representing chronic white matter ischemia due to chronic occlusive microangiopathy. There is normal gray-white matter differentiation. The images reviewed in bone window show no acute bony abnormality. The visualized paranasal sinuses appear normal. There is infiltration of the mastoid air cells bilaterally, more pronounced on the right side. There is lobulated soft tissue mass in the right upper neck in the posterior part of the superficial lobe of the right parotid gland measuring 2 cm in diameter. This may represent a pleomorphic adenoma? . Further evaluation with contrast enhanced CT scanner of the soft tissues of the neck may be obtained. No acute intracranial abnormality. Chronic ischemic and atrophic changes. Infiltration of the mastoid air cells bilaterally suggesting chronic mastoiditis. Other findings as above. The above findings are recorded on a digital voice clip in PACS.  Signed by Dr Mitali Ramirez on 11/21/2017 7:35 AM    Us Renal Complete    Result Date: 11/4/2017  US RENAL COMPLETE 11/4/2017 8:45 AM HISTORY: Acute kidney injury COMPARISON: None  TECHNIQUE: Multiple longitudinal and transverse realtime sonographic images of the on 11/8/2017 8:50 AM    Nm Bone Scan Whole Body    Result Date: 11/9/2017  EXAMINATION: Whole-body bone scan 11/9/2017 HISTORY: Initial staging lung cancer. FINDINGS: Approximately 3 hours after injection of 20.1 mCi of technetium 99m HDP intravenously whole-body planar imaging was obtained of the appendicular and axial skeleton in the anterior and posterior projections. There is mild arthritic change of the knees. There are no focal intense areas of abnormal uptake to suggest occult fracture, primary bone lesion or metastatic disease. . No scintigraphic evidence of metastatic disease. Signed by Dr Chidi Muller on 11/9/2017 3:31 PM    Mri Brain W Wo Contrast    Result Date: 11/9/2017  History: Lung cancer staging. MRI BRAIN: Multiplanar imaging the brain is performed pre- and post-IV contrast. COMPARISON: None FINDINGS: There is no abnormal diffusion restriction. There is no intracranial hemorrhage. There is mild cerebral volume loss/atrophy. Nonenhancing hyperintense FLAIR signal changes in periventricular white matter regions likely due to chronic small vessel ischemia. There is no abnormal extra-axial fluid collection. There is no abnormal intracranial enhancement. There is no evidence of intracranial metastasis. Limited assessment of the orbits is unremarkable. There is opacification of the bilateral mastoid air cells. No air-fluid level seen within the paranasal sinuses. There are normal flow voids in the distal internal carotid and basilar arteries. There is homogeneous enhancement of the sagittal sinus. 1. No evidence of intracranial metastasis. 2. Nonenhancing hyperintense FLAIR signal changes likely due to chronic small vessel ischemia. Mild atrophy/cerebral volume loss. . 3. Bilateral mastoid effusions. Signed by Dr Lyndsay Sousa on 11/9/2017 4:34 PM      ASSESSMENT/PLAN:    Carlos Carlson has a recent diagnosis of YULISA non-small cell lung cancer.  Unfortunately, initiation of chemotherapy treatment has be delayed due to recent hospitalizations. Mary was scheduled to initiate treatment with carboplatin/Abraxane on 11/21/2017, unfortunately, she has been rehospitalized for complaints of chest pain and shortness of air. She is presently being treated for atrial fibrillation with RVR and recurrent left pleural effusion. Lung Cancer- advanced NSCLC/recurrent persistent pleural effusion. · Dr. Zeus Manzanares have seen her in the recent past and did not believe Pleurx catheter was a good option for her. PLEASE REEVALUATE FOR PLEUREX, S/P 3 THORACOCENTESIS. Anticipating Pleurex cath placement on 11/24/2017. · Social service consult for home needs/supply for Pleurx catheter. · Therapeutic thoracocenthesis on 11/08/2017 for symptom relief with the removal of 2 L of straw-colored fluid. Cytology revealed no malignancy. · Port-A-Cath planned as Outpatient. · PET scan planned as outpatient  · Combined modality therapy with carboplatin/Abraxane and radiation therapy is anticipated as outpatient.     Normocytic anemia- multifactorial 2/2 malignancy, anemia chronic disease, hypothyroidism  · Hemoglobin 10.3  · MCV 46.0     Neutrophilic leukocytosis- likely 2/2 leukemoid reaction. WBC has been elevated at least since August 2017. Range 15,000-48,000  · Afebrile  · WBC 52,800      Thrombocytosis- likely reactive process  · Platelets 970,336    [place]. fibrillationanticoagulation with warfarin  · Supratherapeutic INR 5.46 (Recommend reversing coagulapathy with vitamin K for anticipating Pleurex cath placement on 11/24/2017. · Management as per cardiology  · Cardizem drip per cardiology      DISPOSITION: Dr. Kasandra [place] spoke with Dr. mJ Pickett, cardiothoracic surgeon planning Pleurex cath placement on 11/24/2017. Recommend reversing coagulapathy with vitamin K. Continue with current plan of care. We will be covered through the holiday by Dr. Nir Hadley and Dr. Kasandra [place] will return on Monday.        Yamileth Shen, APRN  6:26 AM  11/22/2017

## 2017-11-22 NOTE — PROGRESS NOTES
Instructed by Dr Michael Parsons to stop cardizem drip altogether.    Electronically signed by Nicolas Castaneda RN on 11/22/2017 at 11:56 AM

## 2017-11-22 NOTE — CONSULTS
[facility] [address] [facility]  Cardiology Consult    Requesting MD:  Anitra Persaud MD Admit Status:  Inpatient       History obtained from:   [x] Patient  [] Other (specify):     Cc:  AFib with RVR    HPI: Ms. Reed [place] is a 69 y.o. female with a history of hypertension, hyperlipidemia,Non-small cell lung cancer, recurrent pleural effusion evaluated for A. fib with RVR. The patient presented with shortness of air several days. On arrival she was found to be in A. fib with RVR. Chest x-ray showed an effusion that had recurred after her last thoracentesis. It was modestly bigger than the last chest x-ray. She says that she's been doing well otherwise. She has no chest pain. She's been compliant with her medications. Her INR was greater than 5. It is unknown whether this was checked in between hospitalizations. She otherwise states that she has no chest pain, PND, orthopnea, near-syncope or syncope. She has no other complaints. Review of Systems   Constitutional: Negative for malaise/fatigue. Respiratory: Positive for shortness of breath. Cardiovascular: Negative for chest pain. Neurological: Negative for weakness. All other systems reviewed and are negative.       Past Medical History:   Diagnosis Date    A-fib ([address]     Anticoagulated on Coumadin     Anxiety     GERD (gastroesophageal reflux disease)     HTN (hypertension)     Hyperlipidemia     Lung cancer (HCC)     Malignant neoplasm of breast (female), unspecified site     Occlusion and stenosis of carotid artery     Osteoporosis     Palliative care encounter     Respiratory distress     Thrombocytopenia ([address]     Tobacco use disorder         Past Surgical History:   Procedure Laterality Date    BREAST SURGERY      CATARACT REMOVAL      CHOLECYSTECTOMY      COLONOSCOPY      LYMPH NODE BIOPSY      MASTECTOMY, PARTIAL      WA COLSC FLX W/REMOVAL LESION BY HOT BX FORCEPS N/A 10/2/2017    Dr Aly Flank prep, diverticulosis-Tubular AP (-) dysplasia--1 yr recall    UPPER GASTROINTESTINAL ENDOSCOPY N/A 9/29/2017    Dr Marylen Kirsten       Family History   Problem Relation Age of Onset    Lung Cancer Other     Stomach Cancer Other     Brain Cancer Other     Other Father      cardiac event       Social History     Social History    Marital status:      Spouse name: N/A    Number of children: N/A    Years of education: N/A     Occupational History    Not on file. Social History Main Topics    Smoking status: Former Smoker    Smokeless tobacco: Never Used    Alcohol use No    Drug use: No    Sexual activity: Not on file     Other Topics Concern    Not on file     Social History Narrative    No narrative on file       Allergies   Allergen Reactions    Morphine Other (See Comments)     Bottoms out her heart rate    Aspirin Hives    Penicillins Hives       Current Meds   diltiazem  120 mg Oral Daily    ipratropium-albuterol  1 ampule Inhalation Q4H [place]    traMADol  50 mg Oral BID    pravastatin  40 mg Oral Daily    pantoprazole  40 mg Oral QAM AC    sodium chloride flush  10 mL Intravenous 2 times per day    amiodarone  200 mg Oral Daily    methylPREDNISolone  40 mg Intravenous Q8H       Current Infused Meds   diltiazem (CARDIZEM) 125 mg in dextrose 5% 125 mL infusion 10 mg/hr (11/22/17 0129)       PE:  Vitals:    11/22/17 0815   BP: 120/62   Pulse:    Resp:    Temp:    SpO2:        Intake/Output Summary (Last 24 hours) at 11/22/17 0930  Last data filed at 11/22/17 0818   Gross per 24 hour   Intake              480 ml   Output              500 ml   Net              -20 ml     Estimated body mass index is 23.26 kg/m² as calculated from the following:    Height as of this encounter: 5' 8\" (1.727 m). Weight as of this encounter: 153 lb (69.4 kg).     General - No acute distress  Eyes - PERRL, anicteric sclerae; no lid-lag  ENMT - Atraumatic; Mucous membranes moist, oropharynx clear  Neck -

## 2017-11-22 NOTE — PROGRESS NOTES
Subjective :      Junior Pop is a 69 y.o. female referral for specialty bed. Patient has a Partial thickness wound which is located on the Buttocks L gluteal, Coccyx. Objective:        Wound:  Left upper buttocks near coccyx Stage 2 pressure injury 0.8cm x 0.6cm x 0.1cm, red wound base  Coccyx Stage 2 pressure injury 0.8cm x 0.8cm x <01. cm pink wound base  Periwound dark pink, appears to be healing denuded areas     Wound Description:  See above  This was originally noted on 11/21/17 per Ketty Tran PA-C  Measurements shown are from today's visit:11/22/17      Assessment :      Partial thickness    Stage 2 pressure injuries to coccyx and left upper buttocks near coccyx  Patient has stated she has had these and is using silvadene cream at home. She was sure she bought cream with her, but did not see it in the pink basin at bedside where she thought is was. Assist of Domenica Painter  Noted INR 5.46 today. Patient is 153 lbs, 5'8\" and Michael Score documented as 20 today, noted Michael Score 16 on admission. Patient has very bony prominences, noted in medical summary severe protein calorie malnutrition, see summary for completed medical history. Plan :      Plan for wound: Dolphin mattress, silvadene cream 1% daily to sacral Stage 2 pressure injuries-patient has stated this is her home medication she uses. Briefly discussed with Ketty Tran PA-C 11/21/17 to follow up with patient today. Discussed with Delisa WALLACE and orders placed in carepalth. Dress per physician order:  Dr. Ángel Gallegos on unit updated on patient skin and orders that were placed in care path. No new orders received at this time  Pressure Ulcer Prevention Protocol reviewed and updated. Discussed with Roni Avalos RN patient nurse today.       Azeb Constantino RN 11/22/2017

## 2017-11-22 NOTE — PROGRESS NOTES
Palliative Care:      Palliative Care  made an initial visit to introduce himself and offer support to Patient who presented with chest discomfort and shortness of breath. Patient has a history of atrial fibrillation. She also has history of lung cancer and sees Dr. Eliel Cam. Has not yet started any kind of treatment but said that she is supposed to have an appointment for port placement soon. After this she is supposed to start her chemo. Patient was recently admitted to the hospital with A. fib with RVR, renal failure and large left pleural effusion. She underwent thoracentesis and symptoms improved and she was discharged. This was 10 days ago. Said that over the past couple days she's had increasing chest discomfort and palpitations. Past Medical History:      Patient has a history of A-Fib, Anticoagulated on Coumadin, Anxiety, GERD, HTN, Hyperlipidemia, Lung Cancer, Malignant Neoplasm of Breast, Occlusion and Stenosis of Carotid Artery, Osteoporosis, Respiratory Distress, Thrombocytopenia, Tobacco Use Disorder. Advance Directives:      Patient does not have an Advanced Directive and is a Full Code. Pain/Other Symptoms:    Patient does not report of pain at this time. Activity:         Patient was lying in her bed watching television. Psychological/Spiritual:         Patient has good Spiritual Support. Patient/Family Discussion:      Patient has good Family Support. Plan/expectations:  Patient is uncertain of the plan going forward at this time. Patient reports that Doctor has given suggestion of catheter for the fluid that continues to build up in her lungs. Patient reports that Doctor has told her that her condition \"can't be cured, but can help give a little more time\". Patient reported to this  that Doctors gave her time to make a decision as to whether she wanted to have this performed.   Upon speaking with Doctor, this  learned that Patient was told by Doctor

## 2017-11-22 NOTE — CONSULTS
Cardiothoracic Surgical Consultation  Date of Admission:  11/21/2017  5:49 AM  Date of Consultation:  11/22/2017    Surgeon:   Maribell Carrillo     PCP:  Polly Conklin       Chief Complaint: SOB    History of Present Illness:    . Antoni Alarcon is a 69 y.o. female who  has history HTN, a-fib, HLD, non small cell lung carcinoma with recurrent left pleural effusion. Patient with recent past admission last month for GI bleed with elevated INR, had left thoracentesis on 10/5 for legft effusion. One liter fluid removed. CT chest demonstrated left upper lobe mass with left upper lobe collapse. Patient evaluated by Dr. Joanie Hayes for possible Pleurex catheter placement. Decision not to place due to lack of expansion of the left lung after thoracentesis, indicating that there is bronchial obstruction due to tumor, and lack of symptomatic improvement by the patient. Patient states that she has bouts/ episodes of SOB, but presently breathing \"ok\". Patient's current admission  Due to abnormal blood work demonstrating acute renal failure. Past Medical History:     has a past medical history of A-fib ([address] Anticoagulated on Coumadin; Anxiety; GERD (gastroesophageal reflux disease); HTN (hypertension); Hyperlipidemia; Lung cancer ([address] Malignant neoplasm of breast (female), unspecified site; Occlusion and stenosis of carotid artery; Osteoporosis; Palliative care encounter; Respiratory distress; Thrombocytopenia ([address] and Tobacco use disorder. Past Surgical History:     has a past surgical history that includes Cholecystectomy; Cataract removal; Breast surgery; lymph node biopsy; Mastectomy, partial; Colonoscopy; Upper gastrointestinal endoscopy (N/A, 9/29/2017); and colsc flx w/removal lesion by hot bx forceps (N/A, 10/2/2017). Home Medications:   Prior to Admission medications    Medication Sig Start Date End Date Taking?  Authorizing Provider   HYDROcodone-acetaminophen (NORCO) 5-325 MG per tablet Take 1 tablet by mouth every 6 hours as needed for Pain . Historical Provider, MD   amiodarone (CORDARONE) 200 MG tablet Take 1 tablet by mouth daily 10/13/17   Bubba Baptiste, DO   warfarin (COUMADIN) 5 MG tablet Take 1 tablet by mouth daily Keep INR 2.0-3.0 10/13/17   Bubba Baptiste, DO   pravastatin (PRAVACHOL) 40 MG tablet Take 1 tablet by mouth daily 10/13/17   Bubba Baptiste, DO   diltiazem (CARDIZEM CD) 120 MG extended release capsule Take 1 capsule by mouth daily 10/14/17   Bubba Baptiste, DO   furosemide (LASIX) 40 MG tablet Take 0.5 tablets by mouth daily 10/14/17   Bubba Baptiste, DO   ipratropium-albuterol (DUONEB) 0.5-2.5 (3) MG/3ML SOLN nebulizer solution Inhale 3 mLs into the lungs 4 times daily for 5 days 10/11/17 10/16/17  Selwyn Padilla MD   pantoprazole (PROTONIX) 40 MG tablet Take 1 tablet by mouth every morning (before breakfast) 9/9/17   Emmanuel Stacy PA-C   traMADol (ULTRAM) 50 MG tablet Take 50 mg by mouth 2 times daily    Historical Provider, MD        Facility Administered Medications:    diltiazem  120 mg Oral Daily    ipratropium-albuterol  1 ampule Inhalation Q4H WA    traMADol  50 mg Oral BID    pravastatin  40 mg Oral Daily    pantoprazole  40 mg Oral QAM AC    sodium chloride flush  10 mL Intravenous 2 times per day    amiodarone  200 mg Oral Daily    methylPREDNISolone  40 mg Intravenous Q8H       Allergies:  Morphine; Aspirin; and Penicillins     Social History:       Social History     Social History    Marital status:      Spouse name: N/A    Number of children: N/A    Years of education: N/A     Occupational History    Not on file.      Social History Main Topics    Smoking status: Former Smoker    Smokeless tobacco: Never Used    Alcohol use No    Drug use: No    Sexual activity: Not on file     Other Topics Concern    Not on file     Social History Narrative    No narrative on file       Family History:     Family History   Problem Relation Age of Onset    Lung Cancer Other  Stomach Cancer Other     Brain Cancer Other     Other Father      cardiac event         Review of Systems: Review of Systems   Constitutional: Positive for malaise/fatigue and weight loss. HENT: Negative. Eyes: Negative. Respiratory: Positive for shortness of breath. Cardiovascular:        Rapid heart rate   Gastrointestinal: Negative. Genitourinary: Negative. Musculoskeletal: Positive for back pain, falls and joint pain. Skin: Negative. Neurological: Positive for weakness. Endo/Heme/Allergies: Bruises/bleeds easily. Psychiatric/Behavioral: Negative. Physical Examination:  /62 Comment: manual  Pulse 96   Temp 97.8 °F (36.6 °C) (Temporal)   Resp 18   Ht 5' 8\" (1.727 m)   Wt 153 lb (69.4 kg)   SpO2 95%   BMI 23.26 kg/m²              General appearance:Fraile, appears stated age  Eyes: wnl  ENMT:  non icteric. Neck: no adenopathy,  Respiratory: diminished BS left chest and right base. Cardiovascular: irregular irregular  Pulses:intact  GI: benign  Extremities: no edema   Skin: multiple ecchymosis  Neuro/psychiatric: no focal deficits. MEDICAL DECISION MAKING/TESTING    CXR: left pleural effusion, smaller right pleural effusion. Ct: reviewed- collapsed left lung with extensive mediastinal adenopathy.     Labs: INR  5.46  Creatinine  1.7  Co2 35   ABG 7.45/57/61/39/14/ 90%    Diagnosis:   Patient Active Problem List   Diagnosis    Mixed hyperlipidemia    Essential hypertension    Lung mass    Atrial fibrillation with RVR (HCC)    Diastolic heart failure, stage C (HCC)    Severe protein-calorie malnutrition (HCC)    Pneumonia of left lung due to infectious organism    Paroxysmal atrial fibrillation (HCC)    [facility]co[facility]able state ([facility]    Gastrointestinal hemorrhage with melena    Acute blood loss anemia    Warfarin-induced coagulopathy (HCC)    Melena    Gastroesophageal reflux disease with esophagitis    HCAP (healthcare-associated pneumonia)    Polyp of descending colon    Diverticulosis of large intestine without hemorrhage    Non-small cell cancer of left lung (HCC)    Warfarin toxicity    [facility]) [facility]    Chronic hypoxemic respiratory failure (HCC)    Leukocytosis    S/P thoracentesis    Pleural effusion on left    Hypothyroidism due to acquired atrophy of thyroid    E-coli UTI    Chronic kidney disease (CKD), stage III (moderate)    Recurrent left pleural effusion       IMP: Advanced stage non small cell lung cancer stage IIIb vs stage Iv, with left lung collapse due to bronchial obstruction. Renal failure, atrial fibrillation, warfarin induced coagulopathy. Chronic respiratory failure with CO2 retention. Plan: Given the patient's severe underlying respiratory status, and lack of symptomatic improvement with thoracentesis, it is highly doubtful that Pleurex catheter placement will be of any palliative benefit. I discussed my findings with the patient and Dr. Fabiola Wall. Thank you for this consultation.       Signed by Preston Bennett M.D.

## 2017-11-22 NOTE — PROGRESS NOTES
Patient converted to NSR. EKG in soft chart. Dr Caroline Payan and Dr Wu Garcia notified.    Electronically signed by Curtis Garrett RN on 11/22/2017 at 11:51 AM

## 2017-11-22 NOTE — PROGRESS NOTES
Zoë Serna coming up to see patient.   Electronically signed by Nicolas Castaneda RN on 11/22/2017 at 2:01 PM

## 2017-11-22 NOTE — PROGRESS NOTES
pH, Arterial 7.350 - 7.450 7.450    pCO2, Arterial 35.0 - 45.0 mmHg 57.0     pO2, Arterial 80.0 - 100.0 mmHg 61.0     HCO3, Arterial 22.0 - 26.0 mmol/L 39.6     Base Excess, Arterial -2.0 - 2.0 mmol/L 13.9     Hemoglobin, Art, Extended 12.0 - 16.0 g/dL 8.7     O2 Sat, Arterial >92 % 90.7    Carboxyhgb, Arterial 0.0 - 5.0 % 3.3    Comments:      0.0-1.5   (Smokers 1.5-5.0)    Methemoglobin, Arterial <1.5 % 1.8    O2 Content, Arterial Not Established mL/dL 11.2      BIPAP 12/6 WITH 6 LPM O2 BLEED IN  RR 16  CORY TEST +  SITE RR

## 2017-11-23 LAB
ANION GAP SERPL CALCULATED.3IONS-SCNC: 7 MMOL/L (ref 7–19)
BASE EXCESS ARTERIAL: 11.6 MMOL/L (ref -2–2)
BUN BLDV-MCNC: 32 MG/DL (ref 8–23)
CALCIUM SERPL-MCNC: 8.7 MG/DL (ref 8.8–10.2)
CARBOXYHEMOGLOBIN ARTERIAL: 2.5 % (ref 0–5)
CHLORIDE BLD-SCNC: 97 MMOL/L (ref 98–111)
CO2: 34 MMOL/L (ref 22–29)
CREAT SERPL-MCNC: 1.7 MG/DL (ref 0.5–0.9)
GFR NON-AFRICAN AMERICAN: 29
GLUCOSE BLD-MCNC: 190 MG/DL (ref 74–109)
HCO3 ARTERIAL: 37.6 MMOL/L (ref 22–26)
HEMOGLOBIN, ART, EXTENDED: 8.8 G/DL (ref 12–16)
INR BLD: 1.73 (ref 0.88–1.18)
METHEMOGLOBIN ARTERIAL: 1.3 %
O2 CONTENT ARTERIAL: 11.3 ML/DL
O2 SAT, ARTERIAL: 91.2 %
O2 THERAPY: ABNORMAL
PCO2 ARTERIAL: 58 MMHG (ref 35–45)
PH ARTERIAL: 7.42 (ref 7.35–7.45)
PO2 ARTERIAL: 60 MMHG (ref 80–100)
POTASSIUM SERPL-SCNC: 4.9 MMOL/L (ref 3.5–5)
POTASSIUM, WHOLE BLOOD: 4.9
PROTHROMBIN TIME: 20.3 SEC (ref 12–14.6)
SODIUM BLD-SCNC: 138 MMOL/L (ref 136–145)

## 2017-11-23 PROCEDURE — 36415 COLL VENOUS BLD VENIPUNCTURE: CPT

## 2017-11-23 PROCEDURE — 84132 ASSAY OF SERUM POTASSIUM: CPT

## 2017-11-23 PROCEDURE — 6370000000 HC RX 637 (ALT 250 FOR IP): Performed by: PHYSICIAN ASSISTANT

## 2017-11-23 PROCEDURE — 2700000000 HC OXYGEN THERAPY PER DAY

## 2017-11-23 PROCEDURE — 6360000002 HC RX W HCPCS: Performed by: INTERNAL MEDICINE

## 2017-11-23 PROCEDURE — 6370000000 HC RX 637 (ALT 250 FOR IP): Performed by: INTERNAL MEDICINE

## 2017-11-23 PROCEDURE — 94660 CPAP INITIATION&MGMT: CPT

## 2017-11-23 PROCEDURE — 85610 PROTHROMBIN TIME: CPT

## 2017-11-23 PROCEDURE — 2140000000 HC CCU INTERMEDIATE R&B

## 2017-11-23 PROCEDURE — 80048 BASIC METABOLIC PNL TOTAL CA: CPT

## 2017-11-23 PROCEDURE — 99232 SBSQ HOSP IP/OBS MODERATE 35: CPT | Performed by: INTERNAL MEDICINE

## 2017-11-23 PROCEDURE — 99231 SBSQ HOSP IP/OBS SF/LOW 25: CPT | Performed by: INTERNAL MEDICINE

## 2017-11-23 PROCEDURE — 82803 BLOOD GASES ANY COMBINATION: CPT

## 2017-11-23 PROCEDURE — 2580000003 HC RX 258: Performed by: PHYSICIAN ASSISTANT

## 2017-11-23 PROCEDURE — 36600 WITHDRAWAL OF ARTERIAL BLOOD: CPT

## 2017-11-23 PROCEDURE — 94640 AIRWAY INHALATION TREATMENT: CPT

## 2017-11-23 RX ORDER — LORAZEPAM 2 MG/ML
0.5 INJECTION INTRAMUSCULAR ONCE
Status: DISCONTINUED | OUTPATIENT
Start: 2017-11-23 | End: 2017-11-27 | Stop reason: HOSPADM

## 2017-11-23 RX ORDER — PHYTONADIONE 5 MG/1
5 TABLET ORAL ONCE
Status: COMPLETED | OUTPATIENT
Start: 2017-11-23 | End: 2017-11-23

## 2017-11-23 RX ADMIN — PANTOPRAZOLE SODIUM 40 MG: 40 TABLET, DELAYED RELEASE ORAL at 05:39

## 2017-11-23 RX ADMIN — PHYTONADIONE 5 MG: 5 TABLET ORAL at 15:22

## 2017-11-23 RX ADMIN — PRAVASTATIN SODIUM 40 MG: 20 TABLET ORAL at 08:19

## 2017-11-23 RX ADMIN — HYDROCODONE BITARTRATE AND ACETAMINOPHEN 1 TABLET: 5; 325 TABLET ORAL at 05:39

## 2017-11-23 RX ADMIN — METHYLPREDNISOLONE SODIUM SUCCINATE 40 MG: 125 INJECTION, POWDER, FOR SOLUTION INTRAMUSCULAR; INTRAVENOUS at 23:03

## 2017-11-23 RX ADMIN — METHYLPREDNISOLONE SODIUM SUCCINATE 40 MG: 125 INJECTION, POWDER, FOR SOLUTION INTRAMUSCULAR; INTRAVENOUS at 05:34

## 2017-11-23 RX ADMIN — IPRATROPIUM BROMIDE AND ALBUTEROL SULFATE 1 AMPULE: .5; 3 SOLUTION RESPIRATORY (INHALATION) at 18:39

## 2017-11-23 RX ADMIN — AMIODARONE HYDROCHLORIDE 200 MG: 200 TABLET ORAL at 08:19

## 2017-11-23 RX ADMIN — IPRATROPIUM BROMIDE AND ALBUTEROL SULFATE 1 AMPULE: .5; 3 SOLUTION RESPIRATORY (INHALATION) at 14:57

## 2017-11-23 RX ADMIN — TRAMADOL HYDROCHLORIDE 50 MG: 50 TABLET, COATED ORAL at 23:03

## 2017-11-23 RX ADMIN — TRAMADOL HYDROCHLORIDE 50 MG: 50 TABLET, COATED ORAL at 08:18

## 2017-11-23 RX ADMIN — DILTIAZEM HYDROCHLORIDE 120 MG: 120 CAPSULE, COATED, EXTENDED RELEASE ORAL at 08:18

## 2017-11-23 RX ADMIN — IPRATROPIUM BROMIDE AND ALBUTEROL SULFATE 1 AMPULE: .5; 3 SOLUTION RESPIRATORY (INHALATION) at 10:59

## 2017-11-23 RX ADMIN — IPRATROPIUM BROMIDE AND ALBUTEROL SULFATE 1 AMPULE: .5; 3 SOLUTION RESPIRATORY (INHALATION) at 07:21

## 2017-11-23 RX ADMIN — METHYLPREDNISOLONE SODIUM SUCCINATE 40 MG: 125 INJECTION, POWDER, FOR SOLUTION INTRAMUSCULAR; INTRAVENOUS at 11:41

## 2017-11-23 RX ADMIN — Medication 10 ML: at 08:18

## 2017-11-23 RX ADMIN — Medication 10 ML: at 23:03

## 2017-11-23 RX ADMIN — SILVER SULFADIAZINE: 10 CREAM TOPICAL at 08:35

## 2017-11-23 ASSESSMENT — PAIN DESCRIPTION - PAIN TYPE
TYPE: CHRONIC PAIN
TYPE: CHRONIC PAIN

## 2017-11-23 ASSESSMENT — PAIN SCALES - GENERAL
PAINLEVEL_OUTOF10: 0
PAINLEVEL_OUTOF10: 8
PAINLEVEL_OUTOF10: 10
PAINLEVEL_OUTOF10: 0
PAINLEVEL_OUTOF10: 0
PAINLEVEL_OUTOF10: 4
PAINLEVEL_OUTOF10: 6
PAINLEVEL_OUTOF10: 9
PAINLEVEL_OUTOF10: 0

## 2017-11-23 ASSESSMENT — PAIN DESCRIPTION - LOCATION
LOCATION: BACK
LOCATION: BACK

## 2017-11-23 NOTE — PROGRESS NOTES
[facility]  Daily Progress Note      Chief Complaint: f/u Afib    Interval Hx: converted to sinus, still SOA, left pleurex catheter placement planned for tomorrow    ROS:  The rest of the systems are negative except Interval Hx    Current Inpatient Medications:   LORazepam  0.5 mg Intravenous Once    diltiazem  120 mg Oral Daily    silver sulfADIAZINE   Topical Daily    ipratropium-albuterol  1 ampule Inhalation Q4H WA    traMADol  50 mg Oral BID    pravastatin  40 mg Oral Daily    pantoprazole  40 mg Oral QAM AC    sodium chloride flush  10 mL Intravenous 2 times per day    amiodarone  200 mg Oral Daily    methylPREDNISolone  40 mg Intravenous Q8H       IV Infusions (if any):   diltiazem (CARDIZEM) 125 mg in dextrose 5% 125 mL infusion Stopped (11/22/17 1152)       Physical Exam:   /66   Pulse 73   Temp 97.3 °F (36.3 °C) (Temporal)   Resp 18   Ht 5' 8\" (1.727 m)   Wt 130 lb 8 oz (59.2 kg)   SpO2 93%   BMI 19.84 kg/m²       Intake/Output Summary (Last 24 hours) at 11/23/17 0802  Last data filed at 11/23/17 0506   Gross per 24 hour   Intake             1434 ml   Output              300 ml   Net             1134 ml       Gen - NAD  Neck - Supple  ENMT - MMM, OP Clear  Cardio - No JVD     Clear s1 s2, no gallop, rub, murmur                No edema, 2+ radials  Resp - Normal effort, decreased BS L base  GI - soft, non-tender, no HSM  Psych - A+O x 3, normal affect     Diagnostics:      Recent Labs      11/21/17   0558  11/22/17 0148   WBC  54.6*  52.8*   HGB  11.4*  10.3*   PLT  648*  580*      Recent Labs      11/21/17   0712   11/21/17   1946  11/22/17   0148  11/23/17   0628   NA  138   --    --   140   --    K  4.8   < >  4.4  4.7  4.9   CL  96*   --    --   97*   --    CO2  35*   --    --   35*   --    BUN  23   --    --   23   --    CREATININE  1.5*   --    --   1.7*   --    LABGLOM  34*   --    --   29*   --    MG   --    --    --   2.3   --    CALCIUM  8.2*   --    -- 8.2*   --     < > = values in this interval not displayed.       Recent Labs      11/21/17   0602  11/21/17   0712  11/21/17   0944   TROPONINI  0.01   --   0.01   PROBNP   --   5,083*   --         Tele - sinus    Assessment:     69 y.o. female with afib with RVR, recurrent L pleural effusion in setting of NSCLC    Plan:     - continue oral cardizem and amiodarone  - it is reasonable to not use bridging lovenox since she is in [address] [place] [address] [place], MD  [facility]    11/23/2017 8:02 AM

## 2017-11-23 NOTE — PROGRESS NOTES
CARDIOTHORACIC SURGERY PROGRESS NOTE      SUBJECTIVE:  Patient stable, no complaints. /62   Pulse 72   Temp 97.1 °F (36.2 °C) (Temporal)   Resp 16   Ht 5' 8\" (1.727 m)   Wt 130 lb 8 oz (59.2 kg)   SpO2 97%   BMI 19.84 kg/m²   Average, Min, and Max for last 24 hours Vitals:  TEMPERATURE:  Temp  Av.3 °F (36.3 °C)  Min: 96.9 °F (36.1 °C)  Max: 97.8 °F (36.6 °C)  RESPIRATIONS RANGE: Resp  Av.3  Min: 16  Max: 18  PULSE RANGE: Pulse  Av.5  Min: 72  Max: 129  BLOOD PRESSURE RANGE:  Systolic (22MAD), HSY:843 , Min:91 , RFD:106   ; Diastolic (39MVA), WIK:41, Min:51, Max:66    PULSE OXIMETRY RANGE: SpO2  Av.7 %  Min: 90 %  Max: 97 %    I/O last 3 completed shifts: In: 7284 [P.O.:1320; I.V.:114]  Out: 300 [Urine:300]    CHEST: diminshed BS left hemithorax. right lung clear    CARDIOVASCULAR: regular rhythm, no murmurs    INCISION: NA    DRAINS: NA    LABS:  pending    CHEST XRAY: no imaging today. ASSESSMENT: stable, now NSR. PLAN: Left Pleurex catheter placement tomorrow if INR normalized. Consent obtained from the patient who understands the potential risks of postoperative pain and infection.   Electronically signed by Angela Chaudhry M.D.

## 2017-11-23 NOTE — PROGRESS NOTES
______________________________________________________________________  I have seen and evaluated the patient and I have reviewed her most recent test results. Review of Systems:   Constitutional / general: a little stronger  CV: no CP or palpitations    Respiratory: no SOA  GI: no nausea or change in bowel habits  : no dysuria  Musculoskeletal: no muscle spasms  Skin: no rash or itching  Neuro: no headache or dizziness      VITALS:  /62   Pulse 72   Temp 97.1 °F (36.2 °C) (Temporal)   Resp 16   Ht 5' 8\" (1.727 m)   Wt 130 lb 8 oz (59.2 kg)   SpO2 97%   BMI 19.84 kg/m²   24HR INTAKE/OUTPUT:    Intake/Output Summary (Last 24 hours) at 11/23/17 1411  Last data filed at 11/23/17 1055   Gross per 24 hour   Intake             1050 ml   Output              500 ml   Net              550 ml     General appearance: chronically ill appearing in no distress  HEENT: oral mucosa and conjunctival pallor  Lungs: markedly decreased breath sounds at left lung base; diffuse, end-expiratory    musical rales on right to auscultation  Heart: distant tones; RR w NL rate; no m's or gallops  Abdomen: no distension   Extremities: no edema; generalized, symmetrical muscular wasting  Joints: no synovitis signs  Skin: pale, cool, and dry w/o ecchymoses  Neurologic: NL orientation; no gross, focal, asymmetrical peripheral motor deficits.     Principal Problem:    Atrial fibrillation (paroxysmal) with RVR (currently in NSR): cont current Rx  Active Problems:    Acute on Chronic Diastolic CHF (stable): cont Rx    Severe protein-calorie malnutrition     Warfarin-induced coagulopathy (improved): administer one more dose of vitamin K and monitor               (need to have INR at or below 1.5 by 11/24/2017)    Gastroesophageal reflux disease with esophagitis (controlled)    Non-small cell cancer of left lung    Chronic hypoxemic respiratory failure: cont supplemental N/C oxygen at 6 L/min    Leukocytosis (leukemoid reaction) Hypothyroidism due to acquired atrophy of thyroid (adequate but slightly subtherapeutic levothyroxine               replacement): no change replacement dose planned    Chronic kidney disease (CKD), stage III (stable)    Recurrent left pleural effusion: plan placement of Pleur-X catheter on the left on 11/24/2017 by                cardiothoracic surgeon.     Electronically signed by Danielle Lawson MD on 11/23/17 at 2:11 PM  30 \" spent on rounding

## 2017-11-23 NOTE — PROGRESS NOTES
______________________________________________________________________  I have seen and evaluated the patient and I have reviewed her most recent test results. VITALS:  /60   Pulse 80   Temp 96.9 °F (36.1 °C) (Temporal)   Resp 16   Ht 5' 8\" (1.727 m)   Wt 153 lb (69.4 kg)   SpO2 96%   BMI 23.26 kg/m²   24HR INTAKE/OUTPUT:    Intake/Output Summary (Last 24 hours) at 11/22/17 1939  Last data filed at 11/22/17 1659   Gross per 24 hour   Intake              714 ml   Output              800 ml   Net              -86 ml     Principal Problem:    Atrial fibrillation with RVR (rate now controlled): cont current Rx  Active Problems:    Acute on Chronic Diastolic CHF (stable): cont Rx    Severe protein-calorie malnutrition     Warfarin-induced coagulopathy: administer another dose of vitamin K and monitor    (need to have INR below 1.5 by 11/24/2017)    Gastroesophageal reflux disease with esophagitis (controlled)    Non-small cell cancer of left lung    Chronic hypoxemic respiratory failure: cont supplemental N/C oxygen at 6 L/min    Leukocytosis (leukemoid reaction)    Hypothyroidism due to acquired atrophy of thyroid (adequate but slightly subtherapeutic levothyroxine    replacement): no change replacement dose planned    Chronic kidney disease (CKD), stage III (stable)    Recurrent left pleural effusion: plan placement of Pleur-X catheter on the left on 11/24/2017 by     cardiothoracic surgeon.       Electronically signed by Martin Muller MD on 11/22/17 at 7:39 PM  15 \" spent on rounding

## 2017-11-23 NOTE — PROGRESS NOTES
Patient has refused bi-pap all night stating that the mask hurts her face and she doesn't like it. Patient has stayed on O2 at [facility] and has had O2 sats between 90-95. Patient awoke at 0430 confused, trying to get out of bed, and removing clothing stating \"I have to get out of here now. \" RN tried to calm patient down by reorienting patient to time, place, and situation. RN had PCA get O2 sat on patient while trying to convince the patient she need to put on her bi-pap. Patient O2 sat at 74%. Patient allowed bi-pap placement and respiratory was called to the room. Patient O2 sat up to 90% within a minute of bi-pap placement. Respiratory checked seal and settings on bi-pap. Patient O2 sat up to 93% on bi-pap. Patient states that she does not want to wear the bi-pap and that she will take it off when she wants to. RN educated the patient on the need for the bi-pap. Patient unhappy at this time but wearing bi-pap.    Electronically signed by Gena Alvarado RN on 11/23/2017 at 5:12 AM

## 2017-11-24 ENCOUNTER — APPOINTMENT (OUTPATIENT)
Dept: GENERAL RADIOLOGY | Age: 77
DRG: 180 | End: 2017-11-24
Payer: MEDICARE

## 2017-11-24 ENCOUNTER — ANESTHESIA (OUTPATIENT)
Dept: OPERATING ROOM | Age: 77
DRG: 180 | End: 2017-11-24
Payer: MEDICARE

## 2017-11-24 ENCOUNTER — ANESTHESIA EVENT (OUTPATIENT)
Dept: OPERATING ROOM | Age: 77
DRG: 180 | End: 2017-11-24
Payer: MEDICARE

## 2017-11-24 VITALS — DIASTOLIC BLOOD PRESSURE: 51 MMHG | OXYGEN SATURATION: 93 % | SYSTOLIC BLOOD PRESSURE: 121 MMHG

## 2017-11-24 LAB
INR BLD: 1.31 (ref 0.88–1.18)
PROTHROMBIN TIME: 16.3 SEC (ref 12–14.6)

## 2017-11-24 PROCEDURE — 2500000003 HC RX 250 WO HCPCS: Performed by: THORACIC SURGERY (CARDIOTHORACIC VASCULAR SURGERY)

## 2017-11-24 PROCEDURE — 6360000002 HC RX W HCPCS: Performed by: NURSE ANESTHETIST, CERTIFIED REGISTERED

## 2017-11-24 PROCEDURE — 6370000000 HC RX 637 (ALT 250 FOR IP): Performed by: PHYSICIAN ASSISTANT

## 2017-11-24 PROCEDURE — 0W9B30Z DRAINAGE OF LEFT PLEURAL CAVITY WITH DRAINAGE DEVICE, PERCUTANEOUS APPROACH: ICD-10-PCS | Performed by: THORACIC SURGERY (CARDIOTHORACIC VASCULAR SURGERY)

## 2017-11-24 PROCEDURE — 2700000000 HC OXYGEN THERAPY PER DAY

## 2017-11-24 PROCEDURE — 3700000001 HC ADD 15 MINUTES (ANESTHESIA): Performed by: THORACIC SURGERY (CARDIOTHORACIC VASCULAR SURGERY)

## 2017-11-24 PROCEDURE — 36415 COLL VENOUS BLD VENIPUNCTURE: CPT

## 2017-11-24 PROCEDURE — 6360000002 HC RX W HCPCS: Performed by: INTERNAL MEDICINE

## 2017-11-24 PROCEDURE — 71010 XR CHEST PORTABLE: CPT

## 2017-11-24 PROCEDURE — 7100000001 HC PACU RECOVERY - ADDTL 15 MIN: Performed by: THORACIC SURGERY (CARDIOTHORACIC VASCULAR SURGERY)

## 2017-11-24 PROCEDURE — 94660 CPAP INITIATION&MGMT: CPT

## 2017-11-24 PROCEDURE — 94640 AIRWAY INHALATION TREATMENT: CPT

## 2017-11-24 PROCEDURE — 2580000003 HC RX 258: Performed by: PHYSICIAN ASSISTANT

## 2017-11-24 PROCEDURE — 2140000000 HC CCU INTERMEDIATE R&B

## 2017-11-24 PROCEDURE — 32550 INSERT PLEURAL CATH: CPT | Performed by: THORACIC SURGERY (CARDIOTHORACIC VASCULAR SURGERY)

## 2017-11-24 PROCEDURE — C1729 CATH, DRAINAGE: HCPCS | Performed by: THORACIC SURGERY (CARDIOTHORACIC VASCULAR SURGERY)

## 2017-11-24 PROCEDURE — 3600000014 HC SURGERY LEVEL 4 ADDTL 15MIN: Performed by: THORACIC SURGERY (CARDIOTHORACIC VASCULAR SURGERY)

## 2017-11-24 PROCEDURE — 3600000004 HC SURGERY LEVEL 4 BASE: Performed by: THORACIC SURGERY (CARDIOTHORACIC VASCULAR SURGERY)

## 2017-11-24 PROCEDURE — 7100000000 HC PACU RECOVERY - FIRST 15 MIN: Performed by: THORACIC SURGERY (CARDIOTHORACIC VASCULAR SURGERY)

## 2017-11-24 PROCEDURE — 99231 SBSQ HOSP IP/OBS SF/LOW 25: CPT | Performed by: INTERNAL MEDICINE

## 2017-11-24 PROCEDURE — 3700000000 HC ANESTHESIA ATTENDED CARE: Performed by: THORACIC SURGERY (CARDIOTHORACIC VASCULAR SURGERY)

## 2017-11-24 PROCEDURE — 85610 PROTHROMBIN TIME: CPT

## 2017-11-24 PROCEDURE — 2580000003 HC RX 258: Performed by: NURSE ANESTHETIST, CERTIFIED REGISTERED

## 2017-11-24 PROCEDURE — 2720000001 HC MISC SURG SUPPLY STERILE $51-500: Performed by: THORACIC SURGERY (CARDIOTHORACIC VASCULAR SURGERY)

## 2017-11-24 PROCEDURE — 6370000000 HC RX 637 (ALT 250 FOR IP): Performed by: INTERNAL MEDICINE

## 2017-11-24 RX ORDER — PROMETHAZINE HYDROCHLORIDE 25 MG/ML
6.25 INJECTION, SOLUTION INTRAMUSCULAR; INTRAVENOUS
Status: DISCONTINUED | OUTPATIENT
Start: 2017-11-24 | End: 2017-11-24 | Stop reason: HOSPADM

## 2017-11-24 RX ORDER — LABETALOL HYDROCHLORIDE 5 MG/ML
5 INJECTION, SOLUTION INTRAVENOUS EVERY 10 MIN PRN
Status: DISCONTINUED | OUTPATIENT
Start: 2017-11-24 | End: 2017-11-24 | Stop reason: HOSPADM

## 2017-11-24 RX ORDER — METOCLOPRAMIDE HYDROCHLORIDE 5 MG/ML
10 INJECTION INTRAMUSCULAR; INTRAVENOUS
Status: DISCONTINUED | OUTPATIENT
Start: 2017-11-24 | End: 2017-11-24 | Stop reason: HOSPADM

## 2017-11-24 RX ORDER — MIDAZOLAM HYDROCHLORIDE 1 MG/ML
INJECTION INTRAMUSCULAR; INTRAVENOUS PRN
Status: DISCONTINUED | OUTPATIENT
Start: 2017-11-24 | End: 2017-11-24 | Stop reason: SDUPTHER

## 2017-11-24 RX ORDER — HYDRALAZINE HYDROCHLORIDE 20 MG/ML
5 INJECTION INTRAMUSCULAR; INTRAVENOUS EVERY 10 MIN PRN
Status: DISCONTINUED | OUTPATIENT
Start: 2017-11-24 | End: 2017-11-24 | Stop reason: HOSPADM

## 2017-11-24 RX ORDER — FENTANYL CITRATE 50 UG/ML
INJECTION, SOLUTION INTRAMUSCULAR; INTRAVENOUS PRN
Status: DISCONTINUED | OUTPATIENT
Start: 2017-11-24 | End: 2017-11-24 | Stop reason: SDUPTHER

## 2017-11-24 RX ORDER — ENALAPRILAT 2.5 MG/2ML
1.25 INJECTION INTRAVENOUS
Status: DISCONTINUED | OUTPATIENT
Start: 2017-11-24 | End: 2017-11-24 | Stop reason: HOSPADM

## 2017-11-24 RX ORDER — WARFARIN SODIUM 2 MG/1
4 TABLET ORAL DAILY
Status: DISCONTINUED | OUTPATIENT
Start: 2017-11-25 | End: 2017-11-27

## 2017-11-24 RX ORDER — SENNA PLUS 8.6 MG/1
2 TABLET ORAL NIGHTLY
Status: DISCONTINUED | OUTPATIENT
Start: 2017-11-24 | End: 2017-11-27 | Stop reason: HOSPADM

## 2017-11-24 RX ORDER — METHYLPREDNISOLONE SODIUM SUCCINATE 40 MG/ML
40 INJECTION, POWDER, LYOPHILIZED, FOR SOLUTION INTRAMUSCULAR; INTRAVENOUS EVERY 12 HOURS
Status: DISCONTINUED | OUTPATIENT
Start: 2017-11-25 | End: 2017-11-26

## 2017-11-24 RX ORDER — DOCUSATE SODIUM 100 MG/1
100 CAPSULE, LIQUID FILLED ORAL 2 TIMES DAILY
Status: DISCONTINUED | OUTPATIENT
Start: 2017-11-24 | End: 2017-11-27 | Stop reason: HOSPADM

## 2017-11-24 RX ORDER — PROPOFOL 10 MG/ML
INJECTION, EMULSION INTRAVENOUS PRN
Status: DISCONTINUED | OUTPATIENT
Start: 2017-11-24 | End: 2017-11-24 | Stop reason: SDUPTHER

## 2017-11-24 RX ORDER — MEPERIDINE HYDROCHLORIDE 50 MG/ML
12.5 INJECTION INTRAMUSCULAR; INTRAVENOUS; SUBCUTANEOUS EVERY 5 MIN PRN
Status: DISCONTINUED | OUTPATIENT
Start: 2017-11-24 | End: 2017-11-24 | Stop reason: HOSPADM

## 2017-11-24 RX ORDER — SODIUM CHLORIDE, SODIUM LACTATE, POTASSIUM CHLORIDE, CALCIUM CHLORIDE 600; 310; 30; 20 MG/100ML; MG/100ML; MG/100ML; MG/100ML
INJECTION, SOLUTION INTRAVENOUS CONTINUOUS PRN
Status: DISCONTINUED | OUTPATIENT
Start: 2017-11-24 | End: 2017-11-24 | Stop reason: SDUPTHER

## 2017-11-24 RX ORDER — DIPHENHYDRAMINE HYDROCHLORIDE 50 MG/ML
12.5 INJECTION INTRAMUSCULAR; INTRAVENOUS
Status: DISCONTINUED | OUTPATIENT
Start: 2017-11-24 | End: 2017-11-24 | Stop reason: HOSPADM

## 2017-11-24 RX ADMIN — Medication 10 ML: at 08:55

## 2017-11-24 RX ADMIN — TRAMADOL HYDROCHLORIDE 50 MG: 50 TABLET, COATED ORAL at 08:51

## 2017-11-24 RX ADMIN — PANTOPRAZOLE SODIUM 40 MG: 40 TABLET, DELAYED RELEASE ORAL at 08:51

## 2017-11-24 RX ADMIN — VANCOMYCIN HYDROCHLORIDE 1000 MG: 1 INJECTION, POWDER, LYOPHILIZED, FOR SOLUTION INTRAVENOUS at 14:21

## 2017-11-24 RX ADMIN — TRAMADOL HYDROCHLORIDE 50 MG: 50 TABLET, COATED ORAL at 21:59

## 2017-11-24 RX ADMIN — PROPOFOL 20 MG: 10 INJECTION, EMULSION INTRAVENOUS at 14:21

## 2017-11-24 RX ADMIN — SENNOSIDES 17.2 MG: 8.6 TABLET, FILM COATED ORAL at 21:59

## 2017-11-24 RX ADMIN — PRAVASTATIN SODIUM 40 MG: 20 TABLET ORAL at 08:51

## 2017-11-24 RX ADMIN — IPRATROPIUM BROMIDE AND ALBUTEROL SULFATE 1 AMPULE: .5; 3 SOLUTION RESPIRATORY (INHALATION) at 10:33

## 2017-11-24 RX ADMIN — AMIODARONE HYDROCHLORIDE 200 MG: 200 TABLET ORAL at 08:51

## 2017-11-24 RX ADMIN — MIDAZOLAM HYDROCHLORIDE 1 MG: 1 INJECTION, SOLUTION INTRAMUSCULAR; INTRAVENOUS at 14:07

## 2017-11-24 RX ADMIN — IPRATROPIUM BROMIDE AND ALBUTEROL SULFATE 1 AMPULE: .5; 3 SOLUTION RESPIRATORY (INHALATION) at 18:56

## 2017-11-24 RX ADMIN — METHYLPREDNISOLONE SODIUM SUCCINATE 40 MG: 125 INJECTION, POWDER, FOR SOLUTION INTRAMUSCULAR; INTRAVENOUS at 12:07

## 2017-11-24 RX ADMIN — SODIUM CHLORIDE, SODIUM LACTATE, POTASSIUM CHLORIDE, AND CALCIUM CHLORIDE: 600; 310; 30; 20 INJECTION, SOLUTION INTRAVENOUS at 13:58

## 2017-11-24 RX ADMIN — PROPOFOL 40 MG: 10 INJECTION, EMULSION INTRAVENOUS at 14:26

## 2017-11-24 RX ADMIN — METHYLPREDNISOLONE SODIUM SUCCINATE 40 MG: 125 INJECTION, POWDER, FOR SOLUTION INTRAMUSCULAR; INTRAVENOUS at 03:23

## 2017-11-24 RX ADMIN — Medication 10 ML: at 22:00

## 2017-11-24 RX ADMIN — IPRATROPIUM BROMIDE AND ALBUTEROL SULFATE 1 AMPULE: .5; 3 SOLUTION RESPIRATORY (INHALATION) at 06:30

## 2017-11-24 RX ADMIN — HYDROCODONE BITARTRATE AND ACETAMINOPHEN 1 TABLET: 5; 325 TABLET ORAL at 21:59

## 2017-11-24 RX ADMIN — FENTANYL CITRATE 50 MCG: 50 INJECTION, SOLUTION INTRAMUSCULAR; INTRAVENOUS at 14:07

## 2017-11-24 RX ADMIN — SILVER SULFADIAZINE: 10 CREAM TOPICAL at 08:58

## 2017-11-24 RX ADMIN — MIDAZOLAM HYDROCHLORIDE 1 MG: 1 INJECTION, SOLUTION INTRAMUSCULAR; INTRAVENOUS at 14:03

## 2017-11-24 RX ADMIN — DILTIAZEM HYDROCHLORIDE 120 MG: 120 CAPSULE, COATED, EXTENDED RELEASE ORAL at 08:51

## 2017-11-24 RX ADMIN — HYDROCODONE BITARTRATE AND ACETAMINOPHEN 1 TABLET: 5; 325 TABLET ORAL at 00:46

## 2017-11-24 RX ADMIN — DOCUSATE SODIUM 100 MG: 100 CAPSULE, LIQUID FILLED ORAL at 21:59

## 2017-11-24 ASSESSMENT — PAIN SCALES - GENERAL
PAINLEVEL_OUTOF10: 4
PAINLEVEL_OUTOF10: 9
PAINLEVEL_OUTOF10: 0
PAINLEVEL_OUTOF10: 5
PAINLEVEL_OUTOF10: 10
PAINLEVEL_OUTOF10: 9

## 2017-11-24 ASSESSMENT — LIFESTYLE VARIABLES: SMOKING_STATUS: 0

## 2017-11-24 ASSESSMENT — ENCOUNTER SYMPTOMS: SHORTNESS OF BREATH: 1

## 2017-11-24 NOTE — PROGRESS NOTES
bleeding risk is acceptable    Manan Guerrero MD  [facility] [place]    11/24/2017 11:02 AM

## 2017-11-24 NOTE — CARE COORDINATION
Pt is currently residing at [facility] for short term care needs and is scheduled to receive a PluerX catheter placement today 11/24. Pt reports will return to [facility] upon dc and SW has confirmed her residency and acceptance back to the facility. [facility] states will not require a new referral at dc only updated notes. SW will continue to follow and assist with dc needs as necessary.      [facility]  Ph: [phone]  Fa: [phone]

## 2017-11-24 NOTE — PLAN OF CARE
Problem: Falls - Risk of  Goal: Absence of falls  Outcome: Met This Shift      Problem: Risk for Impaired Skin Integrity  Goal: Tissue integrity - skin and mucous membranes  Structural intactness and normal physiological function of skin and  mucous membranes. Outcome: Ongoing      Problem: Pain:  Goal: Pain level will decrease  Pain level will decrease   Outcome: Ongoing    Goal: Control of acute pain  Control of acute pain   Outcome: Ongoing    Goal: Control of chronic pain  Control of chronic pain   Outcome: Ongoing      Problem: Cardiac Output - Decreased:  Goal: Hemodynamic stability will improve  Hemodynamic stability will improve   Outcome: Ongoing      Problem: DAILY CARE  Goal: Daily care needs are met  Outcome: Ongoing      Problem: PAIN  Goal: Patient's pain/discomfort is manageable  Outcome: Ongoing      Problem: KNOWLEDGE DEFICIT  Goal: Patient/S.O. demonstrates understanding of disease process, treatment plan, medications, and discharge instructions.   Outcome: Ongoing      Problem: DISCHARGE BARRIERS  Goal: Patient's continuum of care needs are met  Outcome: Ongoing      Problem: Nutrition  Goal: Optimal nutrition therapy  Outcome: Ongoing

## 2017-11-24 NOTE — OP NOTE
BRIEF CT SURGERY OPERATIVE NOTE      Pre-operative Diagnosis:  Stage lung cancer recurrent pleural effusion on Left    Post-operative Diagnosis:  same    Procedure:  Pleurex catheter placement left chest.    Surgeon: Eboni Hernandez M.D. Assistant(s):  ИРИНА Muro    Anesthesia:  Gina Cannon M.D., and Stafnord Coulter CRNA    Estimated blood loss:  nil     Blood Products:none    Drains:  pleurex    Specimens:  none    Findings:2 liters serous fluid drained from left pleural space    See dictated operative report for full details.   Dictation# [id number]

## 2017-11-24 NOTE — ANESTHESIA POSTPROCEDURE EVALUATION
Department of Anesthesiology  Postprocedure Note    Patient: Allen Zhao  MRN: [id number]  YOB: 1940  Date of evaluation: 11/24/2017  Time:  3:02 PM     Procedure Summary     Date:  11/24/17 Room / Location:  [facility] OR  / [facility] OR    Anesthesia Start:  1401 Anesthesia Stop:  1501    Procedure:  pleurx catheter PLACEMENT (Left ) Diagnosis:  (PLUEREX CATH PLACEMENT)    Surgeon:  Shayy Parra MD Responsible Provider:  Omer St CRNA    Anesthesia Type:  MAC ASA Status:  3          Anesthesia Type: MAC    Svitlana Phase I:      Svitlana Phase II:      Last vitals: Reviewed and per EMR flowsheets.        Anesthesia Post Evaluation    Patient location during evaluation: PACU  Patient participation: complete - patient participated  Level of consciousness: awake and alert  Pain score: 0  Airway patency: patent  Nausea & Vomiting: no nausea and no vomiting  Complications: no  Cardiovascular status: hemodynamically stable  Respiratory status: acceptable and face mask  Hydration status: stable

## 2017-11-24 NOTE — ANESTHESIA PRE PROCEDURE
Alcohol use No                                Counseling given: Not Answered      Vital Signs (Current):   Vitals:    11/23/17 1841 11/23/17 2009 11/24/17 0000 11/24/17 0700   BP:   117/61 131/64   Pulse:  68 74 90   Resp:   16 20   Temp:   98.3 °F (36.8 °C) 97.3 °F (36.3 °C)   TempSrc:   Temporal Temporal   SpO2: 92%  94% (!) 86%   Weight:   133 lb 9.6 oz (60.6 kg)    Height:                                                  BP Readings from Last 3 Encounters:   11/24/17 131/64   11/11/17 (!) 117/51   10/13/17 103/61       NPO Status: Time of last liquid consumption: 2100                        Time of last solid consumption: 1200                        Date of last liquid consumption: 11/23/17                        Date of last solid food consumption: 11/23/17    BMI:   Wt Readings from Last 3 Encounters:   11/24/17 133 lb 9.6 oz (60.6 kg)   11/11/17 155 lb (70.3 kg)   10/13/17 174 lb 3.2 oz (79 kg)     Body mass index is 20.31 kg/m². CBC:   Lab Results   Component Value Date    WBC 52.8 11/22/2017    RBC 3.55 11/22/2017    HGB 10.3 11/22/2017    HCT 33.7 11/22/2017    MCV 94.9 11/22/2017    RDW 16.5 11/22/2017     11/22/2017       CMP:   Lab Results   Component Value Date     11/23/2017    K 4.9 11/23/2017    CL 97 11/23/2017    CO2 34 11/23/2017    BUN 32 11/23/2017    CREATININE 1.7 11/23/2017    LABGLOM 29 11/23/2017    GLUCOSE 190 11/23/2017    PROT 5.6 11/05/2017    CALCIUM 8.7 11/23/2017    BILITOT <0.2 11/05/2017    ALKPHOS 92 11/05/2017    AST 16 11/05/2017    ALT 13 11/05/2017       POC Tests: No results for input(s): POCGLU, POCNA, POCK, POCCL, POCBUN, POCHEMO, POCHCT in the last 72 hours.     Coags:   Lab Results   Component Value Date    PROTIME 16.3 11/24/2017    INR 1.31 11/24/2017    APTT 24.5 09/01/2017       HCG (If Applicable): No results found for: PREGTESTUR, PREGSERUM, HCG, HCGQUANT     ABGs:   Lab Results   Component Value Date    PHART 7.420 11/23/2017    PO2ART 60.0 2017    GMJ7FPM 58.0 2017    YTE8MLJ 37.6 2017    BEART 11.6 2017    N5BIIHDA 91.2 2017        Type & Screen (If Applicable):  No results found for: LABABO, [address]    Anesthesia Evaluation  Patient summary reviewed and Nursing notes reviewed no history of anesthetic complications:   Airway: Mallampati: I  TM distance: >3 FB   Neck ROM: full  Mouth opening: > = 3 FB Dental:    (+) upper dentures and lower dentures      Pulmonary:   (+) pneumonia:  shortness of breath: recurrent,  decreased breath sounds,  wheezes,      (-) not a current smoker                          ROS comment: Left recurrent pleural effusion 2/2 NSCLC. No chemotherapy or radiation treatment currently. PE comment: Diminished on Left Cardiovascular:  Exercise tolerance: no interval change,   (+) hypertension:, angina: no interval change, dysrhythmias: atrial fibrillation,     (-) past MI and CABG/stent             ROS comment: EK BPM  Rhythm is now atrial fibrillation with rapid ventricular response  Possible faulty V2 - omitted from analysis  Anterior infarct as previously  T resolution compared to previous ECG  Lateral T wave abnormality  Sequential changes compared to a previous ECG  Comparison Summary: Significant changes including rhythm  Summary: Abnormal ECG      Echo 2017  Summary   Mitral valve leaflets are mildly thickened with preserved leaflet   mobility.   Mild-moderate mitral regurgitation noted.   Normal left ventricular size with preserved LV function and an estimated   ejection fraction of approximately 55%.   No regional wall motion abnormalities identified.   Normal left ventricular wall thickness.   Normal diastolic function.   No evidence of left ventricular mass or thrombus noted.        Neuro/Psych:      (-) seizures and CVA           GI/Hepatic/Renal:   (+) GERD: well controlled, renal disease (Pre op Cr 1.7.   Previous dialysis with prior hospitalizations.): CRI and no interval change, (-) liver disease       Endo/Other:    (+) blood dyscrasia (Previously on coumadin. Since stopped. Last  INR 1.3 today. Plt 580.): anticoagulation therapy and anemia:., .    (-) no Type II DM               Abdominal:       Abdomen: soft. Vascular:     - DVT and PE. Anesthesia Plan      MAC     ASA 3     (NSR on pre op bedside tele. O2 @5 L on floor.)  Induction: intravenous. Anesthetic plan and risks discussed with patient. Use of blood products discussed with patient whom.                    Umer Neville DO   11/24/2017

## 2017-11-25 ENCOUNTER — APPOINTMENT (OUTPATIENT)
Dept: GENERAL RADIOLOGY | Age: 77
DRG: 180 | End: 2017-11-25
Payer: MEDICARE

## 2017-11-25 LAB
ANION GAP SERPL CALCULATED.3IONS-SCNC: 7 MMOL/L (ref 7–19)
BUN BLDV-MCNC: 37 MG/DL (ref 8–23)
CALCIUM SERPL-MCNC: 8.7 MG/DL (ref 8.8–10.2)
CHLORIDE BLD-SCNC: 100 MMOL/L (ref 98–111)
CO2: 32 MMOL/L (ref 22–29)
CREAT SERPL-MCNC: 1.7 MG/DL (ref 0.5–0.9)
GFR NON-AFRICAN AMERICAN: 29
GLUCOSE BLD-MCNC: 151 MG/DL (ref 74–109)
HCT VFR BLD CALC: 28.5 % (ref 37–47)
HEMOGLOBIN: 8.5 G/DL (ref 12–16)
INR BLD: 1.36 (ref 0.88–1.18)
MCH RBC QN AUTO: 28.8 PG (ref 27–31)
MCHC RBC AUTO-ENTMCNC: 29.8 G/DL (ref 33–37)
MCV RBC AUTO: 96.6 FL (ref 81–99)
PDW BLD-RTO: 16.5 % (ref 11.5–14.5)
PLATELET # BLD: 476 K/UL (ref 130–400)
PMV BLD AUTO: 9.4 FL (ref 9.4–12.3)
POTASSIUM SERPL-SCNC: 5 MMOL/L (ref 3.5–5)
PROTHROMBIN TIME: 16.8 SEC (ref 12–14.6)
RBC # BLD: 2.95 M/UL (ref 4.2–5.4)
SODIUM BLD-SCNC: 139 MMOL/L (ref 136–145)
WBC # BLD: 38.3 K/UL (ref 4.8–10.8)

## 2017-11-25 PROCEDURE — 6370000000 HC RX 637 (ALT 250 FOR IP): Performed by: INTERNAL MEDICINE

## 2017-11-25 PROCEDURE — 2700000000 HC OXYGEN THERAPY PER DAY

## 2017-11-25 PROCEDURE — 94660 CPAP INITIATION&MGMT: CPT

## 2017-11-25 PROCEDURE — 85027 COMPLETE CBC AUTOMATED: CPT

## 2017-11-25 PROCEDURE — 71010 XR CHEST PORTABLE: CPT

## 2017-11-25 PROCEDURE — 6360000002 HC RX W HCPCS: Performed by: INTERNAL MEDICINE

## 2017-11-25 PROCEDURE — 36415 COLL VENOUS BLD VENIPUNCTURE: CPT

## 2017-11-25 PROCEDURE — 99024 POSTOP FOLLOW-UP VISIT: CPT | Performed by: THORACIC SURGERY (CARDIOTHORACIC VASCULAR SURGERY)

## 2017-11-25 PROCEDURE — 85610 PROTHROMBIN TIME: CPT

## 2017-11-25 PROCEDURE — 99232 SBSQ HOSP IP/OBS MODERATE 35: CPT | Performed by: INTERNAL MEDICINE

## 2017-11-25 PROCEDURE — 2580000003 HC RX 258: Performed by: PHYSICIAN ASSISTANT

## 2017-11-25 PROCEDURE — 2140000000 HC CCU INTERMEDIATE R&B

## 2017-11-25 PROCEDURE — 6370000000 HC RX 637 (ALT 250 FOR IP): Performed by: PHYSICIAN ASSISTANT

## 2017-11-25 PROCEDURE — 99231 SBSQ HOSP IP/OBS SF/LOW 25: CPT | Performed by: INTERNAL MEDICINE

## 2017-11-25 PROCEDURE — 94640 AIRWAY INHALATION TREATMENT: CPT

## 2017-11-25 PROCEDURE — 80048 BASIC METABOLIC PNL TOTAL CA: CPT

## 2017-11-25 RX ADMIN — Medication 10 ML: at 20:37

## 2017-11-25 RX ADMIN — DOCUSATE SODIUM 100 MG: 100 CAPSULE, LIQUID FILLED ORAL at 09:15

## 2017-11-25 RX ADMIN — DILTIAZEM HYDROCHLORIDE 120 MG: 120 CAPSULE, COATED, EXTENDED RELEASE ORAL at 09:15

## 2017-11-25 RX ADMIN — IPRATROPIUM BROMIDE AND ALBUTEROL SULFATE 1 AMPULE: .5; 3 SOLUTION RESPIRATORY (INHALATION) at 14:17

## 2017-11-25 RX ADMIN — HYDROCODONE BITARTRATE AND ACETAMINOPHEN 1 TABLET: 5; 325 TABLET ORAL at 15:34

## 2017-11-25 RX ADMIN — WARFARIN SODIUM 4 MG: 2 TABLET ORAL at 17:47

## 2017-11-25 RX ADMIN — HYDROCODONE BITARTRATE AND ACETAMINOPHEN 1 TABLET: 5; 325 TABLET ORAL at 06:18

## 2017-11-25 RX ADMIN — PRAVASTATIN SODIUM 40 MG: 20 TABLET ORAL at 09:15

## 2017-11-25 RX ADMIN — IPRATROPIUM BROMIDE AND ALBUTEROL SULFATE 1 AMPULE: .5; 3 SOLUTION RESPIRATORY (INHALATION) at 06:42

## 2017-11-25 RX ADMIN — AMIODARONE HYDROCHLORIDE 200 MG: 200 TABLET ORAL at 09:15

## 2017-11-25 RX ADMIN — SENNOSIDES 17.2 MG: 8.6 TABLET, FILM COATED ORAL at 20:37

## 2017-11-25 RX ADMIN — SILVER SULFADIAZINE: 10 CREAM TOPICAL at 09:18

## 2017-11-25 RX ADMIN — METHYLPREDNISOLONE SODIUM SUCCINATE 40 MG: 40 INJECTION, POWDER, FOR SOLUTION INTRAMUSCULAR; INTRAVENOUS at 12:23

## 2017-11-25 RX ADMIN — IPRATROPIUM BROMIDE AND ALBUTEROL SULFATE 1 AMPULE: .5; 3 SOLUTION RESPIRATORY (INHALATION) at 18:46

## 2017-11-25 RX ADMIN — METHYLPREDNISOLONE SODIUM SUCCINATE 40 MG: 40 INJECTION, POWDER, FOR SOLUTION INTRAMUSCULAR; INTRAVENOUS at 01:00

## 2017-11-25 RX ADMIN — DOCUSATE SODIUM 100 MG: 100 CAPSULE, LIQUID FILLED ORAL at 20:37

## 2017-11-25 RX ADMIN — IPRATROPIUM BROMIDE AND ALBUTEROL SULFATE 1 AMPULE: .5; 3 SOLUTION RESPIRATORY (INHALATION) at 10:48

## 2017-11-25 RX ADMIN — PANTOPRAZOLE SODIUM 40 MG: 40 TABLET, DELAYED RELEASE ORAL at 06:18

## 2017-11-25 RX ADMIN — Medication 10 ML: at 09:19

## 2017-11-25 RX ADMIN — TRAMADOL HYDROCHLORIDE 50 MG: 50 TABLET, COATED ORAL at 20:37

## 2017-11-25 RX ADMIN — TRAMADOL HYDROCHLORIDE 50 MG: 50 TABLET, COATED ORAL at 09:15

## 2017-11-25 ASSESSMENT — PAIN SCALES - GENERAL
PAINLEVEL_OUTOF10: 9
PAINLEVEL_OUTOF10: 8
PAINLEVEL_OUTOF10: 9
PAINLEVEL_OUTOF10: 8
PAINLEVEL_OUTOF10: 8
PAINLEVEL_OUTOF10: 0
PAINLEVEL_OUTOF10: 10
PAINLEVEL_OUTOF10: 0
PAINLEVEL_OUTOF10: 8

## 2017-11-25 ASSESSMENT — PAIN DESCRIPTION - ORIENTATION
ORIENTATION: LEFT
ORIENTATION: LEFT

## 2017-11-25 ASSESSMENT — PAIN DESCRIPTION - LOCATION
LOCATION: CHEST
LOCATION: CHEST

## 2017-11-25 ASSESSMENT — PAIN DESCRIPTION - PAIN TYPE
TYPE: SURGICAL PAIN
TYPE: SURGICAL PAIN

## 2017-11-25 ASSESSMENT — ENCOUNTER SYMPTOMS
GASTROINTESTINAL NEGATIVE: 1
RESPIRATORY NEGATIVE: 1
EYES NEGATIVE: 1

## 2017-11-25 NOTE — PROGRESS NOTES
discharge to nursing facility. Please re consult prn. Thank you for this consultation.     Electronically signed by Anusha Bailey

## 2017-11-25 NOTE — PROGRESS NOTES
[id number] [facility]  Daily Progress Note      Chief Complaint: f/u afib    Interval Hx: No chest pain or shortness of air, continues in sinus    ROS:  The rest of the systems are negative except Interval Hx    Current Inpatient Medications:   methylPREDNISolone  40 mg Intravenous Q12H    warfarin  4 mg Oral Daily    warfarin (COUMADIN) daily dosing (placeholder)   Other RX Placeholder    senna  2 tablet Oral Nightly    docusate sodium  100 mg Oral BID    LORazepam  0.5 mg Intravenous Once    diltiazem  120 mg Oral Daily    silver sulfADIAZINE   Topical Daily    ipratropium-albuterol  1 ampule Inhalation Q4H WA    traMADol  50 mg Oral BID    pravastatin  40 mg Oral Daily    pantoprazole  40 mg Oral QAM AC    sodium chloride flush  10 mL Intravenous 2 times per day    amiodarone  200 mg Oral Daily       IV Infusions (if any):       Physical Exam:   /72   Pulse 84   Temp 97.6 °F (36.4 °C) (Temporal)   Resp 18   Ht 5' 9\" (1.753 m)   Wt 152 lb (68.9 kg)   SpO2 92%   BMI 22.45 kg/m²       Intake/Output Summary (Last 24 hours) at 11/25/17 1315  Last data filed at 11/25/17 0853   Gross per 24 hour   Intake             1610 ml   Output              500 ml   Net             1110 ml       Gen - NAD  Neck - Supple  ENMT - MMM, OP Clear  Cardio - No JVD     Clear s1 s2, no gallop, rub, murmur                No edema, 2+ radials  Resp - Normal effort, CTA Bilat  GI - soft, non-tender, no HSM  Psych - A+O x 3, normal affect     Diagnostics:      Recent Labs      11/25/17   0252   WBC  38.3*   HGB  8.5*   PLT  476*      Recent Labs      11/23/17   0628  11/23/17   1140  11/25/17   0252   NA   --   138  139   K  4.9  4.9  5.0   CL   --   97*  100   CO2   --   34*  32*   BUN   --   32*  37*   CREATININE   --   1.7*  1.7*   LABGLOM   --   29*  29*   CALCIUM   --   8.7*  8.7*      No results for input(s): TROPONINI, PROBNP in the last 72 hours.      Tele - Sinus rhythm    Assessment:     69 y.o. female with Paroxysmal atrial fibrillation,    Plan:     - Continue with amiodarone and diltiazem. Warfarin has been restarted. Very clear plan of INR checks needs to be made. She's been admitted twice now supratherapeutic INRs. The patient was supposed to follow-up the past 2 times with primary care provider concerning INR did not. I will see that the patient has a INR check in our Coumadin clinic in the first part of next week.       MD [facility]    11/25/2017 1:15 PM

## 2017-11-25 NOTE — CONSULTS
person, place, and time. Skin: Skin is warm. No erythema. Psychiatric: She has a normal mood and affect. Her behavior is normal. Judgment and thought content normal.         MEDICATIONS:      methylPREDNISolone  40 mg Intravenous Q12H    warfarin  4 mg Oral Daily    warfarin (COUMADIN) daily dosing (placeholder)   Other RX Placeholder    senna  2 tablet Oral Nightly    docusate sodium  100 mg Oral BID    LORazepam  0.5 mg Intravenous Once    diltiazem  120 mg Oral Daily    silver sulfADIAZINE   Topical Daily    ipratropium-albuterol  1 ampule Inhalation Q4H WA    traMADol  50 mg Oral BID    pravastatin  40 mg Oral Daily    pantoprazole  40 mg Oral QAM AC    sodium chloride flush  10 mL Intravenous 2 times per day    amiodarone  200 mg Oral Daily     HYDROcodone-acetaminophen, sodium chloride flush, acetaminophen, ondansetron    DIET:   Dietary Nutrition Supplements: Standard High Calorie Oral Supplement  DIET CARDIAC;      DIAGNOSTIC DATA:  Recent Labs      11/25/17   0252   WBC  38.3*   RBC  2.95*   HGB  8.5*   HCT  28.5*   MCV  96.6   MCH  28.8   MCHC  29.8*   PLT  476*     Recent Labs      11/23/17   0628  11/23/17   1140  11/25/17   0252   NA   --   138  139   K  4.9  4.9  5.0   ANIONGAP   --   7  7   CL   --   97*  100   CO2   --   34*  32*   BUN   --   32*  37*   CREATININE   --   1.7*  1.7*   GLUCOSE   --   190*  151*   CALCIUM   --   8.7*  8.7*     No results for input(s): MG, PHOS in the last 72 hours. Lab Results   Component Value Date    CALCIUM 8.7 (L) 11/25/2017     No results for input(s): AST, ALT, ALB, BILITOT, ALKPHOS, ALB in the last 72 hours. @LABRCNT (ABG:3)@  HgBA1c:  No components found for: HGBA1C  FLP:  Lab Results   Component Value Date    TRIG 123 09/03/2017    HDL 43 09/03/2017    LDLCALC 86 09/03/2017     TSH:    Lab Results   Component Value Date    TSH 5.260 11/22/2017     Troponin T: No results for input(s): TROPONINI in the last 72 hours.   INR:   Recent Labs

## 2017-11-26 PROBLEM — I50.33 ACUTE ON CHRONIC DIASTOLIC CONGESTIVE HEART FAILURE (HCC): Status: ACTIVE | Noted: 2017-11-26

## 2017-11-26 LAB
HCT VFR BLD CALC: 32.1 % (ref 37–47)
HEMOGLOBIN: 9.7 G/DL (ref 12–16)
INR BLD: 1.46 (ref 0.88–1.18)
MCH RBC QN AUTO: 29 PG (ref 27–31)
MCHC RBC AUTO-ENTMCNC: 30.2 G/DL (ref 33–37)
MCV RBC AUTO: 95.8 FL (ref 81–99)
PDW BLD-RTO: 16.9 % (ref 11.5–14.5)
PLATELET # BLD: 559 K/UL (ref 130–400)
PMV BLD AUTO: 9 FL (ref 9.4–12.3)
PROTHROMBIN TIME: 17.7 SEC (ref 12–14.6)
RBC # BLD: 3.35 M/UL (ref 4.2–5.4)
WBC # BLD: 43.5 K/UL (ref 4.8–10.8)

## 2017-11-26 PROCEDURE — 94660 CPAP INITIATION&MGMT: CPT

## 2017-11-26 PROCEDURE — 2580000003 HC RX 258: Performed by: PHYSICIAN ASSISTANT

## 2017-11-26 PROCEDURE — 2700000000 HC OXYGEN THERAPY PER DAY

## 2017-11-26 PROCEDURE — 99231 SBSQ HOSP IP/OBS SF/LOW 25: CPT | Performed by: INTERNAL MEDICINE

## 2017-11-26 PROCEDURE — G8979 MOBILITY GOAL STATUS: HCPCS

## 2017-11-26 PROCEDURE — 97162 PT EVAL MOD COMPLEX 30 MIN: CPT

## 2017-11-26 PROCEDURE — 2140000000 HC CCU INTERMEDIATE R&B

## 2017-11-26 PROCEDURE — 85610 PROTHROMBIN TIME: CPT

## 2017-11-26 PROCEDURE — 6370000000 HC RX 637 (ALT 250 FOR IP): Performed by: PHYSICIAN ASSISTANT

## 2017-11-26 PROCEDURE — 36415 COLL VENOUS BLD VENIPUNCTURE: CPT

## 2017-11-26 PROCEDURE — 85027 COMPLETE CBC AUTOMATED: CPT

## 2017-11-26 PROCEDURE — 6370000000 HC RX 637 (ALT 250 FOR IP): Performed by: INTERNAL MEDICINE

## 2017-11-26 PROCEDURE — 6360000002 HC RX W HCPCS: Performed by: INTERNAL MEDICINE

## 2017-11-26 PROCEDURE — G8978 MOBILITY CURRENT STATUS: HCPCS

## 2017-11-26 PROCEDURE — 99024 POSTOP FOLLOW-UP VISIT: CPT | Performed by: THORACIC SURGERY (CARDIOTHORACIC VASCULAR SURGERY)

## 2017-11-26 PROCEDURE — 94640 AIRWAY INHALATION TREATMENT: CPT

## 2017-11-26 RX ORDER — FUROSEMIDE 10 MG/ML
40 INJECTION INTRAMUSCULAR; INTRAVENOUS 2 TIMES DAILY
Status: DISCONTINUED | OUTPATIENT
Start: 2017-11-26 | End: 2017-11-27

## 2017-11-26 RX ORDER — PREDNISONE 10 MG/1
10 TABLET ORAL
Status: DISCONTINUED | OUTPATIENT
Start: 2017-11-26 | End: 2017-11-27 | Stop reason: HOSPADM

## 2017-11-26 RX ADMIN — FUROSEMIDE 40 MG: 10 INJECTION, SOLUTION INTRAMUSCULAR; INTRAVENOUS at 17:46

## 2017-11-26 RX ADMIN — DOCUSATE SODIUM 100 MG: 100 CAPSULE, LIQUID FILLED ORAL at 19:58

## 2017-11-26 RX ADMIN — DILTIAZEM HYDROCHLORIDE 120 MG: 120 CAPSULE, COATED, EXTENDED RELEASE ORAL at 08:43

## 2017-11-26 RX ADMIN — TRAMADOL HYDROCHLORIDE 50 MG: 50 TABLET, COATED ORAL at 19:58

## 2017-11-26 RX ADMIN — SENNOSIDES 17.2 MG: 8.6 TABLET, FILM COATED ORAL at 19:58

## 2017-11-26 RX ADMIN — FUROSEMIDE 40 MG: 10 INJECTION, SOLUTION INTRAMUSCULAR; INTRAVENOUS at 11:41

## 2017-11-26 RX ADMIN — SILVER SULFADIAZINE: 10 CREAM TOPICAL at 10:24

## 2017-11-26 RX ADMIN — IPRATROPIUM BROMIDE AND ALBUTEROL SULFATE 1 AMPULE: .5; 3 SOLUTION RESPIRATORY (INHALATION) at 10:32

## 2017-11-26 RX ADMIN — IPRATROPIUM BROMIDE AND ALBUTEROL SULFATE 1 AMPULE: .5; 3 SOLUTION RESPIRATORY (INHALATION) at 06:47

## 2017-11-26 RX ADMIN — PREDNISONE 10 MG: 10 TABLET ORAL at 17:46

## 2017-11-26 RX ADMIN — IPRATROPIUM BROMIDE AND ALBUTEROL SULFATE 1 AMPULE: .5; 3 SOLUTION RESPIRATORY (INHALATION) at 18:38

## 2017-11-26 RX ADMIN — PANTOPRAZOLE SODIUM 40 MG: 40 TABLET, DELAYED RELEASE ORAL at 05:16

## 2017-11-26 RX ADMIN — IPRATROPIUM BROMIDE AND ALBUTEROL SULFATE 1 AMPULE: .5; 3 SOLUTION RESPIRATORY (INHALATION) at 14:24

## 2017-11-26 RX ADMIN — TRAMADOL HYDROCHLORIDE 50 MG: 50 TABLET, COATED ORAL at 08:43

## 2017-11-26 RX ADMIN — WARFARIN SODIUM 4 MG: 2 TABLET ORAL at 17:46

## 2017-11-26 RX ADMIN — PRAVASTATIN SODIUM 40 MG: 20 TABLET ORAL at 08:43

## 2017-11-26 RX ADMIN — PREDNISONE 10 MG: 10 TABLET ORAL at 11:39

## 2017-11-26 RX ADMIN — HYDROCODONE BITARTRATE AND ACETAMINOPHEN 1 TABLET: 5; 325 TABLET ORAL at 07:17

## 2017-11-26 RX ADMIN — DOCUSATE SODIUM 100 MG: 100 CAPSULE, LIQUID FILLED ORAL at 08:44

## 2017-11-26 RX ADMIN — AMIODARONE HYDROCHLORIDE 200 MG: 200 TABLET ORAL at 08:43

## 2017-11-26 RX ADMIN — Medication 10 ML: at 20:01

## 2017-11-26 RX ADMIN — METHYLPREDNISOLONE SODIUM SUCCINATE 40 MG: 40 INJECTION, POWDER, FOR SOLUTION INTRAMUSCULAR; INTRAVENOUS at 01:39

## 2017-11-26 ASSESSMENT — PAIN SCALES - GENERAL
PAINLEVEL_OUTOF10: 6
PAINLEVEL_OUTOF10: 9
PAINLEVEL_OUTOF10: 9
PAINLEVEL_OUTOF10: 10
PAINLEVEL_OUTOF10: 10
PAINLEVEL_OUTOF10: 0

## 2017-11-26 ASSESSMENT — PAIN DESCRIPTION - LOCATION
LOCATION: INCISION
LOCATION: INCISION

## 2017-11-26 ASSESSMENT — PAIN DESCRIPTION - ORIENTATION: ORIENTATION: LEFT

## 2017-11-26 NOTE — PROGRESS NOTES
POST OP CARDIOTHORACIC SURGERY PROGRESS NOTE    Post op day 2    SUBJECTIVE:  Patient doing well OOB to chair. BP (!) 155/69   Pulse 68   Temp 97.7 °F (36.5 °C) (Temporal)   Resp 12   Ht 5' 9\" (1.753 m)   Wt 137 lb 12.8 oz (62.5 kg)   SpO2 93%   BMI 20.35 kg/m²   Average, Min, and Max for last 24 hours Vitals:  TEMPERATURE:  Temp  Av.9 °F (36.6 °C)  Min: 96.8 °F (36 °C)  Max: 98.5 °F (36.9 °C)  RESPIRATIONS RANGE: Resp  Av.6  Min: 12  Max: 18  PULSE RANGE: Pulse  Av.3  Min: 68  Max: 84  BLOOD PRESSURE RANGE:  Systolic (53FJX), FFA:866 , Min:122 , JML:150   ; Diastolic (00CGT), SFU:08, Min:55, Max:72    PULSE OXIMETRY RANGE: SpO2  Av %  Min: 91 %  Max: 95 %    I/O last 3 completed shifts: In: 600 [P.O.:600]  Out: 900 [Urine:900]    CHEST: clear right, no BS on left. CARDIOVASCULAR: regular rhythm, no murmurs    INCISION: no drainage, skin edges intactLABS:  CBC:   Lab Results   Component Value Date    WBC 43.5 2017    RBC 3.35 2017    HGB 9.7 2017    HCT 32.1 2017    MCV 95.8 2017    MCH 29.0 2017    MCHC 30.2 2017    RDW 16.9 2017     2017    MPV 9.0 2017       CHEST XRAY: reaccumulation effusion left chest.    ASSESSMENT: normal postoperative course, left pleural fluid re accumulation. PLAN: Pleurex catheter accessed and drained one liter serous fluid. Patient tolerated well. Nursing instructed with use of Pleurex system. Patient will require drainage every three days. Please re consult prn.      Electronically signed by Mayte Vazquez

## 2017-11-26 NOTE — PROGRESS NOTES
Pt standing to tx to bsc. Patient seems to be getting stronger as the shift progresses. Still a 2 person assist for steadying. Pt sat up in chair last night for an hour.

## 2017-11-26 NOTE — PROGRESS NOTES
Physical Therapy    Facility/Department: [facility] PROGRESSIVE CARE  Initial Assessment    NAME: Kiersten Mcallister  :   MRN: [id number]    Date of Service: 2017    Patient Diagnosis(es): The primary encounter diagnosis was Recurrent left pleural effusion. Diagnoses of Atrial fibrillation with RVR ([facility] [address] Chest pain, unspecified type, and Elevated INR were also pertinent to this visit. has a past medical history of A-fib ([facility] [address] Anticoagulated on Coumadin; Anxiety; GERD (gastroesophageal reflux disease); HTN (hypertension); Hyperlipidemia; Lung cancer ([facility] [address] Malignant neoplasm of breast (female), unspecified site; Occlusion and stenosis of carotid artery; Osteoporosis; Palliative care encounter; Respiratory distress; Thrombocytopenia ([facility] [address] and Tobacco use disorder. has a past surgical history that includes Cholecystectomy; Cataract removal; Breast surgery; lymph node biopsy; Mastectomy, partial; Colonoscopy; Upper gastrointestinal endoscopy (N/A, 2017); and colsc flx w/removal lesion by hot bx forceps (N/A, 10/2/2017). Restrictions  Restrictions/Precautions  Restrictions/Precautions: Fall Risk  Required Braces or Orthoses?: No  Vision/Hearing  Vision: Impaired  Vision Exceptions: Wears glasses at all times  Hearing: Within functional limits     Subjective  General  Chart Reviewed: Yes  Patient assessed for rehabilitation services?: Yes  Additional Pertinent Hx: pt. with chest pain  Response To Previous Treatment: Not applicable  Family / Caregiver Present: No  Referring Practitioner: Dr. Jakob Haskins  Referral Date : 17  Diagnosis: Recurrent L pleural effusion, afib with RVR, chest pain  Follows Commands: Within Functional Limits  General Comment  Comments: RN, Ani Gomez, okayed PT. Then doctor entered to empty pleurex, PT returned 20 mins later to amb. pt. 17 Pleurex catheter placement. Subjective  Subjective: Pt. states,\"I'm ready to walk. \"  Pain Screening  Patient Currently in Pain: No  Pain Dynamic: Good  Standing - Static: Good;-  Standing - Dynamic: Fair  Exercises  Comments: AP, TKE, marching x 5-10 reps     Assessment   Body structures, Functions, Activity limitations: Decreased functional mobility ; Decreased strength;Decreased safe awareness;Decreased endurance;Decreased balance  Assessment: Pt. will benefit from skilled PT to decrease impairments. Pt. a fall risk and should not attempt mobility on her own at this time. Pt. fatigued after short amb. Anticipate the need for continued therapy after d/c from [facility]. Treatment Diagnosis: deconditioning with Recurrent pleural effusion  Prognosis: Guarded  Decision Making: Medium Complexity  Patient Education: purpose of PT, safety, use of RW, staff A  Barriers to Learning: none noted  REQUIRES PT FOLLOW UP: Yes  Activity Tolerance  Activity Tolerance: Patient limited by fatigue;Patient limited by endurance     Discharge Recommendations:  Continue to assess pending progress, Patient would benefit from continued therapy after discharge      Plan   Plan  Times per week: at least 5-7 times a week  Times per day: Daily  Plan weeks: 2  Current Treatment Recommendations: Strengthening, Balance Training, Functional Mobility Training, Transfer Training, Gait Training, Endurance Training, Safety Education & Training, Positioning, Equipment Evaluation, Education, & procurement, Pain Management, Patient/Caregiver Education & Training  Plan Comment: cont. PT per POC. Safety Devices  Type of devices: Bed alarm in place, Call light within reach, Gait belt, Patient at risk for falls, Left in bed, Nurse notified (Susan MYRICK in room changing bed and assisting with t/f back to bed.)    G-Code  PT G-Codes  Functional Assessment Tool Used: sit to stand  Score: Mod A  Functional Limitation: Mobility: Walking and moving around  Mobility: Walking and Moving Around Current Status ():  At least 60 percent but less than 80 percent impaired, limited or restricted  Mobility:

## 2017-11-26 NOTE — PROGRESS NOTES
Physical Therapy  Kelvin Melvin  [id number]     11/26/17 1347   Subjective   Subjective Attempt this PM, patient states she has been up to the BR and recently returned to bed and does not feel up to getting up again at this time.    Electronically signed by Gage Vargas PTA on 11/26/2017 at 1:48 PM

## 2017-11-26 NOTE — PROGRESS NOTES
______________________________________________________________________  I have seen and evaluated the patient and I have reviewed her most recent test results     Review of Systems:   Constitutional / general: has not been out of bed yet  CV: no palpitations  Chest Wall: decreased discomfort   Respiratory: SOA still improved  GI: still no BM, but believes that she is    getting close    VITALS:  BP (!) 126/58   Pulse 73   Temp 98.3 °F (36.8 °C) (Temporal)   Resp 16   Ht 5' 9\" (1.753 m)   Wt 152 lb (68.9 kg)   SpO2 94%   BMI 22.45 kg/m²   24HR INTAKE/OUTPUT:    Intake/Output Summary (Last 24 hours) at 11/25/17 1846  Last data filed at 11/25/17 0853   Gross per 24 hour   Intake              740 ml   Output              500 ml   Net              240 ml     General appearance: chronically ill  Lungs: decreased and tubular breath sounds at left   posterior lung base; right lung with decreased breath   sounds, but clear to auscultation  Heart:  distant tones; RR w NL rate; no lower extremity edema  Abdomen: no distension  Extremities: generalized, symmetrical muscular wasting  Joints: no synovitis signs  Skin: pale, warm, dry  Neurologic: NL orientation; no gross, focal, asymmetrical motor deficits    Principal Problem:    Atrial fibrillation (paroxysmal) with RVR (currently in NSR): cont current Rx  Active Problems:    Acute on Chronic Diastolic CHF (stable): cont Rx    Severe protein-calorie malnutrition      Warfarin Anti-coagulation Therapy: resume warfarin today following Pleurex catheter placement     Gastroesophageal reflux disease with esophagitis (controlled)    Non-small cell cancer of left lung    Chronic hypoxemic respiratory failure: cont supplemental N/C oxygen at 6 L/min    Leukocytosis (leukemoid reaction)    Hypothyroidism due to acquired atrophy of thyroid (adequate but slightly subtherapeutic levothyroxine               replacement): no change replacement dose planned    Chronic kidney disease (CKD), stage III (stable)    Recurrent left pleural effusion (successfully drained and Pleurex catheter placed)    Constipation (opiate induced): continue senna and docusate sodium (may require Relistor)    Immobility/Deconditioning: consult physical therapy and nursing aides to being to mobilize      Electronically signed by Chere Boas, MD on 11/25/17 at 6:46 PM  15 \" spent on rounding

## 2017-11-26 NOTE — PROGRESS NOTES
[facility]  Daily Progress Note      Chief Complaint: f/u afib    Interval Hx: No chest pain or shortness of air, continues in sinus    ROS:  The rest of the systems are negative except Interval Hx    Current Inpatient Medications:   methylPREDNISolone  40 mg Intravenous Q12H    warfarin  4 mg Oral Daily    warfarin (COUMADIN) daily dosing (placeholder)   Other RX Placeholder    senna  2 tablet Oral Nightly    docusate sodium  100 mg Oral BID    LORazepam  0.5 mg Intravenous Once    diltiazem  120 mg Oral Daily    silver sulfADIAZINE   Topical Daily    ipratropium-albuterol  1 ampule Inhalation Q4H WA    traMADol  50 mg Oral BID    pravastatin  40 mg Oral Daily    pantoprazole  40 mg Oral QAM AC    sodium chloride flush  10 mL Intravenous 2 times per day    amiodarone  200 mg Oral Daily       IV Infusions (if any):       Physical Exam:   BP (!) 155/69   Pulse 68   Temp 97.7 °F (36.5 °C) (Temporal)   Resp 12   Ht 5' 9\" (1.753 m)   Wt 137 lb 12.8 oz (62.5 kg)   SpO2 93%   BMI 20.35 kg/m²       Intake/Output Summary (Last 24 hours) at 11/26/17 1013  Last data filed at 11/26/17 3209   Gross per 24 hour   Intake              540 ml   Output             1480 ml   Net             -940 ml       Gen - NAD  Neck - Supple  ENMT - MMM, OP Clear  Cardio - No JVD     Clear s1 s2, no gallop, rub, murmur                No edema, 2+ radials  Resp - Normal effort, CTA Bilat  GI - soft, non-tender, no HSM  Psych - A+O x 3, normal affect     Diagnostics:      Recent Labs      11/25/17   0252  11/26/17   0833   WBC  38.3*  43.5*   HGB  8.5*  9.7*   PLT  476*  559*      Recent Labs      11/23/17   1140  11/25/17   0252   NA  138  139   K  4.9  5.0   CL  97*  100   CO2  34*  32*   BUN  32*  37*   CREATININE  1.7*  1.7*   LABGLOM  29*  29*   CALCIUM  8.7*  8.7*      No results for input(s): TROPONINI, PROBNP in the last 72 hours.      Tele - Sinus rhythm    Assessment:     69 y.o. female with Paroxysmal

## 2017-11-27 VITALS
WEIGHT: 137.4 LBS | SYSTOLIC BLOOD PRESSURE: 142 MMHG | HEIGHT: 69 IN | TEMPERATURE: 98.1 F | DIASTOLIC BLOOD PRESSURE: 66 MMHG | HEART RATE: 70 BPM | BODY MASS INDEX: 20.35 KG/M2 | OXYGEN SATURATION: 96 % | RESPIRATION RATE: 20 BRPM

## 2017-11-27 LAB
ANION GAP SERPL CALCULATED.3IONS-SCNC: 9 MMOL/L (ref 7–19)
BUN BLDV-MCNC: 47 MG/DL (ref 8–23)
CALCIUM SERPL-MCNC: 9.1 MG/DL (ref 8.8–10.2)
CHLORIDE BLD-SCNC: 95 MMOL/L (ref 98–111)
CO2: 37 MMOL/L (ref 22–29)
CREAT SERPL-MCNC: 1.4 MG/DL (ref 0.5–0.9)
GFR NON-AFRICAN AMERICAN: 37
GLUCOSE BLD-MCNC: 180 MG/DL (ref 74–109)
INR BLD: 1.56 (ref 0.88–1.18)
MAGNESIUM: 2.5 MG/DL (ref 1.6–2.4)
POTASSIUM SERPL-SCNC: 5.1 MMOL/L (ref 3.5–5)
PROTHROMBIN TIME: 18.7 SEC (ref 12–14.6)
SODIUM BLD-SCNC: 141 MMOL/L (ref 136–145)

## 2017-11-27 PROCEDURE — 2700000000 HC OXYGEN THERAPY PER DAY

## 2017-11-27 PROCEDURE — 6370000000 HC RX 637 (ALT 250 FOR IP): Performed by: PHYSICIAN ASSISTANT

## 2017-11-27 PROCEDURE — 94640 AIRWAY INHALATION TREATMENT: CPT

## 2017-11-27 PROCEDURE — 80048 BASIC METABOLIC PNL TOTAL CA: CPT

## 2017-11-27 PROCEDURE — 99239 HOSP IP/OBS DSCHRG MGMT >30: CPT | Performed by: INTERNAL MEDICINE

## 2017-11-27 PROCEDURE — 97116 GAIT TRAINING THERAPY: CPT

## 2017-11-27 PROCEDURE — 83735 ASSAY OF MAGNESIUM: CPT

## 2017-11-27 PROCEDURE — 6370000000 HC RX 637 (ALT 250 FOR IP): Performed by: INTERNAL MEDICINE

## 2017-11-27 PROCEDURE — 94660 CPAP INITIATION&MGMT: CPT

## 2017-11-27 PROCEDURE — 2580000003 HC RX 258: Performed by: PHYSICIAN ASSISTANT

## 2017-11-27 PROCEDURE — 85610 PROTHROMBIN TIME: CPT

## 2017-11-27 PROCEDURE — 36415 COLL VENOUS BLD VENIPUNCTURE: CPT

## 2017-11-27 RX ORDER — PREDNISONE 10 MG/1
10 TABLET ORAL
Qty: 30 TABLET | Refills: 0 | Status: SHIPPED | OUTPATIENT
Start: 2017-11-27 | End: 2017-12-07

## 2017-11-27 RX ORDER — FUROSEMIDE 40 MG/1
40 TABLET ORAL DAILY
Status: DISCONTINUED | OUTPATIENT
Start: 2017-11-27 | End: 2017-11-27 | Stop reason: HOSPADM

## 2017-11-27 RX ORDER — TRAMADOL HYDROCHLORIDE 50 MG/1
50 TABLET ORAL 2 TIMES DAILY
Qty: 10 TABLET | Refills: 0 | Status: ON HOLD | OUTPATIENT
Start: 2017-11-27 | End: 2017-12-15 | Stop reason: HOSPADM

## 2017-11-27 RX ORDER — WARFARIN SODIUM 5 MG/1
TABLET ORAL
Qty: 30 TABLET | Refills: 3 | Status: ON HOLD | OUTPATIENT
Start: 2017-11-28 | End: 2017-12-15 | Stop reason: HOSPADM

## 2017-11-27 RX ORDER — HYDROCODONE BITARTRATE AND ACETAMINOPHEN 5; 325 MG/1; MG/1
1 TABLET ORAL EVERY 6 HOURS PRN
Qty: 20 TABLET | Refills: 0 | Status: ON HOLD | OUTPATIENT
Start: 2017-11-27 | End: 2017-12-15 | Stop reason: HOSPADM

## 2017-11-27 RX ORDER — SENNA PLUS 8.6 MG/1
2 TABLET ORAL NIGHTLY
Qty: 60 TABLET | Refills: 0 | Status: SHIPPED | OUTPATIENT
Start: 2017-11-27 | End: 2017-12-27

## 2017-11-27 RX ORDER — WARFARIN SODIUM 5 MG/1
5 TABLET ORAL DAILY
Status: DISCONTINUED | OUTPATIENT
Start: 2017-11-28 | End: 2017-11-27 | Stop reason: HOSPADM

## 2017-11-27 RX ORDER — IPRATROPIUM BROMIDE AND ALBUTEROL SULFATE 2.5; .5 MG/3ML; MG/3ML
3 SOLUTION RESPIRATORY (INHALATION)
Qty: 360 ML | Refills: 0 | Status: SHIPPED | OUTPATIENT
Start: 2017-11-27

## 2017-11-27 RX ORDER — PSEUDOEPHEDRINE HCL 30 MG
100 TABLET ORAL 2 TIMES DAILY
Qty: 60 CAPSULE | Refills: 0 | Status: SHIPPED | OUTPATIENT
Start: 2017-11-27

## 2017-11-27 RX ADMIN — PANTOPRAZOLE SODIUM 40 MG: 40 TABLET, DELAYED RELEASE ORAL at 08:24

## 2017-11-27 RX ADMIN — DOCUSATE SODIUM 100 MG: 100 CAPSULE, LIQUID FILLED ORAL at 08:24

## 2017-11-27 RX ADMIN — WARFARIN SODIUM 7.5 MG: 2.5 TABLET ORAL at 12:31

## 2017-11-27 RX ADMIN — DILTIAZEM HYDROCHLORIDE 120 MG: 120 CAPSULE, COATED, EXTENDED RELEASE ORAL at 08:24

## 2017-11-27 RX ADMIN — HYDROCODONE BITARTRATE AND ACETAMINOPHEN 1 TABLET: 5; 325 TABLET ORAL at 03:43

## 2017-11-27 RX ADMIN — IPRATROPIUM BROMIDE AND ALBUTEROL SULFATE 1 AMPULE: .5; 3 SOLUTION RESPIRATORY (INHALATION) at 07:15

## 2017-11-27 RX ADMIN — TRAMADOL HYDROCHLORIDE 50 MG: 50 TABLET, COATED ORAL at 08:24

## 2017-11-27 RX ADMIN — AMIODARONE HYDROCHLORIDE 200 MG: 200 TABLET ORAL at 08:24

## 2017-11-27 RX ADMIN — IPRATROPIUM BROMIDE AND ALBUTEROL SULFATE 1 AMPULE: .5; 3 SOLUTION RESPIRATORY (INHALATION) at 10:55

## 2017-11-27 RX ADMIN — Medication 10 ML: at 08:24

## 2017-11-27 RX ADMIN — PRAVASTATIN SODIUM 40 MG: 20 TABLET ORAL at 08:24

## 2017-11-27 RX ADMIN — PREDNISONE 10 MG: 10 TABLET ORAL at 08:24

## 2017-11-27 RX ADMIN — SILVER SULFADIAZINE: 10 CREAM TOPICAL at 08:25

## 2017-11-27 RX ADMIN — FUROSEMIDE 40 MG: 40 TABLET ORAL at 08:24

## 2017-11-27 ASSESSMENT — ENCOUNTER SYMPTOMS
DIARRHEA: 0
ORTHOPNEA: 0
GASTROINTESTINAL NEGATIVE: 1
EYES NEGATIVE: 1
BLURRED VISION: 0
ABDOMINAL PAIN: 0
BLOOD IN STOOL: 0
HEMOPTYSIS: 0
DOUBLE VISION: 0
CONSTIPATION: 0
EYE PAIN: 0
NAUSEA: 0
SORE THROAT: 0
SHORTNESS OF BREATH: 1
COUGH: 1
HEARTBURN: 0
BACK PAIN: 0
VOMITING: 0
WHEEZING: 0
EYE REDNESS: 0
EYE DISCHARGE: 0
SPUTUM PRODUCTION: 0

## 2017-11-27 ASSESSMENT — PAIN SCALES - GENERAL
PAINLEVEL_OUTOF10: 10
PAINLEVEL_OUTOF10: 6

## 2017-11-27 NOTE — PROGRESS NOTES
______________________________________________________________________  I have seen and evaluated the patient and I have reviewed her most recent test results     Review of Systems:   No change in system review    VITALS:  /61   Pulse 73   Temp 98 °F (36.7 °C) (Temporal)   Resp 18   Ht 5' 9\" (1.753 m)   Wt 137 lb 12.8 oz (62.5 kg)   SpO2 92%   BMI 20.35 kg/m²   24HR INTAKE/OUTPUT:    Intake/Output Summary (Last 24 hours) at 11/26/17 1911  Last data filed at 11/26/17 1800   Gross per 24 hour   Intake              600 ml   Output             1880 ml   Net            -1280 ml     General appearance: chronically ill  Lungs: decreased and tubular breath sounds at left   posterior lung base; right lung with decreased breath   sounds, but clear to auscultation  Heart:  distant tones; RR w NL rate; no lower extremity edema  Abdomen: no distension  Extremities: generalized, symmetrical muscular wasting  Joints: no synovitis signs  Skin: pale, warm, dry  Neurologic: NL orientation; no gross, focal, asymmetrical motor deficits      Principal Problem:    Atrial fibrillation (paroxysmal) with RVR (currently in NSR): cont current Rx  Active Problems:    Acute on Chronic Diastolic CHF (stable): cont Rx    Severe protein-calorie malnutrition      Warfarin Anti-coagulation Therapy:  warfarin daily dosing continuing    Gastroesophageal reflux disease with esophagitis (controlled)    Non-small cell cancer of left lung    Chronic hypoxemic respiratory failure: cont supplemental N/C oxygen at 6 L/min    Leukocytosis (leukemoid reaction)    Hypothyroidism due to acquired atrophy of thyroid (adequate but slightly subtherapeutic levothyroxine               replacement): no change replacement dose planned    Chronic kidney disease (CKD), stage III (stable)    Recurrent left pleural effusion (successfully drained and Pleurex catheter placed)    Constipation (opiate induced): continue senna and docusate sodium (may yet require Relistor)    Immobility/Deconditioning: continue physical therapy and nursing aides for mobilization               Discharge to skilled care facility as soon as INR therapeutic      Electronically signed by Chere Boas, MD on 11/26/17 at 7:11 PM  15 \" spent on rounding

## 2017-11-27 NOTE — PLAN OF CARE
Problem: Falls - Risk of  Goal: Absence of falls  Outcome: Ongoing      Problem: Risk for Impaired Skin Integrity  Goal: Tissue integrity - skin and mucous membranes  Structural intactness and normal physiological function of skin and  mucous membranes. Outcome: Ongoing      Problem: Pain:  Goal: Pain level will decrease  Pain level will decrease   Outcome: Ongoing    Goal: Control of acute pain  Control of acute pain   Outcome: Ongoing    Goal: Control of chronic pain  Control of chronic pain   Outcome: Ongoing      Problem: Cardiac Output - Decreased:  Goal: Hemodynamic stability will improve  Hemodynamic stability will improve   Outcome: Ongoing      Problem: DAILY CARE  Goal: Daily care needs are met  Outcome: Ongoing      Problem: PAIN  Goal: Patient's pain/discomfort is manageable  Outcome: Ongoing      Problem: KNOWLEDGE DEFICIT  Goal: Patient/S.O. demonstrates understanding of disease process, treatment plan, medications, and discharge instructions.   Outcome: Ongoing      Problem: DISCHARGE BARRIERS  Goal: Patient's continuum of care needs are met  Outcome: Ongoing

## 2017-11-27 NOTE — DISCHARGE SUMMARY
Hospitalist Discharge Summary    Kiersten Mcallister  :  [id number]  MRN:  [id number]    Admit date:  2017  Discharge date:  2017    Admitting Physician:  Yessi Musa MD  Primary Care Physician:  Jakob Su    Discharge Diagnoses:  Principal Problem:    Atrial fibrillation with RVR [facility])  Active Problems:    Mixed hyperlipidemia    Essential hypertension    Severe protein-calorie malnutrition ([address]    War[facility] coagulopathy ([facility]    Gastroesophageal reflux disease with esophagitis    Non-small cell cancer of left lung (HCC)    Chronic hypoxemic respiratory failure (HCC)    Leukocytosis    Hypothyroidism due to acquired atrophy of thyroid    Chronic kidney disease (CKD), stage III (moderate)    Recurrent left pleural effusion    Acute on chronic diastolic congestive heart failure [facility])      Hospital Course: This 68 yo woman with a recurring left pleural effusion related to non-small   cell carcinoma of the left chest was admission to [facility] through the ER on transfer from   [facility] on 2017 due to an acute exacerbation of chronic hypoxic (pO2=49 on N/C   oxygen at 6 L/min) and hypercarbic (pCO2=60) respiratory failure associated with a reacculumation   of the left pleural effusion. Upon admission, she exhibited an episode of rapid ventricular response   to her chronic atrial fibrillation which had been controlled by amiodarone. The rapid heart rates resulted   in an acute exacerbation of her chronic diastolic CHF, complicating her COPD and resulting in the   admission respiratory failure. On admission, she was found to have an excessively prolonged PT/INR (6.35)   She receives chronic warfarin therapy as stroke prophylaxis with her chronic atrial fibrillation. Her admission WBC was markedly elevated to 54,600 due to a chronic leukemoid reaction. The WBC    remained abnormally elevated, although it trended downward a bit during the course of her hospitalization.    Her by mouth 2 times daily             furosemide (LASIX) 40 MG tablet  Take 0.5 tablets by mouth daily             HYDROcodone-acetaminophen (NORCO) 5-325 MG per tablet  Take 1 tablet by mouth every 6 hours as needed for Pain .             ipratropium-albuterol (DUONEB) 0.5-2.5 (3) MG/3ML SOLN nebulizer solution  Inhale 3 mLs into the lungs every 4 hours (while awake)             pantoprazole (PROTONIX) 40 MG tablet  Take 1 tablet by mouth every morning (before breakfast)             pravastatin (PRAVACHOL) 40 MG tablet  Take 1 tablet by mouth daily             predniSONE (DELTASONE) 10 MG tablet  Take 1 tablet by mouth 3 times daily (with meals) for 10 days             senna (SENOKOT) 8.6 MG tablet  Take 2 tablets by mouth nightly             silver sulfADIAZINE (SILVADENE) 1 % cream  Apply topically daily. traMADol (ULTRAM) 50 MG tablet  Take 1 tablet by mouth 2 times daily .              warfarin (COUMADIN) 5 MG tablet  One tablet po daily and check a daily PT/INR to permit adjustment of dosage                 Consults:  See summary above    Significant Diagnostic Studies:  See summary above    Treatments:   See summary above    Disposition:  Discharge and transfer to [facility]  Follow up with Nicol Riggs in one month  Follow up with Dr. Duanne Duane MD on 12/11/2017    Signed:  Kellen Cruz  11/27/2017, 11:09 AM

## 2017-11-28 NOTE — PROGRESS NOTES
Patient name: Jose Raul Duffy  Patient : 2017  6:08 PM  ROOM  Patient Active Problem List   Diagnosis Code    Mixed hyperlipidemia E78.2    Essential hypertension I10    Lung mass R91.8    Atrial fibrillation with RVR ([facility] I48.91    Severe protein-calorie malnutrition ([facility] E43    Pneumonia of left lung due to infectious organism J18.9    Paroxysmal atrial fibrillation (HCC) I48.0    Hypercoagulable state ([facility] D68.59    Gastrointestinal hemorrhage with melena K92.1    Acute blood loss anemia D62    Warfarin-induced coagulopathy (HCC) D68.9, T45.515A    Melena K92.1    Gastroesophageal reflux disease with esophagitis K21.0    HCAP (healthcare-associated pneumonia) J18.9    Polyp of descending colon D12.4    Diverticulosis of large intestine without hemorrhage K57.30    Non-small cell cancer of left lung (HCC) C34.92    Warfarin toxicity T45.511A    AMOS (acute kidney injury) (HCC) N17.9    Chronic hypoxemic respiratory failure (HCC) J96.11    Leukocytosis D72.829    S/P thoracentesis Z98.890    Pleural effusion on left J90    Hypothyroidism due to acquired atrophy of thyroid E03.4    E-coli UTI N39.0, B96.20    Chronic kidney disease (CKD), stage III (moderate) N18.3    Recurrent left pleural effusion J90    Acute on chronic diastolic congestive heart failure (HCC) I50.33       Subjective: Feeling better. Anticipating discharge to back to SNF. HISTORY OF PRESENT ILLNESS:   Olivia Adams is a 69-year-old  female with a recent diagnosis of YULISA non-small cell lung cancer. Unfortunately, initiation of chemotherapy treatment has be delayed due to recent hospitalizations. Mary was scheduled to initiate treatment with carboplatin/Abraxane on 2017, unfortunately, she has been rehospitalized for complaints of chest pain and shortness of air.  She is presently being treated for atrial fibrillation with RVR and recurrent left pleural effusion.        TARGET SITES:  1. 10 cm YULISA primary lung mass with pleural effusion   2. 6 mm RLL lung nodule   3. bulky mediastinal lymphadenopathy   4. left supraclavicular LN   5. indeterminate 1.8 cm right adrenal nodule     ONCOLOGIC HISTORY: YULISA NSCLC 9/8/2017   Kamilah Lin was seen in initial oncologic consultation on 11/02/17, referred by Dr. Guillermina Salas regarding a new diagnosis of YULISA non-small cell lung carcinoma. Mary initially presented to [place], [place] on 8/25/17 for progressive cough and dyspnea, associated with right-sided pleuritic chest discomfort. She was treated for pneumonia.     A CT scan of the chest on 8/25/2017 documented a 10 cm left-sided chest mass with mediastinal invasion and pathologic adenopathy. She was transferred to [facility] for further evaluation and management.     Her hospital course was complicated by atrial fibrillation with RVR, followed by cardiologist, Dr. Anderson Bautista. She was also evaluated by pulmonologist, Dr. Guillermina Salas,     A CT-guided left thoracentesis was performed on 9/1/2017  1100 mL of yellow, translucent fluid was evacuated. Cytology was negative for malignant cells. Noted on the CT scan of 9/1/2017. during the thoracentesis procedure was a moderate increase in the volume of pleural fluid compared to the CT scan from [facility] on 8/25/17. Additionally a new passive collapse of the left upper lobe and moderate amount of debris within the left main stem bronchus and left upper lobe bronchus.     An Abdominal/pelvic CT WO contrast 9/4/2017 documented a 6 mm subpleural nodule in the RLL, likely benign. Thickening of the adrenal glands were felt likely adenomatous change, the largest nodule was 1.8 cm on the right.     A CT-guided biopsy of the YULISA lung mass was performed on 9/8/2017.   Pathology was consistent with CK7 positive NON-SMALL CELL CARCINOMA.     Mary was unable to make her initial appointment for oncology consultation on 9/28/2017 because she was hospitalized at  for GI bleeding with an INR greater than 18, on warfarin for atrial fibrillation.      Endoscopy 9/29/20 17 and colonoscopy 10/2/17 by Dr. Tatum James were both negative, unrevealing for endoluminal lesions. .     During the hospitalization, Mary was evaluated by cardiothoracic surgeon, Dr. Lilibeth Barrett, who felt Pleur-x catheter would not be beneficial with regard to her trapped lung on the left.      She was discharged on 10/13/17 to [facility] for rehabilitation. She was able to return home 11/2/2017.     Mary was seen in initial oncology consultation the day of her discharge from [facility] on 11/2/2017.     Findings were reviewed in detail with Mary and her daughter Rand Pena at her initial consultation on 11/2/2017. Mary desired to proceed with workup and treatment.     Mary was again admitted to the hospitalist at [facility] on 11/3/2017 with dyspnea, atrial fibrillation, intermittent cough and her renal insufficiency with a creatinine of 2.6.     An ultrasound-guided thoracentesis was performed on 11/8/2017 obtaining 2 L of straw-colored fluid with significant symptomatic relief. Cytology was again reported as negative for malignancy.     A bone scan on 11/9/2017 did not reveal evidence of bony metastatic disease.     An MRI of the brain on 11/9/2017 did not reveal evidence of metastatic disease.     A PET scan was requested and scheduled at her initial clinic visit on 11/2/2017 but she was unable to keep this appointment due to her recent hospitalization at [address] [place] [address] Road was referred to [facility] for XRT to the YULISA lung mass with associated postobstruction , but was unable to keep that appointment due to her recent hospitalization at [facility].  Systemic chemotherapy with carboplatinum/Abraxane is recommended once stabilized.     She was refered to [facility] for Mediport placement , but due to her hospitalization was unable to keep the appointment.     She is currently residing in a nursing home and [facility] and comes today accompanied by staff.     Systemic chemotherapy with carboplatinum/Abraxane is recommended once stabilized.     Mary was last evaluated in clinic on 11/16/2017. That day, she was relatively stable but with decreased breath sounds on the left. She was in no acute distress and able to interact intelligently inappropriately.     Mary was reschedule for the following :  · PET scan on 11/29/2017  · Port placement on 11/29/2017  · Consult Dr. Holland Kayser for XRT to the chest   · Chemotherapy at [facility] to be able to coordinate with XRT on 11/21/2017. Review of Systems   Constitutional: Positive for malaise/fatigue. Negative for chills, diaphoresis, fever and weight loss. HENT: Negative. Negative for congestion, ear pain, hearing loss, nosebleeds, sore throat and tinnitus. Eyes: Negative. Negative for blurred vision, double vision, pain, discharge and redness. Respiratory: Positive for cough and shortness of breath. Negative for hemoptysis, sputum production and wheezing. Cardiovascular: Negative. Negative for chest pain, palpitations, orthopnea, claudication and leg swelling. Gastrointestinal: Negative. Negative for abdominal pain, blood in stool, constipation, diarrhea, heartburn, melena, nausea and vomiting. Genitourinary: Negative for dysuria, flank pain, frequency, hematuria and urgency. Musculoskeletal: Negative. Negative for back pain, falls, joint pain, myalgias and neck pain. Skin: Negative. Negative for itching and rash. Neurological: Positive for weakness. Negative for dizziness, tingling, tremors, sensory change, speech change, focal weakness, seizures, loss of consciousness and headaches. Endo/Heme/Allergies: Negative. Negative for polydipsia. Does not bruise/bleed easily. Psychiatric/Behavioral: Negative. Negative for depression and memory loss. The patient is not nervous/anxious. Current Pain Assessment  Pain Assessment  Pain Assessment: 0-10  Pain Level: 10  Pain Type: Surgical pain  Pain Location: Incision  Pain Orientation: Left  Pain Descriptors: Pressure  Pain Intervention(s): Medication (see eMar)  Response to Pain Intervention: Asleep with RR greater than 10    OBJECTIVE:  VITALS: BP (!) 142/66   Pulse 70   Temp 98.1 °F (36.7 °C) (Temporal)   Resp 20   Ht 5' 9\" (1.753 m)   Wt 137 lb 6.4 oz (62.3 kg)   SpO2 96%   BMI 20.29 kg/m²   I&O:   Intake/Output Summary (Last 24 hours) at 11/27/17 1808  Last data filed at 11/27/17 0851   Gross per 24 hour   Intake             1005 ml   Output              625 ml   Net              380 ml         Physical Exam   Constitutional: She is oriented to person, place, and time. She appears ill. No distress. HENT:   Head: Normocephalic and atraumatic. Mouth/Throat: Oropharynx is clear and moist.   Eyes: Conjunctivae and EOM are normal. Pupils are equal, round, and reactive to light. Right eye exhibits no discharge. Left eye exhibits no discharge. No scleral icterus. Neck: Normal range of motion. Neck supple. No JVD present. No tracheal deviation present. Cardiovascular: Normal rate, regular rhythm, normal heart sounds and intact distal pulses. Exam reveals no gallop and no friction rub. No murmur heard. Pulmonary/Chest: Effort normal. No respiratory distress. She has decreased breath sounds in the right lower field and the left lower field. She has no wheezes. She has no rales. She exhibits no tenderness. Pleurx catheter, posterior left   Abdominal: Soft. Bowel sounds are normal. She exhibits no distension and no mass. There is no tenderness. There is no rebound and no guarding. No hernia. Musculoskeletal: Normal range of motion. She exhibits no edema, tenderness or deformity. Lymphadenopathy:     She has no cervical adenopathy. Neurological: She is alert and oriented to person, place, and time. No cranial nerve deficit. Coordination normal.   Skin: Skin is warm. No rash noted. She is not diaphoretic. No erythema. No pallor. Psychiatric: She has a normal mood and affect. Her behavior is normal. Thought content normal.   Nursing note and vitals reviewed. BMP: Recent Labs      11/25/17 0252 11/27/17   0151   NA  139  141   K  5.0  5.1*   CL  100  95*   CO2  32*  37*   BUN  37*  47*   CREATININE  1.7*  1.4*   GLUCOSE  151*  180*   CALCIUM  8.7*  9.1     CBC: Recent Labs      11/25/17 0252 11/26/17   0833   WBC  38.3*  43.5*   HGB  8.5*  9.7*   HCT  28.5*  32.1*   MCV  96.6  95.8   PLT  476*  559*     CMP:  Recent Labs      11/25/17 0252 11/27/17   0151   NA  139  141   K  5.0  5.1*   CL  100  95*   CO2  32*  37*   BUN  37*  47*   CREATININE  1.7*  1.4*   LABGLOM  29*  37*   GLUCOSE  151*  180*   CALCIUM  8.7*  9.1       30 Day lookback of cultures:    Blood Culture Recent:   Recent Labs      11/04/17   0922   BC  No growth after 5 days of incubation. Gram Stain Recent: No results for input(s): LABGRAM in the last 720 hours. Resp Culture Recent: No results for input(s): CULTRESP in the last 720 hours. Body Fluid Recent : No results for input(s): BFCX in the last 720 hours. MRSA Recent : No results for input(s): [facility] in the last 720 hours. Urine Culture Recent : Recent Labs      11/04/17   [facility]  <50,000 CFU/ml  Mixed skin bee present  *  Light growth  Moderate growth  No further workup       Organism Recent :   Recent Labs      11/04/17   1232   ORG  Escherichia coli*  Yeast, not C. albicans*          Radiology:   Xr Chest Standard (2 Vw)    Result Date: 11/7/2017  Examination. XR CHEST STANDARD TWO VW History: A single frontal portable upright view of the chest is compared with the previous study dated 11/6/2017. There is a focal pleural thickening in the left apex. I do not see any pneumothorax in the area. There is a persistent bibasilar pleural effusion, more on the left side.  There is left lower lobar consolidation. There are interstitial changes in the left parahilar area with complete obliteration of the left hilum. This may represent an interstitial pneumonitis or pulmonary vascular congestion. There is moderate pulmonary vascular congestion in the right side as well. The heart size is enlarged. There is moderate fullness in the right hilum which was also noted in the previous study which probably represent distended pulmonary arteries. There is no bony abnormality. No pneumothorax. The remaining cardiopulmonary status is unchanged. Signed by Dr Ady Hathaway on 11/7/2017 7:41 AM    Xr Chest Standard (2 Vw)    Result Date: 11/6/2017  HISTORY: No left breast sounds CXR: 2 views of the chest are obtained. COMPARISON: 11/5/2017. FINDINGS: There is a moderate to large left pleural effusion with left lung opacities. The right lung is hyperinflated. Increasing right basilar densities may be atelectasis. There is diffuse thoracic aortic calcification. A trace right pleural effusion is suspected. There is a small left apical pneumothorax suspected. There is no shifting of the cardiac mediastinal structures. No acute bony pathology is identified. 1. Moderate to large left pleural effusion with left lung opacities. Opacities may represent a combination of compressive atelectasis and consolidation. 2. Small left apical pneumothorax suspected. 3. Increasing right basilar densities may be patchy atelectasis. Trace right pleural effusion. 4. Prominent pulmonary vessels and interstitial markings suggesting a component of underlying edema. Signed by Dr Kayli Barnett on 11/6/2017 9:20 AM    Xr Chest Standard (2 Vw)    Result Date: 11/5/2017  XR CHEST STANDARD TWO VW 11/4/2017 11:00 PM HISTORY: Leukocytosis COMPARISON: Exam dated 10/9/2017. FINDINGS: There is opacification of the left lung base, stable from the recent comparison exam. This appears to reflect a moderate sized layering left pleural effusion. The lungs are otherwise clear. Overall stable heart size. The pulmonary vasculature are unremarkable. No acute bone or soft tissue abnormality. 1. Overall stable exam. No new focal lung infiltrates. Stable opacification of the left lung base which is thought to reflect a moderate sized layering pleural effusion. Signed by Dr Tin Paula on 11/5/2017 9:32 AM    Ct Head Wo Contrast    Result Date: 11/21/2017  Examination. CT HEAD WO CONTRAST History: Headache. DLP: 955 mGy. The CT scan of the head is performed without intravenous contrast enhancement. The images are acquired in axial plane with subsequent reconstruction in coronal and sagittal planes. There is no previous study for comparison. There is no evidence of a mass or midline shift. There is moderate dilatation of ventricles, cisterns and the cortical sulci suggesting chronic volume loss/atrophy. There are scattered low-density areas in the centrum semiovale bilaterally representing chronic white matter ischemia due to chronic occlusive microangiopathy. There is normal gray-white matter differentiation. The images reviewed in bone window show no acute bony abnormality. The visualized paranasal sinuses appear normal. There is infiltration of the mastoid air cells bilaterally, more pronounced on the right side. There is lobulated soft tissue mass in the right upper neck in the posterior part of the superficial lobe of the right parotid gland measuring 2 cm in diameter. This may represent a pleomorphic adenoma? . Further evaluation with contrast enhanced CT scanner of the soft tissues of the neck may be obtained. No acute intracranial abnormality. Chronic ischemic and atrophic changes. Infiltration of the mastoid air cells bilaterally suggesting chronic mastoiditis. Other findings as above. The above findings are recorded on a digital voice clip in PACS.  Signed by Dr Mitali Ramirez on 11/21/2017 7:35 AM    Us Renal Complete    Result Date: 11/4/2017  US RENAL COMPLETE 11/4/2017 8:45 AM HISTORY: Acute kidney injury COMPARISON: None  TECHNIQUE: Multiple longitudinal and transverse realtime sonographic images of the kidneys and urinary bladder are obtained. FINDINGS: The right kidney measures 9.5 x 3.8 x 4.3 cm. The right kidney is normal in size, shape, contour and position. The cortical thickness is normal, with preservation of the corticomedullary differentiation. A 1.8 cm circumscribed simple appearing cyst is seen. The central echo complex is compact with no evidence for hydronephrosis. No nephrolithiasis or abnormal perinephric fluid collections are seen. No hydroureter. The left kidney measures 11.5 x 5.0 x 4.5 cm. The left kidney is normal in size, shape, contour and position. The cortical thickness is normal, with preservation of the corticomedullary differentiation. The central echo complex is compact with no evidence for hydronephrosis. 2.4 cm rounded circumscribed anechoic cyst. No nephrolithiasis or abnormal perinephric fluid collections are seen. No hydroureter. Scanning through the pelvis reveals the bladder to be moderately distended with anechoic urine. The wall thickness and contour are normal. There is no surrounding ascites. 1. Unremarkable grayscale renal ultrasound. No evidence of obstruction/hydronephrosis. 2. Unremarkable appearance of the urinary bladder. 3. Incidental small simple renal cysts. Signed by Dr Arslan Brasher on 11/4/2017 5:01 PM    Xr Chest Portable    Result Date: 11/25/2017  EXAMINATION: XR CHEST PORTABLE 11/25/2017 8:33 PM HISTORY: Pleural effusion COMPARISON: 11/24/2017 FINDINGS: There is near complete opacification of the left lung. This is similar to the previous exam. This results in a mock line, giving the appearance of a pneumothorax, though no definite pneumothorax is present. Layering pleural fluid is also noted at the right lung base. There is central pulmonary vascular congestion. Right lung appears hyperinflated. Evaluation of heart size is limited by overlying pathology. The aorta is tortuous with atherosclerotic calcifications. A small chest tube is noted at the left lung base, stable    Stable one day appearance of the chest, with bilateral pleural effusions, left much greater than right. Signed by Dr Taylor Mcdermott on 11/25/2017 8:36 PM    Xr Chest Portable    Result Date: 11/24/2017  Examination. XR CHEST PORTABLE History: Pleural catheter placement. Frontal portable semiupright view of the chest is compared with the previous study dated 11/21/2017. There is left lung consolidation and pleural effusion occupying most of the left chest. There is a left apical pneumothorax. There is mild pulmonary vascular congestion of the right lung and small right basal pleural effusion. Left-sided chest tube is seen. No bony abnormality. The left lung consolidation and left basal pleural effusion. Left apical pneumothorax. Mild pulmonary vascular congestion of the right lung and a small right basal pleural effusion. The above findings are recorded on a digital voice clip in PACS. Signed by Dr Arslan Clarke on 11/24/2017 3:23 PM    Xr Chest Portable    Result Date: 11/21/2017  Examination. XR CHEST PORTABLE History: Chest pain. Frontal portable upright view of the chest is compared with the previous study dated 11/7/2017. There is increasing left pleural effusion and left lower lung consolidation. There is a small pleural effusion at the right base. There is mild pulmonary vascular congestion. There is loss of left lung volume with displacement of the cardiomediastinal structures into the left chest. Atheromatous changes of thoracic aorta are noted. No significant bony abnormality seen. Increasing consolidation and pleural effusion in the left chest since the previous study. A small pleural effusion on the right base. Mild pulmonary vascular congestion. The above findings are recorded on a digital voice clip in PACS.  Signed by  cancer. FINDINGS: Approximately 3 hours after injection of 20.1 mCi of technetium 99m HDP intravenously whole-body planar imaging was obtained of the appendicular and axial skeleton in the anterior and posterior projections. There is mild arthritic change of the knees. There are no focal intense areas of abnormal uptake to suggest occult fracture, primary bone lesion or metastatic disease. . No scintigraphic evidence of metastatic disease. Signed by Dr Daniella Borjas on 11/9/2017 3:31 PM    Mri Brain W Wo Contrast    Result Date: 11/9/2017  History: Lung cancer staging. MRI BRAIN: Multiplanar imaging the brain is performed pre- and post-IV contrast. COMPARISON: None FINDINGS: There is no abnormal diffusion restriction. There is no intracranial hemorrhage. There is mild cerebral volume loss/atrophy. Nonenhancing hyperintense FLAIR signal changes in periventricular white matter regions likely due to chronic small vessel ischemia. There is no abnormal extra-axial fluid collection. There is no abnormal intracranial enhancement. There is no evidence of intracranial metastasis. Limited assessment of the orbits is unremarkable. There is opacification of the bilateral mastoid air cells. No air-fluid level seen within the paranasal sinuses. There are normal flow voids in the distal internal carotid and basilar arteries. There is homogeneous enhancement of the sagittal sinus. 1. No evidence of intracranial metastasis. 2. Nonenhancing hyperintense FLAIR signal changes likely due to chronic small vessel ischemia. Mild atrophy/cerebral volume loss. . 3. Bilateral mastoid effusions. Signed by Dr Leon Kelly on 11/9/2017 4:34 PM      Medications  No current facility-administered medications for this encounter. Current Outpatient Prescriptions   Medication Sig Dispense Refill    HYDROcodone-acetaminophen (NORCO) 5-325 MG per tablet Take 1 tablet by mouth every 6 hours as needed for Pain .  20 tablet 0    traMADol (ULTRAM) 50 MG tablet Take 1 tablet by mouth 2 times daily . 10 tablet 0    ipratropium-albuterol (DUONEB) 0.5-2.5 (3) MG/3ML SOLN nebulizer solution Inhale 3 mLs into the lungs every 4 hours (while awake) 360 mL 0    [START ON 11/28/2017] warfarin (COUMADIN) 5 MG tablet One tablet po daily and check a daily PT/INR to permit adjustment of dosage 30 tablet 3    predniSONE (DELTASONE) 10 MG tablet Take 1 tablet by mouth 3 times daily (with meals) for 10 days 30 tablet 0    [START ON 11/28/2017] silver sulfADIAZINE (SILVADENE) 1 % cream Apply topically daily. 50 g 0    docusate sodium (COLACE, DULCOLAX) 100 MG CAPS Take 100 mg by mouth 2 times daily 60 capsule 0    senna (SENOKOT) 8.6 MG tablet Take 2 tablets by mouth nightly 60 tablet 0    amiodarone (CORDARONE) 200 MG tablet Take 1 tablet by mouth daily 30 tablet 0    pravastatin (PRAVACHOL) 40 MG tablet Take 1 tablet by mouth daily 30 tablet 3    diltiazem (CARDIZEM CD) 120 MG extended release capsule Take 1 capsule by mouth daily 30 capsule 3    furosemide (LASIX) 40 MG tablet Take 0.5 tablets by mouth daily 60 tablet 3    pantoprazole (PROTONIX) 40 MG tablet Take 1 tablet by mouth every morning (before breakfast) 30 tablet 0       Allergies  Morphine; Aspirin; and Penicillins            ASSESSMENT/PLAN:    Lung Cancer- advanced NSCLC/recurrent persistent pleural effusion. · Pleurx catheter placed on 11/24/2017. · Social service consult for home needs/supply for Pleurx catheter. · Therapeutic thoracocenthesis on 11/08/2017 for symptom relief with the removal of 2 L of straw-colored fluid. Cytology revealed no malignancy. · Port-A-Cath planned as Outpatient.   · PET scan planned as outpatient  · Combined modality therapy with carboplatin/Abraxane and radiation therapy is anticipated as outpatient.     Normocytic anemia- multifactorial 2/2 malignancy, anemia chronic disease, hypothyroidism  · Hemoglobin 9.7  · MCV 88.0     Neutrophilic leukocytosis- likely 2/2 leukemoid reaction. WBC has been elevated at least since August 2017. Range 15,000-48,000  · Afebrile  · WBC 43,500      Thrombocytosis- likely reactive process  · Platelets 592,483     Stuckey. fibrillationanticoagulation with warfarin  · Subtherapeutic INR 1.46   · Management as per cardiology          DISPOSITION:  Pallavi Degroot from oncology standpoint for discharge.   Keep follow-up appointment in clinic on 12/11/2017.      Krista Hayes, APRN    11/27/17  6:08 PM

## 2017-11-29 ENCOUNTER — OFFICE VISIT (OUTPATIENT)
Dept: SURGERY | Age: 77
End: 2017-11-29
Payer: MEDICARE

## 2017-11-29 ENCOUNTER — HOSPITAL ENCOUNTER (OUTPATIENT)
Dept: NUCLEAR MEDICINE | Age: 77
Discharge: HOME | DRG: 180 | End: 2017-11-29
Payer: MEDICARE

## 2017-11-29 VITALS
TEMPERATURE: 97.4 F | BODY MASS INDEX: 24.1 KG/M2 | DIASTOLIC BLOOD PRESSURE: 80 MMHG | WEIGHT: 159 LBS | SYSTOLIC BLOOD PRESSURE: 136 MMHG | HEIGHT: 68 IN

## 2017-11-29 DIAGNOSIS — C34.92 NON-SMALL CELL CANCER OF LEFT LUNG (HCC): Primary | ICD-10-CM

## 2017-11-29 PROCEDURE — 99202 OFFICE O/P NEW SF 15 MIN: CPT | Performed by: PHYSICIAN ASSISTANT

## 2017-11-29 PROCEDURE — 1036F TOBACCO NON-USER: CPT | Performed by: PHYSICIAN ASSISTANT

## 2017-11-29 PROCEDURE — G8400 PT W/DXA NO RESULTS DOC: HCPCS | Performed by: PHYSICIAN ASSISTANT

## 2017-11-29 PROCEDURE — 4040F PNEUMOC VAC/ADMIN/RCVD: CPT | Performed by: PHYSICIAN ASSISTANT

## 2017-11-29 PROCEDURE — G8420 CALC BMI NORM PARAMETERS: HCPCS | Performed by: PHYSICIAN ASSISTANT

## 2017-11-29 PROCEDURE — 1111F DSCHRG MED/CURRENT MED MERGE: CPT | Performed by: PHYSICIAN ASSISTANT

## 2017-11-29 PROCEDURE — 1123F ACP DISCUSS/DSCN MKR DOCD: CPT | Performed by: PHYSICIAN ASSISTANT

## 2017-11-29 PROCEDURE — G8427 DOCREV CUR MEDS BY ELIG CLIN: HCPCS | Performed by: PHYSICIAN ASSISTANT

## 2017-11-29 PROCEDURE — G8484 FLU IMMUNIZE NO ADMIN: HCPCS | Performed by: PHYSICIAN ASSISTANT

## 2017-11-29 PROCEDURE — 1090F PRES/ABSN URINE INCON ASSESS: CPT | Performed by: PHYSICIAN ASSISTANT

## 2017-11-29 NOTE — LETTER
Preop Orders:     Patient: Radha Peñaloza  : 1940    Hospital:  [facility]    Admitting Physician:        Diagnosis: YULISA lung cancer    Procedure:    Insertion DL Lifeport    Anesthesia:MAC    Admission:Outpatient    Date:17    Labs:per anesthesia      Pre-Op Meds:  Clindamycin 900 mg IV    Latex Allergy: no    Beta Blocker: no    Medication Instructions: No plavix for 2 weeks prior to procedure; no asprin for 3 days prior to procedure    This has been electronically signed by Sriram King M.D.

## 2017-11-30 ENCOUNTER — HOSPITAL ENCOUNTER (OUTPATIENT)
Dept: NUCLEAR MEDICINE | Age: 77
Discharge: HOME | End: 2017-11-30
Payer: COMMERCIAL

## 2017-11-30 DIAGNOSIS — C34.12 MALIGNANT NEOPLASM OF BRONCHUS OF LEFT UPPER LOBE (HCC): ICD-10-CM

## 2017-11-30 LAB
GLUCOSE BLD-MCNC: 157 MG/DL (ref 70–99)
PERFORMED ON: ABNORMAL

## 2017-11-30 PROCEDURE — 78815 PET IMAGE W/CT SKULL-THIGH: CPT

## 2017-11-30 PROCEDURE — 3430000000 HC RX DIAGNOSTIC RADIOPHARMACEUTICAL: Performed by: INTERNAL MEDICINE

## 2017-11-30 PROCEDURE — A9552 F18 FDG: HCPCS | Performed by: INTERNAL MEDICINE

## 2017-11-30 PROCEDURE — 82948 REAGENT STRIP/BLOOD GLUCOSE: CPT

## 2017-11-30 RX ORDER — FLUDEOXYGLUCOSE F 18 200 MCI/ML
10 INJECTION, SOLUTION INTRAVENOUS
Status: COMPLETED | OUTPATIENT
Start: 2017-11-30 | End: 2017-11-30

## 2017-11-30 RX ADMIN — FLUDEOXYGLUCOSE F 18 10 MILLICURIE: 200 INJECTION, SOLUTION INTRAVENOUS at 12:27

## 2017-12-05 ENCOUNTER — ANESTHESIA EVENT (OUTPATIENT)
Dept: OPERATING ROOM | Age: 77
End: 2017-12-05
Payer: COMMERCIAL

## 2017-12-05 ENCOUNTER — ANESTHESIA (OUTPATIENT)
Dept: OPERATING ROOM | Age: 77
End: 2017-12-05
Payer: COMMERCIAL

## 2017-12-05 ENCOUNTER — TELEPHONE (OUTPATIENT)
Dept: SURGERY | Age: 77
End: 2017-12-05

## 2017-12-05 ENCOUNTER — HOSPITAL ENCOUNTER (OUTPATIENT)
Age: 77
Setting detail: OUTPATIENT SURGERY
Discharge: HOME | End: 2017-12-05
Attending: SURGERY | Admitting: SURGERY
Payer: COMMERCIAL

## 2017-12-05 VITALS
HEART RATE: 87 BPM | SYSTOLIC BLOOD PRESSURE: 130 MMHG | RESPIRATION RATE: 12 BRPM | DIASTOLIC BLOOD PRESSURE: 72 MMHG | OXYGEN SATURATION: 94 % | TEMPERATURE: 98.1 F

## 2017-12-05 LAB
ANION GAP SERPL CALCULATED.3IONS-SCNC: 6 MMOL/L (ref 7–19)
APTT: 43.4 SEC (ref 26–36.2)
BUN BLDV-MCNC: 42 MG/DL (ref 8–23)
CALCIUM SERPL-MCNC: 8.8 MG/DL (ref 8.8–10.2)
CHLORIDE BLD-SCNC: 98 MMOL/L (ref 98–111)
CO2: 38 MMOL/L (ref 22–29)
CREAT SERPL-MCNC: 1.8 MG/DL (ref 0.5–0.9)
GFR NON-AFRICAN AMERICAN: 27
GLUCOSE BLD-MCNC: 79 MG/DL (ref 74–109)
HCT VFR BLD CALC: 29.9 % (ref 37–47)
HCT VFR BLD CALC: 30.7 % (ref 37–47)
HEMOGLOBIN: 9.2 G/DL (ref 12–16)
HEMOGLOBIN: 9.4 G/DL (ref 12–16)
INR BLD: 3.03 (ref 0.88–1.18)
INR BLD: 3.05 (ref 0.88–1.18)
MCH RBC QN AUTO: 28.9 PG (ref 27–31)
MCH RBC QN AUTO: 29.1 PG (ref 27–31)
MCHC RBC AUTO-ENTMCNC: 30.6 G/DL (ref 33–37)
MCHC RBC AUTO-ENTMCNC: 30.8 G/DL (ref 33–37)
MCV RBC AUTO: 94.5 FL (ref 81–99)
MCV RBC AUTO: 94.6 FL (ref 81–99)
PDW BLD-RTO: 17.2 % (ref 11.5–14.5)
PDW BLD-RTO: 17.4 % (ref 11.5–14.5)
PLATELET # BLD: 398 K/UL (ref 130–400)
PLATELET # BLD: 410 K/UL (ref 130–400)
PMV BLD AUTO: 9.4 FL (ref 9.4–12.3)
PMV BLD AUTO: 9.4 FL (ref 9.4–12.3)
POTASSIUM SERPL-SCNC: 4.6 MMOL/L (ref 3.5–4.9)
PROTHROMBIN TIME: 31.8 SEC (ref 12–14.6)
PROTHROMBIN TIME: 32 SEC (ref 12–14.6)
RBC # BLD: 3.16 M/UL (ref 4.2–5.4)
RBC # BLD: 3.25 M/UL (ref 4.2–5.4)
SODIUM BLD-SCNC: 142 MMOL/L (ref 136–145)
WBC # BLD: 57.4 K/UL (ref 4.8–10.8)
WBC # BLD: 59.7 K/UL (ref 4.8–10.8)

## 2017-12-05 PROCEDURE — 85730 THROMBOPLASTIN TIME PARTIAL: CPT

## 2017-12-05 PROCEDURE — 6370000000 HC RX 637 (ALT 250 FOR IP): Performed by: ANESTHESIOLOGY

## 2017-12-05 PROCEDURE — 85610 PROTHROMBIN TIME: CPT

## 2017-12-05 PROCEDURE — 85027 COMPLETE CBC AUTOMATED: CPT

## 2017-12-05 PROCEDURE — 36415 COLL VENOUS BLD VENIPUNCTURE: CPT

## 2017-12-05 PROCEDURE — 80048 BASIC METABOLIC PNL TOTAL CA: CPT

## 2017-12-05 RX ORDER — HYDRALAZINE HYDROCHLORIDE 20 MG/ML
5 INJECTION INTRAMUSCULAR; INTRAVENOUS EVERY 10 MIN PRN
Status: DISCONTINUED | OUTPATIENT
Start: 2017-12-05 | End: 2017-12-05 | Stop reason: HOSPADM

## 2017-12-05 RX ORDER — CLINDAMYCIN PHOSPHATE 900 MG/50ML
900 INJECTION INTRAVENOUS ONCE
Status: DISCONTINUED | OUTPATIENT
Start: 2017-12-05 | End: 2017-12-05 | Stop reason: HOSPADM

## 2017-12-05 RX ORDER — FENTANYL CITRATE 50 UG/ML
50 INJECTION, SOLUTION INTRAMUSCULAR; INTRAVENOUS
Status: DISCONTINUED | OUTPATIENT
Start: 2017-12-05 | End: 2017-12-05 | Stop reason: HOSPADM

## 2017-12-05 RX ORDER — ENALAPRILAT 2.5 MG/2ML
1.25 INJECTION INTRAVENOUS
Status: DISCONTINUED | OUTPATIENT
Start: 2017-12-05 | End: 2017-12-05 | Stop reason: HOSPADM

## 2017-12-05 RX ORDER — SODIUM CHLORIDE 0.9 % (FLUSH) 0.9 %
10 SYRINGE (ML) INJECTION EVERY 12 HOURS SCHEDULED
Status: DISCONTINUED | OUTPATIENT
Start: 2017-12-05 | End: 2017-12-05 | Stop reason: HOSPADM

## 2017-12-05 RX ORDER — METOCLOPRAMIDE HYDROCHLORIDE 5 MG/ML
10 INJECTION INTRAMUSCULAR; INTRAVENOUS
Status: DISCONTINUED | OUTPATIENT
Start: 2017-12-05 | End: 2017-12-05 | Stop reason: HOSPADM

## 2017-12-05 RX ORDER — SCOLOPAMINE TRANSDERMAL SYSTEM 1 MG/1
1 PATCH, EXTENDED RELEASE TRANSDERMAL ONCE
Status: DISCONTINUED | OUTPATIENT
Start: 2017-12-05 | End: 2017-12-05 | Stop reason: HOSPADM

## 2017-12-05 RX ORDER — DIPHENHYDRAMINE HYDROCHLORIDE 50 MG/ML
12.5 INJECTION INTRAMUSCULAR; INTRAVENOUS
Status: DISCONTINUED | OUTPATIENT
Start: 2017-12-05 | End: 2017-12-05 | Stop reason: HOSPADM

## 2017-12-05 RX ORDER — SODIUM CHLORIDE, SODIUM LACTATE, POTASSIUM CHLORIDE, CALCIUM CHLORIDE 600; 310; 30; 20 MG/100ML; MG/100ML; MG/100ML; MG/100ML
INJECTION, SOLUTION INTRAVENOUS CONTINUOUS
Status: DISCONTINUED | OUTPATIENT
Start: 2017-12-05 | End: 2017-12-05 | Stop reason: HOSPADM

## 2017-12-05 RX ORDER — FENTANYL CITRATE 50 UG/ML
50 INJECTION, SOLUTION INTRAMUSCULAR; INTRAVENOUS EVERY 5 MIN PRN
Status: DISCONTINUED | OUTPATIENT
Start: 2017-12-05 | End: 2017-12-05 | Stop reason: HOSPADM

## 2017-12-05 RX ORDER — FAMOTIDINE 20 MG/1
20 TABLET, FILM COATED ORAL ONCE
Status: COMPLETED | OUTPATIENT
Start: 2017-12-05 | End: 2017-12-05

## 2017-12-05 RX ORDER — LABETALOL HYDROCHLORIDE 5 MG/ML
5 INJECTION, SOLUTION INTRAVENOUS EVERY 10 MIN PRN
Status: DISCONTINUED | OUTPATIENT
Start: 2017-12-05 | End: 2017-12-05 | Stop reason: HOSPADM

## 2017-12-05 RX ORDER — MEPERIDINE HYDROCHLORIDE 50 MG/ML
12.5 INJECTION INTRAMUSCULAR; INTRAVENOUS; SUBCUTANEOUS EVERY 5 MIN PRN
Status: DISCONTINUED | OUTPATIENT
Start: 2017-12-05 | End: 2017-12-05 | Stop reason: HOSPADM

## 2017-12-05 RX ORDER — FENTANYL CITRATE 50 UG/ML
25 INJECTION, SOLUTION INTRAMUSCULAR; INTRAVENOUS EVERY 5 MIN PRN
Status: DISCONTINUED | OUTPATIENT
Start: 2017-12-05 | End: 2017-12-05 | Stop reason: HOSPADM

## 2017-12-05 RX ORDER — LIDOCAINE HYDROCHLORIDE 10 MG/ML
1 INJECTION, SOLUTION EPIDURAL; INFILTRATION; INTRACAUDAL; PERINEURAL
Status: DISCONTINUED | OUTPATIENT
Start: 2017-12-05 | End: 2017-12-05 | Stop reason: HOSPADM

## 2017-12-05 RX ORDER — PROMETHAZINE HYDROCHLORIDE 25 MG/ML
6.25 INJECTION, SOLUTION INTRAMUSCULAR; INTRAVENOUS
Status: DISCONTINUED | OUTPATIENT
Start: 2017-12-05 | End: 2017-12-05 | Stop reason: HOSPADM

## 2017-12-05 RX ORDER — MIDAZOLAM HYDROCHLORIDE 1 MG/ML
2 INJECTION INTRAMUSCULAR; INTRAVENOUS
Status: DISCONTINUED | OUTPATIENT
Start: 2017-12-05 | End: 2017-12-05 | Stop reason: HOSPADM

## 2017-12-05 RX ORDER — SODIUM CHLORIDE 0.9 % (FLUSH) 0.9 %
10 SYRINGE (ML) INJECTION PRN
Status: DISCONTINUED | OUTPATIENT
Start: 2017-12-05 | End: 2017-12-05 | Stop reason: HOSPADM

## 2017-12-05 RX ADMIN — FAMOTIDINE 20 MG: 20 TABLET, FILM COATED ORAL at 08:00

## 2017-12-05 ASSESSMENT — PAIN - FUNCTIONAL ASSESSMENT: PAIN_FUNCTIONAL_ASSESSMENT: 0-10

## 2017-12-05 ASSESSMENT — LIFESTYLE VARIABLES: SMOKING_STATUS: 0

## 2017-12-05 NOTE — ANESTHESIA PRE PROCEDURE
Department of Anesthesiology  Preprocedure Note       Name:  Gilford Seed   Age:  69 y.o.  :  1940                                          MRN:  [id number]         Date:  2017      Surgeon: Messi Houston):  Mario Cobos MD    Procedure: Procedure(s):  INSERTION DOUBLE LUMEN LIFEPORT    Medications prior to admission:   Prior to Admission medications    Medication Sig Start Date End Date Taking? Authorizing Provider   HYDROcodone-acetaminophen (NORCO) 5-325 MG per tablet Take 1 tablet by mouth every 6 hours as needed for Pain . 17   Kurtis Carrillo MD   traMADol (ULTRAM) 50 MG tablet Take 1 tablet by mouth 2 times daily . 17   Kurtis Carrillo MD   ipratropium-albuterol (DUONEB) 0.5-2.5 (3) MG/3ML SOLN nebulizer solution Inhale 3 mLs into the lungs every 4 hours (while awake) 17   Kurtis Carrillo MD   warfarin (COUMADIN) 5 MG tablet One tablet po daily and check a daily PT/INR to permit adjustment of dosage 17   Kurtis Carrillo MD   predniSONE (DELTASONE) 10 MG tablet Take 1 tablet by mouth 3 times daily (with meals) for 10 days 17  Kurtis Carrillo MD   silver sulfADIAZINE (SILVADENE) 1 % cream Apply topically daily.  17   Kurtis Carrillo MD   docusate sodium (COLACE, DULCOLAX) 100 MG CAPS Take 100 mg by mouth 2 times daily 17   Kurtis Carrillo MD   [facility]) 8.6 MG tablet Take 2 tablets by mouth nightly 17  Kurtis Carrillo MD   amiodarone (CORDARONE) 200 MG tablet Take 1 tablet by mouth daily 10/13/17   Bubba Baptiste, DO   pravastatin (PRAVACHOL) 40 MG tablet Take 1 tablet by mouth daily 10/13/17   Bubba Baptiste, DO   diltiazem (CARDIZEM CD) 120 MG extended release capsule Take 1 capsule by mouth daily 10/14/17   Bubba Baptiste, DO   furosemide (LASIX) 40 MG tablet Take 0.5 tablets by mouth daily 10/14/17   Bubba D Emile, DO   pantoprazole (PROTONIX) 40 MG tablet Take 1 tablet by mouth every morning (before breakfast) 17

## 2017-12-07 ENCOUNTER — TELEPHONE (OUTPATIENT)
Dept: SURGERY | Age: 77
End: 2017-12-07

## 2017-12-13 ENCOUNTER — TELEPHONE (OUTPATIENT)
Dept: CARDIOLOGY | Age: 77
End: 2017-12-13

## 2017-12-14 ENCOUNTER — HOSPITAL ENCOUNTER (INPATIENT)
Age: 77
LOS: 1 days | Discharge: HOSPICE | DRG: 054 | End: 2017-12-15
Attending: EMERGENCY MEDICINE | Admitting: INTERNAL MEDICINE
Payer: MEDICARE

## 2017-12-14 ENCOUNTER — APPOINTMENT (OUTPATIENT)
Dept: GENERAL RADIOLOGY | Age: 77
DRG: 054 | End: 2017-12-14
Payer: MEDICARE

## 2017-12-14 ENCOUNTER — APPOINTMENT (OUTPATIENT)
Dept: MRI IMAGING | Age: 77
DRG: 054 | End: 2017-12-14
Payer: MEDICARE

## 2017-12-14 DIAGNOSIS — C34.92 PRIMARY MALIGNANT NEOPLASM OF LEFT LUNG METASTATIC TO OTHER SITE (HCC): Primary | ICD-10-CM

## 2017-12-14 DIAGNOSIS — R06.00 DYSPNEA, UNSPECIFIED TYPE: ICD-10-CM

## 2017-12-14 PROBLEM — Z85.118 H/O: LUNG CANCER: Status: ACTIVE | Noted: 2017-12-14

## 2017-12-14 LAB
ALBUMIN SERPL-MCNC: 2.4 G/DL (ref 3.5–5.2)
ALP BLD-CCNC: 74 U/L (ref 35–104)
ALT SERPL-CCNC: 13 U/L (ref 5–33)
ANION GAP SERPL CALCULATED.3IONS-SCNC: 12 MMOL/L (ref 7–19)
ANISOCYTOSIS: ABNORMAL
AST SERPL-CCNC: 9 U/L (ref 5–32)
ATYPICAL LYMPHOCYTE RELATIVE PERCENT: 1 % (ref 0–8)
BANDED NEUTROPHILS RELATIVE PERCENT: 12 % (ref 0–5)
BASOPHILS ABSOLUTE: 0 K/UL (ref 0–0.2)
BASOPHILS MANUAL: 0 %
BASOPHILS RELATIVE PERCENT: 0 % (ref 0–1)
BILIRUB SERPL-MCNC: 0.3 MG/DL (ref 0.2–1.2)
BUN BLDV-MCNC: 56 MG/DL (ref 8–23)
CALCIUM SERPL-MCNC: 8.7 MG/DL (ref 8.8–10.2)
CHLORIDE BLD-SCNC: 97 MMOL/L (ref 98–111)
CO2: 30 MMOL/L (ref 22–29)
CREAT SERPL-MCNC: 1.8 MG/DL (ref 0.5–0.9)
EOSINOPHILS ABSOLUTE: 6.05 K/UL (ref 0–0.6)
EOSINOPHILS RELATIVE PERCENT: 11 % (ref 0–5)
GFR NON-AFRICAN AMERICAN: 27
GLUCOSE BLD-MCNC: 101 MG/DL (ref 74–109)
HCT VFR BLD CALC: 32.7 % (ref 37–47)
HEMOGLOBIN: 9.8 G/DL (ref 12–16)
HYPOCHROMIA: ABNORMAL
INR BLD: 0.99 (ref 0.88–1.18)
LYMPHOCYTES ABSOLUTE: 0.6 K/UL (ref 1.1–4.5)
LYMPHOCYTES RELATIVE PERCENT: 0 % (ref 20–40)
MCH RBC QN AUTO: 28.4 PG (ref 27–31)
MCHC RBC AUTO-ENTMCNC: 30 G/DL (ref 33–37)
MCV RBC AUTO: 94.8 FL (ref 81–99)
MONOCYTES ABSOLUTE: 4.4 K/UL (ref 0–0.9)
MONOCYTES RELATIVE PERCENT: 8 % (ref 0–10)
NEUTROPHILS ABSOLUTE: 44 K/UL (ref 1.5–7.5)
NEUTROPHILS MANUAL: 68 %
NEUTROPHILS RELATIVE PERCENT: 68 % (ref 50–65)
PDW BLD-RTO: 18 % (ref 11.5–14.5)
PLATELET # BLD: 380 K/UL (ref 130–400)
PLATELET SLIDE REVIEW: ABNORMAL
PMV BLD AUTO: 9.9 FL (ref 9.4–12.3)
POTASSIUM SERPL-SCNC: 4.9 MMOL/L (ref 3.5–5)
PROTHROMBIN TIME: 13 SEC (ref 12–14.6)
RBC # BLD: 3.45 M/UL (ref 4.2–5.4)
SODIUM BLD-SCNC: 139 MMOL/L (ref 136–145)
TOTAL PROTEIN: 5.8 G/DL (ref 6.6–8.7)
TROPONIN: 0.01 NG/ML (ref 0–0.03)
WBC # BLD: 55 K/UL (ref 4.8–10.8)

## 2017-12-14 PROCEDURE — 70551 MRI BRAIN STEM W/O DYE: CPT

## 2017-12-14 PROCEDURE — 2580000003 HC RX 258: Performed by: INTERNAL MEDICINE

## 2017-12-14 PROCEDURE — 84484 ASSAY OF TROPONIN QUANT: CPT

## 2017-12-14 PROCEDURE — 99222 1ST HOSP IP/OBS MODERATE 55: CPT | Performed by: INTERNAL MEDICINE

## 2017-12-14 PROCEDURE — 2700000000 HC OXYGEN THERAPY PER DAY

## 2017-12-14 PROCEDURE — 36415 COLL VENOUS BLD VENIPUNCTURE: CPT

## 2017-12-14 PROCEDURE — 85610 PROTHROMBIN TIME: CPT

## 2017-12-14 PROCEDURE — 2580000003 HC RX 258: Performed by: EMERGENCY MEDICINE

## 2017-12-14 PROCEDURE — 94640 AIRWAY INHALATION TREATMENT: CPT

## 2017-12-14 PROCEDURE — 6360000002 HC RX W HCPCS: Performed by: INTERNAL MEDICINE

## 2017-12-14 PROCEDURE — 85025 COMPLETE CBC W/AUTO DIFF WBC: CPT

## 2017-12-14 PROCEDURE — 1210000000 HC MED SURG R&B

## 2017-12-14 PROCEDURE — 80053 COMPREHEN METABOLIC PANEL: CPT

## 2017-12-14 PROCEDURE — 99285 EMERGENCY DEPT VISIT HI MDM: CPT

## 2017-12-14 PROCEDURE — 93005 ELECTROCARDIOGRAM TRACING: CPT

## 2017-12-14 PROCEDURE — 6360000002 HC RX W HCPCS: Performed by: EMERGENCY MEDICINE

## 2017-12-14 PROCEDURE — 71010 XR CHEST PORTABLE: CPT

## 2017-12-14 RX ORDER — BISACODYL 10 MG
10 SUPPOSITORY, RECTAL RECTAL DAILY PRN
Status: DISCONTINUED | OUTPATIENT
Start: 2017-12-14 | End: 2017-12-15 | Stop reason: HOSPADM

## 2017-12-14 RX ORDER — DEXAMETHASONE SODIUM PHOSPHATE 10 MG/ML
4 INJECTION, SOLUTION INTRAMUSCULAR; INTRAVENOUS EVERY 6 HOURS
Status: DISCONTINUED | OUTPATIENT
Start: 2017-12-15 | End: 2017-12-15 | Stop reason: HOSPADM

## 2017-12-14 RX ORDER — 0.9 % SODIUM CHLORIDE 0.9 %
250 INTRAVENOUS SOLUTION INTRAVENOUS ONCE
Status: COMPLETED | OUTPATIENT
Start: 2017-12-14 | End: 2017-12-14

## 2017-12-14 RX ORDER — ONDANSETRON 2 MG/ML
4 INJECTION INTRAMUSCULAR; INTRAVENOUS EVERY 6 HOURS PRN
Status: DISCONTINUED | OUTPATIENT
Start: 2017-12-14 | End: 2017-12-15 | Stop reason: HOSPADM

## 2017-12-14 RX ORDER — ACETAMINOPHEN 325 MG/1
650 TABLET ORAL EVERY 4 HOURS PRN
Status: DISCONTINUED | OUTPATIENT
Start: 2017-12-14 | End: 2017-12-14

## 2017-12-14 RX ORDER — BISACODYL 10 MG
10 SUPPOSITORY, RECTAL RECTAL DAILY PRN
Status: DISCONTINUED | OUTPATIENT
Start: 2017-12-14 | End: 2017-12-14

## 2017-12-14 RX ORDER — HYDROMORPHONE HCL 110MG/55ML
1 PATIENT CONTROLLED ANALGESIA SYRINGE INTRAVENOUS ONCE
Status: COMPLETED | OUTPATIENT
Start: 2017-12-14 | End: 2017-12-14

## 2017-12-14 RX ORDER — SODIUM CHLORIDE 9 MG/ML
INJECTION, SOLUTION INTRAVENOUS CONTINUOUS
Status: DISCONTINUED | OUTPATIENT
Start: 2017-12-14 | End: 2017-12-14

## 2017-12-14 RX ORDER — DEXAMETHASONE SODIUM PHOSPHATE 10 MG/ML
10 INJECTION, SOLUTION INTRAMUSCULAR; INTRAVENOUS ONCE
Status: COMPLETED | OUTPATIENT
Start: 2017-12-14 | End: 2017-12-14

## 2017-12-14 RX ORDER — ACETAMINOPHEN 325 MG/1
650 TABLET ORAL EVERY 4 HOURS PRN
Status: DISCONTINUED | OUTPATIENT
Start: 2017-12-14 | End: 2017-12-15 | Stop reason: HOSPADM

## 2017-12-14 RX ORDER — ONDANSETRON 2 MG/ML
4 INJECTION INTRAMUSCULAR; INTRAVENOUS EVERY 6 HOURS PRN
Status: DISCONTINUED | OUTPATIENT
Start: 2017-12-14 | End: 2017-12-14

## 2017-12-14 RX ADMIN — HYDROMORPHONE HYDROCHLORIDE 1 MG: 2 INJECTION INTRAMUSCULAR; INTRAVENOUS; SUBCUTANEOUS at 21:12

## 2017-12-14 RX ADMIN — DEXAMETHASONE SODIUM PHOSPHATE 10 MG: 10 INJECTION, SOLUTION INTRAMUSCULAR; INTRAVENOUS at 21:35

## 2017-12-14 RX ADMIN — IPRATROPIUM BROMIDE 0.5 MG: 0.5 SOLUTION RESPIRATORY (INHALATION) at 23:32

## 2017-12-14 RX ADMIN — SODIUM CHLORIDE 250 ML: 9 INJECTION, SOLUTION INTRAVENOUS at 19:56

## 2017-12-14 RX ADMIN — SODIUM CHLORIDE: 9 INJECTION, SOLUTION INTRAVENOUS at 21:12

## 2017-12-14 ASSESSMENT — PAIN SCALES - GENERAL
PAINLEVEL_OUTOF10: 5
PAINLEVEL_OUTOF10: 10

## 2017-12-14 ASSESSMENT — ENCOUNTER SYMPTOMS
COUGH: 0
WHEEZING: 1
SHORTNESS OF BREATH: 1
VOMITING: 0
ABDOMINAL PAIN: 0

## 2017-12-15 VITALS
HEART RATE: 112 BPM | BODY MASS INDEX: 24.1 KG/M2 | WEIGHT: 159 LBS | TEMPERATURE: 98.2 F | OXYGEN SATURATION: 92 % | DIASTOLIC BLOOD PRESSURE: 64 MMHG | RESPIRATION RATE: 20 BRPM | HEIGHT: 68 IN | SYSTOLIC BLOOD PRESSURE: 109 MMHG

## 2017-12-15 PROBLEM — E87.5 HYPERKALEMIA: Status: ACTIVE | Noted: 2017-12-15

## 2017-12-15 PROBLEM — J90 RECURRENT LEFT PLEURAL EFFUSION: Status: ACTIVE | Noted: 2017-12-15

## 2017-12-15 PROBLEM — C79.31 BRAIN METASTASIS: Status: ACTIVE | Noted: 2017-12-15

## 2017-12-15 PROBLEM — I61.9 INTRAPARENCHYMAL HEMORRHAGE OF BRAIN (HCC): Status: ACTIVE | Noted: 2017-12-15

## 2017-12-15 LAB
ANION GAP SERPL CALCULATED.3IONS-SCNC: 16 MMOL/L (ref 7–19)
BASOPHILS ABSOLUTE: 0.1 K/UL (ref 0–0.2)
BASOPHILS RELATIVE PERCENT: 0.1 % (ref 0–1)
BUN BLDV-MCNC: 58 MG/DL (ref 8–23)
CALCIUM SERPL-MCNC: 8.6 MG/DL (ref 8.8–10.2)
CHLORIDE BLD-SCNC: 99 MMOL/L (ref 98–111)
CO2: 25 MMOL/L (ref 22–29)
CREAT SERPL-MCNC: 2 MG/DL (ref 0.5–0.9)
EOSINOPHILS ABSOLUTE: 5.7 K/UL (ref 0–0.6)
EOSINOPHILS RELATIVE PERCENT: 10 % (ref 0–5)
GFR NON-AFRICAN AMERICAN: 24
GLUCOSE BLD-MCNC: 108 MG/DL (ref 74–109)
HCT VFR BLD CALC: 34.2 % (ref 37–47)
HEMOGLOBIN: 10.1 G/DL (ref 12–16)
LYMPHOCYTES ABSOLUTE: 1 K/UL (ref 1.1–4.5)
LYMPHOCYTES RELATIVE PERCENT: 1.7 % (ref 20–40)
MCH RBC QN AUTO: 28.6 PG (ref 27–31)
MCHC RBC AUTO-ENTMCNC: 29.5 G/DL (ref 33–37)
MCV RBC AUTO: 96.9 FL (ref 81–99)
MONOCYTES ABSOLUTE: 1.2 K/UL (ref 0–0.9)
MONOCYTES RELATIVE PERCENT: 2 % (ref 0–10)
NEUTROPHILS ABSOLUTE: 47 K/UL (ref 1.5–7.5)
NEUTROPHILS RELATIVE PERCENT: 82.7 % (ref 50–65)
PDW BLD-RTO: 18.3 % (ref 11.5–14.5)
PLATELET # BLD: 412 K/UL (ref 130–400)
PMV BLD AUTO: 9.8 FL (ref 9.4–12.3)
POTASSIUM SERPL-SCNC: 5.9 MMOL/L (ref 3.5–5)
RBC # BLD: 3.53 M/UL (ref 4.2–5.4)
SODIUM BLD-SCNC: 140 MMOL/L (ref 136–145)
WBC # BLD: 56.8 K/UL (ref 4.8–10.8)

## 2017-12-15 PROCEDURE — 6360000002 HC RX W HCPCS: Performed by: NURSE PRACTITIONER

## 2017-12-15 PROCEDURE — 2700000000 HC OXYGEN THERAPY PER DAY

## 2017-12-15 PROCEDURE — 94640 AIRWAY INHALATION TREATMENT: CPT

## 2017-12-15 PROCEDURE — 6370000000 HC RX 637 (ALT 250 FOR IP): Performed by: INTERNAL MEDICINE

## 2017-12-15 PROCEDURE — 80048 BASIC METABOLIC PNL TOTAL CA: CPT

## 2017-12-15 PROCEDURE — 36415 COLL VENOUS BLD VENIPUNCTURE: CPT

## 2017-12-15 PROCEDURE — 99239 HOSP IP/OBS DSCHRG MGMT >30: CPT | Performed by: INTERNAL MEDICINE

## 2017-12-15 PROCEDURE — 6360000002 HC RX W HCPCS: Performed by: INTERNAL MEDICINE

## 2017-12-15 PROCEDURE — 85025 COMPLETE CBC W/AUTO DIFF WBC: CPT

## 2017-12-15 PROCEDURE — 99284 EMERGENCY DEPT VISIT MOD MDM: CPT | Performed by: EMERGENCY MEDICINE

## 2017-12-15 RX ORDER — FENTANYL CITRATE 50 UG/ML
75 INJECTION, SOLUTION INTRAMUSCULAR; INTRAVENOUS
Status: DISCONTINUED | OUTPATIENT
Start: 2017-12-15 | End: 2017-12-15 | Stop reason: HOSPADM

## 2017-12-15 RX ORDER — FENTANYL 25 UG/H
1 PATCH TRANSDERMAL
Qty: 10 PATCH | Refills: 0 | Status: SHIPPED | OUTPATIENT
Start: 2017-12-18 | End: 2018-01-17

## 2017-12-15 RX ORDER — HYDROMORPHONE HYDROCHLORIDE 2 MG/1
1 TABLET ORAL
Qty: 20 TABLET | Refills: 0 | Status: SHIPPED | OUTPATIENT
Start: 2017-12-15

## 2017-12-15 RX ORDER — FENTANYL 25 UG/H
1 PATCH TRANSDERMAL
Status: DISCONTINUED | OUTPATIENT
Start: 2017-12-15 | End: 2017-12-15 | Stop reason: HOSPADM

## 2017-12-15 RX ORDER — HYDROMORPHONE HYDROCHLORIDE 2 MG/1
1 TABLET ORAL
Status: DISCONTINUED | OUTPATIENT
Start: 2017-12-15 | End: 2017-12-15 | Stop reason: HOSPADM

## 2017-12-15 RX ORDER — LORAZEPAM 2 MG/ML
0.25 CONCENTRATE ORAL EVERY 8 HOURS PRN
Status: DISCONTINUED | OUTPATIENT
Start: 2017-12-15 | End: 2017-12-15 | Stop reason: HOSPADM

## 2017-12-15 RX ADMIN — IPRATROPIUM BROMIDE 0.5 MG: 0.5 SOLUTION RESPIRATORY (INHALATION) at 10:44

## 2017-12-15 RX ADMIN — HYDROMORPHONE HYDROCHLORIDE 1 MG: 2 TABLET ORAL at 15:49

## 2017-12-15 RX ADMIN — DEXAMETHASONE SODIUM PHOSPHATE 4 MG: 10 INJECTION, SOLUTION INTRAMUSCULAR; INTRAVENOUS at 09:16

## 2017-12-15 RX ADMIN — IPRATROPIUM BROMIDE 0.5 MG: 0.5 SOLUTION RESPIRATORY (INHALATION) at 14:52

## 2017-12-15 RX ADMIN — IPRATROPIUM BROMIDE 0.5 MG: 0.5 SOLUTION RESPIRATORY (INHALATION) at 07:17

## 2017-12-15 RX ADMIN — HYDROMORPHONE HYDROCHLORIDE 1 MG: 2 TABLET ORAL at 11:14

## 2017-12-15 RX ADMIN — DEXAMETHASONE SODIUM PHOSPHATE 4 MG: 10 INJECTION, SOLUTION INTRAMUSCULAR; INTRAVENOUS at 03:09

## 2017-12-15 RX ADMIN — FENTANYL CITRATE 75 MCG: 50 INJECTION INTRAMUSCULAR; INTRAVENOUS at 09:16

## 2017-12-15 ASSESSMENT — ENCOUNTER SYMPTOMS
SPUTUM PRODUCTION: 0
VOMITING: 0
HEARTBURN: 0
BLURRED VISION: 0
EYES NEGATIVE: 1
ABDOMINAL PAIN: 0
EYE PAIN: 0
NAUSEA: 0
SORE THROAT: 0
SHORTNESS OF BREATH: 1
BLOOD IN STOOL: 0
EYE DISCHARGE: 0
GASTROINTESTINAL NEGATIVE: 1
CONSTIPATION: 0
WHEEZING: 0
ORTHOPNEA: 0
EYE REDNESS: 0
COUGH: 1
DIARRHEA: 0
HEMOPTYSIS: 0
BACK PAIN: 0
DOUBLE VISION: 0

## 2017-12-15 ASSESSMENT — PAIN SCALES - GENERAL
PAINLEVEL_OUTOF10: 7
PAINLEVEL_OUTOF10: 9
PAINLEVEL_OUTOF10: 9

## 2017-12-15 NOTE — ED PROVIDER NOTES
disease)     HTN (hypertension)     Hyperlipidemia     [facility] [address]     Malignant neoplasm of breast (female), unspecified site     Occlusion and stenosis of carotid artery     Osteoporosis     Palliative care encounter     Respiratory distress     Thrombocytopenia ([facility] [address] [facility]     Tobacco use disorder          SURGICAL HISTORY       Past Surgical History:   Procedure Laterality Date    BREAST SURGERY      CATARACT REMOVAL      CHOLECYSTECTOMY      COLONOSCOPY      INSERTION / PLACEMENT PLEURAL CATHETER Left 11/24/2017    pleurx catheter PLACEMENT performed by Malorie Whitaker MD at [address], PARTIAL      DE COLSC FLX W/REMOVAL LESION BY HOT BX FORCEPS N/A 10/2/2017    Dr Aida Sims prep, diverticulosis-Tubular AP (-) dysplasia--1 yr recall    UPPER GASTROINTESTINAL ENDOSCOPY N/A 9/29/2017    Dr Meaghan Alejandro       Current Discharge Medication List      CONTINUE these medications which have NOT CHANGED    Details   HYDROcodone-acetaminophen (NORCO) 5-325 MG per tablet Take 1 tablet by mouth every 6 hours as needed for Pain . Qty: 20 tablet, Refills: 0      traMADol (ULTRAM) 50 MG tablet Take 1 tablet by mouth 2 times daily . Qty: 10 tablet, Refills: 0      ipratropium-albuterol (DUONEB) 0.5-2.5 (3) MG/3ML SOLN nebulizer solution Inhale 3 mLs into the lungs every 4 hours (while awake)  Qty: 360 mL, Refills: 0      warfarin (COUMADIN) 5 MG tablet One tablet po daily and check a daily PT/INR to permit adjustment of dosage  Qty: 30 tablet, Refills: 3      silver sulfADIAZINE (SILVADENE) 1 % cream Apply topically daily.   Qty: 50 g, Refills: 0      docusate sodium (COLACE, DULCOLAX) 100 MG CAPS Take 100 mg by mouth 2 times daily  Qty: 60 capsule, Refills: 0      senna (SENOKOT) 8.6 MG tablet Take 2 tablets by mouth nightly  Qty: 60 tablet, Refills: 0      amiodarone (CORDARONE) 200 MG tablet Take 1 tablet by mouth daily  Qty: 30 tablet, left   Abdominal: Soft. There is no tenderness. Musculoskeletal: She exhibits no edema. Neurological: She is alert and oriented to person, place, and time. No cranial nerve deficit. Skin: Skin is warm and dry. She is not diaphoretic. Psychiatric: She has a normal mood and affect. Nursing note and vitals reviewed. DIAGNOSTIC RESULTS     EKG: All EKG's are interpreted by the Emergency Department Physician who either signs or Co-signs this chart in the absence of a cardiologist.    EKG: sinus tach, , nonspecific T changes laterally, artifact present, no sig ST elevation or depression    RADIOLOGY:   Non-plain film images such as CT, Ultrasound and MRI are read by the radiologist. Plain radiographic images are visualized and preliminarily interpreted by the emergency physician with the below findings:        Interpretation per the Radiologist below, if available at the time of this note:    MRI Brain WO Contrast   Final Result   Impression:    1. 4.2 x 1.8 x 1.2 cm intraparenchymal hematoma in the left occipital   lobe. This is favored to be a late subacute hemorrhage given the   imaging appearance. Minimal surrounding vasogenic edema is identified. Uncertain as to the cause of the hemorrhage. A solitary hemorrhagic   metastasis cannot be excluded on this noncontrast exam. No gross  mass   lesions identified on this noncontrast exam.   2. Underlying chronic small vessel ischemic changes. The results of this exam were discussed with Dr. Claudette Listen on 12/14/2017   at 2009 hours   Signed by Dr Delia Vivas on 12/14/2017 8:09 PM      XR Chest Portable   Final Result   1. Overall stable exam. This includes the near complete opacification   of the left hemithorax with dense consolidation in the left upper   lobe. 2. There appears to be a chest tube in the left lung base.    Signed by Dr Delia Vivas on 12/14/2017 7:57 PM            ED BEDSIDE ULTRASOUND:   Performed by ED Physician - none    LABS:  Labs Reviewed   CBC WITH AUTO DIFFERENTIAL - Abnormal; Notable for the following:        Result Value    WBC 55.0 (*)     RBC 3.45 (*)     Hemoglobin 9.8 (*)     Hematocrit 32.7 (*)     MCHC 30.0 (*)     RDW 18.0 (*)     Neutrophils % 68.0 (*)     Lymphocytes % 0.0 (*)     Eosinophils % 11.0 (*)     Neutrophils # 44.0 (*)     Lymphocytes # 0.6 (*)     Monocytes # 4.40 (*)     Eosinophils # 6.05 (*)     Bands Relative 12 (*)     Anisocytosis 1+ (*)     Hypochromia 1+ (*)     All other components within normal limits   COMPREHENSIVE METABOLIC PANEL - Abnormal; Notable for the following:     Chloride 97 (*)     CO2 30 (*)     BUN 56 (*)     CREATININE 1.8 (*)     GFR Non-African American 27 (*)     Calcium 8.7 (*)     Total Protein 5.8 (*)     Alb 2.4 (*)     All other components within normal limits   CBC WITH AUTO DIFFERENTIAL - Abnormal; Notable for the following:     WBC 56.8 (*)     RBC 3.53 (*)     Hemoglobin 10.1 (*)     Hematocrit 34.2 (*)     MCHC 29.5 (*)     RDW 18.3 (*)     Platelets 971 (*)     Neutrophils % 82.7 (*)     Lymphocytes % 1.7 (*)     Eosinophils % 10.0 (*)     Neutrophils # 47.0 (*)     Lymphocytes # 1.0 (*)     Monocytes # 1.20 (*)     Eosinophils # 5.70 (*)     All other components within normal limits   BASIC METABOLIC PANEL - Abnormal; Notable for the following:     Potassium 5.9 (*)     BUN 58 (*)     CREATININE 2.0 (*)     GFR Non-African American 24 (*)     Calcium 8.6 (*)     All other components within normal limits   TROPONIN   PROTIME-INR       All other labs were within normal range or not returned as of this dictation.     EMERGENCY DEPARTMENT COURSE and DIFFERENTIAL DIAGNOSIS/MDM:   Vitals:    Vitals:    12/15/17 1005 12/15/17 1044 12/15/17 1052 12/15/17 1116   BP:    109/64   Pulse:    118   Resp:    20   Temp:    98.2 °F (36.8 °C)   TempSrc:    Temporal   SpO2: 96% 95%  95%   Weight:       Height:   5' 8\" (1.727 m)            MDM  Number of Diagnoses or Management Options  Dyspnea, unspecified type:   Primary malignant neoplasm of left lung metastatic to other site [facility]):   Diagnosis management comments: Discussed with pt and daughter - DNR. Discussed with Dr. Anisa Cotto - MRI brain. Discussed with Dr. Wang Meehan - admit. CRITICAL CARE TIME   Total Critical Care time was 0 minutes, excluding separately reportable procedures. There was a high probability of clinically significant/life threatening deterioration in the patient's condition which required my urgent intervention. CONSULTS:  IP CONSULT TO PALLIATIVE CARE  IP CONSULT TO HOSPICE    PROCEDURES:  Unless otherwise noted below, none     Procedures    FINAL IMPRESSION      1. Primary malignant neoplasm of left lung metastatic to other site [facility])    2. Dyspnea, unspecified type          DISPOSITION/PLAN   DISPOSITION     PATIENT REFERRED TO:  Isela [address]  [address] [place] [address]  [phone]            DISCHARGE MEDICATIONS:  Current Discharge Medication List             (Please note that portions of this note were completed with a voice recognition program.  Efforts were made to edit the dictations but occasionally words are mis-transcribed.)    Ellie Garduno MD (electronically signed)  Attending Emergency Physician        Ellie Garduno MD  12/15/17 1600 Luis Enrique Rodríguez MD  12/15/17 1119       Ellie Garduno MD  12/15/17 1600 Hardeep Rodríguez MD  12/15/17 1120

## 2017-12-15 NOTE — DISCHARGE SUMMARY
consulted. The patient and her daughter are in agreement to return to [facility] in [place] with Hospice following. All arrangements are made, and she will be discharged back to this facility today. Physical Examination:  VITALS:  /70   Pulse 117   Temp 97.9 °F (36.6 °C) (Temporal)   Resp 20   Wt 159 lb (72.1 kg)   SpO2 96%   BMI 24.18 kg/m²   24HR INTAKE/OUTPUT:      Intake/Output Summary (Last 24 hours) at 12/15/17 0959  Last data filed at 12/14/17 2134   Gross per 24 hour   Intake                0 ml   Output              200 ml   Net             -200 ml     Constitutional: 68 y/o frail appearing female, ill appearing. HENT:    Head: Normocephalic and atraumatic.    Mouth/Throat: Oropharynx is clear and moist. No oropharyngeal exudate. Eyes: Conjunctivae and EOM are normal. Pupils are equal, round, and reactive to light. No scleral icterus. Neck: Normal range of motion. Neck supple. No JVD present. No tracheal deviation present. No thyromegaly present. Cardiovascular: Normal rate, regular rhythm and normal heart sounds.  Exam reveals no gallop and no friction rub.   No murmur heard. Pulmonary/Chest: Normal respiratory effort, Diminished breath sounds at bases. No wheezes or rales. PleurX catheter noted. Abdominal: Soft. Bowel sounds are normal. No distension and no mass. There is no tenderness. There is no rebound and no guarding. Musculoskeletal: Moves all extremities. There is no edema or tenderness. Lymphadenopathy: No cervical adenopathy. Neurological: Intermittent confusion. Generalized weakness. No focal deficits. Skin: Skin is warm and dry. No rash noted. Not diaphoretic. No erythema. No pallor. Psychiatric: Anxious. Intermittent confusion. Significant Diagnostic Studies:    Chest Xray -   1. Overall stable exam. This includes the near complete opacification   of the left hemithorax with dense consolidation in the left upper   lobe.    2. There appears to be a chest tube in the left lung base. Signed by Dr Betty Leo on 2017 7:57 PM     MRI Brain without contrast -   Impression:    1. 4.2 x 1.8 x 1.2 cm intraparenchymal hematoma in the left occipital   lobe. This is favored to be a late subacute hemorrhage given the   imaging appearance. Minimal surrounding vasogenic edema is identified. Uncertain as to the cause of the hemorrhage. A solitary hemorrhagic   metastasis cannot be excluded on this noncontrast exam. No gross  mass   lesions identified on this noncontrast exam.   2. Underlying chronic small vessel ischemic changes. The results of this exam were discussed with Dr. Katy Osorio on 2017   at 2009 hours   Signed by Dr Betty Leo on 2017 8:09 PM     Lab Results   Component Value Date     12/15/2017    K 5.9 12/15/2017    CL 99 12/15/2017    CO2 25 12/15/2017    BUN 58 12/15/2017    CREATININE 2.0 12/15/2017    GLUCOSE 108 12/15/2017    CALCIUM 8.6 12/15/2017      Lab Results   Component Value Date    WBC 56.8 (H) 12/15/2017    HGB 10.1 (L) 12/15/2017    HCT 34.2 (L) 12/15/2017    MCV 96.9 12/15/2017     (H) 12/15/2017         Discharge Medications:       Brennan Cota   Home Medication Instructions HO    Printed on:12/15/17 1214   Medication Information                      docusate sodium (COLACE, DULCOLAX) 100 MG CAPS  Take 100 mg by mouth 2 times daily             fentaNYL (DURAGESIC) 25 MCG/HR  Place 1 patch onto the skin every 72 hours .              furosemide (LASIX) 40 MG tablet  Take 0.5 tablets by mouth daily             HYDROmorphone (DILAUDID) 2 MG tablet  Take 0.5 tablets by mouth every 3 hours as needed for Pain .             ipratropium-albuterol (DUONEB) 0.5-2.5 (3) MG/3ML SOLN nebulizer solution  Inhale 3 mLs into the lungs every 4 hours (while awake)             pantoprazole (PROTONIX) 40 MG tablet  Take 1 tablet by mouth every morning (before breakfast)             senna (SENOKOT) 8.6 MG

## 2017-12-15 NOTE — H&P
12/14/2017   at 2009 hours   Signed by Dr Susannah Carranza on 12/14/2017 8:09 PM      XR Chest Portable   Final Result   1. Overall stable exam. This includes the near complete opacification   of the left hemithorax with dense consolidation in the left upper   lobe. 2. There appears to be a chest tube in the left lung base. Signed by Dr Susannah Carranza on 12/14/2017 7:57 PM                 Labs Reviewed   CBC WITH AUTO DIFFERENTIAL - Abnormal; Notable for the following:        Result Value    WBC 55.0 (*)     RBC 3.45 (*)     Hemoglobin 9.8 (*)     Hematocrit 32.7 (*)     MCHC 30.0 (*)     RDW 18.0 (*)     All other components within normal limits   COMPREHENSIVE METABOLIC PANEL - Abnormal; Notable for the following:     Chloride 97 (*)     CO2 30 (*)     BUN 56 (*)     CREATININE 1.8 (*)     GFR Non-African American 27 (*)     Calcium 8.7 (*)     Total Protein 5.8 (*)     Alb 2.4 (*)     All other components within normal limits   TROPONIN   PROTIME-INR   BASIC METABOLIC PANEL   CBC WITH AUTO DIFFERENTIAL          IMPRESSION:  1. Shortness of breath  2. Left pleural effusion  3. The static lung CA  4. Brain hemorrhage possible metastasis    PLAN:     1. The patient had improved her shortness of breath symptoms  2. We will continue oxygen  3. Bronchodilators  4. Review of her home medications  5. Talk to her daughter and is in agreement for a palliative care consult  6. I believe that we'll be the best course of action on this patient condition who already had metastatic brain lesion  7.  We'll follow-up progress    Gloria Beckford MD    Internal Medicine Hospitalist

## 2017-12-15 NOTE — ED NOTES
Pt alert and oriented x4; confirmed DNR status with myself and charge nurse Mau Diop RN  12/14/17 2002

## 2017-12-15 NOTE — PROGRESS NOTES
Palliative Care  made a follow up visit to offer support to patient. Patient was lying in her bed with Family present at the time of the visit. Patient informs me that she will be transporting back to SNF with Hospice. Patient was glad to see .  offered prayer.       Electronically signed by Juan Carrillo on 12/15/2017 at 11:24 AM

## 2017-12-15 NOTE — ED NOTES
Bed: 01-A  Expected date:   Expected time:   Means of arrival:   Comments:  [facility]     Gina Franklin RN  12/14/17 1800

## 2017-12-15 NOTE — PROGRESS NOTES
Nutrition Assessment    Type and Reason for Visit: Positive Nutrition Screen    Nutrition Recommendations: Continue current diet order; will add ONS tid with meals. Malnutrition Assessment:  · Malnutrition Status: At risk for malnutrition  · Context: Acute illness or injury   ·   Nutrition Diagnosis:   · Problem: Inadequate oral intake  · Etiology: related to Other (root cause ca dx.)     Signs and symptoms:  as evidenced by Weight loss    Nutrition Assessment:  · Subjective Assessment: Pt to d/c back to SNF today under Hospice care. Pt with new dx of metastatic non-small cell lung cancer. Current wt = 159#  Wt. decline reported and suspected cause is r/t dx. Spoke with pt. and daughter at bedside. Appetitie is better toda per pt. Food preferences noted. Pt does have dentures and expressed difficulty with tough textures; she refused any type of mechanical alteration. SLP should be involved if pt does not d/c back to SNF today. · Current Nutrition Therapies:  · Oral Diet Orders: General   · Oral Diet intake: Unable to assess (Pt reported eating all of scrambled eggs and half of toast at B this am.)  · Oral Nutrition Supplement (ONS) Orders: None  · Anthropometric Measures:  · Ht: 5' 8\" (172.7 cm)   · Current Body Wt: 159 lb (72.1 kg)  · Ideal Body Wt: 140 lb (63.5 kg), % Ideal Body 113.5%    · BMI Classification: BMI 18.5 - 24.9 Normal Weight    Estimated Intake vs Estimated Needs: Intake Less Than Needs    Nutrition Risk Level: Moderate    Nutrition Interventions:   Continue current diet, Start ONS  Continued Inpatient Monitoring    Nutrition Evaluation:   · Evaluation: Goals set   · Goals: Pt. will consume 50% or more of meals and ONS    · Monitoring: Meal Intake, Supplement Intake, Skin Integrity, Fluid Balance, Weight, Pertinent Labs, Chewing/Swallowing    See Adult Nutrition Doc Flowsheet for more detail.      Electronically signed by Marito Haskins RD, LD on 12/15/17 at 11:04 AM    Contact Number: [phone]

## 2017-12-15 NOTE — CONSULTS
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 Unfortunately, initiation of chemotherapy treatment has be delayed due to recent hospitalizations. Mary presented to the ED with complaints of shortness of breath and confusion. She has recently been treated for atrial fibrillation with RVR and recurrent left pleural effusion. MRI of the brain on 12/14/2017 revealed a 4.2 x 1.8 x 1.2 cm intraparenchymal hematoma in the left occipital lobe that is favored to be a late subacute hemorrhage given the imaging appearance. Minimal surrounding vasogenic edema is identified. Uncertain as to the cause of the hemorrhage to be a solitary hemorrhagic metastasis cannot be excluded on this noncontrast exam.      TARGET SITES:  1. 10 cm YULISA primary lung mass with pleural effusion   2. 6 mm RLL lung nodule   3. bulky mediastinal lymphadenopathy   4. left supraclavicular LN   5. indeterminate 1.8 cm right adrenal nodule     ONCOLOGIC HISTORY: YULISA NSCLC 9/8/2017   Germán Robles was seen in initial oncologic consultation on 11/02/17, referred by Dr. Jessica Brown regarding a new diagnosis of YULISA non-small cell lung carcinoma. Mary initially presented to [place], [place] on 8/25/17 for progressive cough and dyspnea, associated with right-sided pleuritic chest discomfort. She was treated for pneumonia.     A CT scan of the chest on 8/25/2017 documented a 10 cm left-sided chest mass with mediastinal invasion and pathologic adenopathy.   She was (COUMADIN) 5 MG tablet, One tablet po daily and check a daily PT/INR to permit adjustment of dosage  silver sulfADIAZINE (SILVADENE) 1 % cream, Apply topically daily. docusate sodium (COLACE, DULCOLAX) 100 MG CAPS, Take 100 mg by mouth 2 times daily  senna (SENOKOT) 8.6 MG tablet, Take 2 tablets by mouth nightly  amiodarone (CORDARONE) 200 MG tablet, Take 1 tablet by mouth daily  pravastatin (PRAVACHOL) 40 MG tablet, Take 1 tablet by mouth daily  diltiazem (CARDIZEM CD) 120 MG extended release capsule, Take 1 capsule by mouth daily  furosemide (LASIX) 40 MG tablet, Take 0.5 tablets by mouth daily  pantoprazole (PROTONIX) 40 MG tablet, Take 1 tablet by mouth every morning (before breakfast)    Current Meds:  Scheduled Meds:   ipratropium  0.5 mg Nebulization 4x daily    dexamethasone  4 mg Intravenous Q6H       Continuous Infusions:   PRN Meds:ondansetron, bisacodyl, acetaminophen    Allergies:    Morphine; Aspirin; and Penicillins    Social History:    reports that she has quit smoking. She has never used smokeless tobacco. She reports that she does not drink alcohol or use drugs.     Family History:   family history includes Brain Cancer in an other family member; Katlatonya Abdullahis in an other family member; Other in her father; Tammy Ulloa in an other family member. Review of Systems   Constitutional: Positive for malaise/fatigue and weight loss. Negative for chills, diaphoresis and fever. HENT: Negative. Negative for congestion, ear pain, hearing loss, nosebleeds, sore throat and tinnitus. Eyes: Negative. Negative for blurred vision, double vision, pain, discharge and redness. Respiratory: Positive for cough and shortness of breath. Negative for hemoptysis, sputum production and wheezing. Cardiovascular: Negative. Negative for chest pain, palpitations, orthopnea, claudication and leg swelling. Gastrointestinal: Negative.   Negative for abdominal pain, blood in stool, constipation, diarrhea, heartburn, melena, nausea and vomiting. Genitourinary: Negative for dysuria, flank pain, frequency, hematuria and urgency. Musculoskeletal: Negative. Negative for back pain, falls, joint pain, myalgias and neck pain. Skin: Negative. Negative for itching and rash. Neurological: Positive for weakness and headaches. Negative for dizziness, tingling, tremors, sensory change, speech change, focal weakness, seizures and loss of consciousness. Endo/Heme/Allergies: Negative. Negative for polydipsia. Does not bruise/bleed easily. Psychiatric/Behavioral: Positive for depression and memory loss. The patient is not nervous/anxious. Physical Exam   Constitutional: She appears ill. No distress. HENT:   Head: Normocephalic and atraumatic. Mouth/Throat: Oropharynx is clear and moist.   Eyes: Conjunctivae and EOM are normal. Pupils are equal, round, and reactive to light. Right eye exhibits no discharge. Left eye exhibits no discharge. No scleral icterus. Neck: Normal range of motion. Neck supple. No JVD present. No tracheal deviation present. Cardiovascular: Normal rate, regular rhythm, normal heart sounds and intact distal pulses. Exam reveals no gallop and no friction rub. No murmur heard. Pulmonary/Chest: Effort normal. No respiratory distress. She has decreased breath sounds in the right lower field and the left lower field. She has no wheezes. She has no rales. She exhibits no tenderness. Pleurx catheter intact   Abdominal: Soft. Bowel sounds are normal. She exhibits no distension and no mass. There is no tenderness. There is no rebound and no guarding. No hernia. Musculoskeletal: Normal range of motion. She exhibits no edema, tenderness or deformity. Lymphadenopathy:     She has no cervical adenopathy. Neurological: She is alert. No cranial nerve deficit. Coordination normal.   Skin: Skin is warm. No rash noted. She is not diaphoretic. No erythema. No pallor.    Psychiatric: Her behavior is normal. Thought content normal. Her mood appears anxious. Cognition and memory are impaired. Nursing note and vitals reviewed.       Vitals:  /70   Pulse 117   Temp 97.9 °F (36.6 °C) (Temporal)   Resp 20   Wt 159 lb (72.1 kg)   SpO2 96%   BMI 24.18 kg/m²       LABS:  Lab Results   Component Value Date    WBC 56.8 12/15/2017    RBC 3.53 12/15/2017    HGB 10.1 12/15/2017    HCT 34.2 12/15/2017    MCV 96.9 12/15/2017    MCH 28.6 12/15/2017    MCHC 29.5 12/15/2017    RDW 18.3 12/15/2017     12/15/2017    MPV 9.8 12/15/2017     Lab Results   Component Value Date     12/15/2017    K 5.9 12/15/2017    CL 99 12/15/2017    CO2 25 12/15/2017    BUN 58 12/15/2017    CREATININE 2.0 12/15/2017    LABGLOM 24 12/15/2017    GLUCOSE 108 12/15/2017    PROT 5.8 12/14/2017    LABALBU 2.4 12/14/2017    CALCIUM 8.6 12/15/2017    BILITOT 0.3 12/14/2017    ALKPHOS 74 12/14/2017    AST 9 12/14/2017    ALT 13 12/14/2017     Lab Results   Component Value Date    PROTIME 13.0 12/14/2017    INR 0.99 12/14/2017     Recent Labs      12/14/17   1825   TROPONINI  0.01     Lab Results   Component Value Date    NITRU Negative 11/04/2017    COLORU YELLOW 11/04/2017    PHUR 5.5 11/04/2017    LABCAST 2-4 Coarse Gran 11/04/2017    WBCUA 6-10 11/04/2017    YEAST 3+ 11/04/2017    BACTERIA 2+ 11/04/2017    CLARITYU CLOUDY 11/04/2017    SPECGRAV 1.015 11/04/2017    LEUKOCYTESUR TRACE 11/04/2017    UROBILINOGEN 0.2 11/04/2017    BILIRUBINUR SMALL 11/04/2017    BLOODU Negative 11/04/2017    GLUCOSEU Negative 11/04/2017    KETUA Negative 11/04/2017     Lab Results   Component Value Date    MG 2.5 11/27/2017     Lab Results   Component Value Date    TSH 5.260 11/22/2017    YYFGJEPW01 362 10/10/2017    FOLATE 7.3 10/10/2017    FERRITIN 158.9 10/10/2017    IRON 18 10/10/2017    TIBC 203 09/28/2017     Lab Results   Component Value Date    TRIG 123 09/03/2017    HDL 43 09/03/2017    LDLCALC 86 09/03/2017     Lab Results 12/14/2017  XR CHEST PORTABLE 12/14/2017 5:15 PM HISTORY: Shortness of breath COMPARISON: Exam dated 11/25/2017. FINDINGS: Again noted is near complete opacification of the left hemithorax. This is largely secondary to what appears to be an area of dense consolidation in the left upper lobe. A chest tube appears to be present in the left lung base. No significant mediastinal shift. Mild prominence of the central pulmonary vasculature with no evidence of pulmonary edema. No superimposed infiltrate in the right lung. No acute bony abnormalities. 1. Overall stable exam. This includes the near complete opacification of the left hemithorax with dense consolidation in the left upper lobe. 2. There appears to be a chest tube in the left lung base. Signed by Dr Tin Paula on 12/14/2017 7:57 PM    Xr Chest Portable    Result Date: 11/25/2017  EXAMINATION: XR CHEST PORTABLE 11/25/2017 8:33 PM HISTORY: Pleural effusion COMPARISON: 11/24/2017 FINDINGS: There is near complete opacification of the left lung. This is similar to the previous exam. This results in a mock line, giving the appearance of a pneumothorax, though no definite pneumothorax is present. Layering pleural fluid is also noted at the right lung base. There is central pulmonary vascular congestion. Right lung appears hyperinflated. Evaluation of heart size is limited by overlying pathology. The aorta is tortuous with atherosclerotic calcifications. A small chest tube is noted at the left lung base, stable    Stable one day appearance of the chest, with bilateral pleural effusions, left much greater than right. Signed by Dr Alvaro Bay on 11/25/2017 8:36 PM    Xr Chest Portable    Result Date: 11/24/2017  Examination. XR CHEST PORTABLE History: Pleural catheter placement. Frontal portable semiupright view of the chest is compared with the previous study dated 11/21/2017.  There is left lung consolidation and pleural effusion occupying most of the left chest. There is a left apical pneumothorax. There is mild pulmonary vascular congestion of the right lung and small right basal pleural effusion. Left-sided chest tube is seen. No bony abnormality. The left lung consolidation and left basal pleural effusion. Left apical pneumothorax. Mild pulmonary vascular congestion of the right lung and a small right basal pleural effusion. The above findings are recorded on a digital voice clip in PACS. Signed by Dr Joy Silva on 11/24/2017 3:23 PM    Xr Chest Portable    Result Date: 11/21/2017  Examination. XR CHEST PORTABLE History: Chest pain. Frontal portable upright view of the chest is compared with the previous study dated 11/7/2017. There is increasing left pleural effusion and left lower lung consolidation. There is a small pleural effusion at the right base. There is mild pulmonary vascular congestion. There is loss of left lung volume with displacement of the cardiomediastinal structures into the left chest. Atheromatous changes of thoracic aorta are noted. No significant bony abnormality seen. Increasing consolidation and pleural effusion in the left chest since the previous study. A small pleural effusion on the right base. Mild pulmonary vascular congestion. The above findings are recorded on a digital voice clip in PACS. Signed by Dr Joy Silva on 11/21/2017 7:23 AM    Pet Ct Skull Base To Mid Thigh    Result Date: 12/1/2017  History: 69-year-old female evaluated for left upper lobe carcinoma and breast cancer. Staging subsequent treatment planning. Reference: CT chest October 10, 2017. CT abdomen pelvis September 4, 2017. Technique With a fasting blood glucose level of 157, 8.3 mCi of F 18 FDG was administered intravenously. Subsequent PET CT imaging was performed from the skull base to the midthigh with low-dose CT technique for attenuation correction. No IV or oral contrast was administered.  The lack of contrast limits the accuracy of evaluation of the solid organs and vasculature. Automated exposure control was utilized to limit radiation dose. Findings: There is a hypermetabolic nodule on the inferior margin of the right parotid measuring 15 mm (maximum SUV 2.5). There is metastatic supraclavicular adenopathy, largest on the left measuring 2.3 x 1.3 cm, maximal SUV 6.2. There is extensive hypermetabolic metastatic adenopathy throughout the mediastinum and both lisseth. For reference a large lymph node anterior to the teresa measures approximately 3.3 x 2.4 cm maximally should be 6.1. Hypermetabolic tumor throughout the left upper lobe is consistent with known malignancy, maximal SUV 5. There is invasion/occlusion of the left upper lobe bronchus. There is hypermetabolic retrocrural adenopathy maximal SUV 4. There are hypermetabolic soft tissue metastatic deposits in the peritoneum. For instance in the right deep pelvis adjacent to the sigmoid, a metastasis measures approximately 3.4 cm with maximal SUV 7.5 smaller peritoneal deposits in the left paracolic gutter. There are diffusely disseminated metastatic deposits in the subcutaneous tissues throughout the body wall, pelvis, and thighs. For instance in the skin in the anterior mid abdominal wall there is a 14 mm metastatic deposit. There is a large metastatic deposit in the proximal left thigh anteromedially measuring 4.7 cm with maximal SUV 7. Low dose nonenhanced CT performed for an attenuation correction and anatomic localization. Area of increased attenuation centered at the left occipital lobe measures approximately 18 mm although partially imaged on the top two CT images. There was no abnormality in this region on CT head 9 days previous or MRI brain 11/9. Airspace opacity throughout the left upper lobe may represent atelectasis and/or pneumonia. Tiny nodule in the right lower lobe periphery is too small for PET characterization. Small bilateral pleural effusions.  There is a presumed Pleurx catheter in the

## 2017-12-15 NOTE — ED NOTES
Spoke with pt and notified MD about her wishes if her heart stops beating and/or she quits breathing. She stated that she would like for her pass naturally and peacefully with no pain. I informed her that we will make her comfortable if that does not happen. Pt is alert and orientated x4. Pt informed that we ask everyone admitted to the hospital this question and that DNR does not mean that we will not treat her. Pt understands. Malick Nolasco RN at bedside for witness of pt's statement.              Marylu Lucas RN  12/14/17 [id number]

## 2017-12-15 NOTE — PLAN OF CARE
Problem: Nutrition  Goal: Optimal nutrition therapy  Outcome: Ongoing  Nutrition Problem: Inadequate oral intake  Intervention: Food and/or Nutrient Delivery: Continue current diet, Start ONS  Nutritional Goals: Pt. will consume 50% or more of meals and ONS

## 2017-12-17 LAB
EKG P AXIS: 85 DEGREES
EKG P-R INTERVAL: 150 MS
EKG Q-T INTERVAL: 298 MS
EKG QRS DURATION: 102 MS
EKG QTC CALCULATION (BAZETT): 420 MS
EKG T AXIS: 130 DEGREES

## (undated) DEVICE — KIT STRT 1000ML PT DISCHRG BTL DSG GZ PD BLU WRAPPING INFO

## (undated) DEVICE — CONTROL SYRINGE LUER-LOCK TIP: Brand: MONOJECT

## (undated) DEVICE — PACK,UNIVERSAL,NO GOWNS: Brand: MEDLINE

## (undated) DEVICE — FORCEPS BX L L240CM DIA2.4MM RAD JAW 4 HOT FOR POLYP DISP

## (undated) DEVICE — ADHESIVE SKIN CLSR 0.7ML TOP DERMBND ADV

## (undated) DEVICE — SOLUTION IV IRRIG POUR BRL 0.9% SODIUM CHL 2F7124

## (undated) DEVICE — MINOR CDS: Brand: MEDLINE INDUSTRIES, INC.

## (undated) DEVICE — SUTURE MCRYL SZ 3-0 L18IN ABSRB UD L19MM PS-2 3/8 CIR PRIM Y497G

## (undated) DEVICE — SUTURE PERMAHAND SZ 2-0 L18IN NONABSORBABLE BLK L26MM SH C012D

## (undated) DEVICE — GLOVE SURG SZ 75 CRM LTX FREE POLYISOPRENE POLYMER BEAD ANTI

## (undated) DEVICE — BLADE SURG NO11 C STL RETRCT DISPOSABLE

## (undated) DEVICE — SUTURE VCRL SZ 4-0 L27IN ABSRB UD L19MM PS-2 3/8 CIR PRIM J426H

## (undated) DEVICE — ASTOUND STANDARD SURGICAL GOWN, XXL: Brand: CONVERTORS

## (undated) DEVICE — E-Z CLEAN, NON-STICK, PTFE COATED, ELECTROSURGICAL NEEDLE ELECTRODE, MODIFIED EXTENDED INSULATION, 2.75 INCH (7 CM): Brand: MEGADYNE

## (undated) DEVICE — C-ARM: Brand: UNBRANDED

## (undated) DEVICE — GLOVE SURG SZ 85 L12IN FNGR ORTHO 126MIL CRM LTX FREE

## (undated) DEVICE — 6 ML SYRINGE LUER-LOCK TIP: Brand: MONOJECT

## (undated) DEVICE — SYRINGE, LUER LOCK, 10ML: Brand: MEDLINE

## (undated) DEVICE — DECANTER FLD 9IN ST BG FOR ASEP TRNSF OF FLD

## (undated) DEVICE — DISCONTINUED USE 127221 SUTURE SILK PRMHND ETHLN BR BLK REV 2-0 18 685H

## (undated) DEVICE — SUTURE VCRL SZ 3-0 L27IN ABSRB UD L26MM SH 1/2 CIR J416H

## (undated) DEVICE — SUTURE MCRYL SZ 4-0 L18IN ABSRB UD L19MM PS-2 3/8 CIR PRIM Y496G

## (undated) DEVICE — ENDO KIT,LOURDES HOSPITAL: Brand: MEDLINE INDUSTRIES, INC.